# Patient Record
Sex: MALE | Race: BLACK OR AFRICAN AMERICAN | NOT HISPANIC OR LATINO | Employment: OTHER | URBAN - METROPOLITAN AREA
[De-identification: names, ages, dates, MRNs, and addresses within clinical notes are randomized per-mention and may not be internally consistent; named-entity substitution may affect disease eponyms.]

---

## 2017-01-03 ENCOUNTER — OFFICE VISIT (OUTPATIENT)
Dept: UROLOGY | Facility: CLINIC | Age: 72
End: 2017-01-03
Attending: UROLOGY
Payer: MEDICARE

## 2017-01-03 VITALS
HEIGHT: 70 IN | BODY MASS INDEX: 36.65 KG/M2 | SYSTOLIC BLOOD PRESSURE: 134 MMHG | HEART RATE: 53 BPM | WEIGHT: 256 LBS | DIASTOLIC BLOOD PRESSURE: 75 MMHG

## 2017-01-03 DIAGNOSIS — N52.9 ERECTILE DYSFUNCTION, UNSPECIFIED ERECTILE DYSFUNCTION TYPE: ICD-10-CM

## 2017-01-03 DIAGNOSIS — C61 CANCER OF PROSTATE WITH HIGH RECURRENCE RISK (STAGE T3A OR GLEASON 8-10 OR PSA > 20): Primary | ICD-10-CM

## 2017-01-03 LAB
BILIRUB SERPL-MCNC: NORMAL MG/DL
BLOOD URINE, POC: NORMAL
COLOR, POC UA: NORMAL
GLUCOSE UR QL STRIP: NORMAL
KETONES UR QL STRIP: NORMAL
LEUKOCYTE ESTERASE URINE, POC: NORMAL
NITRITE, POC UA: NORMAL
PH, POC UA: 6
PROTEIN, POC: NORMAL
SPECIFIC GRAVITY, POC UA: 1.02
UROBILINOGEN, POC UA: NORMAL

## 2017-01-03 PROCEDURE — 99213 OFFICE O/P EST LOW 20 MIN: CPT | Mod: S$PBB,,, | Performed by: UROLOGY

## 2017-01-03 PROCEDURE — 99213 OFFICE O/P EST LOW 20 MIN: CPT | Mod: PBBFAC | Performed by: UROLOGY

## 2017-01-03 PROCEDURE — 81002 URINALYSIS NONAUTO W/O SCOPE: CPT | Mod: PBBFAC | Performed by: UROLOGY

## 2017-01-03 PROCEDURE — 99999 PR PBB SHADOW E&M-EST. PATIENT-LVL III: CPT | Mod: PBBFAC,,, | Performed by: UROLOGY

## 2017-01-03 NOTE — PROGRESS NOTES
"Subjective:      Zheng Talley Jr. is a 71 y.o. male who returns today regarding his     Prostate cancer approximately one year status post surgery.    No pads  +nocturia when he drinks caffeine    No sxs of met disease    The following portions of the patient's history were reviewed and updated as appropriate: allergies, current medications, past family history, past medical history, past social history, past surgical history and problem list.    Review of Systems  Pertinent items are noted in HPI.  A comprehensive multipoint review of systems was negative except as otherwise stated in the HPI.     Objective:   Vitals:   Visit Vitals    /75 (BP Location: Right arm, Patient Position: Sitting, BP Method: Automatic)    Pulse (!) 53    Ht 5' 10" (1.778 m)    Wt 116.1 kg (256 lb)    BMI 36.73 kg/m2       Physical Exam   General: alert and oriented, no acute distress  Respiratory: Symmetric expansion, non-labored breathing  Cardiovascular: no peripheral edema  Abdomen: non distended  Skin: normal coloration and turgor, no rashes, no suspicious skin lesions noted  Neuro: no gross deficits  Psych: normal judgment and insight, normal mood/affect and non-anxious  BLANQUITA empty fossa    Physical Exam    Lab Review   Urinalysis demonstrates micro ua 0-1rbc; no wbc; no bacteria    Lab Results   Component Value Date    WBC 8.63 07/18/2015    HGB 14.3 07/18/2015    HCT 43.7 07/18/2015    MCV 85 07/18/2015     07/18/2015     Lab Results   Component Value Date    CREATININE 1.0 07/18/2015    BUN 13 07/18/2015     Lab Results   Component Value Date    PSA 3.4 02/24/2014    PSA 2.80 02/04/2013    PSA 3.13 07/11/2012    PSADIAG 0.01 11/28/2016    PSADIAG <0.01 06/03/2016    PSADIAG <0.01 03/03/2016         Imaging  -  Assessment and Plan:   Cancer of prostate with high recurrence risk (stage T3a or [place] 8-10 or PSA > 20)  didier 9 4+5 [id number] JEANETTE  rtc 6 months with psa      Erectile dysfunction, unspecified " erectile dysfunction type    Other orders  -     POCT URINE DIPSTICK WITHOUT MICROSCOPE

## 2017-01-25 ENCOUNTER — TELEPHONE (OUTPATIENT)
Dept: UROLOGY | Facility: CLINIC | Age: 72
End: 2017-01-25

## 2017-01-25 RX ORDER — AMLODIPINE BESYLATE 10 MG/1
TABLET ORAL
Qty: 90 TABLET | Refills: 0 | Status: SHIPPED | OUTPATIENT
Start: 2017-01-25 | End: 2017-05-26 | Stop reason: SDUPTHER

## 2017-01-25 RX ORDER — PRAVASTATIN SODIUM 40 MG/1
TABLET ORAL
Qty: 90 TABLET | Refills: 0 | Status: SHIPPED | OUTPATIENT
Start: 2017-01-25 | End: 2017-07-28 | Stop reason: SDUPTHER

## 2017-01-25 RX ORDER — LOSARTAN POTASSIUM AND HYDROCHLOROTHIAZIDE 25; 100 MG/1; MG/1
TABLET ORAL
Qty: 90 TABLET | Refills: 0 | Status: SHIPPED | OUTPATIENT
Start: 2017-01-25 | End: 2017-05-26 | Stop reason: SDUPTHER

## 2017-01-25 RX ORDER — METOPROLOL SUCCINATE 50 MG/1
TABLET, EXTENDED RELEASE ORAL
Qty: 90 TABLET | Refills: 0 | Status: SHIPPED | OUTPATIENT
Start: 2017-01-25 | End: 2017-05-26 | Stop reason: SDUPTHER

## 2017-01-25 NOTE — TELEPHONE ENCOUNTER
Pt called today and states he has been having groin pain.  He has been working out at the gym and thought it was soreness from this, but it has gone away and now come back.  He is not sure if he should see you or PCP.

## 2017-01-25 NOTE — TELEPHONE ENCOUNTER
----- Message from Lupe Umana sent at 1/25/2017  3:33 PM CST -----  Contact: pt   X_  1st Request  _  2nd Request  _  3rd Request    Who:RAYSHAWN ROBBINS JR. [[id number]]    Why: Patient states he would like to speak with the staff in regards to an unresolved problem he is experiencing      What Number to Call Back: [phone]    When to Expect a call back: (Before the end of the day)   -- if call after 3:00 call back will be tomorrow.

## 2017-01-30 ENCOUNTER — OFFICE VISIT (OUTPATIENT)
Dept: UROLOGY | Facility: CLINIC | Age: 72
End: 2017-01-30
Attending: UROLOGY
Payer: MEDICARE

## 2017-01-30 VITALS
BODY MASS INDEX: 36.65 KG/M2 | HEART RATE: 61 BPM | WEIGHT: 256 LBS | HEIGHT: 70 IN | SYSTOLIC BLOOD PRESSURE: 128 MMHG | DIASTOLIC BLOOD PRESSURE: 71 MMHG

## 2017-01-30 DIAGNOSIS — R10.32 BILATERAL GROIN PAIN: Primary | ICD-10-CM

## 2017-01-30 DIAGNOSIS — R10.31 BILATERAL GROIN PAIN: Primary | ICD-10-CM

## 2017-01-30 LAB
BILIRUB SERPL-MCNC: ABNORMAL MG/DL
BLOOD URINE, POC: ABNORMAL
COLOR, POC UA: ABNORMAL
GLUCOSE UR QL STRIP: ABNORMAL
KETONES UR QL STRIP: ABNORMAL
LEUKOCYTE ESTERASE URINE, POC: ABNORMAL
NITRITE, POC UA: ABNORMAL
PH, POC UA: 5
PROTEIN, POC: ABNORMAL
SPECIFIC GRAVITY, POC UA: 1.01
UROBILINOGEN, POC UA: ABNORMAL

## 2017-01-30 PROCEDURE — 99213 OFFICE O/P EST LOW 20 MIN: CPT | Mod: PBBFAC | Performed by: UROLOGY

## 2017-01-30 PROCEDURE — 99212 OFFICE O/P EST SF 10 MIN: CPT | Mod: S$PBB,,, | Performed by: UROLOGY

## 2017-01-30 PROCEDURE — 81002 URINALYSIS NONAUTO W/O SCOPE: CPT | Mod: PBBFAC | Performed by: UROLOGY

## 2017-01-30 PROCEDURE — 99999 PR PBB SHADOW E&M-EST. PATIENT-LVL III: CPT | Mod: PBBFAC,,, | Performed by: UROLOGY

## 2017-01-30 NOTE — PROGRESS NOTES
"Subjective:      Zheng Talley Jr. is a 71 y.o. male who returns today regarding his     Bilateral groin pain which is worse when he is working in his garage.  This resolved spontaneously.  No bulge to suggest a hernia.  Currently no complaints.  He is having no pain today.    The discomfort was mild.  No renal colic.  No hematuria.  Dysuria.  No urinary complaints    The following portions of the patient's history were reviewed and updated as appropriate: allergies, current medications, past family history, past medical history, past social history, past surgical history and problem list.    Review of Systems  Pertinent items are noted in HPI.  A comprehensive multipoint review of systems was negative except as otherwise stated in the HPI.     Objective:   Vitals:   Visit Vitals    /71 (BP Location: Right arm, Patient Position: Sitting, BP Method: Automatic)    Pulse 61    Ht 5' 10" (1.778 m)    Wt 116.1 kg (256 lb)    BMI 36.73 kg/m2       Physical Exam   General: alert and oriented, no acute distress  Respiratory: Symmetric expansion, non-labored breathing  Cardiovascular: no peripheral edema  Abdomen: soft, non distended  Skin: normal coloration and turgor, no rashes, no suspicious skin lesions noted  Neuro: no gross deficits  Psych: normal judgment and insight, normal mood/affect and non-anxious  No inguinal hernias palpable.  Testes epididymis and penis normal.  Scrotum normal.  No tenderness  Physical Exam    Lab Review   Urinalysis demonstrates trace blood on dipstick.  Microscopic urinalysis 0-2 red blood cells otherwise negative  Lab Results   Component Value Date    WBC 8.63 07/18/2015    HGB 14.3 07/18/2015    HCT 43.7 07/18/2015    MCV 85 07/18/2015     07/18/2015     Lab Results   Component Value Date    CREATININE 1.0 07/18/2015    BUN 13 07/18/2015       Imaging  -  Assessment and Plan:   Bilateral groin pain; resolved appears to have been musculoskeletal in origin  -     POCT URINE " DIPSTICK WITHOUT MICROSCOPE    Return to clinic as needed if the pain recurs.  Follow-up as previously scheduled for his prostate cancer

## 2017-03-08 ENCOUNTER — HOSPITAL ENCOUNTER (EMERGENCY)
Facility: OTHER | Age: 72
Discharge: HOME | End: 2017-03-08
Attending: EMERGENCY MEDICINE
Payer: MEDICARE

## 2017-03-08 VITALS
HEIGHT: 70 IN | WEIGHT: 248 LBS | RESPIRATION RATE: 18 BRPM | SYSTOLIC BLOOD PRESSURE: 131 MMHG | BODY MASS INDEX: 35.5 KG/M2 | OXYGEN SATURATION: 94 % | HEART RATE: 48 BPM | DIASTOLIC BLOOD PRESSURE: 71 MMHG | TEMPERATURE: 98 F

## 2017-03-08 DIAGNOSIS — S61.011A LACERATION OF THUMB, RIGHT, INITIAL ENCOUNTER: Primary | ICD-10-CM

## 2017-03-08 PROCEDURE — 63600175 PHARM REV CODE 636 W HCPCS: Performed by: EMERGENCY MEDICINE

## 2017-03-08 PROCEDURE — 90715 TDAP VACCINE 7 YRS/> IM: CPT | Performed by: EMERGENCY MEDICINE

## 2017-03-08 PROCEDURE — 25000003 PHARM REV CODE 250: Performed by: EMERGENCY MEDICINE

## 2017-03-08 PROCEDURE — 90471 IMMUNIZATION ADMIN: CPT | Performed by: EMERGENCY MEDICINE

## 2017-03-08 PROCEDURE — 99284 EMERGENCY DEPT VISIT MOD MDM: CPT | Mod: 25

## 2017-03-08 PROCEDURE — 12042 INTMD RPR N-HF/GENIT2.6-7.5: CPT

## 2017-03-08 RX ORDER — OXYCODONE AND ACETAMINOPHEN 5; 325 MG/1; MG/1
1 TABLET ORAL
Status: COMPLETED | OUTPATIENT
Start: 2017-03-08 | End: 2017-03-08

## 2017-03-08 RX ORDER — LIDOCAINE HYDROCHLORIDE 10 MG/ML
5 INJECTION INFILTRATION; PERINEURAL
Status: COMPLETED | OUTPATIENT
Start: 2017-03-08 | End: 2017-03-08

## 2017-03-08 RX ORDER — BACITRACIN ZINC 500 UNIT/G
OINTMENT (GRAM) TOPICAL 2 TIMES DAILY
Qty: 30 G | Refills: 0 | Status: SHIPPED | OUTPATIENT
Start: 2017-03-08 | End: 2017-03-18

## 2017-03-08 RX ORDER — OXYCODONE AND ACETAMINOPHEN 5; 325 MG/1; MG/1
1 TABLET ORAL EVERY 4 HOURS PRN
Qty: 10 TABLET | Refills: 0 | Status: SHIPPED | OUTPATIENT
Start: 2017-03-08 | End: 2017-05-26

## 2017-03-08 RX ORDER — CEPHALEXIN 500 MG/1
500 CAPSULE ORAL 4 TIMES DAILY
Qty: 20 CAPSULE | Refills: 0 | Status: SHIPPED | OUTPATIENT
Start: 2017-03-08 | End: 2017-03-13

## 2017-03-08 RX ADMIN — BACITRACIN, NEOMYCIN, POLYMYXIN B 1 EACH: 400; 3.5; 5 OINTMENT TOPICAL at 10:03

## 2017-03-08 RX ADMIN — OXYCODONE HYDROCHLORIDE AND ACETAMINOPHEN 1 TABLET: 5; 325 TABLET ORAL at 10:03

## 2017-03-08 RX ADMIN — CLOSTRIDIUM TETANI TOXOID ANTIGEN (FORMALDEHYDE INACTIVATED), CORYNEBACTERIUM DIPHTHERIAE TOXOID ANTIGEN (FORMALDEHYDE INACTIVATED), BORDETELLA PERTUSSIS TOXOID ANTIGEN (GLUTARALDEHYDE INACTIVATED), BORDETELLA PERTUSSIS FILAMENTOUS HEMAGGLUTININ ANTIGEN (FORMALDEHYDE INACTIVATED), BORDETELLA PERTUSSIS PERTACTIN ANTIGEN, AND BORDETELLA PERTUSSIS FIMBRIAE 2/3 ANTIGEN 0.5 ML: 5; 2; 2.5; 5; 3; 5 INJECTION, SUSPENSION INTRAMUSCULAR at 10:03

## 2017-03-08 RX ADMIN — LIDOCAINE HYDROCHLORIDE 5 ML: 10 INJECTION, SOLUTION INFILTRATION; PERINEURAL at 10:03

## 2017-03-08 NOTE — ED AVS SNAPSHOT
[facility]  [address] [facility] [address]               Zheng Talley Jr.   3/8/2017  9:23 PM   ED    Description:  Male : 1945   Department:  [facility]           Your Care was Coordinated By:     Provider Role From To    Rebecca Abernathy MD Attending Provider 17 [phone] --      Reason for Visit     Laceration           Diagnoses this Visit        Comments    Laceration of thumb, right, initial encounter    -  Primary       ED Disposition     ED Disposition Condition Comment    Discharge             To Do List           Follow-up Information     Follow up with Claude S. Williams Iv, MD. Schedule an appointment as soon as possible for a visit in 2 days.    Specialty:  Orthopedic Surgery    Contact information:    [address] [facility] [id number] [phone]          Go to EM[facility].    Why:  If symptoms worsen- fever, pus, worsened pain    Contact information:    [address] [facility] [address]         These Medications        Disp Refills Start End    cephALEXin (KEFLEX) 500 MG capsule 20 capsule 0 3/8/2017 3/13/2017    Take 1 capsule (500 mg total) by mouth 4 (four) times daily. - Oral    Pharmacy: [facility] [address] [place] [address] Tarena Koko Manzanares Ph #: [phone]       oxycodone-acetaminophen (PERCOCET) 5-325 mg per tablet 10 tablet 0 3/8/2017     Take 1 tablet by mouth every 4 (four) hours as needed for Pain. - Oral    Pharmacy: [facility] [address] [place] [address] [address] Ph #: [phone]       bacitracin 500 unit/gram Oint 30 g 0 3/8/2017 3/18/2017    Apply topically 2 (two) times daily. - Topical (Top)    Pharmacy: [facility] [address] [place] [address] [address] Ph #: [phone]         [facility]     [facility] -  Assistance  Registered nurses in the [facility] provide clinical advisement, health  education, appointment booking, and other advisory services.  Call for this free service at [phone].             Medications           Message regarding Medications     Verify the changes and/or additions to your medication regime listed below are the same as discussed with your clinician today.  If any of these changes or additions are incorrect, please notify your healthcare provider.        START taking these NEW medications        Refills    cephALEXin (KEFLEX) 500 MG capsule 0    Sig: Take 1 capsule (500 mg total) by mouth 4 (four) times daily.    Class: Print    Route: Oral    oxycodone-acetaminophen (PERCOCET) 5-325 mg per tablet 0    Sig: Take 1 tablet by mouth every 4 (four) hours as needed for Pain.    Class: Print    Route: Oral    bacitracin 500 unit/gram Oint 0    Sig: Apply topically 2 (two) times daily.    Class: Print    Route: Topical (Top)      These medications were administered today        Dose Freq    lidocaine HCL 10 mg/ml (1%) injection 5 mL 5 mL ED 1 Time    Si mLs by Infiltration route ED 1 Time.    Class: Normal    Route: Infiltration    neomycin-bacitracnZn-polymyxnB packet 1 each 1 packet ED 1 Time    Sig: Apply 1 each topically ED 1 Time.    Class: Normal    Route: Topical (Top)    Tdap vaccine injection 0.5 mL 0.5 mL Once    Sig: Inject 0.5 mLs into the muscle once.    Class: Normal    Route: Intramuscular    oxycodone-acetaminophen 5-325 mg per tablet 1 tablet 1 tablet ED 1 Time    Sig: Take 1 tablet by mouth ED 1 Time.    Class: Normal    Route: Oral           Verify that the below list of medications is an accurate representation of the medications you are currently taking.  If none reported, the list may be blank. If incorrect, please contact your healthcare provider. Carry this list with you in case of emergency.           Current Medications     amlodipine (NORVASC) 10 MG tablet TAKE 1 TABLET DAILY    losartan-hydrochlorothiazide 100-25 mg (HYZAAR) 100-25 mg per tablet  "TAKE 1 TABLET DAILY    metoprolol succinate (TOPROL-XL) 50 MG 24 hr tablet TAKE 1 TABLET DAILY    pravastatin (PRAVACHOL) 40 MG tablet TAKE 1 TABLET EVERY EVENING    valacyclovir (VALTREX) 1000 MG tablet Take 1 tablet (1,000 mg total) by mouth once daily.    bacitracin 500 unit/gram Oint Apply topically 2 (two) times daily.    cephALEXin (KEFLEX) 500 MG capsule Take 1 capsule (500 mg total) by mouth 4 (four) times daily.    oxycodone-acetaminophen (PERCOCET) 5-325 mg per tablet Take 1 tablet by mouth every 4 (four) hours as needed for Pain.    sildenafil (REVATIO) 20 mg Tab Take 1 tablet (20 mg total) by mouth daily as needed.           Clinical Reference Information           Your Vitals Were     BP Pulse Temp Resp Height Weight    141/77 (BP Location: Left arm) 56 98.3 °F (36.8 °C) (Oral) 18 5' 10" (1.778 m) 112.5 kg (248 lb)    SpO2 BMI             96% 35.58 kg/m2         Allergies as of 3/8/2017        Reactions    Iodine And Iodide Containing Products Other (See Comments)    Blood in urine in the 1970s      Immunizations Administered on Date of Encounter - 3/8/2017     Name Date Dose VIS Date Route    TDAP 3/8/2017 0.5 mL 2/24/2015 Intramuscular      ED Micro, Lab, POCT     None      ED Imaging Orders     Start Ordered       Status Ordering Provider    03/08/17 1951 03/08/17 1951  X-Ray Finger 2 or More Views  1 time imaging      Final result         Discharge Instructions         Extremity Laceration: Sutures, Staples, or Tape  A laceration is a cut through the skin. If it is deep, it may require stitches (sutures) or staples to close so it can heal. Minor cuts may be treated with surgical tape closures.   X-rays may be done if something may have entered the skin through the cut. You may also need a tetanus shot if you are not up to date on this vaccination.  Home care  · Follow the health care providers instructions on how to care for the cut.  · Wash your hands with soap and warm water before and after " caring for your wound. This is to help prevent infection.  · Keep the wound clean and dry. If a bandage was applied and it becomes wet or dirty, replace it. Otherwise, leave it in place for the first 24 hours, then change it once a day or as directed.  · If sutures or staples were used, clean the wound daily:  · After removing the bandage, wash the area with soap and water. Use a wet cotton swab to loosen and remove any blood or crust that forms.  · After cleaning, keep the wound clean and dry. Talk with your doctor before applying any antibiotic ointment to the wound. Reapply the bandage.  · You may remove the bandage to shower as usual after the first 24 hours, but do not soak the area in water (no swimming) until the stitches or staples are removed.  · If surgical tape closures were used, keep the area clean and dry. If it becomes wet, blot it dry with a towel.  · The doctor may prescribe an antibiotic cream or ointment to prevent infection. Do not stop taking this medication until you have finished the prescribed course or the doctor tells you to stop. The doctor may also prescribe medications for pain. Follow the doctors instructions for taking these medications.  · Avoid activities that may reopen your wound.  Follow-up care  Follow up with your health care provider. Most skin wounds heal within ten days. However, an infection may sometimes occur despite proper treatment. Therefore, check the wound daily for the signs of infection listed below. Stitches and staples should be removed within 7-14 days. If surgical tape closures were used, you may remove them after 10 days if they have not fallen off by then.   When to seek medical advice  Call your health care provider right away if any of these occur:  · Wound bleeding not controlled by direct pressure  · Signs of infection, including increasing pain in the wound, increasing wound redness or swelling, or pus or bad odor coming from the wound  · Fever  of 100.4°F (38ºC) or higher or as directed by your healthcare provider  · Stitches or staples come apart or fall out or surgical tape falls off before 7 days  · Wound edges re-open  · Wound changes colors  · Numbness around the wound   · Decreased movement around the injured area  Date Last Reviewed: 6/14/2015  © 2160-0799 [employer]. [address] [place] [address], [place] [address]. All rights reserved. This information is not intended as a substitute for professional medical care. Always follow your healthcare professional's instructions.          Your Scheduled Appointments     Jul 07, 2017  7:30 AM CDT   Established Patient Visit with Derrick Wu MD   [facility]    [address] [place] [address], [facility] [address]  [facility] [address] [phone]               [facility] complies with applicable Federal civil rights laws and does not discriminate on the basis of race, color, national origin, age, disability, or sex.        Language Assistance Services     ATTENTION: Language assistance services are available, free of charge. Please call [phone].      ATENCIÓN: Si habla lucasañol, tiene a newell disposición servicios gratuitos de asistencia lingüística. Llame al [phone].     CHÚ Ý: N?u b?n nói Ti?ng Vi?t, có các d?ch v? h? tr? ngôn ng? mi?n phí dành cho b?n. G?i s? [phone].

## 2017-03-09 NOTE — DISCHARGE INSTRUCTIONS
Extremity Laceration: Sutures, Staples, or Tape  A laceration is a cut through the skin. If it is deep, it may require stitches (sutures) or staples to close so it can heal. Minor cuts may be treated with surgical tape closures.   X-rays may be done if something may have entered the skin through the cut. You may also need a tetanus shot if you are not up to date on this vaccination.  Home care  · Follow the health care providers instructions on how to care for the cut.  · Wash your hands with soap and warm water before and after caring for your wound. This is to help prevent infection.  · Keep the wound clean and dry. If a bandage was applied and it becomes wet or dirty, replace it. Otherwise, leave it in place for the first 24 hours, then change it once a day or as directed.  · If sutures or staples were used, clean the wound daily:  · After removing the bandage, wash the area with soap and water. Use a wet cotton swab to loosen and remove any blood or crust that forms.  · After cleaning, keep the wound clean and dry. Talk with your doctor before applying any antibiotic ointment to the wound. Reapply the bandage.  · You may remove the bandage to shower as usual after the first 24 hours, but do not soak the area in water (no swimming) until the stitches or staples are removed.  · If surgical tape closures were used, keep the area clean and dry. If it becomes wet, blot it dry with a towel.  · The doctor may prescribe an antibiotic cream or ointment to prevent infection. Do not stop taking this medication until you have finished the prescribed course or the doctor tells you to stop. The doctor may also prescribe medications for pain. Follow the doctors instructions for taking these medications.  · Avoid activities that may reopen your wound.  Follow-up care  Follow up with your health care provider. Most skin wounds heal within ten days. However, an infection may sometimes occur despite proper treatment.  Therefore, check the wound daily for the signs of infection listed below. Stitches and staples should be removed within 7-14 days. If surgical tape closures were used, you may remove them after 10 days if they have not fallen off by then.   When to seek medical advice  Call your health care provider right away if any of these occur:  · Wound bleeding not controlled by direct pressure  · Signs of infection, including increasing pain in the wound, increasing wound redness or swelling, or pus or bad odor coming from the wound  · Fever of 100.4°F (38ºC) or higher or as directed by your healthcare provider  · Stitches or staples come apart or fall out or surgical tape falls off before 7 days  · Wound edges re-open  · Wound changes colors  · Numbness around the wound   · Decreased movement around the injured area  Date Last Reviewed: 6/14/2015  © 6404-2966 [employer]. [address] [place] [address], [place] [address]. All rights reserved. This information is not intended as a substitute for professional medical care. Always follow your healthcare professional's instructions.

## 2017-03-09 NOTE — ED TRIAGE NOTES
Pt states he cut his R thumb while working with a table saw.  Not currently bleeding.  [place]-shaped laceration noted to pad of R thumb, approx 3.5 cm

## 2017-03-09 NOTE — ED PROVIDER NOTES
Encounter Date: 3/8/2017    SCRIBE #1 NOTE: I, Landy Rojas, am scribing for, and in the presence of,  Dr. Abernathy. I have scribed the entire note.       History     Chief Complaint   Patient presents with    Laceration     pt has a laceration to the R thumb from a table saw, bleeding stable     Review of patient's allergies indicates:   Allergen Reactions    Iodine and iodide containing products Other (See Comments)     Blood in urine in the 1970s     HPI Comments: Time seen by provider: 9:52 PM    This is a 72 y.o. male who presents with complaint of right thumb laceration. He reports onset of incident was a few hours ago. The patient notes he was working with a circular saw when he accidentally sliced his finger. He states he had immediate bleeding from the wound that was controlled with compress. The patient notes associated numbness to the tip of the finger but denies any swelling or weakness to the left finger. He denies any other injury. The patient denies being on blood thinners or history of bleeding disorder. He states he is right hand dominant.     The history is provided by the patient.     Past Medical History:   Diagnosis Date    BPH (benign prostatic hyperplasia)     Cancer     Groin pain     History of gastroesophageal reflux (GERD)     Hyperlipidemia     Hypertension     Nuclear sclerosis - Both Eyes 2/18/2014     Past Surgical History:   Procedure Laterality Date    BUNIONECTOMY      bilateral     COLONOSCOPY  2011    Dr. Velez    HAND SURGERY      lip surgery       Family History   Problem Relation Age of Onset    Cancer Father      leukemia    Diabetes Father     Coronary artery disease Mother     Heart disease Neg Hx     Amblyopia Neg Hx     Blindness Neg Hx     Cataracts Neg Hx     Glaucoma Neg Hx     Macular degeneration Neg Hx     Retinal detachment Neg Hx     Strabismus Neg Hx      Social History   Substance Use Topics    Smoking status: Former Smoker      Types: Cigarettes    Smokeless tobacco: Never Used      Comment: QUIT 1976    Alcohol use 0.0 oz/week     0 Standard drinks or equivalent per week      Comment: occasionally     Review of Systems   Constitutional: Negative for chills and fever.   HENT: Negative for congestion and sore throat.    Eyes: Negative for redness and visual disturbance.   Respiratory: Negative for cough and shortness of breath.    Cardiovascular: Negative for chest pain and palpitations.   Gastrointestinal: Negative for abdominal pain, diarrhea, nausea and vomiting.   Genitourinary: Negative for dysuria.   Musculoskeletal: Negative for back pain.   Skin: Positive for wound (right thumb laceration). Negative for rash.   Neurological: Negative for weakness and headaches.   Psychiatric/Behavioral: Negative for confusion.       Physical Exam   Initial Vitals   BP Pulse Resp Temp SpO2   03/08/17 1947 03/08/17 1947 03/08/17 1947 03/08/17 1947 03/08/17 1947   141/77 56 18 98.3 °F (36.8 °C) 96 %     Physical Exam    Nursing note and vitals reviewed.  Constitutional: He appears well-developed and well-nourished. He is not diaphoretic. No distress.   HENT:   Head: Normocephalic and atraumatic.   Right Ear: External ear normal.   Left Ear: External ear normal.   Eyes: Conjunctivae and EOM are normal.   Neck: Normal range of motion. Neck supple.   Cardiovascular: Intact distal pulses.   Pulmonary/Chest: No respiratory distress.   Musculoskeletal: Normal range of motion. He exhibits tenderness. He exhibits no edema.        Right hand: He exhibits normal capillary refill. Decreased sensation is not present in the ulnar distribution, is not present in the medial distribution and is not present in the radial distribution. Right thumb: Exhibits bleeding, swelling and tenderness. Injuries: laceration. Motor /Testing: : 5/5. Thumb APB: 5/5. Thumb FPL: 5/5. Pinch Mechanism: 5/5.        Hands:  Semi-circular irregular deep laceration to right thumb distal  to DIP joint with extensive tissue injury and maceration of wound borders. Able to fully flex and extend digit as well as perform opposition. Full distal sensation intact.    Lymphadenopathy:     He has no cervical adenopathy.   Neurological: He is alert and oriented to person, place, and time. He has normal strength.   Skin: Skin is warm and dry. Laceration noted. No rash noted.         ED Course   Lac Repair  Date/Time: 3/8/2017 10:15 PM  Performed by: TIFFANY JULIEN  Authorized by: TIFFANY JULIEN   Body area: upper extremity  Location details: right thumb  Laceration length: 3 cm  Foreign bodies: no foreign bodies  Tendon involvement: none  Vascular damage: yes  Anesthesia: digital block    Anesthesia:  Anesthesia: digital block  Local Anesthetic: lidocaine 1% without epinephrine   Anesthetic total: 4 mL  Patient sedated: no  Preparation: Patient was prepped and draped in the usual sterile fashion.  Irrigation solution: saline  Irrigation method: syringe  Amount of cleaning: extensive  Debridement: moderate  Degree of undermining: minimal  Skin closure: 5-0 nylon  Number of sutures: 8  Technique: simple, horizontal mattress and vertical mattress (interrupted)  Approximation: loose  Approximation difficulty: complex  Dressing: antibiotic ointment, non-stick sterile dressing, biologic dressing, bulky dressing, dressing applied and splint for protection  Patient tolerance: Patient tolerated the procedure well with no immediate complications        Labs Reviewed - No data to display       X-Rays:   Independently Interpreted Readings:   Other Readings:  Finger 2 or More View X-ray Reading (9:51 PM): No foreign body. No fracture or dislocation.     Medical Decision Making:   Initial Assessment:   Urgent evaluation of 72-year-old right-hand-dominant gentleman with history of hypertension presenting with an injury to the right thumb from a saw while performing woodworking.  On exam patient has 3 cm irregular  laceration with macerated tissue present distal to the DIP joint on the palmar surface.  Patient has full sensation, opposition, flexion and extension strength to the digit.  Wound irrigated, repaired, tetanus updated, and discharged home with empiric antibiotics, splint, and strict return precautions regarding infection, fever, purulence, and instructions for wound care.     Additional MDM:   X-Rays: I have independently interpreted X-Ray(s) - see notes.          Scribe Attestation:   Scribe #1: I performed the above scribed service and the documentation accurately describes the services I performed. I attest to the accuracy of the note.    Attending Attestation:           Physician Attestation for Scribe:  Physician Attestation Statement for Scribe #1: I, Dr. Abernathy, reviewed documentation, as scribed by Landy Rojas in my presence, and it is both accurate and complete.                 ED Course     Clinical Impression:     1. Laceration of thumb, right, initial encounter          Disposition:   Disposition: Discharged  Condition: Stable       Rebecca Abernathy MD  03/10/17 1151

## 2017-03-15 ENCOUNTER — PATIENT MESSAGE (OUTPATIENT)
Dept: INTERNAL MEDICINE | Facility: CLINIC | Age: 72
End: 2017-03-15

## 2017-03-15 NOTE — TELEPHONE ENCOUNTER
I believe [facility] does take Medicare.  He would have to confirm that prior to the appointment.  Please remind the patient that [facility] is an [facility] partner physician group and that we work closely with them.

## 2017-05-16 RX ORDER — METOPROLOL SUCCINATE 50 MG/1
TABLET, EXTENDED RELEASE ORAL
Qty: 90 TABLET | OUTPATIENT
Start: 2017-05-16

## 2017-05-16 RX ORDER — LOSARTAN POTASSIUM AND HYDROCHLOROTHIAZIDE 25; 100 MG/1; MG/1
TABLET ORAL
Qty: 90 TABLET | OUTPATIENT
Start: 2017-05-16

## 2017-05-16 RX ORDER — AMLODIPINE BESYLATE 10 MG/1
TABLET ORAL
Qty: 90 TABLET | OUTPATIENT
Start: 2017-05-16

## 2017-05-17 ENCOUNTER — PATIENT MESSAGE (OUTPATIENT)
Dept: INTERNAL MEDICINE | Facility: CLINIC | Age: 72
End: 2017-05-17

## 2017-05-26 ENCOUNTER — OFFICE VISIT (OUTPATIENT)
Dept: INTERNAL MEDICINE | Facility: CLINIC | Age: 72
End: 2017-05-26
Attending: FAMILY MEDICINE
Payer: MEDICARE

## 2017-05-26 ENCOUNTER — LAB VISIT (OUTPATIENT)
Dept: LAB | Facility: OTHER | Age: 72
End: 2017-05-26
Attending: FAMILY MEDICINE
Payer: MEDICARE

## 2017-05-26 VITALS
BODY MASS INDEX: 34.84 KG/M2 | WEIGHT: 243.38 LBS | SYSTOLIC BLOOD PRESSURE: 120 MMHG | DIASTOLIC BLOOD PRESSURE: 70 MMHG | HEART RATE: 94 BPM | HEIGHT: 70 IN

## 2017-05-26 DIAGNOSIS — C61 PROSTATE CANCER: ICD-10-CM

## 2017-05-26 DIAGNOSIS — I10 ESSENTIAL HYPERTENSION: ICD-10-CM

## 2017-05-26 DIAGNOSIS — I10 ESSENTIAL HYPERTENSION: Primary | ICD-10-CM

## 2017-05-26 LAB
ALBUMIN SERPL BCP-MCNC: 4.1 G/DL
ALP SERPL-CCNC: 57 U/L
ALT SERPL W/O P-5'-P-CCNC: 23 U/L
ANION GAP SERPL CALC-SCNC: 10 MMOL/L
AST SERPL-CCNC: 21 U/L
BASOPHILS # BLD AUTO: 0.03 K/UL
BASOPHILS NFR BLD: 0.5 %
BILIRUB SERPL-MCNC: 0.5 MG/DL
BUN SERPL-MCNC: 17 MG/DL
CALCIUM SERPL-MCNC: 9.7 MG/DL
CHLORIDE SERPL-SCNC: 101 MMOL/L
CO2 SERPL-SCNC: 29 MMOL/L
CREAT SERPL-MCNC: 1.3 MG/DL
DIFFERENTIAL METHOD: ABNORMAL
EOSINOPHIL # BLD AUTO: 0.1 K/UL
EOSINOPHIL NFR BLD: 1.4 %
ERYTHROCYTE [DISTWIDTH] IN BLOOD BY AUTOMATED COUNT: 14.6 %
EST. GFR  (AFRICAN AMERICAN): >60 ML/MIN/1.73 M^2
EST. GFR  (NON AFRICAN AMERICAN): 55 ML/MIN/1.73 M^2
GLUCOSE SERPL-MCNC: 82 MG/DL
HCT VFR BLD AUTO: 47.5 %
HGB BLD-MCNC: 15.5 G/DL
LYMPHOCYTES # BLD AUTO: 1.6 K/UL
LYMPHOCYTES NFR BLD: 27.5 %
MCH RBC QN AUTO: 28.1 PG
MCHC RBC AUTO-ENTMCNC: 32.6 %
MCV RBC AUTO: 86 FL
MONOCYTES # BLD AUTO: 0.6 K/UL
MONOCYTES NFR BLD: 10.7 %
NEUTROPHILS # BLD AUTO: 3.4 K/UL
NEUTROPHILS NFR BLD: 59.7 %
PLATELET # BLD AUTO: 181 K/UL
PMV BLD AUTO: 10.7 FL
POTASSIUM SERPL-SCNC: 4 MMOL/L
PROT SERPL-MCNC: 7.5 G/DL
RBC # BLD AUTO: 5.52 M/UL
SODIUM SERPL-SCNC: 140 MMOL/L
WBC # BLD AUTO: 5.71 K/UL

## 2017-05-26 PROCEDURE — 85025 COMPLETE CBC W/AUTO DIFF WBC: CPT

## 2017-05-26 PROCEDURE — 99999 PR PBB SHADOW E&M-EST. PATIENT-LVL II: CPT | Mod: PBBFAC,,, | Performed by: FAMILY MEDICINE

## 2017-05-26 PROCEDURE — 36415 COLL VENOUS BLD VENIPUNCTURE: CPT

## 2017-05-26 PROCEDURE — 99214 OFFICE O/P EST MOD 30 MIN: CPT | Mod: S$PBB,,, | Performed by: FAMILY MEDICINE

## 2017-05-26 PROCEDURE — 80053 COMPREHEN METABOLIC PANEL: CPT

## 2017-05-26 RX ORDER — AMLODIPINE BESYLATE 10 MG/1
10 TABLET ORAL DAILY
Qty: 90 TABLET | Refills: 3 | Status: SHIPPED | OUTPATIENT
Start: 2017-05-26 | End: 2018-05-24 | Stop reason: SDUPTHER

## 2017-05-26 RX ORDER — METOPROLOL SUCCINATE 50 MG/1
50 TABLET, EXTENDED RELEASE ORAL DAILY
Qty: 90 TABLET | Refills: 3 | Status: SHIPPED | OUTPATIENT
Start: 2017-05-26 | End: 2018-05-24 | Stop reason: SDUPTHER

## 2017-05-26 RX ORDER — VALACYCLOVIR HYDROCHLORIDE 1 G/1
1000 TABLET, FILM COATED ORAL DAILY
Qty: 90 TABLET | Refills: 3 | Status: SHIPPED | OUTPATIENT
Start: 2017-05-26 | End: 2018-07-25 | Stop reason: SDUPTHER

## 2017-05-26 RX ORDER — LOSARTAN POTASSIUM AND HYDROCHLOROTHIAZIDE 25; 100 MG/1; MG/1
1 TABLET ORAL DAILY
Qty: 90 TABLET | Refills: 3 | Status: SHIPPED | OUTPATIENT
Start: 2017-05-26 | End: 2018-05-24 | Stop reason: SDUPTHER

## 2017-05-26 NOTE — PROGRESS NOTES
"CHIEF COMPLAINT: Annual exam and hypertension follow-up    HISTORY OF PRESENT ILLNESS: The patient is a 72 year-old male.  The patient has been treated with robotic prostatectomy since last time I saw him.  He is closely with urologic oncology.  All is going well there.      The patient has a history of stable hypertension on current medications.  Patient denies chest pain or shortness of breath today.    The patient has a history of stable hyperlipidemia on current medications.  The patient denies chest pain or shortness of breath today.  The patient denies muscle aches or myalgias suggestive of myositis.    REVIEW OF SYSTEMS:  GENERAL: No fever, chills, fatigability or weight loss.  SKIN: No rashes, itching or changes in color or texture of skin.  HEAD: No headaches or recent head trauma.  EYES: Visual acuity fine. No photophobia, ocular pain or diplopia.  EARS: Denies ear pain, discharge or vertigo.  NOSE: No loss of smell, no epistaxis or postnasal drip.  MOUTH & THROAT: No hoarseness or change in voice. No excessive gum bleeding.  NODES: Denies swollen glands.  CHEST: Denies CLIFFORD, cyanosis, wheezing, cough and sputum production.  CARDIOVASCULAR: Denies chest pain, PND, orthopnea or reduced exercise tolerance.  ABDOMEN: Appetite fine. No weight loss. Denies diarrhea, abdominal pain, hematemesis or blood in stool.  URINARY: No flank pain, dysuria or hematuria.  PERIPHERAL VASCULAR: No claudication or cyanosis.  MUSCULOSKELETAL: No joint stiffness or swelling. Denies back pain.  NEUROLOGIC: No history of seizures, paralysis, alteration of gait or coordination.    SOCIAL HISTORY: The patient does not smoke.  The patient consumes alcohol socially.  The patient is happily .    PHYSICAL EXAMINATION:     Blood pressure 120/70, pulse 94, height 5' 10" (1.778 m), weight 110.4 kg (243 lb 6.2 oz).    APPEARANCE: Well nourished, well developed, in no acute distress.    HEAD: Normocephalic, atraumatic.  EYES: PERRL. " EOMI.  Conjunctivae without injection and  anicteric  EARS: TM's intact. Light reflex normal. No retraction or perforation.    NOSE: Mucosa pink. Airway clear.  MOUTH & THROAT: No tonsillar enlargement. No pharyngeal erythema or exudate. No stridor.  NECK: Supple.   NODES: No cervical, axillary or inguinal lymph node enlargement.  CHEST: Lungs clear to auscultation.  No retractions are noted.  No rales or rhonchi are present.  CARDIOVASCULAR: Normal S1, S2. No rubs, murmurs or gallops.  ABDOMEN: Bowel sounds normal. Not distended. Soft. No tenderness or masses.  No ascites is noted.  MUSCULOSKELETAL:  There is no clubbing, cyanosis, or edema of the extremities x4.  There is full range of motion of the lumbar spine.  There is full range of motion of the extremities x4.  There is no deformity noted.    NEUROLOGIC:       Normal speech development.      Hearing normal.      Normal gait.      Motor and sensory exams grossly normal.      DTR's normal.  PSYCHIATRIC: Patient is alert and oriented x3.  Thought processes are all normal.  There is no homicidality.  There is no suicidality.  There is no evidence of psychosis.    LABORATORY/RADIOLOGY:   Chart reviewed.  We will update blood work today.    ASSESSMENT:   Normal physical exam in an apparently healthy individual  Prostate cancer, believed to be cured  Hypertension    PLAN:  We will follow-up blood work which we expect to be normal.  Blood pressure medications refilled   I discussed his case with his urologist today.    Return to clinic in one year.

## 2017-06-02 ENCOUNTER — PATIENT MESSAGE (OUTPATIENT)
Dept: INTERNAL MEDICINE | Facility: CLINIC | Age: 72
End: 2017-06-02

## 2017-06-07 ENCOUNTER — TELEPHONE (OUTPATIENT)
Dept: INTERNAL MEDICINE | Facility: CLINIC | Age: 72
End: 2017-06-07

## 2017-06-07 NOTE — TELEPHONE ENCOUNTER
"----- Message from Marleny Salazar sent at 6/7/2017  3:06 PM CDT -----  Contact: Patient himself  X  1st Request  _  2nd Request  _  3rd Request    Who: Zheng Talley (mrn# [id number])    Why: Patient called and said, "Wednesday 06/17/2017 at 10:40AM is fine; it works for him."   Please give a call back at your earliest convenience if there's a need too.THANKS!    What Number to Call Back:    When to Expect a call back: (Before the end of the day)   -- if the call is after 12:00, the call back will be tomorrow.                        "

## 2017-06-08 ENCOUNTER — TELEPHONE (OUTPATIENT)
Dept: INTERNAL MEDICINE | Facility: CLINIC | Age: 72
End: 2017-06-08

## 2017-06-08 NOTE — TELEPHONE ENCOUNTER
----- Message from Rivka Patel sent at 6/8/2017 12:00 PM CDT -----  x_  1st Request  _  2nd Request  _  3rd Request        Who: RAYSHAWN ROBBINS JR. [[id number]]    Why: Pt called in reference to getting an appointment. Please call him.     What Number to Call Back: [phone]    When to Expect a call back: (Before the end of the day)   -- if the call is after 12:00, the call back will be tomorrow.

## 2017-06-08 NOTE — TELEPHONE ENCOUNTER
----- Message from Rivka Patel sent at 6/8/2017 12:00 PM CDT -----  x_  1st Request  _  2nd Request  _  3rd Request        Who: RAYSHAWN ROBBINS JR. [[id number]]    Why: Pt called in reference to getting an appointment. Please call him.     What Number to Call Back: [phone]    When to Expect a call back: (Before the end of the day)   -- if the call is after 12:00, the call back will be tomorrow.

## 2017-06-08 NOTE — TELEPHONE ENCOUNTER
I spoke to pt concerning received letter from insurance company. Pt was informed that his clinic visit was done and he may have received that letter prior to scheduling his f/u appointment in May

## 2017-07-05 ENCOUNTER — LAB VISIT (OUTPATIENT)
Dept: LAB | Facility: OTHER | Age: 72
End: 2017-07-05
Attending: UROLOGY
Payer: MEDICARE

## 2017-07-05 DIAGNOSIS — C61 PROSTATE CANCER: Primary | ICD-10-CM

## 2017-07-05 DIAGNOSIS — C61 PROSTATE CANCER: ICD-10-CM

## 2017-07-05 LAB — COMPLEXED PSA SERPL-MCNC: <0.01 NG/ML

## 2017-07-05 PROCEDURE — 84153 ASSAY OF PSA TOTAL: CPT

## 2017-07-05 PROCEDURE — 36415 COLL VENOUS BLD VENIPUNCTURE: CPT

## 2017-07-07 ENCOUNTER — OFFICE VISIT (OUTPATIENT)
Dept: UROLOGY | Facility: CLINIC | Age: 72
End: 2017-07-07
Attending: UROLOGY
Payer: MEDICARE

## 2017-07-07 VITALS
DIASTOLIC BLOOD PRESSURE: 66 MMHG | WEIGHT: 243 LBS | SYSTOLIC BLOOD PRESSURE: 119 MMHG | BODY MASS INDEX: 34.79 KG/M2 | HEART RATE: 54 BPM | HEIGHT: 70 IN

## 2017-07-07 DIAGNOSIS — C61 CANCER OF PROSTATE WITH HIGH RECURRENCE RISK (STAGE T3A OR GLEASON 8-10 OR PSA > 20): Primary | ICD-10-CM

## 2017-07-07 PROCEDURE — 99214 OFFICE O/P EST MOD 30 MIN: CPT | Mod: S$PBB,,, | Performed by: UROLOGY

## 2017-07-07 PROCEDURE — 99213 OFFICE O/P EST LOW 20 MIN: CPT | Mod: PBBFAC | Performed by: UROLOGY

## 2017-07-07 PROCEDURE — 1159F MED LIST DOCD IN RCRD: CPT | Mod: ,,, | Performed by: UROLOGY

## 2017-07-07 PROCEDURE — 1126F AMNT PAIN NOTED NONE PRSNT: CPT | Mod: ,,, | Performed by: UROLOGY

## 2017-07-07 PROCEDURE — 99999 PR PBB SHADOW E&M-EST. PATIENT-LVL III: CPT | Mod: PBBFAC,,, | Performed by: UROLOGY

## 2017-07-07 NOTE — PROGRESS NOTES
"Subjective:      Zheng Talley Jr. is a 72 y.o. male who returns today regarding his     Prostate cancer    Dry    No erections but his wife has gyn issues and they are not sexually active  .    The following portions of the patient's history were reviewed and updated as appropriate: allergies, current medications, past family history, past medical history, past social history, past surgical history and problem list.    Review of Systems  Pertinent items are noted in HPI.  A comprehensive multipoint review of systems was negative except as otherwise stated in the HPI.     Objective:   Vitals: /66 (BP Location: Right arm, Patient Position: Sitting, BP Method: Automatic)   Pulse (!) 54   Ht 5' 10" (1.778 m)   Wt 110.2 kg (243 lb)   BMI 34.87 kg/m²     Physical Exam   General: alert and oriented, no acute distress  Respiratory: Symmetric expansion, non-labored breathing  Cardiovascular: no peripheral edema  Abdomen: soft, non distended  Skin: normal coloration and turgor, no rashes, no suspicious skin lesions noted  Neuro: no gross deficits  Psych: normal judgment and insight, normal mood/affect and non-anxious  No hernias palpable    Physical Exam    Lab Review   Urinalysis demonstrates   Lab Results   Component Value Date    PSA 3.4 02/24/2014    PSADIAG <0.01 07/05/2017       Lab Results   Component Value Date    WBC 5.71 05/26/2017    HGB 15.5 05/26/2017    HCT 47.5 05/26/2017    MCV 86 05/26/2017     05/26/2017     Lab Results   Component Value Date    CREATININE 1.3 05/26/2017    BUN 17 05/26/2017       Imaging  -  Assessment and Plan:   Cancer of prostate with high recurrence risk  didier 9 4+5 [id number] JEANETTE  -     Prostate Specific Antigen, Diagnostic; Future; Expected date: 01/03/2018    rtc 6 mons with psa    Groin pain  Appears musculoskeletal; no hernia on exam    "

## 2017-07-28 RX ORDER — PRAVASTATIN SODIUM 40 MG/1
TABLET ORAL
Qty: 90 TABLET | Refills: 0 | Status: SHIPPED | OUTPATIENT
Start: 2017-07-28 | End: 2017-11-06 | Stop reason: SDUPTHER

## 2017-11-06 RX ORDER — PRAVASTATIN SODIUM 40 MG/1
TABLET ORAL
Qty: 90 TABLET | Refills: 0 | Status: SHIPPED | OUTPATIENT
Start: 2017-11-06 | End: 2018-02-28 | Stop reason: SDUPTHER

## 2017-12-06 ENCOUNTER — OFFICE VISIT (OUTPATIENT)
Dept: OPTOMETRY | Facility: CLINIC | Age: 72
End: 2017-12-06
Payer: MEDICARE

## 2017-12-06 DIAGNOSIS — H52.4 HYPEROPIA WITH PRESBYOPIA OF BOTH EYES: ICD-10-CM

## 2017-12-06 DIAGNOSIS — H52.03 HYPEROPIA WITH PRESBYOPIA OF BOTH EYES: ICD-10-CM

## 2017-12-06 DIAGNOSIS — H04.123 DRY EYES, BILATERAL: ICD-10-CM

## 2017-12-06 DIAGNOSIS — H25.13 NUCLEAR SCLEROSIS, BILATERAL: Primary | ICD-10-CM

## 2017-12-06 DIAGNOSIS — Z13.5 SCREENING FOR GLAUCOMA: ICD-10-CM

## 2017-12-06 PROCEDURE — 99212 OFFICE O/P EST SF 10 MIN: CPT | Mod: PBBFAC,PO | Performed by: OPTOMETRIST

## 2017-12-06 PROCEDURE — 99999 PR PBB SHADOW E&M-EST. PATIENT-LVL II: CPT | Mod: PBBFAC,,, | Performed by: OPTOMETRIST

## 2017-12-06 PROCEDURE — 92015 DETERMINE REFRACTIVE STATE: CPT | Mod: ,,, | Performed by: OPTOMETRIST

## 2017-12-06 PROCEDURE — 92014 COMPRE OPH EXAM EST PT 1/>: CPT | Mod: S$PBB,,, | Performed by: OPTOMETRIST

## 2017-12-06 RX ORDER — TRIAZOLAM 0.25 MG/1
TABLET ORAL
COMMUNITY
Start: 2017-10-17 | End: 2018-01-09

## 2017-12-06 NOTE — PROGRESS NOTES
HPI     DLS: 12/5/2016  Pt states glare is a bother at night. Pt states after being on the   computer for a long period of time he notice va is blurry and takes longer   to focus when looking in other places, other than computer.  +Eye allergies: itching, tearing, redness, and burning OD>OS--not using   ATs regularly+Floaters  Denies flashes, diplopia, and headaches    AT ou PRN-not often    CAT OU   Vit Floaters   MONALISA     Last edited by Nixon Amos, OD on 12/6/2017  9:29 AM. (History)        ROS     Negative for: Constitutional, Gastrointestinal, Neurological, Skin,   Genitourinary, Musculoskeletal, HENT, Endocrine, Cardiovascular, Eyes,   Respiratory, Psychiatric, Allergic/Imm, Heme/Lymph    Last edited by Nixon Amos, OD on 12/6/2017  9:29 AM. (History)        Assessment /Plan     For exam results, see Encounter Report.    Nuclear sclerosis, bilateral    Dry eyes, bilateral    Screening for glaucoma    Hyperopia with presbyopia of both eyes      1. Cat OU--wrote new Rx  2. MONALISA--advised SYSTANE BAL ATs QID    PLAN:    rtc 1 yr

## 2018-01-02 ENCOUNTER — LAB VISIT (OUTPATIENT)
Dept: LAB | Facility: OTHER | Age: 73
End: 2018-01-02
Attending: UROLOGY
Payer: MEDICARE

## 2018-01-02 DIAGNOSIS — C61 CANCER OF PROSTATE WITH HIGH RECURRENCE RISK (STAGE T3A OR GLEASON 8-10 OR PSA > 20): ICD-10-CM

## 2018-01-02 LAB — COMPLEXED PSA SERPL-MCNC: <0.01 NG/ML

## 2018-01-02 PROCEDURE — 36415 COLL VENOUS BLD VENIPUNCTURE: CPT

## 2018-01-02 PROCEDURE — 84153 ASSAY OF PSA TOTAL: CPT

## 2018-01-09 ENCOUNTER — OFFICE VISIT (OUTPATIENT)
Dept: UROLOGY | Facility: CLINIC | Age: 73
End: 2018-01-09
Attending: UROLOGY
Payer: MEDICARE

## 2018-01-09 VITALS
HEIGHT: 70 IN | BODY MASS INDEX: 34.79 KG/M2 | HEART RATE: 54 BPM | DIASTOLIC BLOOD PRESSURE: 59 MMHG | SYSTOLIC BLOOD PRESSURE: 109 MMHG | WEIGHT: 243 LBS

## 2018-01-09 DIAGNOSIS — C61 PROSTATE CANCER: Primary | ICD-10-CM

## 2018-01-09 DIAGNOSIS — N52.9 ERECTILE DYSFUNCTION, UNSPECIFIED ERECTILE DYSFUNCTION TYPE: ICD-10-CM

## 2018-01-09 PROCEDURE — 99214 OFFICE O/P EST MOD 30 MIN: CPT | Mod: S$GLB,,, | Performed by: UROLOGY

## 2018-01-09 RX ORDER — SILDENAFIL CITRATE 20 MG/1
20 TABLET ORAL DAILY PRN
Qty: 30 TABLET | Refills: 11 | Status: SHIPPED | OUTPATIENT
Start: 2018-01-09 | End: 2018-07-10 | Stop reason: SDUPTHER

## 2018-01-09 NOTE — PROGRESS NOTES
"Subjective:      Zheng Talley Jr. is a 72 y.o. male who returns today regarding his     2.5 years post op  psa less than 0.01    Able to have erections firm enough for intercourse with sildenafil 40mg    Dry    +OAB sxs better with dietary modifications    .    The following portions of the patient's history were reviewed and updated as appropriate: allergies, current medications, past family history, past medical history, past social history, past surgical history and problem list.    Review of Systems  Pertinent items are noted in HPI.  A comprehensive multipoint review of systems was negative except as otherwise stated in the HPI.     Objective:   Vitals: BP (!) 109/59 (BP Location: Right arm, Patient Position: Sitting, BP Method: Large (Automatic))   Pulse (!) 54   Ht 5' 10" (1.778 m)   Wt 110.2 kg (243 lb)   BMI 34.87 kg/m²     Physical Exam   General: alert, oriented to person, place and time, no acute distress and alert and oriented, no acute distress  Respiratory: Symmetric expansion, non-labored breathing  Cardiovascular: no peripheral edema  Abdomen:  non distended  Skin: normal coloration and turgor, no rashes, no suspicious skin lesions noted  Neuro: no gross deficits  Psych: normal judgment and insight, normal mood/affect and non-anxious    Physical Exam    Lab Review   Urinalysis demonstrates no specimen    Lab Results                           PSADIAG <0.01 01/02/2018    PSADIAG <0.01 07/05/2017    PSADIAG 0.01 11/28/2016       Lab Results   Component Value Date    WBC 5.71 05/26/2017    HGB 15.5 05/26/2017    HCT 47.5 05/26/2017    MCV 86 05/26/2017     05/26/2017     Lab Results   Component Value Date    CREATININE 1.3 05/26/2017    BUN 17 05/26/2017       Imaging  -  Assessment and Plan:   Prostate cancer didier 9 4+5 [id number] JEANETTE*2.5 years    Erectile dysfunction, unspecified erectile dysfunction type      Sildenafil prn  rtc 6 months with psa  If OK then yearly  "

## 2018-01-09 NOTE — PATIENT INSTRUCTIONS
Sildenafil tablets (Viagra)  What is this medicine?  SILDENAFIL (campbell DEN a best) is used to treat erection problems in men.  How should I use this medicine?  Take this medicine by mouth with a glass of water. Follow the directions on the prescription label. The dose is usually taken 1 hour before sexual activity. You should not take the dose more than once per day. Do not take your medicine more often than directed.  Talk to your pediatrician regarding the use of this medicine in children. This medicine is not used in children for this condition.  What side effects may I notice from receiving this medicine?  Side effects that you should report to your doctor or health care professional as soon as possible:  · allergic reactions like skin rash, itching or hives, swelling of the face, lips, or tongue  · breathing problems  · changes in hearing  · changes in vision  · chest pain  · fast, irregular heartbeat  · prolonged or painful erection  · seizures  Side effects that usually do not require medical attention (report to your doctor or health care professional if they continue or are bothersome):  · back pain  · dizziness  · flushing  · headache  · indigestion  · muscle aches  · nausea  · stuffy or runny nose  What may interact with this medicine?  Do not take this medicine with any of the following medications:  · cisapride  · methscopolamine nitrate  · nitrates like amyl nitrite, isosorbide dinitrate, isosorbide mononitrate, nitroglycerin  · nitroprusside  · other medicines for erectile dysfunction like avanafil, tadalafil, vardenafil  · riociguat  · other sildenafil products (Revatio)  This medicine may also interact with the following medications:  · certain drugs for high blood pressure  · certain drugs for the treatment of HIV infection or AIDS  · certain drugs used for fungal or yeast infections, like fluconazole, itraconazole, ketoconazole, and voriconazole  · cimetidine  · erythromycin  · rifampin  What if I  miss a dose?  This does not apply. Do not take double or extra doses.  Where should I keep my medicine?  Keep out of reach of children.  Store at room temperature between 15 and 30 degrees C (59 and 86 degrees F). Throw away any unused medicine after the expiration date.  What should I tell my health care provider before I take this medicine?  They need to know if you have any of these conditions:  · bleeding disorders  · eye or vision problems, including a rare inherited eye disease called retinitis pigmentosa  · anatomical deformation of the penis, Peyronie's disease, or history of priapism (painful and prolonged erection)  · heart disease, angina, a history of heart attack, irregular heart beats, or other heart problems  · high or low blood pressure  · history of blood diseases, like sickle cell anemia or leukemia  · history of stomach bleeding  · kidney disease  · liver disease  · stroke  · an unusual or allergic reaction to sildenafil, other medicines, foods, dyes, or preservatives  · pregnant or trying to get pregnant  · breast-feeding  What should I watch for while using this medicine?  If you notice any changes in your vision while taking this drug, call your doctor or health care professional as soon as possible. Stop using this medicine and call your health care provider right away if you have a loss of sight in one or both eyes.  Contact your doctor or health care professional right away if you have an erection that lasts longer than 4 hours or if it becomes painful. This may be a sign of a serious problem and must be treated right away to prevent permanent damage.  If you experience symptoms of nausea, dizziness, chest pain or arm pain upon initiation of sexual activity after taking this medicine, you should refrain from further activity and call your doctor or health care professional as soon as possible.  Do not drink alcohol to excess (examples, 5 glasses of wine or 5 shots of whiskey) when taking  this medicine. When taken in excess, alcohol can increase your chances of getting a headache or getting dizzy, increasing your heart rate or lowering your blood pressure.  Using this medicine does not protect you or your partner against HIV infection (the virus that causes AIDS) or other sexually transmitted diseases.  NOTE:This sheet is a summary. It may not cover all possible information. If you have questions about this medicine, talk to your doctor, pharmacist, or health care provider. Copyright© 2017 [employer]

## 2018-02-28 RX ORDER — PRAVASTATIN SODIUM 40 MG/1
TABLET ORAL
Qty: 90 TABLET | Refills: 0 | Status: SHIPPED | OUTPATIENT
Start: 2018-02-28 | End: 2018-05-24 | Stop reason: SDUPTHER

## 2018-05-24 DIAGNOSIS — I10 ESSENTIAL HYPERTENSION: ICD-10-CM

## 2018-05-24 RX ORDER — LOSARTAN POTASSIUM AND HYDROCHLOROTHIAZIDE 25; 100 MG/1; MG/1
TABLET ORAL
Qty: 90 TABLET | Refills: 3 | Status: SHIPPED | OUTPATIENT
Start: 2018-05-24 | End: 2019-06-15 | Stop reason: SDUPTHER

## 2018-05-24 RX ORDER — AMLODIPINE BESYLATE 10 MG/1
TABLET ORAL
Qty: 90 TABLET | Refills: 3 | Status: SHIPPED | OUTPATIENT
Start: 2018-05-24 | End: 2019-06-15 | Stop reason: SDUPTHER

## 2018-05-24 RX ORDER — PRAVASTATIN SODIUM 40 MG/1
TABLET ORAL
Qty: 90 TABLET | Refills: 0 | Status: SHIPPED | OUTPATIENT
Start: 2018-05-24 | End: 2018-10-02 | Stop reason: SDUPTHER

## 2018-05-24 RX ORDER — METOPROLOL SUCCINATE 50 MG/1
TABLET, EXTENDED RELEASE ORAL
Qty: 90 TABLET | Refills: 3 | Status: SHIPPED | OUTPATIENT
Start: 2018-05-24 | End: 2019-06-15 | Stop reason: SDUPTHER

## 2018-07-03 ENCOUNTER — LAB VISIT (OUTPATIENT)
Dept: LAB | Facility: HOSPITAL | Age: 73
End: 2018-07-03
Attending: UROLOGY
Payer: MEDICARE

## 2018-07-03 DIAGNOSIS — N52.9 ERECTILE DYSFUNCTION, UNSPECIFIED ERECTILE DYSFUNCTION TYPE: ICD-10-CM

## 2018-07-03 DIAGNOSIS — C61 PROSTATE CANCER: ICD-10-CM

## 2018-07-03 LAB — COMPLEXED PSA SERPL-MCNC: <0.01 NG/ML

## 2018-07-03 PROCEDURE — 36415 COLL VENOUS BLD VENIPUNCTURE: CPT | Mod: PO

## 2018-07-03 PROCEDURE — 84153 ASSAY OF PSA TOTAL: CPT

## 2018-07-10 ENCOUNTER — TELEPHONE (OUTPATIENT)
Dept: ORTHOPEDICS | Facility: CLINIC | Age: 73
End: 2018-07-10

## 2018-07-10 ENCOUNTER — OFFICE VISIT (OUTPATIENT)
Dept: UROLOGY | Facility: CLINIC | Age: 73
End: 2018-07-10
Attending: UROLOGY
Payer: MEDICARE

## 2018-07-10 VITALS
HEART RATE: 58 BPM | DIASTOLIC BLOOD PRESSURE: 72 MMHG | WEIGHT: 255.31 LBS | BODY MASS INDEX: 36.55 KG/M2 | SYSTOLIC BLOOD PRESSURE: 131 MMHG | HEIGHT: 70 IN

## 2018-07-10 DIAGNOSIS — C61 PROSTATE CANCER: Primary | ICD-10-CM

## 2018-07-10 DIAGNOSIS — N52.9 ERECTILE DYSFUNCTION, UNSPECIFIED ERECTILE DYSFUNCTION TYPE: ICD-10-CM

## 2018-07-10 DIAGNOSIS — R20.0 HAND NUMBNESS: ICD-10-CM

## 2018-07-10 LAB
BILIRUB SERPL-MCNC: ABNORMAL MG/DL
BLOOD URINE, POC: 50
COLOR, POC UA: ABNORMAL
GLUCOSE UR QL STRIP: ABNORMAL
KETONES UR QL STRIP: ABNORMAL
LEUKOCYTE ESTERASE URINE, POC: ABNORMAL
NITRITE, POC UA: ABNORMAL
PH, POC UA: 5
PROTEIN, POC: ABNORMAL
SPECIFIC GRAVITY, POC UA: 1.01
UROBILINOGEN, POC UA: ABNORMAL

## 2018-07-10 PROCEDURE — 81002 URINALYSIS NONAUTO W/O SCOPE: CPT | Mod: S$GLB,,, | Performed by: UROLOGY

## 2018-07-10 PROCEDURE — 99214 OFFICE O/P EST MOD 30 MIN: CPT | Mod: 25,S$GLB,, | Performed by: UROLOGY

## 2018-07-10 RX ORDER — SILDENAFIL CITRATE 20 MG/1
20 TABLET ORAL DAILY PRN
Qty: 60 TABLET | Refills: 11 | Status: SHIPPED | OUTPATIENT
Start: 2018-07-10 | End: 2020-01-22 | Stop reason: SDUPTHER

## 2018-07-10 NOTE — TELEPHONE ENCOUNTER
----- Message from Maya Taylor sent at 7/10/2018  4:19 PM CDT -----  Contact: [phone]/self  Patient is returning your call. Please advise.

## 2018-07-10 NOTE — TELEPHONE ENCOUNTER
----- Message from Sumit Alicea Jr., MD sent at 7/10/2018 12:42 PM CDT -----  Can you call this patient for an appoinment Thx  ----- Message -----  From: Derrick Wu MD  Sent: 7/10/2018  10:08 AM  To: Sumit Alicea Jr., MD

## 2018-07-10 NOTE — PROGRESS NOTES
Subjective:      Zheng Talley Jr. is a 73 y.o. male who returns today regarding his     Prostate cancer 3 years status post surgery.  Erectile dysfunction responds partially to generic sildenafil.    Dry    Occasionally get numbness in his left hand (started 1 year ago and does not appear related to his prostatectomy which was 3 years ago)  Chronic right hand numbness related to a previous injury was present prior to his prostatectomy      The following portions of the patient's history were reviewed and updated as appropriate: allergies, current medications, past family history, past medical history, past social history, past surgical history and problem list.    Review of Systems  Pertinent items are noted in HPI.  A comprehensive multipoint review of systems was negative except as otherwise stated in the HPI.     Objective:   Vitals: There were no vitals taken for this visit.    Physical Exam   General: alert and oriented, no acute distress  Respiratory: Symmetric expansion, non-labored breathing  Cardiovascular: no peripheral edema  Abdomen: non distended -  Skin: normal coloration and turgor, no rashes, no suspicious skin lesions noted  Neuro: no gross deficits  Psych: normal judgment and insight, normal mood/affect and non-anxious  Incisions well healed  No ing hernia palpable    Physical Exam    Lab Review   Urinalysis demonstrates   Lab Results   Component Value Date    WBC 5.71 05/26/2017    HGB 15.5 05/26/2017    HCT 47.5 05/26/2017    MCV 86 05/26/2017     05/26/2017     Lab Results   Component Value Date    CREATININE 1.3 05/26/2017    BUN 17 05/26/2017     Lab Results   Component Value Date    PSA 3.4 02/24/2014    PSA 2.80 02/04/2013    PSA 3.13 07/11/2012    PSADIAG <0.01 07/03/2018    PSADIAG <0.01 01/02/2018    PSADIAG <0.01 07/05/2017       Imaging  -  Assessment and Plan:   Prostate cancer  didier 9 4+5 [id number] JEANETTE*3 years  -     Prostate Specific Antigen, Diagnostic; Future; Expected  date: 07/08/2019    Erectile dysfunction, unspecified erectile dysfunction type    Other orders  -     sildenafil (REVATIO) 20 mg Tab; Take 1 tablet (20 mg total) by mouth daily as needed.  Dispense: 60 tablet; Refill: 11    rtc 1 yr with psa    See Dr Alicea, hand surgery, re hand numbness

## 2018-07-25 ENCOUNTER — TELEPHONE (OUTPATIENT)
Dept: ORTHOPEDICS | Facility: CLINIC | Age: 73
End: 2018-07-25

## 2018-07-25 DIAGNOSIS — I10 ESSENTIAL HYPERTENSION: ICD-10-CM

## 2018-07-25 DIAGNOSIS — M79.642 LEFT HAND PAIN: Primary | ICD-10-CM

## 2018-07-25 RX ORDER — VALACYCLOVIR HYDROCHLORIDE 1 G/1
TABLET, FILM COATED ORAL
Qty: 90 TABLET | Refills: 3 | Status: SHIPPED | OUTPATIENT
Start: 2018-07-25 | End: 2021-04-05 | Stop reason: SDUPTHER

## 2018-07-30 ENCOUNTER — OFFICE VISIT (OUTPATIENT)
Dept: ORTHOPEDICS | Facility: CLINIC | Age: 73
End: 2018-07-30
Payer: MEDICARE

## 2018-07-30 ENCOUNTER — HOSPITAL ENCOUNTER (OUTPATIENT)
Dept: RADIOLOGY | Facility: OTHER | Age: 73
Discharge: HOME | End: 2018-07-30
Attending: PHYSICIAN ASSISTANT
Payer: MEDICARE

## 2018-07-30 VITALS
SYSTOLIC BLOOD PRESSURE: 144 MMHG | WEIGHT: 255 LBS | BODY MASS INDEX: 36.51 KG/M2 | HEIGHT: 70 IN | DIASTOLIC BLOOD PRESSURE: 79 MMHG | HEART RATE: 62 BPM | RESPIRATION RATE: 18 BRPM

## 2018-07-30 DIAGNOSIS — M25.512 LEFT SHOULDER PAIN, UNSPECIFIED CHRONICITY: Primary | ICD-10-CM

## 2018-07-30 DIAGNOSIS — M25.512 LEFT SHOULDER PAIN, UNSPECIFIED CHRONICITY: ICD-10-CM

## 2018-07-30 DIAGNOSIS — M79.642 LEFT HAND PAIN: ICD-10-CM

## 2018-07-30 DIAGNOSIS — M19.019 SHOULDER ARTHRITIS: Primary | ICD-10-CM

## 2018-07-30 DIAGNOSIS — R20.0 FINGER NUMBNESS: ICD-10-CM

## 2018-07-30 PROCEDURE — 73130 X-RAY EXAM OF HAND: CPT | Mod: TC,FY,LT

## 2018-07-30 PROCEDURE — 73030 X-RAY EXAM OF SHOULDER: CPT | Mod: 26,LT,, | Performed by: RADIOLOGY

## 2018-07-30 PROCEDURE — 73030 X-RAY EXAM OF SHOULDER: CPT | Mod: TC,FY,LT

## 2018-07-30 PROCEDURE — 99203 OFFICE O/P NEW LOW 30 MIN: CPT | Mod: S$PBB,,, | Performed by: PHYSICIAN ASSISTANT

## 2018-07-30 PROCEDURE — 99999 PR PBB SHADOW E&M-EST. PATIENT-LVL IV: CPT | Mod: PBBFAC,,, | Performed by: PHYSICIAN ASSISTANT

## 2018-07-30 PROCEDURE — 73130 X-RAY EXAM OF HAND: CPT | Mod: 26,LT,, | Performed by: RADIOLOGY

## 2018-07-30 PROCEDURE — 99214 OFFICE O/P EST MOD 30 MIN: CPT | Mod: PBBFAC,25 | Performed by: PHYSICIAN ASSISTANT

## 2018-07-30 NOTE — PROGRESS NOTES
"Subjective:      Patient ID: Zheng Talley Jr. is a 73 y.o. male.    Chief Complaint: Pain of the Left Hand      HPI  Zheng Talley Jr. is a right hand dominant 73 y.o. male presenting today for left hand tingling and intermittent left upper arm pain.  There was not a history of trauma.  Onset of symptoms began 2-3 months ago.  He notices that the fingers of the left hand will occasionally tingle when he is sitting at a restaurant.  He says that [place] the casino "fairly often" and that they eat at a restaurant when they go, this is predominantly when he notices the tingling.  He reports occasionally waking up with shoulder "aching pains" but denies waking up with finger numbness.  He did have a right thumb laceration 1 year ago, says that the sensation is slowly returning in that thumb.        Review of patient's allergies indicates:   Allergen Reactions    Iodine and iodide containing products Other (See Comments)     Blood in urine in the 1970s         Current Outpatient Prescriptions   Medication Sig Dispense Refill    amLODIPine (NORVASC) 10 MG tablet TAKE 1 TABLET DAILY 90 tablet 3    losartan-hydrochlorothiazide 100-25 mg (HYZAAR) 100-25 mg per tablet TAKE 1 TABLET DAILY 90 tablet 3    metoprolol succinate (TOPROL-XL) 50 MG 24 hr tablet TAKE 1 TABLET DAILY 90 tablet 3    pravastatin (PRAVACHOL) 40 MG tablet TAKE 1 TABLET EVERY EVENING 90 tablet 0    sildenafil (REVATIO) 20 mg Tab Take 1 tablet (20 mg total) by mouth daily as needed. 60 tablet 11    valACYclovir (VALTREX) 1000 MG tablet TAKE 1 TABLET DAILY 90 tablet 3     No current facility-administered medications for this visit.        Past Medical History:   Diagnosis Date    BPH (benign prostatic hyperplasia)     Cancer     Groin pain     History of gastroesophageal reflux (GERD)     Hyperlipidemia     Hypertension     Nuclear sclerosis - Both Eyes 2/18/2014       Past Surgical History:   Procedure Laterality Date    BUNIONECTOMY      " "bilateral     COLONOSCOPY  2011    Dr. Velez    dental implant      HAND SURGERY      lip surgery           Review of Systems:  Review of Systems   Constitution: Negative for chills and fever.   Skin: Negative for rash and suspicious lesions.   Musculoskeletal:        See HPI   Neurological: Positive for light-headedness (occasional). Negative for dizziness and headaches.   Psychiatric/Behavioral: Negative for depression. The patient is not nervous/anxious.          OBJECTIVE:     PHYSICAL EXAM:  Height: 5' 10" (177.8 cm) Weight: 115.7 kg (255 lb)  Vitals:    07/30/18 1020   BP: (!) 144/79   Pulse: 62   Resp: 18   Weight: 115.7 kg (255 lb)   Height: 5' 10" (1.778 m)   PainSc: 0-No pain   PainLoc: Hand     General    Vitals reviewed.  Constitutional: He is oriented to person, place, and time. He appears well-developed and well-nourished.   HENT:   Head: Normocephalic and atraumatic.   Neck: Normal range of motion.   Cardiovascular: Normal rate.    Pulmonary/Chest: Effort normal. No respiratory distress.   Neurological: He is alert and oriented to person, place, and time.   Psychiatric: He has a normal mood and affect. His behavior is normal. Judgment and thought content normal.             Musculoskeletal:  No scars or edema appreciated.  He is nontender palpation.  Good equal range of motion of the wrists, fingers, elbows, and shoulders.  No pain with motion today. Negative Durkan's bilaterally, negative Tinel's right wrist and elbow, equivocal Tinel's left wrist, negative Tinel's left elbow.  Neurovascularly intact-good sensation and motor function, good capillary refill, 2+ radial pulses. He does report some tingling at the right thumb finger tip.     RADIOGRAPHS:  Left Hand X-Ray, 7/30/18  FINDINGS:  These views demonstrate the loss of joint space which is mild involving the 1st carpometacarpal, 1st metacarpophalangeal and 1st interphalangeal joint.  The lateral radiograph shows possible osteophyte " formation at the 2nd distal interphalangeal joint.  Subchondral cysts are identified in the 1st through 3rd metacarpal heads, in the distal portions of the 2nd and 3rd proximal phalanges and in several of the carpal bones.  There is normal mineralization.  No erosions.  No fracture or dislocation.  No soft tissue abnormality.      Impression     Typical pattern osteoarthritis throughout the hands which overall is mild.     Left Shoulder X-Ray, 7/30/18  FINDINGS:  Moderate to severe loss of acromioclavicular joint space with osteophyte formation.  Preservation of the glenohumeral joint with osteophyte formation at the inferior aspect of this joint.  There is hydroxy appetite deposition in the region of the tendinous insertion of the supraspinatus on the humeral head.  No fracture, dislocation or osseous destructive process.      Impression     1. Advanced acromioclavicular osteoarthritis.  This seems to have progressed when compared to the previous exam.  2. 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  3. Mild glenohumeral osteoarthritis.       Comments: I have personally reviewed the imaging and I agree with the above radiologist's report.    ASSESSMENT/PLAN:   Zheng was seen today for pain.    Diagnoses and all orders for this visit:    Shoulder arthritis    Finger numbness           - We talked at length about the anatomy and pathophysiology of   Encounter Diagnoses   Name Primary?    Shoulder arthritis Yes    Finger numbness        - discussed treatment options for shoulder pain and finger numbness.  Discussed possible nerve compression, discussed use of bracing and stretches.  Discussed physical exam findings.  - patient information on stretches provided  - call with questions or concerns  - follow-up as needed     Disclaimer: This note has been generated using voice-recognition software. There may be typographical errors that have been missed during proof-reading.

## 2018-07-30 NOTE — LETTER
July 30, 2018      Derrick Wu MD  [address]  [address]  [facility] [address]           [facility]  [address], [address]  [facility] [address]  Phone: [phone]          Patient: Zheng Talley Jr.   MR Number: [id number]   YOB: 1945   Date of Visit: 7/30/2018       Dear Dr. Derrick Wu:    Thank you for referring Zheng Talley to me for evaluation. Attached you will find relevant portions of my assessment and plan of care.    If you have questions, please do not hesitate to call me. I look forward to following Zheng Talley along with you.    Sincerely,    MANDO Ferrer    Enclosure  CC:  No Recipients    If you would like to receive this communication electronically, please contact [email or web address] or [phone] to request more information on [employer] access.    For providers and/or their staff who would like to refer a patient to [facility], please contact us through our one-stop-shop provider referral line, [facility], at [phone].    If you feel you have received this communication in error or would no longer like to receive these types of communications, please e-mail [email or web address]

## 2018-07-30 NOTE — PATIENT INSTRUCTIONS
Understanding Carpal Tunnel Syndrome    The carpal tunnel is a narrow space inside the wrist. It is ringed by bone and a band of tough tissue called the transverse carpal ligament. A major nerve called the median nerve runs from the forearm into the hand through the carpal tunnel. Tendons also run through the carpal tunnel.  With carpal tunnel syndrome, the tendons or nearby tissues within the carpal tunnel may swell or thicken. Or the transverse carpal ligament may harden and shorten. This narrows the space in the carpal tunnel and puts pressure on the median nerve. This pressure leads to tingling and numbness of the hand and wrist. In time, the condition can make even simple tasks hard to do.  What causes carpal tunnel syndrome?  Doctors arent entirely clear why the condition occurs. Certain things may make a person more likely to have it. These include:  · Being female  · Being pregnant  · Being overweight  · Having diabetes or rheumatoid arthritis  Symptoms of carpal tunnel syndrome  Symptoms often come and go. At first, symptoms may occur mainly at night. Later, they may be noticed during the day as well. They may get worse with activities such as driving, reading, typing, or holding a phone. Symptoms can include:  · Tingling and numbness in the hand or wrist  · Sharp pain that shoots up the arm or down to the fingers  · Hand stiffness or cramping, especially in the morning  · Trouble making a fist  · Hand weakness and clumsiness  Treatment for carpal tunnel syndrome  Certain treatments help reduce the pressure on the median nerve and relieve symptoms. Choices for treatment may include one or more of the following:  · Wrist splint. This involves wearing a special brace on the wrist and hand. The splint holds the wrist straight, in a neutral position. This helps keep the carpal tunnel as open as possible.  · Cortisone shots. Cortisone is a medicine that helps reduce swelling. It is injected directly into the  wrist. It helps shrink tissues inside the carpal tunnel. This relieves symptoms for a time.  · Pain medicines. You may take over-the-counter or prescription medicines to help reduce swelling and relieve symptoms.  · Surgery. If the condition doesnt respond to other treatments and doesnt go away on its own, you may need surgery. During surgery, the surgeon cuts the transverse carpal ligament to relieve pressure on the median nerve.     When to call your healthcare provider  Call your healthcare provider right away if you have any of these:  · Fever of 100.4°F (38°C) or higher, or as directed  · Symptoms that dont get better, or get worse  · New symptoms   Date Last Reviewed: 3/10/2016  © 6424-7660 [employer]. [address] [place] [address], [place] [address]. All rights reserved. This information is not intended as a substitute for professional medical care. Always follow your healthcare professional's instructions.        Exercises for Shoulder Flexibility: External Rotation    This stretch can help restore shoulder flexibility and relieve pain over time. When stretching, be sure to breathe deeply. Follow any special instructions from your doctor or physical therapist:  1.  a doorway. Grasp the doorjamb with the hand on the frozen side. Your arm should be bent.  2. With the other hand, hold the elbow on the frozen side firmly against your body.  3. Standing in the same spot, rotate your body away from the doorjamb. Stop when you feel the stretch in the shoulder. At first, try to hold the stretch for 5 seconds.  4. Work up to doing 3 sets of this stretch, 3 times a day. Work up to holding the stretch for 30 to 60 seconds.  Note: Keep your arms as still as you can. Over time, rotate your body a little more to enhance the stretch. But be careful not to twist your back.  Frozen shoulder  Frozen shoulder is another name for adhesive capsulitis, which causes restricted movement in the shoulder. If you  have frozen shoulder, this stretch may cause discomfort, especially when you first get started. A few months may pass before you achieve the results you want. But once your shoulder heals, it rarely becomes frozen again. So stick to your stretching program. If you have any questions, be sure to ask your doctor.   Date Last Reviewed: 8/16/2015  © 6140-1315 [employer]. [address] [place] [address], [place] [address]. All rights reserved. This information is not intended as a substitute for professional medical care. Always follow your healthcare professional's instructions.        Exercises for Shoulder Flexibility: Internal Rotation    This stretch can help restore shoulder flexibility and relieve pain over time. When stretching, be sure to breathe deeply. Follow any special instructions from your healthcare provider or physical therapist.  5. While seated, move the arm on the side you want to stretch toward the middle of your back. The palm of your hand should face out.  6. Cup your other hand under the hand thats behind your back. Gently push your cupped hand upward until you feel the stretch in the shoulder. Try to hold the stretch for 5 seconds.  7. Work up to doing 3 sets of this stretch, 3 times a day. Work up to holding the stretch for 30 to 60 seconds.  Note: Keep your back straight. Its OK if your hand cant reach the middle of your back. Instead, start the stretch with your hand as close as you can get it to the middle of your back.     Frozen shoulder  Frozen shoulder is another name for adhesive capsulitis. This causes restricted movement in the shoulder. If you have frozen shoulder, this stretch may cause discomfort, especially when you first get started. A few months may pass before you achieve the results you want. But once your shoulder heals, it rarely becomes frozen again. So stick to your stretching program. If you have any questions, be sure to ask your healthcare provider.   Date Last  Reviewed: 10/14/2015  © 6102-6159 [employer]. [address] [place] [address]. All rights reserved. This information is not intended as a substitute for professional medical care. Always follow your healthcare professional's instructions.        Exercises for Shoulder Flexibility: Adduction (Reaching Across)    This stretch can help restore shoulder flexibility and relieve pain over time. When stretching, be sure to breathe deeply. And follow any special instructions from your doctor or physical therapist:  8. Put the hand from the side you want to stretch on your opposite shoulder. Your elbow should point away from your body. Try to raise your elbow as close to shoulder height as you can.  9. With your other hand, push the raised elbow toward the opposite shoulder. Avoid turning your head. Stop when you feel the stretch. Try to hold the stretch for 5 seconds.  10. Work up to doing 3 sets of this stretch, 3 times a day. Work up to holding the stretch for 30 to 60 seconds.  Note: Be sure to push your elbow across your chest, not up toward your chin. Over time, try to push your elbow farther across your chest to enhance the stretch.  Frozen shoulder  Frozen shoulder is another name for adhesive capsulitis, which causes restricted movement in the shoulder. If you have frozen shoulder, this stretch may cause discomfort, especially when you first get started. A few months may pass before you achieve the results you want. Once your shoulder heals, it rarely becomes frozen again. So stick to your stretching program. If you have any questions, be sure to ask your doctor.   Date Last Reviewed: 8/16/2015  © 7293-5662 [employer]. [address] [place] [address]. All rights reserved. This information is not intended as a substitute for professional medical care. Always follow your healthcare professional's instructions.        Exercises for Shoulder Flexibility: Back  Scratch    Improving your flexibility can reduce pain. Stretching exercises also can help increase your range of pain-free motion. Breathe normally when you exercise. Try to use smooth, fluid movements. Never force a stretch.  Note: Follow any special instructions you are given. If you feel pain, stop the exercise. If the pain continues after stopping, call your healthcare provider.  · Stand straight, placing the back of your hand on the side you want to stretch flat against your lower back.  · Throw one end of a towel over your shoulder. Grab it behind your back with your other hand.  · Pull down gently on the towel with your front arm. Let your back arm slide up as high as is comfortable. Youll feel a stretch in your shoulder. Hold the stretch for a few seconds.  · Repeat 3 to 5 times. Build up to holding each stretch for 30 to 60 seconds.  For your safety, check with your healthcare provider before starting an exercise program.   Date Last Reviewed: 8/26/2015  © 6102-5477 [employer]. [address] [place] [address]. All rights reserved. This information is not intended as a substitute for professional medical care. Always follow your healthcare professional's instructions.        Exercises for Shoulder Flexibility: Wall Walk    Improving your flexibility can reduce pain. Stretching exercises also can help increase your range of pain-free motion. Breathe normally when you exercise. Use smooth, fluid movements.  Note: Follow any special instructions you are given. If you feel pain, stop the exercise. If the pain continues after stopping, call your healthcare provider:  · Stand with your shoulder about 2 feet from the wall.  · Raise your arm to shoulder level and gently walk your fingers up the wall as high as you can.  · Hold for a few seconds. Then walk your fingers back down.  · Repeat 3 times. Move closer to the wall as you repeat.  · Build up to holding each stretch  for 30 seconds.  Caution: Do this stretch only if your healthcare provider recommends it. Dont do it when you are first injured.       Date Last Reviewed: 8/16/2015  © 3315-6761 [employer]. [address] [place] [address]. All rights reserved. This information is not intended as a substitute for professional medical care. Always follow your healthcare professional's instructions.        Shoulder Squeeze Exercise    To start, sit in a chair with your feet flat on the floor. Shift your weight slightly forward to avoid rounding your back. Relax. Keep your ears, shoulders, and hips aligned:  · Raise your arms to shoulder height, elbows bent and palms forward.  · Move your arms back, squeezing your shoulder blades together.  · Hold for 10 seconds. Return to starting position.   · Repeat 5 times.   For your safety, check with your healthcare provider before starting an exercise program.   Date Last Reviewed: 8/16/2015  © 9070-6868 [employer]. [address] [place] [address]. All rights reserved. This information is not intended as a substitute for professional medical care. Always follow your healthcare professional's instructions.

## 2018-10-02 RX ORDER — PRAVASTATIN SODIUM 40 MG/1
TABLET ORAL
Qty: 90 TABLET | Refills: 0 | Status: SHIPPED | OUTPATIENT
Start: 2018-10-02 | End: 2018-12-10 | Stop reason: SDUPTHER

## 2018-10-26 ENCOUNTER — OFFICE VISIT (OUTPATIENT)
Dept: OPTOMETRY | Facility: CLINIC | Age: 73
End: 2018-10-26
Payer: MEDICARE

## 2018-10-26 DIAGNOSIS — Z13.5 SCREENING FOR GLAUCOMA: ICD-10-CM

## 2018-10-26 DIAGNOSIS — H43.393 VITREOUS FLOATERS OF BOTH EYES: ICD-10-CM

## 2018-10-26 DIAGNOSIS — H52.4 HYPEROPIA WITH PRESBYOPIA OF BOTH EYES: ICD-10-CM

## 2018-10-26 DIAGNOSIS — H52.03 HYPEROPIA WITH PRESBYOPIA OF BOTH EYES: ICD-10-CM

## 2018-10-26 DIAGNOSIS — H25.13 NUCLEAR SCLEROSIS, BILATERAL: Primary | ICD-10-CM

## 2018-10-26 PROCEDURE — 92015 DETERMINE REFRACTIVE STATE: CPT | Mod: ,,, | Performed by: OPTOMETRIST

## 2018-10-26 PROCEDURE — 99999 PR PBB SHADOW E&M-EST. PATIENT-LVL II: CPT | Mod: PBBFAC,,, | Performed by: OPTOMETRIST

## 2018-10-26 PROCEDURE — 99212 OFFICE O/P EST SF 10 MIN: CPT | Mod: PBBFAC,PO | Performed by: OPTOMETRIST

## 2018-10-26 PROCEDURE — 92014 COMPRE OPH EXAM EST PT 1/>: CPT | Mod: S$PBB,,, | Performed by: OPTOMETRIST

## 2018-10-26 NOTE — PROGRESS NOTES
HPI     DLS:12/6/17  Pt states no VA changes  States he is still having floating and flashes   but has been going on for 4 years now --only rare F/F  systane gtts     Last edited by Nixon Amos, OD on 10/26/2018  9:48 AM. (History)        ROS     Negative for: Constitutional, Gastrointestinal, Neurological, Skin,   Genitourinary, Musculoskeletal, HENT, Endocrine, Cardiovascular, Eyes,   Respiratory, Psychiatric, Allergic/Imm, Heme/Lymph    Last edited by Nixon Amos, OD on 10/26/2018  9:48 AM. (History)        Assessment /Plan     For exam results, see Encounter Report.    Nuclear sclerosis, bilateral    Vitreous floaters of both eyes    Screening for glaucoma    Hyperopia with presbyopia of both eyes      1. Cat OU--pt wishes new Rx  2. Dry eyes--pt to cont w ATs  3. Vitreous floaters--discussed Signs/Symptoms of Retinal Detachment.  Pt to rtc immediately if increased Floaters/Flashes occur    PLAN:    rtc 1 yr

## 2018-10-31 ENCOUNTER — HOSPITAL ENCOUNTER (EMERGENCY)
Facility: OTHER | Age: 73
Discharge: HOME | End: 2018-10-31
Attending: EMERGENCY MEDICINE
Payer: MEDICARE

## 2018-10-31 VITALS
HEIGHT: 70 IN | TEMPERATURE: 99 F | SYSTOLIC BLOOD PRESSURE: 127 MMHG | BODY MASS INDEX: 34.65 KG/M2 | DIASTOLIC BLOOD PRESSURE: 62 MMHG | RESPIRATION RATE: 18 BRPM | WEIGHT: 242 LBS | HEART RATE: 71 BPM | OXYGEN SATURATION: 96 %

## 2018-10-31 DIAGNOSIS — S61.219A LACERATION OF FINGER: ICD-10-CM

## 2018-10-31 DIAGNOSIS — S62.523B OPEN FRACTURE OF TUFT OF DISTAL PHALANX OF THUMB: Primary | ICD-10-CM

## 2018-10-31 PROCEDURE — 63600175 PHARM REV CODE 636 W HCPCS: Performed by: PHYSICIAN ASSISTANT

## 2018-10-31 PROCEDURE — 29130 APPL FINGER SPLINT STATIC: CPT | Mod: F5

## 2018-10-31 PROCEDURE — 99284 EMERGENCY DEPT VISIT MOD MDM: CPT | Mod: 25

## 2018-10-31 PROCEDURE — 96365 THER/PROPH/DIAG IV INF INIT: CPT | Mod: 59

## 2018-10-31 PROCEDURE — 12002 RPR S/N/AX/GEN/TRNK2.6-7.5CM: CPT

## 2018-10-31 PROCEDURE — 13121 CMPLX RPR S/A/L 2.6-7.5 CM: CPT

## 2018-10-31 RX ORDER — CEFAZOLIN SODIUM 2 G/50ML
2 SOLUTION INTRAVENOUS ONCE
Status: COMPLETED | OUTPATIENT
Start: 2018-10-31 | End: 2018-10-31

## 2018-10-31 RX ORDER — OXYCODONE AND ACETAMINOPHEN 5; 325 MG/1; MG/1
1 TABLET ORAL EVERY 4 HOURS PRN
Qty: 10 TABLET | Refills: 0 | Status: SHIPPED | OUTPATIENT
Start: 2018-10-31 | End: 2018-10-31 | Stop reason: SDUPTHER

## 2018-10-31 RX ORDER — CEPHALEXIN 500 MG/1
500 CAPSULE ORAL 4 TIMES DAILY
Qty: 40 CAPSULE | Refills: 0 | Status: SHIPPED | OUTPATIENT
Start: 2018-10-31 | End: 2018-11-10

## 2018-10-31 RX ORDER — CEPHALEXIN 500 MG/1
500 CAPSULE ORAL 4 TIMES DAILY
Qty: 40 CAPSULE | Refills: 0 | Status: SHIPPED | OUTPATIENT
Start: 2018-10-31 | End: 2018-10-31 | Stop reason: SDUPTHER

## 2018-10-31 RX ORDER — OXYCODONE AND ACETAMINOPHEN 5; 325 MG/1; MG/1
1 TABLET ORAL EVERY 4 HOURS PRN
Qty: 10 TABLET | Refills: 0 | Status: SHIPPED | OUTPATIENT
Start: 2018-10-31 | End: 2019-02-25 | Stop reason: ALTCHOICE

## 2018-10-31 RX ADMIN — CEFAZOLIN SODIUM 2 G: 2 SOLUTION INTRAVENOUS at 07:10

## 2018-10-31 NOTE — ED NOTES
Pt presents with c/o laceration to the r thumb. Bleeding present. Pt unsure if up to date on tetanusPt is AAOx4. Rr are even and unlabored. Pt is ambulatory with a steady gait.

## 2018-11-01 ENCOUNTER — TELEPHONE (OUTPATIENT)
Dept: ORTHOPEDICS | Facility: CLINIC | Age: 73
End: 2018-11-01

## 2018-11-01 ENCOUNTER — DOCUMENTATION ONLY (OUTPATIENT)
Dept: ORTHOPEDICS | Facility: CLINIC | Age: 73
End: 2018-11-01

## 2018-11-01 ENCOUNTER — OFFICE VISIT (OUTPATIENT)
Dept: ORTHOPEDICS | Facility: CLINIC | Age: 73
End: 2018-11-01
Payer: MEDICARE

## 2018-11-01 VITALS
DIASTOLIC BLOOD PRESSURE: 75 MMHG | BODY MASS INDEX: 34.65 KG/M2 | HEIGHT: 70 IN | SYSTOLIC BLOOD PRESSURE: 137 MMHG | WEIGHT: 242 LBS | HEART RATE: 58 BPM

## 2018-11-01 DIAGNOSIS — S61.319A LACERATION OF FINGER NAIL BED, INITIAL ENCOUNTER: Primary | ICD-10-CM
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
## 2018-11-01 DIAGNOSIS — S62.521B OPEN DISPLACED FRACTURE OF DISTAL PHALANX OF RIGHT THUMB, INITIAL ENCOUNTER: ICD-10-CM

## 2018-11-01 PROCEDURE — 29125 APPL SHORT ARM SPLINT STATIC: CPT | Mod: S$PBB,RT,, | Performed by: PHYSICIAN ASSISTANT

## 2018-11-01 PROCEDURE — 29125 APPL SHORT ARM SPLINT STATIC: CPT | Mod: PBBFAC | Performed by: PHYSICIAN ASSISTANT

## 2018-11-01 PROCEDURE — 99999 PR PBB SHADOW E&M-EST. PATIENT-LVL IV: CPT | Mod: PBBFAC,,, | Performed by: PHYSICIAN ASSISTANT

## 2018-11-01 PROCEDURE — 99214 OFFICE O/P EST MOD 30 MIN: CPT | Mod: PBBFAC | Performed by: PHYSICIAN ASSISTANT

## 2018-11-01 PROCEDURE — 99214 OFFICE O/P EST MOD 30 MIN: CPT | Mod: S$PBB,25,, | Performed by: PHYSICIAN ASSISTANT

## 2018-11-01 RX ORDER — ONDANSETRON 4 MG/1
4 TABLET, ORALLY DISINTEGRATING ORAL EVERY 6 HOURS PRN
Qty: 30 TABLET | Refills: 0 | Status: SHIPPED | OUTPATIENT
Start: 2018-11-01 | End: 2021-04-06

## 2018-11-01 RX ORDER — ACETAMINOPHEN AND CODEINE PHOSPHATE 300; 30 MG/1; MG/1
1 TABLET ORAL EVERY 6 HOURS PRN
Qty: 20 TABLET | Refills: 0 | Status: SHIPPED | OUTPATIENT
Start: 2018-11-01 | End: 2019-02-25 | Stop reason: ALTCHOICE

## 2018-11-01 RX ORDER — MUPIROCIN 20 MG/G
OINTMENT TOPICAL
Status: CANCELLED | OUTPATIENT
Start: 2018-11-01

## 2018-11-01 NOTE — ED PROVIDER NOTES
Encounter Date: 10/31/2018       History     Chief Complaint   Patient presents with    Laceration     laceration to right thumb after table saw accident today     Patient is a 73-year-old male with hypertension and hyperlipidemia who presents to the ED with a finger injury. Patient states he was cutting wood at home with his table saw when he injured his right thumb.  He reports feeling the saw cut his bone.  He wrapped his wound and came straight here.  His tetanus vaccine was updated last year.  He reports full range of motion to the thumb.  Denies numbness.      The history is provided by the patient.     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 Velez    dental implant      HAND SURGERY      lip surgery      ROBOTIC ASSISTED LAPAROSCOPIC PROSTATECTOMY; lymphadenectomy N/A 7/17/2015    Performed by Derrick Wu MD at [facility] OR     Family History   Problem Relation Age of Onset    Cancer Father         leukemia    Diabetes Father     Coronary artery disease Mother     Heart disease Neg Hx     Amblyopia Neg Hx     Blindness Neg Hx     Cataracts Neg Hx     Glaucoma Neg Hx     Macular degeneration Neg Hx     Retinal detachment Neg Hx     Strabismus Neg Hx      Social History     Tobacco Use    Smoking status: Former Smoker     Types: Cigarettes    Smokeless tobacco: Never Used    Tobacco comment: QUIT 1976   Substance Use Topics    Alcohol use: Yes     Alcohol/week: 0.0 oz     Comment: occasionally    Drug use: No     Review of  Systems   Constitutional: Negative for chills and fever.   HENT: Negative for congestion and sore throat.    Eyes: Negative for pain.   Respiratory: Negative for shortness of breath.    Cardiovascular: Negative for chest pain.   Gastrointestinal: Negative for abdominal pain, diarrhea, nausea and vomiting.   Genitourinary: Negative for dysuria.   Musculoskeletal:        Right thumb pain   Skin:        Right thumb laceration   Neurological: Negative for headaches.       Physical Exam     Initial Vitals [10/31/18 1626]   BP Pulse Resp Temp SpO2   129/69 61 18 99.3 °F (37.4 °C) 95 %      MAP       --         Physical Exam    Constitutional: Vital signs are normal. He is cooperative.   HENT:   Head: Normocephalic and atraumatic.   Eyes: EOM are normal. Pupils are equal, round, and reactive to light.   Neck: Normal range of motion. Neck supple.   Cardiovascular: Normal rate, regular rhythm and intact distal pulses.   Pulmonary/Chest: Breath sounds normal. He has no wheezes. He has no rhonchi. He has no rales.   Musculoskeletal:   Right 1st digit:  Normal range of motion at the MCP   Neurological: He is alert and oriented to person, place, and time. No cranial nerve deficit. GCS eye subscore is 4. GCS verbal subscore is 5. GCS motor subscore is 6.   Normal sensation to light touch at the distal end of the right, 1st digit   Skin: Skin is warm and dry. Capillary refill takes less than 2 seconds. No rash noted.   Extensive laceration, approximately 5 cm on the ventral aspect of the right thumb.  Laceration extends through the nail bed (nail bed remains intact) to the superior to the PIP joint.  Superior portion (2 cm) of laceration has jagged edges.  Inferior portion of skin is macerated with exposure to underlying tissue.  No visualized bone or tendon.         ED Course   Lac Repair  Date/Time: 10/31/2018 8:49 PM  Performed by: Cameron Ross PA-C  Authorized by: Forrest Rothman MD   Body area: upper extremity  Location  details: right thumb  Laceration length: 5 cm  Foreign bodies: no foreign bodies  Tendon involvement: none  Nerve involvement: none  Vascular damage: no  Anesthesia: digital block    Anesthesia:  Local Anesthetic: lidocaine 1% without epinephrine  Anesthetic total: 10 mL  Preparation: Patient was prepped and draped in the usual sterile fashion.  Irrigation solution: saline  Irrigation method: syringe  Amount of cleaning: extensive  Skin closure: 5-0 nylon and 4-0 nylon  Number of sutures: 7  Technique: complex  Approximation: close  Approximation difficulty: complex  Dressing: petrolatum gauze and splint  Patient tolerance: Patient tolerated the procedure well with no immediate complications        Labs Reviewed - No data to display       Imaging Results          X-Ray Hand 3 View Right (Final result)  Result time 10/31/18 17:06:42   Procedure changed from X-Ray Hand 2 View Right     Final result by Dave Scherer MD (10/31/18 17:06:42)                 Impression:      Minimally displaced fracture involving the tuft of the distal phalanx of the right thumb with associated soft tissue laceration.  Punctate calcifications are identified within the soft tissues likely representing tiny bone fragments.  No metallic foreign bodies are identified.    Degenerative changes within the small joints of the hand and wrist.    Atherosclerosis.      Electronically signed by: aDve Scherer MD  Date:    10/31/2018  Time:    17:06             Narrative:    EXAMINATION:  XR HAND COMPLETE 3 VIEW RIGHT    CLINICAL HISTORY:  r thumb injury; Laceration without foreign body of unspecified finger without damage to nail, initial encounter    TECHNIQUE:  PA, lateral, and oblique views of the right hand were performed.    COMPARISON:  None    FINDINGS:  There is a fracture through the tuft of the distal phalanx of the right thumb with minimal displacement.  There is an associated laceration.  There are punctate calcific densities within the  soft tissues likely related to tiny bone fragments.  No metallic foreign bodies are identified.  There is no evidence for dislocation.  There are degenerative changes within the small joints of the hand.  Atherosclerotic calcification is identified within the arteries at the level of the wrist.                                 Medical Decision Making:   Initial Assessment:   Urgent evaluation of a 73 y.o. male with a medical history of HTN, presenting to the emergency department complaining of finger injury. Patient is afebrile, nontoxic appearing and hemodynamically stable.  Patient has extensive laceration involving the nail bed to the dorsal aspect of the right thumb.  Normal range of motion.  Limb is distally neurovascular intact.  ED Management:  Hand x-ray reveals a minimally displaced fracture involving the tuft of the distal phalanx of the right thumb with associated soft tissue laceration.  There are punctate calcifications within the soft tissues that likely represent bone fragments.  No metallic foreign body present.  Patient was given IV Ancef here in the ED for open fracture.  A portion of the laceration was primarily repaired as described in procedure note.  Nail bed was not repaired as it remained intact. Remaining of macerated skin was left open for secondary closure.  Wound was thoroughly irrigated.  The petroleum gauze was placed on the lower portion.  Finger was also placed in a splint.  I spoke with [facility] orthopedist on-call who states patient can be seen tomorrow in clinic with Dr. venecia Thompson.  Patient is actually seen him before for previous injury. Patient is given strict return precautions at home with Keflex.  He has no other complaints this time is stable for discharge.  Other:   I have discussed this case with another health care provider.  This note was created using M*Modal Dictation. There may be typographical errors secondary to dictation.                        Clinical Impression:     1. Open fracture of tuft of distal phalanx of thumb    2. Laceration of finger                               Cameron Ross PA-C  10/31/18 2058

## 2018-11-01 NOTE — TELEPHONE ENCOUNTER
Zheng Talley Jr. notified of arrival time 0930 for surgery on 11/2/18 with Dr. STEVIE Bradford at [facility]. Post Op appointment made, slip in mail.    
Alert and oriented to person, place and time

## 2018-11-01 NOTE — TELEPHONE ENCOUNTER
Zheng Talley Jr. notified of arrival time 0930 for surgery on 11/2/18 with Dr. STEVIE Bradford at [facility]. Post Op appointment made, slip in mail.

## 2018-11-01 NOTE — H&P (VIEW-ONLY)
Subjective:      Patient ID: Zheng Talley Jr. is a 73 y.o. male.    Chief Complaint: Pain of the Right Hand      HPI  Zheng Talley Jr. is a right hand dominant 73 y.o. male presenting today for follow-up from the ED.  There was a history of trauma, he cut his right thumb using a table saw.  Onset of symptoms began on 10/31/18.  He was seen in the emergency department at [facility], part of the defect was sutured and patient underwent x-ray evaluation showing a tuft fracture. He was given IV Keflex and sent home on oral antibiotics, he was also splinted and advised to follow-up in clinic today. He has started taking the oral Keflex.  His tetanus vaccine was updated last year.    He was seen previously in the hand clinic for left hand tingling.  He reports that he has been using the nerve glide handout, he has not braced the wrist.      Review of patient's allergies indicates:   Allergen Reactions    Iodine and iodide containing products Other (See Comments)     Blood in urine in the 1970s         Current Outpatient Medications   Medication Sig Dispense Refill    amLODIPine (NORVASC) 10 MG tablet TAKE 1 TABLET DAILY 90 tablet 3    cephALEXin (KEFLEX) 500 MG capsule Take 1 capsule (500 mg total) by mouth 4 (four) times daily. for 10 days 40 capsule 0    losartan-hydrochlorothiazide 100-25 mg (HYZAAR) 100-25 mg per tablet TAKE 1 TABLET DAILY 90 tablet 3    metoprolol succinate (TOPROL-XL) 50 MG 24 hr tablet TAKE 1 TABLET DAILY 90 tablet 3    oxyCODONE-acetaminophen (PERCOCET) 5-325 mg per tablet Take 1 tablet by mouth every 4 (four) hours as needed for Pain. 10 tablet 0    pravastatin (PRAVACHOL) 40 MG tablet TAKE 1 TABLET EVERY EVENING 90 tablet 0    sildenafil (REVATIO) 20 mg Tab Take 1 tablet (20 mg total) by mouth daily as needed. 60 tablet 11    valACYclovir (VALTREX) 1000 MG tablet TAKE 1 TABLET DAILY 90 tablet 3    acetaminophen-codeine 300-30mg (TYLENOL #3) 300-30 mg Tab Take 1 tablet by mouth  "every 6 (six) hours as needed. 20 tablet 0    ondansetron (ZOFRAN-ODT) 4 MG TbDL Take 1 tablet (4 mg total) by mouth every 6 (six) hours as needed. 30 tablet 0     No current facility-administered medications for this visit.        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 Velez    dental implant      HAND SURGERY      lip surgery      ROBOTIC ASSISTED LAPAROSCOPIC PROSTATECTOMY; lymphadenectomy N/A 7/17/2015    Performed by [facility] MD at [facility] OR         Review of Systems:  Review of Systems   Constitution: Negative for chills and fever.   Skin: Negative for rash and suspicious lesions.   Musculoskeletal:        See HPI   Neurological: Negative for dizziness, headaches, light-headedness, numbness and paresthesias.   Psychiatric/Behavioral: Negative for depression. The patient is not nervous/anxious.          OBJECTIVE:     PHYSICAL EXAM:  Height: 5' 10" (177.8 cm) Weight: 109.8 kg (242 lb)  Vitals:    11/01/18 1042   BP: 137/75   Pulse: (!) 58   Weight: 109.8 kg (242 lb)   Height: 5' 10" (1.778 m)   PainSc: 0-No pain     General    Vitals reviewed.  Constitutional: He is oriented to person, place, and time. He appears well-developed and well-nourished.   HENT:   Head: Normocephalic and atraumatic.   Neck: Normal range of motion.   Cardiovascular: Normal rate.    Pulmonary/Chest: Effort normal. No respiratory distress.   Neurological: He is alert and oriented to person, place, and time.   Psychiatric: He has a normal mood and affect. His behavior is normal. Judgment and thought content normal.             Musculoskeletal:  Laceration noted going through the right thumb nail bed and extending to " the palmar aspect of the thumb.  Sutures are in place distally, proximally there is a raw superficially open area.  Distally he has decreased sensation of the thumb tip, distal to the laceration.  Sensation proximal to the laceration is all intact. NVI.    RADIOGRAPHS:  Right Hand X-Ray, 10/31/18  Impression     Minimally displaced fracture involving the tuft of the distal phalanx of the right thumb with associated soft tissue laceration.  Punctate calcifications are identified within the soft tissues likely representing tiny bone fragments.  No metallic foreign bodies are identified.    Degenerative changes within the small joints of the hand and wrist.    Atherosclerosis.     Comments: I have personally reviewed the imaging and I agree with the above radiologist's report.    ASSESSMENT/PLAN:   Zheng was seen today for pain.    Diagnoses and all orders for this visit:    Laceration of finger nail bed, initial encounter  -     Place in Outpatient; Standing  -     Vital signs; Standing  -     Insert peripheral IV; Standing  -     Patient may leave on underwear for knee, ankle, and upper extremity surgery; Standing  -     Cleanse with Chlorhexidine (CHG); Standing  -     Diet NPO; Standing  -     Place AUGUSTINE hose; Standing  -     Chlorohexidine Gluconate Bath; Standing    Open displaced fracture of distal phalanx of right thumb, initial encounter  -     Place in Outpatient; Standing  -     Vital signs; Standing  -     Insert peripheral IV; Standing  -     Patient may leave on underwear for knee, ankle, and upper extremity surgery; Standing  -     Cleanse with Chlorhexidine (CHG); Standing  -     Diet NPO; Standing  -     Place AUGUSTINE hose; Standing  -     Chlorohexidine Gluconate Bath; Standing    Contact with powered saw as cause of accidental injury    Other orders  -     IP VTE HIGH RISK PATIENT; Standing  -     ceFAZolin (ANCEF) 2 g in dextrose 5 % 50 mL IVPB  -     mupirocin 2 % ointment           - We talked at  length about the anatomy and pathophysiology of   Encounter Diagnoses   Name Primary?    Laceration of finger nail bed, initial encounter Yes    Open displaced fracture of distal phalanx of right thumb, initial encounter     Contact with powered saw as cause of accidental injury        - discussed indications and risks of nail plate removal, nail bed repair, irrigation and debridement of the right thumb open fracture with nail bed involvement.  His questions were answered.  Consent form signed today in clinic.  Dr. Bradford also saw the patient today.  - wound redressed, thumb spica plaster splint applied today clinic  - continue Keflex as prescribed  - left carpal tunnel brace  - call with questions or concerns    Disclaimer: This note has been generated using voice-recognition software. There may be typographical errors that have been missed during proof-reading.

## 2018-11-01 NOTE — PROGRESS NOTES
Subjective:      Patient ID: Zheng Talley Jr. is a 73 y.o. male.    Chief Complaint: Pain of the Right Hand      HPI  Zheng Talley Jr. is a right hand dominant 73 y.o. male presenting today for follow-up from the ED.  There was a history of trauma, he cut his right thumb using a table saw.  Onset of symptoms began on 10/31/18.  He was seen in the emergency department at [facility], part of the defect was sutured and patient underwent x-ray evaluation showing a tuft fracture. He was given IV Keflex and sent home on oral antibiotics, he was also splinted and advised to follow-up in clinic today. He has started taking the oral Keflex.  His tetanus vaccine was updated last year.    He was seen previously in the hand clinic for left hand tingling.  He reports that he has been using the nerve glide handout, he has not braced the wrist.      Review of patient's allergies indicates:   Allergen Reactions    Iodine and iodide containing products Other (See Comments)     Blood in urine in the 1970s         Current Outpatient Medications   Medication Sig Dispense Refill    amLODIPine (NORVASC) 10 MG tablet TAKE 1 TABLET DAILY 90 tablet 3    cephALEXin (KEFLEX) 500 MG capsule Take 1 capsule (500 mg total) by mouth 4 (four) times daily. for 10 days 40 capsule 0    losartan-hydrochlorothiazide 100-25 mg (HYZAAR) 100-25 mg per tablet TAKE 1 TABLET DAILY 90 tablet 3    metoprolol succinate (TOPROL-XL) 50 MG 24 hr tablet TAKE 1 TABLET DAILY 90 tablet 3    oxyCODONE-acetaminophen (PERCOCET) 5-325 mg per tablet Take 1 tablet by mouth every 4 (four) hours as needed for Pain. 10 tablet 0    pravastatin (PRAVACHOL) 40 MG tablet TAKE 1 TABLET EVERY EVENING 90 tablet 0    sildenafil (REVATIO) 20 mg Tab Take 1 tablet (20 mg total) by mouth daily as needed. 60 tablet 11    valACYclovir (VALTREX) 1000 MG tablet TAKE 1 TABLET DAILY 90 tablet 3    acetaminophen-codeine 300-30mg (TYLENOL #3) 300-30 mg Tab Take 1 tablet by mouth  "every 6 (six) hours as needed. 20 tablet 0    ondansetron (ZOFRAN-ODT) 4 MG TbDL Take 1 tablet (4 mg total) by mouth every 6 (six) hours as needed. 30 tablet 0     No current facility-administered medications for this visit.        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 Velez    dental implant      HAND SURGERY      lip surgery      ROBOTIC ASSISTED LAPAROSCOPIC PROSTATECTOMY; lymphadenectomy N/A 7/17/2015    Performed by Derrick Wu MD at [facility] OR         Review of Systems:  Review of Systems   Constitution: Negative for chills and fever.   Skin: Negative for rash and suspicious lesions.   Musculoskeletal:        See HPI   Neurological: Negative for dizziness, headaches, light-headedness, numbness and paresthesias.   Psychiatric/Behavioral: Negative for depression. The patient is not nervous/anxious.          OBJECTIVE:     PHYSICAL EXAM:  Height: 5' 10" (177.8 cm) Weight: 109.8 kg (242 lb)  Vitals:    11/01/18 1042   BP: 137/75   Pulse: (!) 58   Weight: 109.8 kg (242 lb)   Height: 5' 10" (1.778 m)   PainSc: 0-No pain     General    Vitals reviewed.  Constitutional: He is oriented to person, place, and time. He appears well-developed and well-nourished.   HENT:   Head: Normocephalic and atraumatic.   Neck: Normal range of motion.   Cardiovascular: Normal rate.    Pulmonary/Chest: Effort normal. No respiratory distress.   Neurological: He is alert and oriented to person, place, and time.   Psychiatric: He has a normal mood and affect. His behavior is normal. Judgment and thought content normal.             Musculoskeletal:  Laceration noted going through the right thumb nail bed and extending to " the palmar aspect of the thumb.  Sutures are in place distally, proximally there is a raw superficially open area.  Distally he has decreased sensation of the thumb tip, distal to the laceration.  Sensation proximal to the laceration is all intact. NVI.    RADIOGRAPHS:  Right Hand X-Ray, 10/31/18  Impression     Minimally displaced fracture involving the tuft of the distal phalanx of the right thumb with associated soft tissue laceration.  Punctate calcifications are identified within the soft tissues likely representing tiny bone fragments.  No metallic foreign bodies are identified.    Degenerative changes within the small joints of the hand and wrist.    Atherosclerosis.     Comments: I have personally reviewed the imaging and I agree with the above radiologist's report.    ASSESSMENT/PLAN:   Zheng was seen today for pain.    Diagnoses and all orders for this visit:    Laceration of finger nail bed, initial encounter  -     Place in Outpatient; Standing  -     Vital signs; Standing  -     Insert peripheral IV; Standing  -     Patient may leave on underwear for knee, ankle, and upper extremity surgery; Standing  -     Cleanse with Chlorhexidine (CHG); Standing  -     Diet NPO; Standing  -     Place AUGUSTINE hose; Standing  -     Chlorohexidine Gluconate Bath; Standing    Open displaced fracture of distal phalanx of right thumb, initial encounter  -     Place in Outpatient; Standing  -     Vital signs; Standing  -     Insert peripheral IV; Standing  -     Patient may leave on underwear for knee, ankle, and upper extremity surgery; Standing  -     Cleanse with Chlorhexidine (CHG); Standing  -     Diet NPO; Standing  -     Place AUGUSTINE hose; Standing  -     Chlorohexidine Gluconate Bath; Standing    Contact with powered saw as cause of accidental injury    Other orders  -     IP VTE HIGH RISK PATIENT; Standing  -     ceFAZolin (ANCEF) 2 g in dextrose 5 % 50 mL IVPB  -     mupirocin 2 % ointment           - We talked at  length about the anatomy and pathophysiology of   Encounter Diagnoses   Name Primary?    Laceration of finger nail bed, initial encounter Yes    Open displaced fracture of distal phalanx of right thumb, initial encounter     Contact with powered saw as cause of accidental injury        - discussed indications and risks of nail plate removal, nail bed repair, irrigation and debridement of the right thumb open fracture with nail bed involvement.  His questions were answered.  Consent form signed today in clinic.  Dr. Bradford also saw the patient today.  - wound redressed, thumb spica plaster splint applied today clinic  - continue Keflex as prescribed  - left carpal tunnel brace  - call with questions or concerns    Disclaimer: This note has been generated using voice-recognition software. There may be typographical errors that have been missed during proof-reading.

## 2018-11-02 ENCOUNTER — ANESTHESIA EVENT (OUTPATIENT)
Dept: SURGERY | Facility: OTHER | Age: 73
End: 2018-11-02
Payer: MEDICARE

## 2018-11-02 ENCOUNTER — ANESTHESIA (OUTPATIENT)
Dept: SURGERY | Facility: OTHER | Age: 73
End: 2018-11-02
Payer: MEDICARE

## 2018-11-02 ENCOUNTER — HOSPITAL ENCOUNTER (OUTPATIENT)
Facility: OTHER | Age: 73
Discharge: HOME | End: 2018-11-02
Attending: ORTHOPAEDIC SURGERY | Admitting: ORTHOPAEDIC SURGERY
Payer: MEDICARE

## 2018-11-02 VITALS
BODY MASS INDEX: 34.65 KG/M2 | TEMPERATURE: 98 F | OXYGEN SATURATION: 99 % | HEART RATE: 60 BPM | WEIGHT: 242 LBS | HEIGHT: 70 IN | RESPIRATION RATE: 18 BRPM | SYSTOLIC BLOOD PRESSURE: 133 MMHG | DIASTOLIC BLOOD PRESSURE: 74 MMHG

## 2018-11-02 DIAGNOSIS — S61.319A LACERATION OF FINGER NAIL BED, INITIAL ENCOUNTER: ICD-10-CM

## 2018-11-02 DIAGNOSIS — S62.521B OPEN DISPLACED FRACTURE OF DISTAL PHALANX OF RIGHT THUMB, INITIAL ENCOUNTER: ICD-10-CM

## 2018-11-02 PROCEDURE — 71000016 HC POSTOP RECOV ADDL HR: Performed by: ORTHOPAEDIC SURGERY

## 2018-11-02 PROCEDURE — G0008 ADMIN INFLUENZA VIRUS VAC: HCPCS | Performed by: ORTHOPAEDIC SURGERY

## 2018-11-02 PROCEDURE — 90471 IMMUNIZATION ADMIN: CPT | Performed by: ORTHOPAEDIC SURGERY

## 2018-11-02 PROCEDURE — 36000707: Performed by: ORTHOPAEDIC SURGERY

## 2018-11-02 PROCEDURE — 71000015 HC POSTOP RECOV 1ST HR: Performed by: ORTHOPAEDIC SURGERY

## 2018-11-02 PROCEDURE — 63600175 PHARM REV CODE 636 W HCPCS: Performed by: ORTHOPAEDIC SURGERY

## 2018-11-02 PROCEDURE — 37000008 HC ANESTHESIA 1ST 15 MINUTES: Performed by: ORTHOPAEDIC SURGERY

## 2018-11-02 PROCEDURE — 37000009 HC ANESTHESIA EA ADD 15 MINS: Performed by: ORTHOPAEDIC SURGERY

## 2018-11-02 PROCEDURE — 25000003 PHARM REV CODE 250: Performed by: ORTHOPAEDIC SURGERY

## 2018-11-02 PROCEDURE — 63600175 PHARM REV CODE 636 W HCPCS: Performed by: NURSE ANESTHETIST, CERTIFIED REGISTERED

## 2018-11-02 PROCEDURE — 90662 IIV NO PRSV INCREASED AG IM: CPT | Performed by: ORTHOPAEDIC SURGERY

## 2018-11-02 PROCEDURE — 36000706: Performed by: ORTHOPAEDIC SURGERY

## 2018-11-02 PROCEDURE — 63600175 PHARM REV CODE 636 W HCPCS: Performed by: PHYSICIAN ASSISTANT

## 2018-11-02 PROCEDURE — 11012 DEB SKIN BONE AT FX SITE: CPT | Mod: 51,,, | Performed by: ORTHOPAEDIC SURGERY

## 2018-11-02 PROCEDURE — 26765 TREAT FINGER FRACTURE EACH: CPT | Mod: F5,,, | Performed by: ORTHOPAEDIC SURGERY

## 2018-11-02 PROCEDURE — 25000003 PHARM REV CODE 250: Performed by: PHYSICIAN ASSISTANT

## 2018-11-02 PROCEDURE — 25000003 PHARM REV CODE 250: Performed by: ANESTHESIOLOGY

## 2018-11-02 RX ORDER — LIDOCAINE HYDROCHLORIDE 10 MG/ML
INJECTION, SOLUTION EPIDURAL; INFILTRATION; INTRACAUDAL; PERINEURAL
Status: DISCONTINUED | OUTPATIENT
Start: 2018-11-02 | End: 2018-11-02 | Stop reason: HOSPADM

## 2018-11-02 RX ORDER — SODIUM CHLORIDE, SODIUM LACTATE, POTASSIUM CHLORIDE, CALCIUM CHLORIDE 600; 310; 30; 20 MG/100ML; MG/100ML; MG/100ML; MG/100ML
INJECTION, SOLUTION INTRAVENOUS CONTINUOUS PRN
Status: DISCONTINUED | OUTPATIENT
Start: 2018-11-02 | End: 2018-11-02

## 2018-11-02 RX ORDER — ONDANSETRON 2 MG/ML
4 INJECTION INTRAMUSCULAR; INTRAVENOUS DAILY PRN
Status: DISCONTINUED | OUTPATIENT
Start: 2018-11-02 | End: 2018-11-02 | Stop reason: HOSPADM

## 2018-11-02 RX ORDER — PROPOFOL 10 MG/ML
VIAL (ML) INTRAVENOUS
Status: DISCONTINUED | OUTPATIENT
Start: 2018-11-02 | End: 2018-11-02

## 2018-11-02 RX ORDER — CEFAZOLIN SODIUM 1 G/3ML
2 INJECTION, POWDER, FOR SOLUTION INTRAMUSCULAR; INTRAVENOUS
Status: COMPLETED | OUTPATIENT
Start: 2018-11-02 | End: 2018-11-02

## 2018-11-02 RX ORDER — MUPIROCIN 20 MG/G
OINTMENT TOPICAL
Status: DISCONTINUED | OUTPATIENT
Start: 2018-11-02 | End: 2018-11-02 | Stop reason: HOSPADM

## 2018-11-02 RX ORDER — OXYCODONE HYDROCHLORIDE 5 MG/1
5 TABLET ORAL
Status: DISCONTINUED | OUTPATIENT
Start: 2018-11-02 | End: 2018-11-02 | Stop reason: HOSPADM

## 2018-11-02 RX ORDER — SODIUM CHLORIDE 0.9 % (FLUSH) 0.9 %
3 SYRINGE (ML) INJECTION
Status: DISCONTINUED | OUTPATIENT
Start: 2018-11-02 | End: 2018-11-02 | Stop reason: HOSPADM

## 2018-11-02 RX ORDER — MEPERIDINE HYDROCHLORIDE 50 MG/ML
12.5 INJECTION INTRAMUSCULAR; INTRAVENOUS; SUBCUTANEOUS ONCE AS NEEDED
Status: DISCONTINUED | OUTPATIENT
Start: 2018-11-02 | End: 2018-11-02 | Stop reason: HOSPADM

## 2018-11-02 RX ORDER — HYDROMORPHONE HYDROCHLORIDE 2 MG/ML
0.4 INJECTION, SOLUTION INTRAMUSCULAR; INTRAVENOUS; SUBCUTANEOUS EVERY 5 MIN PRN
Status: DISCONTINUED | OUTPATIENT
Start: 2018-11-02 | End: 2018-11-02 | Stop reason: HOSPADM

## 2018-11-02 RX ORDER — FENTANYL CITRATE 50 UG/ML
25 INJECTION, SOLUTION INTRAMUSCULAR; INTRAVENOUS EVERY 5 MIN PRN
Status: DISCONTINUED | OUTPATIENT
Start: 2018-11-02 | End: 2018-11-02 | Stop reason: HOSPADM

## 2018-11-02 RX ORDER — LIDOCAINE HCL/PF 100 MG/5ML
SYRINGE (ML) INTRAVENOUS
Status: DISCONTINUED | OUTPATIENT
Start: 2018-11-02 | End: 2018-11-02

## 2018-11-02 RX ORDER — PROPOFOL 10 MG/ML
VIAL (ML) INTRAVENOUS CONTINUOUS PRN
Status: DISCONTINUED | OUTPATIENT
Start: 2018-11-02 | End: 2018-11-02

## 2018-11-02 RX ORDER — BUPIVACAINE HYDROCHLORIDE 2.5 MG/ML
INJECTION, SOLUTION EPIDURAL; INFILTRATION; INTRACAUDAL
Status: DISCONTINUED | OUTPATIENT
Start: 2018-11-02 | End: 2018-11-02 | Stop reason: HOSPADM

## 2018-11-02 RX ADMIN — LIDOCAINE HYDROCHLORIDE 25 MG: 20 INJECTION, SOLUTION INTRAVENOUS at 11:11

## 2018-11-02 RX ADMIN — SODIUM CHLORIDE, SODIUM LACTATE, POTASSIUM CHLORIDE, AND CALCIUM CHLORIDE: .6; .31; .03; .02 INJECTION, SOLUTION INTRAVENOUS at 10:11

## 2018-11-02 RX ADMIN — MUPIROCIN: 20 OINTMENT TOPICAL at 09:11

## 2018-11-02 RX ADMIN — PROPOFOL 100 MCG/KG/MIN: 10 INJECTION, EMULSION INTRAVENOUS at 11:11

## 2018-11-02 RX ADMIN — CEFAZOLIN 2 G: 330 INJECTION, POWDER, FOR SOLUTION INTRAMUSCULAR; INTRAVENOUS at 11:11

## 2018-11-02 RX ADMIN — PROPOFOL 100 MG: 10 INJECTION, EMULSION INTRAVENOUS at 11:11

## 2018-11-02 RX ADMIN — INFLUENZA A VIRUS A/MICHIGAN/45/2015 X-275 (H1N1) ANTIGEN (FORMALDEHYDE INACTIVATED), INFLUENZA A VIRUS A/SINGAPORE/INFIMH-16-0019/2016 IVR-186 (H3N2) ANTIGEN (FORMALDEHYDE INACTIVATED), AND INFLUENZA B VIRUS B/MARYLAND/15/2016 BX-69A (A B/COLORADO/6/2017-LIKE VIRUS) ANTIGEN (FORMALDEHYDE INACTIVATED) 0.5 ML: 60; 60; 60 INJECTION, SUSPENSION INTRAMUSCULAR at 12:11

## 2018-11-02 NOTE — ANESTHESIA PREPROCEDURE EVALUATION
11/02/2018  Zheng Talley Jr. is a 73 y.o., male.    Anesthesia Evaluation    I have reviewed the Patient Summary Reports.    I have reviewed the Nursing Notes.   I have reviewed the Medications.     Review of Systems  Anesthesia Hx:  No problems with previous Anesthesia    Social:  Non-Smoker    Cardiovascular:   Hypertension, well controlled    Pulmonary:  Pulmonary Normal    Hepatic/GI:  Hepatic/GI Normal    Endocrine:  Endocrine Normal        Physical Exam  General:  Obesity    Airway/Jaw/Neck:  Airway Findings: Mouth Opening: Normal Tongue: Normal  General Airway Assessment: Adult  Mallampati: II  TM Distance: Normal, at least 6 cm  Jaw/Neck Findings:     Neck ROM: Normal ROM      Dental:  Dental Findings: In tact             Anesthesia Plan  Type of Anesthesia, risks & benefits discussed:  Anesthesia Type:  MAC  Patient's Preference:   Intra-op Monitoring Plan:   Intra-op Monitoring Plan Comments:   Post Op Pain Control Plan:   Post Op Pain Control Plan Comments:   Induction:   IV  Beta Blocker:         Informed Consent: Patient understands risks and agrees with Anesthesia plan.  Questions answered. Anesthesia consent signed with patient.  ASA Score: 3     Day of Surgery Review of History & Physical:    H&P update referred to the surgeon.         Ready For Surgery From Anesthesia Perspective.

## 2018-11-02 NOTE — PLAN OF CARE
Zheng Talley Jr. has met all discharge criteria from Phase II. Vital Signs are stable, ambulating  without difficulty. Discharge instructions given, patient verbalized understanding. Discharged from facility via wheelchair in stable condition.

## 2018-11-02 NOTE — BRIEF OP NOTE
Brief Operative Note     SUMMARY     Surgery Date: 11/2/2018     Surgeon(s) and Role:     * Elyssa Bradford MD - Primary    Assisting Surgeon: None    Pre-op Diagnosis:  Laceration of finger nail bed, initial encounter [L51.666A]    Post-op Diagnosis:  Laceration of finger nail bed, initial encounter [S61.464A]    Procedure(s) (LRB):  REPAIR, NAIL BED right thumb with I&D (Right)    Anesthesia: Local MAC    Description of Procedure:   Right thumb nail plate removal and nail bed repair with irrigation and debridement    Findings/Key Components:  Right thumb nail plate removal and nail bed repair with irrigation and debridement    Estimated Blood Loss: Minimal         Specimens Removed:   Specimen (12h ago, onward)    None          Discharge Note      SUMMARY     Admit Date: 11/2/2018    Attending Physician: Elyssa Bradford MD     Discharge Physician: Elyssa Bradford MD    Discharge Date: 11/2/2018     Final Diagnosis: Laceration of finger nail bed, initial encounter [Y48.165H]    Hospital Course: Patient was admitted for an outpatient procedure and tolerated the procedure well with no complications.    Disposition: Home or Self Care    Follow Up/Patient Instructions:   Current Discharge Medication List      CONTINUE these medications which have NOT CHANGED    Details   amLODIPine (NORVASC) 10 MG tablet TAKE 1 TABLET DAILY  Qty: 90 tablet, Refills: 3      cephALEXin (KEFLEX) 500 MG capsule Take 1 capsule (500 mg total) by mouth 4 (four) times daily. for 10 days  Qty: 40 capsule, Refills: 0      losartan-hydrochlorothiazide 100-25 mg (HYZAAR) 100-25 mg per tablet TAKE 1 TABLET DAILY  Qty: 90 tablet, Refills: 3    Associated Diagnoses: Essential hypertension      metoprolol succinate (TOPROL-XL) 50 MG 24 hr tablet TAKE 1 TABLET DAILY  Qty: 90 tablet, Refills: 3    Associated Diagnoses: Essential hypertension      oxyCODONE-acetaminophen (PERCOCET) 5-325 mg per tablet Take 1 tablet by mouth every 4  (four) hours as needed for Pain.  Qty: 10 tablet, Refills: 0      pravastatin (PRAVACHOL) 40 MG tablet TAKE 1 TABLET EVERY EVENING  Qty: 90 tablet, Refills: 0      valACYclovir (VALTREX) 1000 MG tablet TAKE 1 TABLET DAILY  Qty: 90 tablet, Refills: 3    Associated Diagnoses: Essential hypertension      acetaminophen-codeine 300-30mg (TYLENOL #3) 300-30 mg Tab Take 1 tablet by mouth every 6 (six) hours as needed.  Qty: 20 tablet, Refills: 0    Associated Diagnoses: Laceration of finger nail bed, initial encounter      ondansetron (ZOFRAN-ODT) 4 MG TbDL Take 1 tablet (4 mg total) by mouth every 6 (six) hours as needed.  Qty: 30 tablet, Refills: 0    Associated Diagnoses: Laceration of finger nail bed, initial encounter      sildenafil (REVATIO) 20 mg Tab Take 1 tablet (20 mg total) by mouth daily as needed.  Qty: 60 tablet, Refills: 11           Follow-up Information     Follow up In 2 weeks.    Why:  For suture removal, For wound re-check               Discharge Procedure Orders (must include Diet, Follow-up, Activity)   Discharge Procedure Orders (must include Diet, Follow-up, Activity)   Keep surgical extremity elevated     Lifting restrictions     Notify your health care provider if you experience any of the following:  temperature >100.4     Notify your health care provider if you experience any of the following:  severe uncontrolled pain     Notify your health care provider if you experience any of the following:  redness, tenderness, or signs of infection (pain, swelling, redness, odor or green/yellow discharge around incision site)     Notify your health care provider if you experience any of the following:  worsening rash     Leave dressing on - Keep it clean, dry, and intact until clinic visit     Activity as tolerated    Certification of Assistant at Surgery       Surgery Date: 11/2/2018     Participating Surgeons:  Surgeon(s) and Role:     * Elyssa Bradford MD - Primary    Procedures:  Procedure(s)  (LRB):  REPAIR, NAIL BED right thumb with I&D (Right)    Assistant Surgeon's Certification of Necessity:  I understand that section 1842 (b) (6) (d) of the Social Security Act generally prohibits Medicare Part B reasonable charge payment for the services of assistants at surgery in teaching hospitals when qualified residents are available to furnish such services. I certify that the services for which payment is claimed were medically necessary, and that no qualified resident was available to perform the services. I further understand that these services are subject to post-payment review by the Medicare carrier.      MANDO Ferrer    11/02/2018  12:08 PM

## 2018-11-02 NOTE — ANESTHESIA POSTPROCEDURE EVALUATION
"Anesthesia Post Evaluation    Patient: Zheng Talley Jr.    Procedure(s) Performed: Procedure(s) (LRB):  REPAIR, NAIL BED right thumb with I&D (Right)    Final Anesthesia Type: general  Patient location during evaluation: OPS  Patient participation: Yes- Able to Participate  Level of consciousness: awake and alert  Post-procedure vital signs: reviewed and stable  Pain management: adequate  Airway patency: patent  PONV status at discharge: No PONV  Anesthetic complications: no      Cardiovascular status: blood pressure returned to baseline  Respiratory status: unassisted, spontaneous ventilation and room air  Hydration status: euvolemic          Visit Vitals  /87 (BP Location: Left arm, Patient Position: Sitting)   Pulse 73   Temp 36.9 °C (98.5 °F) (Oral)   Resp 18   Ht 5' 10" (1.778 m)   Wt 109.8 kg (241 lb 16 oz)   SpO2 95%   BMI 34.72 kg/m²       Pain/Veronica Score: Pain Assessment Performed: Yes (11/2/2018 10:00 AM)  Presence of Pain: complains of pain/discomfort (11/2/2018 10:00 AM)        "

## 2018-11-02 NOTE — INTERVAL H&P NOTE
The patient has been examined and the H&P has been reviewed:    I concur with the findings and no changes have occurred since H&P was written.    Anesthesia/Surgery risks, benefits and alternative options discussed and understood by patient/family.          Active Hospital Problems    Diagnosis  POA    Laceration of finger nail bed, initial encounter [Z48.708C]  Yes      Resolved Hospital Problems   No resolved problems to display.

## 2018-11-02 NOTE — DISCHARGE INSTRUCTIONS
Anesthesia: Monitored Anesthesia Care (MAC)    Anesthesia Safety  · Have an adult family member or friend drive you home after the procedure.  · For the first 24 hours after your surgery:  ¨ Do not drive or use heavy equipment.  ¨ Do not make important decisions or sign documents.  ¨ Avoid alcohol.  ¨ Have someone stay with you, if possible. They can watch for problems and help keep you safe.    PLEASE FOLLOW ANY OTHER INSTRUCTIONS PROVIDED TO YOU BY DR. MORAN!

## 2018-11-06 NOTE — OP NOTE
DATE OF PROCEDURE:  11/02/2018.    SERVICE:  Orthopedics.    ATTENDING SURGEON:  Elyssa Bradford M.D.    ASSISTANT SURGEON:  MANDO Ferrer.    PREOPERATIVE DIAGNOSIS:  Right open distal phalanx fracture of thumb with nail   bed injury.    POSTOPERATIVE DIAGNOSIS:  Right open distal phalanx fracture of thumb with nail   bed injury.    PROCEDURES PERFORMED:  1.  Nail bed repair, right thumb.  2.  Irrigation and debridement down to bone, right thumb.  3.  Splint application, right thumb.    ANESTHESIA:  Local placed by surgeon and MAC.    FLUIDS:  Lactated Ringers.    BLOOD LOSS:  None.  No blood was given.    TOURNIQUET TIME:  30 minutes.    PACKS AND DRAINS:  None.    IMPLANTS:  None.    SPECIMENS:  None.    COMPLICATIONS:  None.    INDICATIONS FOR PROCEDURE:  Ms. Talley is a 73-year-old male who was using a   skill saw, sliced the dorsal aspect of his right thumb and a portion of his   volar aspect.  He was seen in the Emergency Room, they repaired the volar aspect   of the thumb; however, they did not address the open tuft fracture.  After much   discussion with the patient in clinic, he consented for surgery.  Risks and   benefits were explained to the patient in clinic.  Consents were signed in the   clinic.    PROCEDURE IN DETAIL:  After correct site was marked with the patient's   participation in the holding area, the patient was brought to the Operating   Room, placed in supine position, underwent MAC anesthesia.  A well-padded   nonsterile tourniquet was placed on the right forearm.  Timeout was conducted   for correct site, procedure and patient to be indicated.  IV antibiotics were   given to the patient preoperatively.  Under sterile conditions, lidocaine 1% was   placed as a right thumb block.  The hand was then prepped and draped in normal   sterile fashion.  The nail plate was removed after the tourniquet was   insufflated to 250 mmHg.  Immediately could be seen was the 6 mm laceration    across the dorsal of the nail bed.  After significant amount of irrigation down   to the bone and the bone was exposed through this opening in the nail bed,   curette was used to debride the bone.  The nail bed was then repaired with   chromic suture.  A small piece of aluminum foil was placed as a splint   underneath the cuticle, sutured into place and the finger after being cleansed   was placed in a sterile dressing and a finger splint.  Tourniquet was deflated.    Brisk capillary refill ensued.  The patient was placed in a well-padded splint.    The patient was taken to recovery area in stable condition.    POSTOPERATIVE PLAN FOR THIS PATIENT:  Keep the dressing clean, dry and intact,   we will see him back in one week's time to assess the wound.      LES/IN  dd: 11/05/2018 18:07:42 (CST)  td: 11/05/2018 18:35:55 (CST)  Doc ID   #[id number]  Job ID #[id number]    CC:

## 2018-11-08 ENCOUNTER — PES CALL (OUTPATIENT)
Dept: ADMINISTRATIVE | Facility: CLINIC | Age: 73
End: 2018-11-08

## 2018-11-13 ENCOUNTER — PES CALL (OUTPATIENT)
Dept: ADMINISTRATIVE | Facility: CLINIC | Age: 73
End: 2018-11-13

## 2018-11-15 ENCOUNTER — OFFICE VISIT (OUTPATIENT)
Dept: ORTHOPEDICS | Facility: CLINIC | Age: 73
End: 2018-11-15
Payer: MEDICARE

## 2018-11-15 VITALS
HEIGHT: 70 IN | DIASTOLIC BLOOD PRESSURE: 75 MMHG | HEART RATE: 62 BPM | BODY MASS INDEX: 34.5 KG/M2 | WEIGHT: 241 LBS | SYSTOLIC BLOOD PRESSURE: 128 MMHG

## 2018-11-15 DIAGNOSIS — S62.521B OPEN DISPLACED FRACTURE OF DISTAL PHALANX OF RIGHT THUMB, INITIAL ENCOUNTER: Primary | ICD-10-CM

## 2018-11-15 DIAGNOSIS — S61.319A LACERATION OF FINGER NAIL BED, INITIAL ENCOUNTER: ICD-10-CM

## 2018-11-15 PROCEDURE — 99213 OFFICE O/P EST LOW 20 MIN: CPT | Mod: PBBFAC | Performed by: PHYSICIAN ASSISTANT

## 2018-11-15 PROCEDURE — 99024 POSTOP FOLLOW-UP VISIT: CPT | Mod: POP,,, | Performed by: PHYSICIAN ASSISTANT

## 2018-11-15 PROCEDURE — 99999 PR PBB SHADOW E&M-EST. PATIENT-LVL III: CPT | Mod: PBBFAC,,, | Performed by: PHYSICIAN ASSISTANT

## 2018-11-15 NOTE — PROGRESS NOTES
"Mr. Talley is here today for a post-operative visit.  He is 13 days status post Nail bed repair right thumb with Irrigation and debridement down to bone right thumb by Dr. Bradford on 11/2/18. He reports that he is doing well.  He has no current pain.  He is not taking pain medication.  He denies fever, chills, and sweats since the time of the surgery.     Physical exam:    Vitals:    11/15/18 1518   BP: 128/75   Pulse: 62   Weight: 109.3 kg (241 lb)   Height: 5' 10" (1.778 m)   PainSc: 0-No pain     Vital signs are stable, patient is afebrile.  Patient is well dressed and well groomed, no acute distress.  Alert and oriented to person, place, and time.  Post op dressing taken down. Foil packet not in place over nailbed - came off with dressing.  Incision is clean, dry and intact.  There is no erythema or exudate.  There is no sign of any infection. He is NVI. Nylon sutures removed without difficulty, chromic sutures left in place.      Assessment: 13 days status post Nail bed repair right thumb with Irrigation and debridement down to bone right thumb    Plan:  Zhegn was seen today for post-op evaluation.    Diagnoses and all orders for this visit:    Open displaced fracture of distal phalanx of right thumb, initial encounter  -     X-Ray Finger 2 View or More Views; Future    Laceration of finger nail bed, initial encounter  -     X-Ray Finger 2 View or More Views; Future        - PO instruction reviewed and provided to patient  - Chantale splint right thumb - full time use  - Clean dry dressing applied  - Follow up in 2 weeks with X-Ray  - Call with questions or concerns    "

## 2018-11-29 ENCOUNTER — HOSPITAL ENCOUNTER (OUTPATIENT)
Dept: RADIOLOGY | Facility: OTHER | Age: 73
Discharge: HOME | End: 2018-11-29
Attending: PHYSICIAN ASSISTANT
Payer: MEDICARE

## 2018-11-29 ENCOUNTER — OFFICE VISIT (OUTPATIENT)
Dept: ORTHOPEDICS | Facility: CLINIC | Age: 73
End: 2018-11-29
Payer: MEDICARE

## 2018-11-29 VITALS
HEART RATE: 58 BPM | BODY MASS INDEX: 34.5 KG/M2 | SYSTOLIC BLOOD PRESSURE: 150 MMHG | HEIGHT: 70 IN | DIASTOLIC BLOOD PRESSURE: 81 MMHG | WEIGHT: 241 LBS

## 2018-11-29 DIAGNOSIS — S61.319A LACERATION OF FINGER NAIL BED, INITIAL ENCOUNTER: ICD-10-CM

## 2018-11-29 DIAGNOSIS — S62.521B OPEN DISPLACED FRACTURE OF DISTAL PHALANX OF RIGHT THUMB, INITIAL ENCOUNTER: ICD-10-CM

## 2018-11-29 DIAGNOSIS — S62.521B OPEN DISPLACED FRACTURE OF DISTAL PHALANX OF RIGHT THUMB, INITIAL ENCOUNTER: Primary | ICD-10-CM

## 2018-11-29 PROCEDURE — 99024 POSTOP FOLLOW-UP VISIT: CPT | Mod: POP,,, | Performed by: PHYSICIAN ASSISTANT

## 2018-11-29 PROCEDURE — 73140 X-RAY EXAM OF FINGER(S): CPT | Mod: TC,FY

## 2018-11-29 PROCEDURE — 99214 OFFICE O/P EST MOD 30 MIN: CPT | Mod: PBBFAC,25 | Performed by: PHYSICIAN ASSISTANT

## 2018-11-29 PROCEDURE — 99999 PR PBB SHADOW E&M-EST. PATIENT-LVL IV: CPT | Mod: PBBFAC,,, | Performed by: PHYSICIAN ASSISTANT

## 2018-11-29 PROCEDURE — 73140 X-RAY EXAM OF FINGER(S): CPT | Mod: 26,RT,, | Performed by: RADIOLOGY

## 2018-11-29 NOTE — PROGRESS NOTES
"Mr. Talley is here today for a post-operative visit.  He is 27 days status post Nail bed repair right thumb with Irrigation and debridement down to bone right thumb by Dr. Bradford on 11/2/18. He reports that he is doing well.  He has no current pain.  He is not taking pain medication.  He reports that he is performing scar massage with vitamin E.  He took the splint off 48 hours after his last visit, he has not been wearing it since.  He denies fever, chills, and sweats since the time of the surgery.     Physical exam:    Vitals:    11/29/18 0811   BP: (!) 150/81   Pulse: (!) 58   Weight: 109.3 kg (241 lb)   Height: 5' 10" (1.778 m)   PainSc:   4     Vital signs are stable, patient is afebrile.  Patient is well dressed and well groomed, no acute distress.  Alert and oriented to person, place, and time.  Lacerations over the nailbed and palmar thumb healing well - clean, dry and intact. Palmar thumb abrasion is also healing well. There is no erythema or exudate.  There is no sign of any infection. He is NVI- mild hypersensitivity ulnarly at the thumb.  Good thumb ROM at MCP and IP.     Assessment: 27 days status post Nail bed repair right thumb with Irrigation and debridement down to bone right thumb    Plan:  Zheng was seen today for pain.    Diagnoses and all orders for this visit:    Open displaced fracture of distal phalanx of right thumb, initial encounter  -     Ambulatory Referral to Physical/Occupational Therapy  -     X-Ray Finger 2 View or More Views; Future    Laceration of finger nail bed, initial encounter  -     Ambulatory Referral to Physical/Occupational Therapy          - Chantale splint right thumb - full time use x 2-4 weeks  - Orders for OT  - Follow up in 4 weeks with X-Ray  - Call with questions or concerns    "

## 2018-12-05 ENCOUNTER — PES CALL (OUTPATIENT)
Dept: ADMINISTRATIVE | Facility: CLINIC | Age: 73
End: 2018-12-05

## 2018-12-11 RX ORDER — PRAVASTATIN SODIUM 40 MG/1
TABLET ORAL
Qty: 90 TABLET | Refills: 0 | Status: SHIPPED | OUTPATIENT
Start: 2018-12-11 | End: 2019-03-14 | Stop reason: SDUPTHER

## 2018-12-27 ENCOUNTER — OFFICE VISIT (OUTPATIENT)
Dept: ORTHOPEDICS | Facility: CLINIC | Age: 73
End: 2018-12-27
Payer: MEDICARE

## 2018-12-27 ENCOUNTER — OFFICE VISIT (OUTPATIENT)
Dept: INTERNAL MEDICINE | Facility: CLINIC | Age: 73
End: 2018-12-27
Payer: MEDICARE

## 2018-12-27 ENCOUNTER — HOSPITAL ENCOUNTER (OUTPATIENT)
Dept: RADIOLOGY | Facility: OTHER | Age: 73
Discharge: HOME | End: 2018-12-27
Attending: PHYSICIAN ASSISTANT
Payer: MEDICARE

## 2018-12-27 VITALS
OXYGEN SATURATION: 97 % | SYSTOLIC BLOOD PRESSURE: 126 MMHG | HEIGHT: 70 IN | HEART RATE: 57 BPM | DIASTOLIC BLOOD PRESSURE: 70 MMHG | BODY MASS INDEX: 36.7 KG/M2 | WEIGHT: 256.38 LBS

## 2018-12-27 VITALS
SYSTOLIC BLOOD PRESSURE: 124 MMHG | DIASTOLIC BLOOD PRESSURE: 80 MMHG | HEIGHT: 70 IN | WEIGHT: 241 LBS | HEART RATE: 71 BPM | BODY MASS INDEX: 34.5 KG/M2

## 2018-12-27 DIAGNOSIS — N52.9 ERECTILE DYSFUNCTION, UNSPECIFIED ERECTILE DYSFUNCTION TYPE: ICD-10-CM

## 2018-12-27 DIAGNOSIS — E66.01 SEVERE OBESITY WITH BODY MASS INDEX (BMI) OF 36.0 TO 36.9 WITH SERIOUS COMORBIDITY: ICD-10-CM

## 2018-12-27 DIAGNOSIS — C61 PROSTATE CANCER: ICD-10-CM

## 2018-12-27 DIAGNOSIS — E27.8 ADRENAL NODULE: ICD-10-CM

## 2018-12-27 DIAGNOSIS — B00.9 HSV INFECTION: ICD-10-CM

## 2018-12-27 DIAGNOSIS — S62.521B OPEN DISPLACED FRACTURE OF DISTAL PHALANX OF RIGHT THUMB, INITIAL ENCOUNTER: Primary | ICD-10-CM

## 2018-12-27 DIAGNOSIS — Z00.00 ENCOUNTER FOR PREVENTIVE HEALTH EXAMINATION: Primary | ICD-10-CM

## 2018-12-27 DIAGNOSIS — K63.5 POLYP OF COLON, UNSPECIFIED PART OF COLON, UNSPECIFIED TYPE: ICD-10-CM

## 2018-12-27 DIAGNOSIS — M25.569 KNEE PAIN, UNSPECIFIED CHRONICITY, UNSPECIFIED LATERALITY: ICD-10-CM

## 2018-12-27 DIAGNOSIS — R35.1 NOCTURIA: ICD-10-CM

## 2018-12-27 DIAGNOSIS — H25.13 NUCLEAR SCLEROSIS OF BOTH EYES: ICD-10-CM

## 2018-12-27 DIAGNOSIS — S61.319A LACERATION OF FINGER NAIL BED, INITIAL ENCOUNTER: ICD-10-CM

## 2018-12-27 DIAGNOSIS — S62.521B OPEN DISPLACED FRACTURE OF DISTAL PHALANX OF RIGHT THUMB, INITIAL ENCOUNTER: ICD-10-CM

## 2018-12-27 DIAGNOSIS — I10 ESSENTIAL HYPERTENSION: ICD-10-CM

## 2018-12-27 DIAGNOSIS — E78.5 HYPERLIPIDEMIA LDL GOAL <130: ICD-10-CM

## 2018-12-27 PROBLEM — E27.9 ADRENAL NODULE: Status: ACTIVE | Noted: 2018-12-27

## 2018-12-27 PROCEDURE — 73140 X-RAY EXAM OF FINGER(S): CPT | Mod: 26,RT,, | Performed by: RADIOLOGY

## 2018-12-27 PROCEDURE — 99213 OFFICE O/P EST LOW 20 MIN: CPT | Mod: PBBFAC,25 | Performed by: NURSE PRACTITIONER

## 2018-12-27 PROCEDURE — G0439 PPPS, SUBSEQ VISIT: HCPCS | Mod: S$GLB,,, | Performed by: NURSE PRACTITIONER

## 2018-12-27 PROCEDURE — 99999 PR PBB SHADOW E&M-EST. PATIENT-LVL III: CPT | Mod: PBBFAC,,, | Performed by: PHYSICIAN ASSISTANT

## 2018-12-27 PROCEDURE — 99213 OFFICE O/P EST LOW 20 MIN: CPT | Mod: PBBFAC,25,27 | Performed by: PHYSICIAN ASSISTANT

## 2018-12-27 PROCEDURE — 73140 X-RAY EXAM OF FINGER(S): CPT | Mod: TC,FY

## 2018-12-27 PROCEDURE — 99999 PR PBB SHADOW E&M-EST. PATIENT-LVL III: CPT | Mod: PBBFAC,,, | Performed by: NURSE PRACTITIONER

## 2018-12-27 PROCEDURE — 99024 POSTOP FOLLOW-UP VISIT: CPT | Mod: POP,,, | Performed by: PHYSICIAN ASSISTANT

## 2018-12-27 NOTE — PROGRESS NOTES
"Mr. Talley is here today for a post-operative visit.  He is 55 days status post Nail bed repair right thumb with Irrigation and debridement down to bone right thumb by Dr. Bradford on 11/2/18. He reports that he is doing well.  He has no current pain.  He is not taking pain medication.  He reports that he is performing scar massage with vitamin E.  He wore the splint for couple weeks, but has since lost it. He reports good motion of the thumb.  He is not interested in OT.  He is ready for discharge.  He denies fever, chills, and sweats since the time of the surgery.     Physical exam:    Vitals:    12/27/18 1004   BP: 124/80   Pulse: 71   Weight: 109.3 kg (241 lb)   Height: 5' 10" (1.778 m)   PainSc: 0-No pain     Vital signs are stable, patient is afebrile.  Patient is well dressed and well groomed, no acute distress.  Alert and oriented to person, place, and time.  Lacerations over the nailbed and palmar thumb healing well - clean, dry and intact. Palmar thumb abrasion is also healing well. There is no erythema or exudate.  There is no sign of any infection. He is NVI- mild hypersensitivity volarly at the thumb.  Good thumb ROM at MCP and IP.     Assessment: 55 days status post Nail bed repair right thumb with Irrigation and debridement down to bone right thumb    Plan:  Zheng was seen today for pain.    Diagnoses and all orders for this visit:    Open displaced fracture of distal phalanx of right thumb, initial encounter    Laceration of finger nail bed, initial encounter        - reviewed x-ray with the patient  - gradual increase in lifting/weight-bearing and activity  - Follow up as needed  - Call with questions or concerns    "

## 2018-12-27 NOTE — PROGRESS NOTES
"Zheng Talley presented for a  Medicare AWV and comprehensive Health Risk Assessment today. The following components were reviewed and updated:    · Medical history  · Family History  · Social history  · Allergies and Current Medications  · Health Risk Assessment  · Health Maintenance  · Care Team     ** See Completed Assessments for Annual Wellness Visit within the encounter summary.**       The following assessments were completed:  · Living Situation  · CAGE  · Depression Screening  · Timed Get Up and Go  · Whisper Test  · Cognitive Function Screening  · Nutrition Screening  · ADL Screening  · PAQ Screening          Vitals:    12/27/18 1206   BP: 126/70   BP Location: Left arm   Patient Position: Sitting   Pulse: (!) 57   SpO2: 97%   Weight: 116.3 kg (256 lb 6.3 oz)   Height: 5' 10" (1.778 m)     Body mass index is 36.79 kg/m².     Physical Exam   Constitutional: He is oriented to person, place, and time. He appears well-developed and well-nourished.   HENT:   Head: Normocephalic and atraumatic. Not macrocephalic and not microcephalic. Head is without raccoon's eyes, without Vargas's sign, without abrasion, without contusion, without laceration, without right periorbital erythema and without left periorbital erythema. Hair is normal.   Right Ear: No lacerations. No drainage, swelling or tenderness. No foreign bodies. No mastoid tenderness. Tympanic membrane is not injected, not scarred, not perforated, not erythematous, not retracted and not bulging. Tympanic membrane mobility is normal. No middle ear effusion. No hemotympanum. No decreased hearing is noted.   Left Ear: No lacerations. No drainage, swelling or tenderness. No foreign bodies. No mastoid tenderness. Tympanic membrane is not injected, not scarred, not perforated, not erythematous, not retracted and not bulging. Tympanic membrane mobility is normal.  No middle ear effusion. No hemotympanum. No decreased hearing is noted.   Nose: Nose normal. No " mucosal edema, rhinorrhea, nose lacerations, sinus tenderness or nasal deformity.   Mouth/Throat: Uvula is midline.   Eyes: Conjunctivae and lids are normal. No scleral icterus.   Neck: Trachea normal. Neck supple. No spinous process tenderness and no muscular tenderness present. No neck rigidity. No edema, no erythema and normal range of motion present. No thyroid mass and no thyromegaly present.   Cardiovascular: Normal rate, regular rhythm, normal heart sounds and intact distal pulses. Exam reveals no gallop and no friction rub.   No murmur heard.  Pulmonary/Chest: Effort normal and breath sounds normal. No stridor. No respiratory distress. He has no wheezes. He has no rales. He exhibits no tenderness.   Abdominal: Soft. Bowel sounds are normal. He exhibits no distension and no mass. There is no tenderness. There is no rebound and no guarding. No hernia.   Musculoskeletal: Normal range of motion.   Lymphadenopathy:        Head (right side): No submental, no submandibular, no tonsillar, no preauricular and no posterior auricular adenopathy present.        Head (left side): No submental, no submandibular, no tonsillar, no preauricular, no posterior auricular and no occipital adenopathy present.   Neurological: He is alert and oriented to person, place, and time. No cranial nerve deficit.   Skin: Skin is warm and dry.   Psychiatric: He has a normal mood and affect. His behavior is normal. Judgment and thought content normal.   Vitals reviewed.      Diagnoses and health risks identified today and associated recommendations/orders:    1. Encounter for preventive health examination  Annual Health Risk Assessment (HRA) visit today.  Counseling and referral of health maintenance and preventative health measures performed.  Patient given annual wellness paperwork to take home.  Encouraged to return in 1 year for subsequent HRA visit.     2. Adrenal nodule  Chronic. Stable. Noted on CT Abdomen from 04/29/15. Continue  current treatment plan. Monitored by PCP.    3. Severe obesity with body mass index (BMI) of 36.0 to 36.9 with serious comorbidity  Chronic. Stable. Encouraged to increase exercise as tolerated and improve diet to heart healthy, low sodium diet. Continue current treatment plan as previously prescribed by PCP.    4. Prostate cancer  Chronic. Stable. Monitored by Urology.    5. Erectile dysfunction, unspecified erectile dysfunction type  Chronic. Stable. Continue current treatment plan. Monitored by Urology.    6. Nocturia  Chronic. Stable. Continue current treatment plan. Monitored by Urology.    7. HSV infection  Chronic. Stable. Continue current treatment plan as previously prescribed by PCP.    8. Essential hypertension  Chronic. Stable. Encouraged to increase exercise as tolerated (moderate-intensity aerobic activity and muscle-strengthening activities) improve diet to heart healthy, low sodium diet. Continue current treatment plan. Monitored by PCP.    9. Hyperlipidemia LDL goal <130  Chronic. Stable. Continue current treatment plan as previously prescribed by PCP.    10. Nuclear sclerosis of both eyes  Chronic. Stable. Continue current treatment plan. Monitored by Optometry.    11. Polyp of colon, unspecified part of colon, unspecified type  Chronic. Stable. Monitored by GI.    12. Knee pain, unspecified chronicity, unspecified laterality  Chronic. Stable. Continue current treatment plan. Monitored by Ortho.      Provided Zheng with a 5-10 year written screening schedule and personal prevention plan. Recommendations were developed using the USPSTF age appropriate recommendations. Education, counseling, and referrals were provided as needed. After Visit Summary printed and given to patient which includes a list of additional screenings\tests needed.    Follow-up in about 1 year (around 12/27/2019).    Dash Thompson NP

## 2018-12-27 NOTE — PATIENT INSTRUCTIONS
Counseling and Referral of Other Preventative  (Italic type indicates deductible and co-insurance are waived)    Patient Name: Zhneg Talley  Today's Date: 12/27/2018    Health Maintenance       Date Due Completion Date    Pneumococcal Vaccine (65+ High/Highest Risk) (1 of 2 - PCV13) 01/31/2011 ---    Colonoscopy 03/18/2019 3/18/2009    Lipid Panel 03/16/2020 3/16/2015    TETANUS VACCINE 03/08/2027 3/8/2017        No orders of the defined types were placed in this encounter.    The following information is provided to all patients.  This information is to help you find resources for any of the problems found today that may be affecting your health:                Living healthy guide: www.[facility].[place].gov      Understanding Diabetes: www.diabetes.org      Eating healthy: www.cdc.gov/healthyweight      CDC home safety checklist: www.cdc.gov/steadi/patient.html      Agency on Aging: www.goea.[place].[facility]      Alcoholics anonymous (AA): www.aa.org      Physical Activity: www.silas.nih.gov/bo4dpxu      Tobacco use: www.quitwithusla.org

## 2019-01-23 ENCOUNTER — CLINICAL SUPPORT (OUTPATIENT)
Dept: OPHTHALMOLOGY | Facility: CLINIC | Age: 74
End: 2019-01-23
Payer: MEDICARE

## 2019-01-23 DIAGNOSIS — Z86.010 ENCOUNTER FOR COLONOSCOPY DUE TO HISTORY OF COLONIC POLYP: Primary | ICD-10-CM

## 2019-01-23 DIAGNOSIS — Z12.11 ENCOUNTER FOR COLONOSCOPY DUE TO HISTORY OF COLONIC POLYP: Primary | ICD-10-CM

## 2019-01-23 NOTE — PROGRESS NOTES
COLONOSCOPY ORDER PLACED AND PT GIVEN # TO ENDOSCOPY SCHEDULING TO CALL IF HE HAS NOT HEARD FROM THEM IN APPROX 2 WEEKS.

## 2019-01-23 NOTE — PROGRESS NOTES
Pneumovax 23 given IM LEFT Deltoid; Two patient identifiers verified; VIS given; No adverse reaction noted; patient asked to remain in clinic for 15 minutes post injection

## 2019-02-06 DIAGNOSIS — Z12.11 SPECIAL SCREENING FOR MALIGNANT NEOPLASMS, COLON: Primary | ICD-10-CM

## 2019-02-06 RX ORDER — SODIUM, POTASSIUM,MAG SULFATES 17.5-3.13G
1 SOLUTION, RECONSTITUTED, ORAL ORAL DAILY
Qty: 354 ML | Refills: 0 | Status: SHIPPED | OUTPATIENT
Start: 2019-02-06 | End: 2019-02-24

## 2019-02-12 ENCOUNTER — TELEPHONE (OUTPATIENT)
Dept: OPTOMETRY | Facility: CLINIC | Age: 74
End: 2019-02-12

## 2019-02-12 NOTE — TELEPHONE ENCOUNTER
Spoke with patient regarding upcoming appointments. Pt is scheduled to see Dr Amos at the end of the month.

## 2019-02-25 ENCOUNTER — ANESTHESIA (OUTPATIENT)
Dept: ENDOSCOPY | Facility: HOSPITAL | Age: 74
End: 2019-02-25
Payer: MEDICARE

## 2019-02-25 ENCOUNTER — OFFICE VISIT (OUTPATIENT)
Dept: OPTOMETRY | Facility: CLINIC | Age: 74
End: 2019-02-25
Payer: MEDICARE

## 2019-02-25 ENCOUNTER — ANESTHESIA EVENT (OUTPATIENT)
Dept: ENDOSCOPY | Facility: HOSPITAL | Age: 74
End: 2019-02-25
Payer: MEDICARE

## 2019-02-25 ENCOUNTER — HOSPITAL ENCOUNTER (OUTPATIENT)
Facility: HOSPITAL | Age: 74
Discharge: HOME | End: 2019-02-25
Attending: COLON & RECTAL SURGERY | Admitting: COLON & RECTAL SURGERY
Payer: MEDICARE

## 2019-02-25 VITALS
SYSTOLIC BLOOD PRESSURE: 103 MMHG | HEART RATE: 52 BPM | RESPIRATION RATE: 16 BRPM | WEIGHT: 252 LBS | OXYGEN SATURATION: 97 % | HEIGHT: 70 IN | DIASTOLIC BLOOD PRESSURE: 57 MMHG | BODY MASS INDEX: 36.08 KG/M2 | TEMPERATURE: 98 F

## 2019-02-25 DIAGNOSIS — H52.4 HYPEROPIA WITH PRESBYOPIA OF BOTH EYES: Primary | ICD-10-CM

## 2019-02-25 DIAGNOSIS — Z12.11 SCREENING FOR COLON CANCER: Primary | ICD-10-CM

## 2019-02-25 DIAGNOSIS — H52.03 HYPEROPIA WITH PRESBYOPIA OF BOTH EYES: Primary | ICD-10-CM

## 2019-02-25 PROCEDURE — 99999 PR PBB SHADOW E&M-EST. PATIENT-LVL II: CPT | Mod: PBBFAC,,, | Performed by: OPTOMETRIST

## 2019-02-25 PROCEDURE — 88305 TISSUE EXAM BY PATHOLOGIST: CPT | Mod: 26,,, | Performed by: PATHOLOGY

## 2019-02-25 PROCEDURE — 99499 NO LOS: ICD-10-PCS | Mod: S$PBB,,, | Performed by: OPTOMETRIST

## 2019-02-25 PROCEDURE — 99499 UNLISTED E&M SERVICE: CPT | Mod: S$PBB,,, | Performed by: OPTOMETRIST

## 2019-02-25 PROCEDURE — 37000008 HC ANESTHESIA 1ST 15 MINUTES: Performed by: COLON & RECTAL SURGERY

## 2019-02-25 PROCEDURE — 99999 PR PBB SHADOW E&M-EST. PATIENT-LVL II: ICD-10-PCS | Mod: PBBFAC,,, | Performed by: OPTOMETRIST

## 2019-02-25 PROCEDURE — 27201089 HC SNARE, DISP (ANY): Performed by: COLON & RECTAL SURGERY

## 2019-02-25 PROCEDURE — 63600175 PHARM REV CODE 636 W HCPCS: Performed by: NURSE ANESTHETIST, CERTIFIED REGISTERED

## 2019-02-25 PROCEDURE — 45380 COLONOSCOPY AND BIOPSY: CPT | Performed by: COLON & RECTAL SURGERY

## 2019-02-25 PROCEDURE — 45385 PR COLONOSCOPY,REMV LESN,SNARE: ICD-10-PCS | Mod: PT,,, | Performed by: COLON & RECTAL SURGERY

## 2019-02-25 PROCEDURE — 25000003 PHARM REV CODE 250: Performed by: NURSE PRACTITIONER

## 2019-02-25 PROCEDURE — 45380 COLONOSCOPY AND BIOPSY: CPT | Mod: 59,,, | Performed by: COLON & RECTAL SURGERY

## 2019-02-25 PROCEDURE — 45385 COLONOSCOPY W/LESION REMOVAL: CPT | Mod: PT,,, | Performed by: COLON & RECTAL SURGERY

## 2019-02-25 PROCEDURE — 27201012 HC FORCEPS, HOT/COLD, DISP: Performed by: COLON & RECTAL SURGERY

## 2019-02-25 PROCEDURE — E9220 PRA ENDO ANESTHESIA: HCPCS | Mod: PT,,, | Performed by: NURSE ANESTHETIST, CERTIFIED REGISTERED

## 2019-02-25 PROCEDURE — 45385 COLONOSCOPY W/LESION REMOVAL: CPT | Performed by: COLON & RECTAL SURGERY

## 2019-02-25 PROCEDURE — E9220 PRA ENDO ANESTHESIA: ICD-10-PCS | Mod: PT,,, | Performed by: NURSE ANESTHETIST, CERTIFIED REGISTERED

## 2019-02-25 PROCEDURE — 37000009 HC ANESTHESIA EA ADD 15 MINS: Performed by: COLON & RECTAL SURGERY

## 2019-02-25 PROCEDURE — 25000003 PHARM REV CODE 250: Performed by: NURSE ANESTHETIST, CERTIFIED REGISTERED

## 2019-02-25 PROCEDURE — 88305 TISSUE EXAM BY PATHOLOGIST: CPT | Performed by: PATHOLOGY

## 2019-02-25 PROCEDURE — 45380 PR COLONOSCOPY,BIOPSY: ICD-10-PCS | Mod: 59,,, | Performed by: COLON & RECTAL SURGERY

## 2019-02-25 PROCEDURE — 88305 TISSUE SPECIMEN TO PATHOLOGY - SURGERY: ICD-10-PCS | Mod: 26,,, | Performed by: PATHOLOGY

## 2019-02-25 PROCEDURE — 99212 OFFICE O/P EST SF 10 MIN: CPT | Mod: PBBFAC,PO,25 | Performed by: OPTOMETRIST

## 2019-02-25 RX ORDER — GLYCOPYRROLATE 0.2 MG/ML
INJECTION INTRAMUSCULAR; INTRAVENOUS
Status: DISCONTINUED | OUTPATIENT
Start: 2019-02-25 | End: 2019-02-25

## 2019-02-25 RX ORDER — SODIUM CHLORIDE 0.9 % (FLUSH) 0.9 %
3 SYRINGE (ML) INJECTION
Status: DISCONTINUED | OUTPATIENT
Start: 2019-02-25 | End: 2019-02-25 | Stop reason: HOSPADM

## 2019-02-25 RX ORDER — SODIUM CHLORIDE 9 MG/ML
INJECTION, SOLUTION INTRAVENOUS CONTINUOUS
Status: DISCONTINUED | OUTPATIENT
Start: 2019-02-25 | End: 2019-02-25 | Stop reason: HOSPADM

## 2019-02-25 RX ORDER — LIDOCAINE HCL/PF 100 MG/5ML
SYRINGE (ML) INTRAVENOUS
Status: DISCONTINUED | OUTPATIENT
Start: 2019-02-25 | End: 2019-02-25

## 2019-02-25 RX ORDER — PROPOFOL 10 MG/ML
VIAL (ML) INTRAVENOUS
Status: DISCONTINUED | OUTPATIENT
Start: 2019-02-25 | End: 2019-02-25

## 2019-02-25 RX ORDER — PROPOFOL 10 MG/ML
VIAL (ML) INTRAVENOUS CONTINUOUS PRN
Status: DISCONTINUED | OUTPATIENT
Start: 2019-02-25 | End: 2019-02-25

## 2019-02-25 RX ADMIN — PROPOFOL 150 MCG/KG/MIN: 10 INJECTION, EMULSION INTRAVENOUS at 03:02

## 2019-02-25 RX ADMIN — SODIUM CHLORIDE: 0.9 INJECTION, SOLUTION INTRAVENOUS at 01:02

## 2019-02-25 RX ADMIN — LIDOCAINE HYDROCHLORIDE 100 MG: 20 INJECTION, SOLUTION INTRAVENOUS at 03:02

## 2019-02-25 RX ADMIN — GLYCOPYRROLATE 0.2 MG: 0.2 INJECTION, SOLUTION INTRAMUSCULAR; INTRAVENOUS at 03:02

## 2019-02-25 RX ADMIN — PROPOFOL 60 MG: 10 INJECTION, EMULSION INTRAVENOUS at 03:02

## 2019-02-25 NOTE — ADDENDUM NOTE
Addendum  created 02/25/19 1627 by Sharon Myers CRNA    Intraprocedure Meds edited, Orders acknowledged in Narrator

## 2019-02-25 NOTE — TRANSFER OF CARE
"Anesthesia Transfer of Care Note    Patient: Zheng Talley Jr.    Procedure(s) Performed: Procedure(s) (LRB):  COLONOSCOPY (N/A)    Patient location: PACU    Anesthesia Type: general    Transport from OR: Transported from OR on room air with adequate spontaneous ventilation. Transported from OR on 6-10 L/min O2 by face mask with adequate spontaneous ventilation    Post pain: adequate analgesia    Post assessment: no apparent anesthetic complications and tolerated procedure well    Post vital signs: stable    Level of consciousness: awake and alert    Nausea/Vomiting: no nausea/vomiting    Complications: none    Transfer of care protocol was followed      Last vitals:   Visit Vitals  BP (!) 90/52 (BP Location: Left arm, Patient Position: Lying)   Pulse 60   Temp 36.5 °C (97.7 °F)   Resp 16   Ht 5' 10" (1.778 m)   Wt 114.3 kg (252 lb)   SpO2 (!) 94%   BMI 36.16 kg/m²     "

## 2019-02-25 NOTE — PROVATION PATIENT INSTRUCTIONS
Discharge Summary/Instructions after an Endoscopic Procedure  Patient Name: Zheng Talley  Patient MRN: [id number]  Patient YOB: 1945 Monday, February 25, 2019  Surya Lozano MD  RESTRICTIONS:  During your procedure today, you received medications for sedation.  These   medications may affect your judgment, balance and coordination.  Therefore,   for 24 hours, you have the following restrictions:   - DO NOT drive a car, operate machinery, make legal/financial decisions,   sign important papers or drink alcohol.    ACTIVITY:  Today: no heavy lifting, straining or running due to procedural   sedation/anesthesia.  The following day: return to full activity including work.  DIET:  Eat and drink normally unless instructed otherwise.     TREATMENT FOR COMMON SIDE EFFECTS:  - Mild abdominal pain, nausea, belching, bloating or excessive gas:  rest,   eat lightly and use a heating pad.  - Sore Throat: treat with throat lozenges and/or gargle with warm salt   water.  - Because air was used during the procedure, expelling large amounts of air   from your rectum or belching is normal.  - If a bowel prep was taken, you may not have a bowel movement for 1-3 days.    This is normal.  SYMPTOMS TO WATCH FOR AND REPORT TO YOUR PHYSICIAN:  1. Abdominal pain or bloating, other than gas cramps.  2. Chest pain.  3. Back pain.  4. Signs of infection such as: chills or fever occurring within 24 hours   after the procedure.  5. Rectal bleeding, which would show as bright red, maroon, or black stools.   (A tablespoon of blood from the rectum is not serious, especially if   hemorrhoids are present.)  6. Vomiting.  7. Weakness or dizziness.  GO DIRECTLY TO THE NEAREST EMERGENCY ROOM IF YOU HAVE ANY OF THE FOLLOWING:      Difficulty breathing              Chills and/or fever over 101 F   Persistent vomiting and/or vomiting blood   Severe abdominal pain   Severe chest pain   Black, tarry stools   Bleeding- more than one  tablespoon   Any other symptom or condition that you feel may need urgent attention  Your doctor recommends these additional instructions:  If any biopsies were taken, your doctors clinic will contact you in 1 to 2   weeks with any results.  - Discharge patient to home.   - Resume previous diet.   - Patient has a contact number available for emergencies.  The signs and   symptoms of potential delayed complications were discussed with the   patient.  Return to normal activities tomorrow.  Written discharge   instructions were provided to the patient.   - Continue present medications.   - Await pathology results.   - Repeat colonoscopy in 3 years for surveillance based on pathology   results.  For questions, problems or results please call your physician - Surya Lozano MD at Work:  [phone].  [facility] [place], EMERGENCY ROOM PHONE NUMBER: [phone]  IF A COMPLICATION OR EMERGENCY SITUATION ARISES AND YOU ARE UNABLE TO REACH   YOUR PHYSICIAN - GO DIRECTLY TO THE EMERGENCY ROOM.  Surya Lozano MD  2/25/2019 3:31:09 PM  This report has been verified and signed electronically.  [employer]ATION

## 2019-02-25 NOTE — ANESTHESIA PREPROCEDURE EVALUATION
02/25/2019  Zheng Talley Jr. is a 74 y.o., male presenting for a colonoscopy today.    Past Medical History:   Diagnosis Date    BPH (benign prostatic hyperplasia)     Cancer     Groin pain     History of gastroesophageal reflux (GERD)     Hyperlipidemia     Hypertension     Nuclear sclerosis - Both Eyes 2/18/2014     Past Surgical History:   Procedure Laterality Date    BUNIONECTOMY      bilateral     COLONOSCOPY  2011    Dr. Velez    dental implant      HAND SURGERY      lip surgery      PROSTATE SURGERY      REPAIR, NAIL BED right thumb with I&D Right 11/2/2018    Performed by Elyssa Bradford MD at [facility], [place]    ROBOTIC ASSISTED LAPAROSCOPIC PROSTATECTOMY; lymphadenectomy N/A 7/17/2015    Performed by Derrick Wu MD at [facility], [place]       Anesthesia Evaluation    I have reviewed the Patient Summary Reports.     I have reviewed the Medications.     Review of Systems  Anesthesia Hx:  No problems with previous Anesthesia Denies Hx of Anesthetic complications  Denies Family Hx of Anesthesia complications.   Denies Personal Hx of Anesthesia complications.   Hematology/Oncology:  Hematology Normal   Oncology Normal     EENT/Dental:EENT/Dental Normal   Cardiovascular:   Hypertension    Pulmonary:  Pulmonary Normal    Renal/:  Renal/ Normal     Hepatic/GI:   GERD    Musculoskeletal:  Musculoskeletal Normal    Neurological:  Neurology Normal    Endocrine:  Endocrine Normal    Dermatological:  Skin Normal    Psych:  Psychiatric Normal           Physical Exam  General:  Well nourished    Airway/Jaw/Neck:  Airway Findings: Mouth Opening: Normal Tongue: Normal  General Airway Assessment: Adult  Mallampati: II  TM Distance: Normal, at least 6 cm  Jaw/Neck Findings:  Neck ROM: Normal ROM     Eyes/Ears/Nose:  EYES/EARS/NOSE FINDINGS: Normal   Dental:  Dental Findings: In tact    Chest/Lungs:  Chest/Lungs Findings: Clear to auscultation, Normal Respiratory Rate     Heart/Vascular:  Heart Findings: Rate: Normal  Rhythm: Regular Rhythm  Sounds: Normal        Mental Status:  Mental Status Findings:  Cooperative, Alert and Oriented         Anesthesia Plan  Type of Anesthesia, risks & benefits discussed:  Anesthesia Type:  general  Patient's Preference: general  Intra-op Monitoring Plan: standard ASA monitors  Intra-op Monitoring Plan Comments:   Post Op Pain Control Plan:   Post Op Pain Control Plan Comments:   Induction:   IV  Beta Blocker:  Patient is on a Beta-Blocker and has received one dose within the past 24 hours (No further documentation required).       Informed Consent: Patient understands risks and agrees with Anesthesia plan.  Questions answered. Anesthesia consent signed with patient.  ASA Score: 2     Day of Surgery Review of History & Physical: I have interviewed and examined the patient. I have reviewed the patient's H&P dated: 2/25/2019.   H&P update referred to the provider.         Ready For Surgery From Anesthesia Perspective.

## 2019-02-25 NOTE — DISCHARGE INSTRUCTIONS

## 2019-02-25 NOTE — PROGRESS NOTES
HPI     Pt returns for rx recheck. Pt complains distance vision is not sharp. He   had prescription filled at [facility].  Denies any HA's, no pain.    Gtts: Systane prn    Last edited by Siva Rodríguez MA on 2/25/2019 10:05 AM. (History)        ROS     Negative for: Constitutional, Gastrointestinal, Neurological, Skin,   Genitourinary, Musculoskeletal, HENT, Endocrine, Cardiovascular, Eyes,   Respiratory, Psychiatric, Allergic/Imm, Heme/Lymph    Last edited by Nixon Amos, OD on 2/25/2019 10:50 AM. (History)        Assessment /Plan     For exam results, see Encounter Report.    Hyperopia with presbyopia of both eyes      See notes from 4 mos ago:  1. Cat OU--pt wishes new Rx  2. Dry eyes--pt to cont w ATs  3. Vitreous floaters--discussed Signs/Symptoms of Retinal Detachment.  Pt to rtc immediately if increased Floaters/Flashes occur    Pt presents today because got new PALs from [facility], and feels they are blurry.  I did not change Rx OS, only changed OD from +1.50 to +2.00.  Pt prefers new Rx behind phoropter.  OCs aligned.  BC=+6.00 old, +5.50 new.  Discussed w pt I will rewrite Rx to match OLD power, BUT his sx could be from diff brand of PAL, so if still has problems even w Rx rewrite then [facility] will need to research old brand of PAL    PLAN:    1. Wrote new Rx to reflect old poer  2. NOLOS EPRx charge today

## 2019-02-25 NOTE — ANESTHESIA POSTPROCEDURE EVALUATION
"Anesthesia Post Evaluation    Patient: Zheng Talley Jr.    Procedure(s) Performed: Procedure(s) (LRB):  COLONOSCOPY (N/A)    Final Anesthesia Type: general  Patient location during evaluation: GI PACU  Patient participation: Yes- Able to Participate  Level of consciousness: awake and alert and oriented  Post-procedure vital signs: reviewed and stable  Pain management: adequate  Airway patency: patent  PONV status at discharge: No PONV  Anesthetic complications: yes  Perioperative Events: arrhythmias requiring treatment    Cardiovascular status: blood pressure returned to baseline and hemodynamically stable  Respiratory status: unassisted, spontaneous ventilation and room air  Hydration status: euvolemic  Follow-up not needed.        Visit Vitals  /62 (BP Location: Left arm, Patient Position: Lying)   Pulse (!) 58   Temp 36.5 °C (97.7 °F)   Resp 18   Ht 5' 10" (1.778 m)   Wt 114.3 kg (252 lb)   SpO2 95%   BMI 36.16 kg/m²       Pain/Veronica Score: Veronica Score: 8 (2/25/2019  3:44 PM)    At the end of the procedure, the patient's heart rate decreased rapidly. For a brief few seconds it appeared as if the patient was asystolic. The patients blood pressure decreased to SBP of 60s. 0.2 mg of glycopyrrolate was given with a rapid return in the patient's heart rate and blood pressure. The patient was apneic during this time requiring mask ventilation with the ambu bag and an oral airway. He was an easy mask ventilation with the ambu bag, oral airway and jaw thrust. The patient began to respond after about a minute of mask ventilation and a few minutes later the patient was verbally responsive with normal vital signs and was taken to recovery. I explained to the patient and his wife the series of intraoperative events and to mention this to his PCP then next time he has an appointment. The patient feels well now with no complaints and his vital are back to his baseline.      "

## 2019-02-25 NOTE — H&P
Colonoscopy History and Physical      Procedure : Colonoscopy    Indications:  asymptomatic screening exam    Family Hx of CRC: denies    Last Colonoscopy:  10yrs ago, no polyps    Hx of sedation problems: none  FHX of sedation problems: none    Past Medical History:   Diagnosis Date    BPH (benign prostatic hyperplasia)     Cancer     prostate    Groin pain     History of gastroesophageal reflux (GERD)     Hyperlipidemia     Hypertension     Nuclear sclerosis - Both Eyes 2/18/2014       Past Surgical History:   Procedure Laterality Date    BUNIONECTOMY      bilateral     COLONOSCOPY  2011    Dr. Velez    dental implant      HAND SURGERY      lip surgery      PROSTATE SURGERY      REPAIR, NAIL BED right thumb with I&D Right 11/2/2018    Performed by [facility] Bradford [facility] at [facility] [place]    ROBOTIC ASSISTED LAPAROSCOPIC PROSTATECTOMY; lymphadenectomy N/A 7/17/2015    Performed by Derrick Wu MD at [facility] [place]       Review of patient's allergies indicates:   Allergen Reactions    Iodine and iodide containing products Other (See Comments)     Blood in urine in the 1970s       No current facility-administered medications on file prior to encounter.      Current Outpatient Medications on File Prior to Encounter   Medication Sig Dispense Refill    amLODIPine (NORVASC) 10 MG tablet TAKE 1 TABLET DAILY 90 tablet 3    losartan-hydrochlorothiazide 100-25 mg (HYZAAR) 100-25 mg per tablet TAKE 1 TABLET DAILY 90 tablet 3    metoprolol succinate (TOPROL-XL) 50 MG 24 hr tablet TAKE 1 TABLET DAILY 90 tablet 3    pravastatin (PRAVACHOL) 40 MG tablet TAKE 1 TABLET EVERY EVENING 90 tablet 0    ondansetron (ZOFRAN-ODT) 4 MG TbDL Take 1 tablet (4 mg total) by mouth every 6 (six) hours as needed. 30 tablet 0    sildenafil (REVATIO) 20 mg Tab Take 1 tablet (20 mg total) by mouth daily as needed. 60 tablet 11    valACYclovir (VALTREX) 1000 MG tablet TAKE 1 TABLET DAILY 90 tablet 3    [DISCONTINUED]  acetaminophen-codeine 300-30mg (TYLENOL #3) 300-30 mg Tab Take 1 tablet by mouth every 6 (six) hours as needed. 20 tablet 0    [DISCONTINUED] oxyCODONE-acetaminophen (PERCOCET) 5-325 mg per tablet Take 1 tablet by mouth every 4 (four) hours as needed for Pain. 10 tablet 0       Family History   Problem Relation Age of Onset    Cancer Father         leukemia    Diabetes Father     Coronary artery disease Mother     Hypertension Mother     No Known Problems Brother     No Known Problems Daughter     No Known Problems Son     No Known Problems Brother     No Known Problems Brother     Heart disease Neg Hx     Amblyopia Neg Hx     Blindness Neg Hx     Cataracts Neg Hx     Glaucoma Neg Hx     Macular degeneration Neg Hx     Retinal detachment Neg Hx     Strabismus Neg Hx        Social History     Socioeconomic History    Marital status:      Spouse name: Not on file    Number of children: Not on file    Years of education: Not on file    Highest education level: Not on file   Social Needs    Financial resource strain: Not on file    Food insecurity - worry: Not on file    Food insecurity - inability: Not on file    Transportation needs - medical: Not on file    Transportation needs - non-medical: Not on file   Occupational History    Not on file   Tobacco Use    Smoking status: Former Smoker     Types: Cigarettes     Last attempt to quit:      Years since quittin.1    Smokeless tobacco: Never Used   Substance and Sexual Activity    Alcohol use: Yes     Alcohol/week: 0.0 oz     Comment: occasionally    Drug use: No    Sexual activity: No     Partners: Female   Other Topics Concern    Not on file   Social History Narrative    Not on file       Review of Systems -   Respiratory ROS: negative  Cardiovascular ROS: negative  Gastrointestinal ROS: negative  Musculoskeletal ROS: negative  Neurological ROS: negative    Physical Exam:  General: no distress  Head:  normocephalic  Oropharynx clear, Mallampati   Lungs:  normal respiratory effort  Heart: regular rate  Abdomen: soft,  Non-tender  Extremities: warm and well perfused  Neuro awake and alert    ASA: II    Patient cleared for Anesthesia:  MAC    Anesthesia/Surgery risks, benefits, and alternative options discussed and understood by patient/family.

## 2019-03-04 ENCOUNTER — TELEPHONE (OUTPATIENT)
Dept: ENDOSCOPY | Facility: HOSPITAL | Age: 74
End: 2019-03-04

## 2019-03-05 ENCOUNTER — PATIENT MESSAGE (OUTPATIENT)
Dept: INTERNAL MEDICINE | Facility: CLINIC | Age: 74
End: 2019-03-05

## 2019-03-14 RX ORDER — PRAVASTATIN SODIUM 40 MG/1
TABLET ORAL
Qty: 90 TABLET | Refills: 0 | Status: SHIPPED | OUTPATIENT
Start: 2019-03-14 | End: 2019-07-25 | Stop reason: SDUPTHER

## 2019-05-09 ENCOUNTER — PES CALL (OUTPATIENT)
Dept: ADMINISTRATIVE | Facility: CLINIC | Age: 74
End: 2019-05-09

## 2019-06-15 DIAGNOSIS — I10 ESSENTIAL HYPERTENSION: ICD-10-CM

## 2019-06-17 RX ORDER — AMLODIPINE BESYLATE 10 MG/1
TABLET ORAL
Qty: 90 TABLET | Refills: 3 | Status: SHIPPED | OUTPATIENT
Start: 2019-06-17 | End: 2020-01-22 | Stop reason: SDUPTHER

## 2019-06-17 RX ORDER — METOPROLOL SUCCINATE 50 MG/1
TABLET, EXTENDED RELEASE ORAL
Qty: 90 TABLET | Refills: 3 | Status: SHIPPED | OUTPATIENT
Start: 2019-06-17 | End: 2020-01-22 | Stop reason: SDUPTHER

## 2019-06-17 RX ORDER — LOSARTAN POTASSIUM AND HYDROCHLOROTHIAZIDE 25; 100 MG/1; MG/1
TABLET ORAL
Qty: 90 TABLET | Refills: 3 | Status: SHIPPED | OUTPATIENT
Start: 2019-06-17 | End: 2020-01-22 | Stop reason: SDUPTHER

## 2019-07-03 ENCOUNTER — LAB VISIT (OUTPATIENT)
Dept: LAB | Facility: OTHER | Age: 74
End: 2019-07-03
Attending: UROLOGY
Payer: MEDICARE

## 2019-07-03 DIAGNOSIS — C61 PROSTATE CANCER: ICD-10-CM

## 2019-07-03 LAB — COMPLEXED PSA SERPL-MCNC: <0.01 NG/ML (ref 0–4)

## 2019-07-03 PROCEDURE — 36415 COLL VENOUS BLD VENIPUNCTURE: CPT

## 2019-07-03 PROCEDURE — 84153 ASSAY OF PSA TOTAL: CPT

## 2019-07-03 NOTE — PROGRESS NOTES
Dr Wu forwarded these results to the patient via [facility].  He will discuss the results with the patient in person at the next appointment.  The patient was instructed to make an appointment if he does not already have one scheduled.

## 2019-07-26 RX ORDER — PRAVASTATIN SODIUM 40 MG/1
TABLET ORAL
Qty: 90 TABLET | Refills: 0 | Status: SHIPPED | OUTPATIENT
Start: 2019-07-26 | End: 2019-11-06 | Stop reason: SDUPTHER

## 2019-08-13 ENCOUNTER — PES CALL (OUTPATIENT)
Dept: ADMINISTRATIVE | Facility: CLINIC | Age: 74
End: 2019-08-13

## 2019-11-07 RX ORDER — PRAVASTATIN SODIUM 40 MG/1
TABLET ORAL
Qty: 90 TABLET | Refills: 4 | Status: SHIPPED | OUTPATIENT
Start: 2019-11-07 | End: 2020-01-22 | Stop reason: SDUPTHER

## 2020-01-07 ENCOUNTER — PATIENT MESSAGE (OUTPATIENT)
Dept: INTERNAL MEDICINE | Facility: CLINIC | Age: 75
End: 2020-01-07

## 2020-01-21 ENCOUNTER — PATIENT MESSAGE (OUTPATIENT)
Dept: UROLOGY | Facility: CLINIC | Age: 75
End: 2020-01-21

## 2020-01-22 ENCOUNTER — OFFICE VISIT (OUTPATIENT)
Dept: INTERNAL MEDICINE | Facility: CLINIC | Age: 75
End: 2020-01-22
Attending: FAMILY MEDICINE
Payer: MEDICARE

## 2020-01-22 ENCOUNTER — CLINICAL SUPPORT (OUTPATIENT)
Dept: INTERNAL MEDICINE | Facility: CLINIC | Age: 75
End: 2020-01-22
Payer: MEDICARE

## 2020-01-22 ENCOUNTER — LAB VISIT (OUTPATIENT)
Dept: LAB | Facility: OTHER | Age: 75
End: 2020-01-22
Attending: FAMILY MEDICINE
Payer: MEDICARE

## 2020-01-22 VITALS
HEART RATE: 57 BPM | DIASTOLIC BLOOD PRESSURE: 70 MMHG | HEIGHT: 70 IN | WEIGHT: 254.44 LBS | BODY MASS INDEX: 36.43 KG/M2 | SYSTOLIC BLOOD PRESSURE: 112 MMHG | OXYGEN SATURATION: 91 %

## 2020-01-22 DIAGNOSIS — C61 PROSTATE CANCER: ICD-10-CM

## 2020-01-22 DIAGNOSIS — Z23 IMMUNIZATION DUE: Primary | ICD-10-CM

## 2020-01-22 DIAGNOSIS — R79.9 ABNORMAL FINDING OF BLOOD CHEMISTRY, UNSPECIFIED: ICD-10-CM

## 2020-01-22 DIAGNOSIS — I10 ESSENTIAL HYPERTENSION: ICD-10-CM

## 2020-01-22 DIAGNOSIS — E78.5 HYPERLIPIDEMIA LDL GOAL <130: ICD-10-CM

## 2020-01-22 DIAGNOSIS — Z00.00 ENCOUNTER FOR PREVENTIVE HEALTH EXAMINATION: ICD-10-CM

## 2020-01-22 DIAGNOSIS — I10 ESSENTIAL HYPERTENSION: Primary | ICD-10-CM

## 2020-01-22 LAB
ALBUMIN SERPL BCP-MCNC: 4.2 G/DL (ref 3.5–5.2)
ALP SERPL-CCNC: 55 U/L (ref 55–135)
ALT SERPL W/O P-5'-P-CCNC: 22 U/L (ref 10–44)
ANION GAP SERPL CALC-SCNC: 7 MMOL/L (ref 8–16)
AST SERPL-CCNC: 17 U/L (ref 10–40)
BILIRUB SERPL-MCNC: 0.6 MG/DL (ref 0.1–1)
BUN SERPL-MCNC: 16 MG/DL (ref 8–23)
CALCIUM SERPL-MCNC: 10.1 MG/DL (ref 8.7–10.5)
CHLORIDE SERPL-SCNC: 103 MMOL/L (ref 95–110)
CHOLEST SERPL-MCNC: 128 MG/DL (ref 120–199)
CHOLEST/HDLC SERPL: 3.6 {RATIO} (ref 2–5)
CO2 SERPL-SCNC: 32 MMOL/L (ref 23–29)
CREAT SERPL-MCNC: 1.3 MG/DL (ref 0.5–1.4)
EST. GFR  (AFRICAN AMERICAN): >60 ML/MIN/1.73 M^2
EST. GFR  (NON AFRICAN AMERICAN): 54 ML/MIN/1.73 M^2
ESTIMATED AVG GLUCOSE: 120 MG/DL (ref 68–131)
GLUCOSE SERPL-MCNC: 92 MG/DL (ref 70–110)
HBA1C MFR BLD HPLC: 5.8 % (ref 4–5.6)
HDLC SERPL-MCNC: 36 MG/DL (ref 40–75)
HDLC SERPL: 28.1 % (ref 20–50)
LDLC SERPL CALC-MCNC: 66.6 MG/DL (ref 63–159)
NONHDLC SERPL-MCNC: 92 MG/DL
POTASSIUM SERPL-SCNC: 4.1 MMOL/L (ref 3.5–5.1)
PROT SERPL-MCNC: 7.6 G/DL (ref 6–8.4)
SODIUM SERPL-SCNC: 142 MMOL/L (ref 136–145)
TRIGL SERPL-MCNC: 127 MG/DL (ref 30–150)
TSH SERPL DL<=0.005 MIU/L-ACNC: 1.45 UIU/ML (ref 0.4–4)

## 2020-01-22 PROCEDURE — 1159F PR MEDICATION LIST DOCUMENTED IN MEDICAL RECORD: ICD-10-PCS | Mod: ,,, | Performed by: FAMILY MEDICINE

## 2020-01-22 PROCEDURE — 80053 COMPREHEN METABOLIC PANEL: CPT

## 2020-01-22 PROCEDURE — 1159F MED LIST DOCD IN RCRD: CPT | Mod: ,,, | Performed by: FAMILY MEDICINE

## 2020-01-22 PROCEDURE — 99999 PR PBB SHADOW E&M-EST. PATIENT-LVL III: CPT | Mod: PBBFAC,,, | Performed by: FAMILY MEDICINE

## 2020-01-22 PROCEDURE — 90662 IIV NO PRSV INCREASED AG IM: CPT | Mod: PBBFAC

## 2020-01-22 PROCEDURE — 36415 COLL VENOUS BLD VENIPUNCTURE: CPT

## 2020-01-22 PROCEDURE — 84443 ASSAY THYROID STIM HORMONE: CPT

## 2020-01-22 PROCEDURE — 99213 OFFICE O/P EST LOW 20 MIN: CPT | Mod: PBBFAC,25 | Performed by: FAMILY MEDICINE

## 2020-01-22 PROCEDURE — 1126F AMNT PAIN NOTED NONE PRSNT: CPT | Mod: ,,, | Performed by: FAMILY MEDICINE

## 2020-01-22 PROCEDURE — 80061 LIPID PANEL: CPT

## 2020-01-22 PROCEDURE — 83036 HEMOGLOBIN GLYCOSYLATED A1C: CPT

## 2020-01-22 PROCEDURE — 1126F PR PAIN SEVERITY QUANTIFIED, NO PAIN PRESENT: ICD-10-PCS | Mod: ,,, | Performed by: FAMILY MEDICINE

## 2020-01-22 PROCEDURE — 99214 PR OFFICE/OUTPT VISIT, EST, LEVL IV, 30-39 MIN: ICD-10-PCS | Mod: S$PBB,,, | Performed by: FAMILY MEDICINE

## 2020-01-22 PROCEDURE — 99214 OFFICE O/P EST MOD 30 MIN: CPT | Mod: S$PBB,,, | Performed by: FAMILY MEDICINE

## 2020-01-22 PROCEDURE — 99999 PR PBB SHADOW E&M-EST. PATIENT-LVL III: ICD-10-PCS | Mod: PBBFAC,,, | Performed by: FAMILY MEDICINE

## 2020-01-22 RX ORDER — METOPROLOL SUCCINATE 50 MG/1
50 TABLET, EXTENDED RELEASE ORAL DAILY
Qty: 90 TABLET | Refills: 3 | Status: SHIPPED | OUTPATIENT
Start: 2020-01-22 | End: 2021-04-05 | Stop reason: SDUPTHER

## 2020-01-22 RX ORDER — LOSARTAN POTASSIUM AND HYDROCHLOROTHIAZIDE 25; 100 MG/1; MG/1
1 TABLET ORAL DAILY
Qty: 90 TABLET | Refills: 3 | Status: SHIPPED | OUTPATIENT
Start: 2020-01-22 | End: 2021-04-05 | Stop reason: SDUPTHER

## 2020-01-22 RX ORDER — AMLODIPINE BESYLATE 10 MG/1
10 TABLET ORAL DAILY
Qty: 90 TABLET | Refills: 3 | Status: SHIPPED | OUTPATIENT
Start: 2020-01-22 | End: 2021-04-05 | Stop reason: SDUPTHER

## 2020-01-22 RX ORDER — SILDENAFIL CITRATE 20 MG/1
20 TABLET ORAL DAILY PRN
Qty: 30 TABLET | Refills: 11 | Status: SHIPPED | OUTPATIENT
Start: 2020-01-22 | End: 2020-02-18

## 2020-01-22 RX ORDER — PRAVASTATIN SODIUM 40 MG/1
40 TABLET ORAL NIGHTLY
Qty: 90 TABLET | Refills: 4 | Status: SHIPPED | OUTPATIENT
Start: 2020-01-22 | End: 2021-03-27 | Stop reason: SDUPTHER

## 2020-01-22 NOTE — PROGRESS NOTES
"Patient was given vaccine information sheet for the Flu Vaccine. The area of injection was palpated using the acromion process as a landmark. This area was cleaned with alcohol. Using a 25g 1" safety needle, 0.5mL of the vaccine was placed into the left deltoid muscle. The injection site was dressed with a bandage. Patient experienced no complications and was discharged in stable condition. Fluzone High Dose vaccine Lot: [id number] Exp: 06/02/2020.    "

## 2020-01-22 NOTE — PROGRESS NOTES
"CHIEF COMPLAINT: Annual exam and hypertension follow-up    HISTORY OF PRESENT ILLNESS: The patient is a 74 year-old male.  The patient has been treated with robotic prostatectomy.  He is closely following with urologic oncology.  All is going well there.      The patient has a history of stable hypertension on current medications.  Patient denies chest pain or shortness of breath today.    The patient has a history of stable hyperlipidemia on current medications.  The patient denies chest pain or shortness of breath today.  The patient denies muscle aches or myalgias suggestive of myositis.    REVIEW OF SYSTEMS:  GENERAL: No fever, chills, fatigability or weight loss.  SKIN: No rashes, itching or changes in color or texture of skin.  HEAD: No headaches or recent head trauma.  EYES: Visual acuity fine. No photophobia, ocular pain or diplopia.  EARS: Denies ear pain, discharge or vertigo.  NOSE: No loss of smell, no epistaxis or postnasal drip.  MOUTH & THROAT: No hoarseness or change in voice. No excessive gum bleeding.  NODES: Denies swollen glands.  CHEST: Denies CLIFFORD, cyanosis, wheezing, cough and sputum production.  CARDIOVASCULAR: Denies chest pain, PND, orthopnea or reduced exercise tolerance.  ABDOMEN: Appetite fine. No weight loss. Denies diarrhea, abdominal pain, hematemesis or blood in stool.  URINARY: No flank pain, dysuria or hematuria.  PERIPHERAL VASCULAR: No claudication or cyanosis.  MUSCULOSKELETAL: No joint stiffness or swelling. Denies back pain.  NEUROLOGIC: No history of seizures, paralysis, alteration of gait or coordination.    SOCIAL HISTORY: The patient does not smoke.  The patient consumes alcohol socially.  The patient is happily .    PHYSICAL EXAMINATION:     Blood pressure 112/70, pulse (!) 57, height 5' 10" (1.778 m), weight 115.4 kg (254 lb 6.6 oz), SpO2 (!) 91 %.    APPEARANCE: Well nourished, well developed, in no acute distress.    HEAD: Normocephalic, atraumatic.  EYES: PERRL. " EOMI.  Conjunctivae without injection and  anicteric  EARS: TM's intact. Light reflex normal. No retraction or perforation.    NOSE: Mucosa pink. Airway clear.  MOUTH & THROAT: No tonsillar enlargement. No pharyngeal erythema or exudate. No stridor.  NECK: Supple.   NODES: No cervical, axillary or inguinal lymph node enlargement.  CHEST: Lungs clear to auscultation.  No retractions are noted.  No rales or rhonchi are present.  CARDIOVASCULAR: Normal S1, S2. No rubs, murmurs or gallops.  ABDOMEN: Bowel sounds normal. Not distended. Soft. No tenderness or masses.  No ascites is noted.  MUSCULOSKELETAL:  There is no clubbing, cyanosis, or edema of the extremities x4.  There is full range of motion of the lumbar spine.  There is full range of motion of the extremities x4.  There is no deformity noted.    NEUROLOGIC:       Normal speech development.      Hearing normal.      Normal gait.      Motor and sensory exams grossly normal.  PSYCHIATRIC: Patient is alert and oriented x3.  Thought processes are all normal.  There is no homicidality.  There is no suicidality.  There is no evidence of psychosis.    LABORATORY/RADIOLOGY:   Chart reviewed.  We will update blood work today.    ASSESSMENT:   Left hand paresthesia  Prostate cancer, believed to be cured  Hypertension    PLAN:  We will follow-up blood work which we expect to be normal.  Blood pressure medications refilled   I discussed his case with his urologist     Return to clinic in one year.

## 2020-01-23 ENCOUNTER — TELEPHONE (OUTPATIENT)
Dept: INTERNAL MEDICINE | Facility: CLINIC | Age: 75
End: 2020-01-23

## 2020-01-23 NOTE — TELEPHONE ENCOUNTER
----- Message from Gio Rizo MD sent at 1/23/2020  3:23 PM CST -----  Blood work looks good as we expected.  No changes in medications.  Followup as scheduled.

## 2020-01-31 ENCOUNTER — OFFICE VISIT (OUTPATIENT)
Dept: OPTOMETRY | Facility: CLINIC | Age: 75
End: 2020-01-31
Payer: MEDICARE

## 2020-01-31 DIAGNOSIS — H52.03 HYPEROPIA WITH PRESBYOPIA OF BOTH EYES: ICD-10-CM

## 2020-01-31 DIAGNOSIS — H25.13 NUCLEAR SCLEROSIS, BILATERAL: Primary | ICD-10-CM

## 2020-01-31 DIAGNOSIS — H52.4 HYPEROPIA WITH PRESBYOPIA OF BOTH EYES: ICD-10-CM

## 2020-01-31 DIAGNOSIS — Z13.5 SCREENING FOR GLAUCOMA: ICD-10-CM

## 2020-01-31 PROCEDURE — 92015 PR REFRACTION: ICD-10-PCS | Mod: ,,, | Performed by: OPTOMETRIST

## 2020-01-31 PROCEDURE — 99212 OFFICE O/P EST SF 10 MIN: CPT | Mod: PBBFAC,PO | Performed by: OPTOMETRIST

## 2020-01-31 PROCEDURE — 99999 PR PBB SHADOW E&M-EST. PATIENT-LVL II: CPT | Mod: PBBFAC,,, | Performed by: OPTOMETRIST

## 2020-01-31 PROCEDURE — 92014 PR EYE EXAM, EST PATIENT,COMPREHESV: ICD-10-PCS | Mod: S$PBB,,, | Performed by: OPTOMETRIST

## 2020-01-31 PROCEDURE — 92015 DETERMINE REFRACTIVE STATE: CPT | Mod: ,,, | Performed by: OPTOMETRIST

## 2020-01-31 PROCEDURE — 92014 COMPRE OPH EXAM EST PT 1/>: CPT | Mod: S$PBB,,, | Performed by: OPTOMETRIST

## 2020-01-31 PROCEDURE — 99999 PR PBB SHADOW E&M-EST. PATIENT-LVL II: ICD-10-PCS | Mod: PBBFAC,,, | Performed by: OPTOMETRIST

## 2020-01-31 NOTE — PROGRESS NOTES
HPI     DLS; 2/25/19  Pt states no VA problems   Occ. Floater, Occ. Flashes   systane gtts PRN   Hx Cats OU      Last edited by Nixon Amos, OD on 1/31/2020  8:49 AM. (History)        ROS     Negative for: Constitutional, Gastrointestinal, Neurological, Skin,   Genitourinary, Musculoskeletal, HENT, Endocrine, Cardiovascular, Eyes,   Respiratory, Psychiatric, Allergic/Imm, Heme/Lymph    Last edited by Nixon Amos, OD on 1/31/2020  8:49 AM. (History)        Assessment /Plan     For exam results, see Encounter Report.    Nuclear sclerosis, bilateral    Screening for glaucoma    Hyperopia with presbyopia of both eyes      1. Cat OU--pt wishes new Rx (see last notes: I had tried to make OD stronger last time, but he didn't like and went to old power.  Couldn't be sure if problem was from power or new brand of PAL.  Pt wishes to try stronger power again this year)  2. Dry eyes--pt to cont w ATs  3. Vitreous floaters w rare PVD type flash--again discussed Signs/Symptoms of Retinal Detachment.  Pt to rtc immediately if increased Floaters/Flashes occur    PLAN:    rtc 1 yr

## 2020-02-10 ENCOUNTER — TELEPHONE (OUTPATIENT)
Dept: INTERNAL MEDICINE | Facility: CLINIC | Age: 75
End: 2020-02-10

## 2020-02-10 ENCOUNTER — PATIENT MESSAGE (OUTPATIENT)
Dept: INTERNAL MEDICINE | Facility: CLINIC | Age: 75
End: 2020-02-10

## 2020-02-10 DIAGNOSIS — M79.604 PAIN OF RIGHT LOWER EXTREMITY: Primary | ICD-10-CM

## 2020-02-10 NOTE — TELEPHONE ENCOUNTER
----- Message from Pranav Childs, Patient Care Assistant sent at 2/10/2020 11:56 AM CST -----  Contact: RAYSHAWN ROBBINS JR. [[id number]]  Name of Who is Calling: RAYSHAWN ROBBINS JR. [[id number]]    What is the request in detail:Requesting a call back in regards of still having pain in leg. Please contact to further discuss and advise      Can the clinic reply by [facility]: No    [facility]:   [phone]

## 2020-02-18 ENCOUNTER — OFFICE VISIT (OUTPATIENT)
Dept: UROLOGY | Facility: CLINIC | Age: 75
End: 2020-02-18
Payer: MEDICARE

## 2020-02-18 VITALS
HEIGHT: 70 IN | WEIGHT: 255 LBS | HEART RATE: 58 BPM | BODY MASS INDEX: 36.51 KG/M2 | SYSTOLIC BLOOD PRESSURE: 134 MMHG | DIASTOLIC BLOOD PRESSURE: 68 MMHG

## 2020-02-18 DIAGNOSIS — C61 PROSTATE CANCER: Primary | ICD-10-CM

## 2020-02-18 DIAGNOSIS — N52.9 ERECTILE DYSFUNCTION, UNSPECIFIED ERECTILE DYSFUNCTION TYPE: ICD-10-CM

## 2020-02-18 LAB
BILIRUB SERPL-MCNC: ABNORMAL MG/DL
BLOOD URINE, POC: 50
COLOR, POC UA: YELLOW
GLUCOSE UR QL STRIP: ABNORMAL
KETONES UR QL STRIP: ABNORMAL
LEUKOCYTE ESTERASE URINE, POC: ABNORMAL
NITRITE, POC UA: ABNORMAL
PH, POC UA: 6
PROTEIN, POC: ABNORMAL
SPECIFIC GRAVITY, POC UA: 1.01
UROBILINOGEN, POC UA: 1

## 2020-02-18 PROCEDURE — 81002 URINALYSIS NONAUTO W/O SCOPE: CPT | Mod: S$GLB,,, | Performed by: UROLOGY

## 2020-02-18 PROCEDURE — 99214 OFFICE O/P EST MOD 30 MIN: CPT | Mod: 25,S$GLB,, | Performed by: UROLOGY

## 2020-02-18 PROCEDURE — 99214 PR OFFICE/OUTPT VISIT, EST, LEVL IV, 30-39 MIN: ICD-10-PCS | Mod: 25,S$GLB,, | Performed by: UROLOGY

## 2020-02-18 PROCEDURE — 81002 POCT URINE DIPSTICK WITHOUT MICROSCOPE: ICD-10-PCS | Mod: S$GLB,,, | Performed by: UROLOGY

## 2020-02-18 RX ORDER — TADALAFIL 20 MG/1
20 TABLET ORAL DAILY PRN
Qty: 12 TABLET | Refills: 11 | Status: SHIPPED | OUTPATIENT
Start: 2020-02-18 | End: 2022-09-15

## 2020-02-18 NOTE — PATIENT INSTRUCTIONS
Tadalafil tablets (Cialis)  What is this medicine?  TADALAFIL (desirae DA la best) is used to treat erection problems in men. It is also used for enlargement of the prostate gland in men, a condition called benign prostatic hyperplasia or BPH. This medicine improves urine flow and reduces BPH symptoms. This medicine can also treat both erection problems and BPH when they occur together.  How should I use this medicine?  Take this medicine by mouth with a glass of water. Follow the directions on the prescription label. You may take this medicine with or without meals. When this medicine is used for erection problems, your doctor may prescribe it to be taken once daily or as needed. If you are taking the medicine as needed, you may be able to have sexual activity 30 minutes after taking it and for up to 36 hours after taking it. Whether you are taking the medicine as needed or once daily, you should not take more than one dose per day. If you are taking this medicine for symptoms of benign prostatic hyperplasia (BPH) or to treat both BPH and an erection problem, take the dose once daily at about the same time each day. Do not take your medicine more often than directed.  Talk to your pediatrician regarding the use of this medicine in children. Special care may be needed.  What side effects may I notice from receiving this medicine?  Side effects that you should report to your doctor or health care professional as soon as possible:  · allergic reactions like skin rash, itching or hives, swelling of the face, lips, or tongue  · breathing problems  · changes in hearing  · changes in vision  · chest pain  · fast, irregular heartbeat  · prolonged or painful erection  · seizures  Side effects that usually do not require medical attention (report to your doctor or health care professional if they continue or are bothersome):  · back pain  · dizziness  · flushing  · headache  · indigestion  · muscle aches  · nausea  · stuffy or  runny nose  What may interact with this medicine?  Do not take this medicine with any of the following medications:  · nitrates like amyl nitrite, isosorbide dinitrate, isosorbide mononitrate, nitroglycerin  · other medicines for erectile dysfunction like avanafil, sildenafil, vardenafil  · other tadalafil products (Adcirca)  · riociguat  This medicine may also interact with the following medications:  · certain drugs for high blood pressure  · certain drugs for the treatment of HIV infection or AIDS  · certain drugs used for fungal or yeast infections, like fluconazole, itraconazole, ketoconazole, and voriconazole  · certain drugs used for seizures like carbamazepine, phenytoin, and phenobarbital  · grapefruit juice  · macrolide antibiotics like clarithromycin, erythromycin, troleandomycin  · medicines for prostate problems  · rifabutin, rifampin or rifapentine  What if I miss a dose?  If you are taking this medicine as needed for erection problems, this does not apply. If you miss a dose while taking this medicine once daily for an erection problem, benign prostatic hyperplasia, or both, take it as soon as you remember, but do not take more than one dose per day.  Where should I keep my medicine?  Keep out of the reach of children.  Store at room temperature between 15 and 30 degrees C (59 and 86 degrees F). Throw away any unused medicine after the expiration date.  What should I tell my health care provider before I take this medicine?  They need to know if you have any of these conditions:  · bleeding disorders  · eye or vision problems, including a rare inherited eye disease called retinitis pigmentosa  · anatomical deformation of the penis, Peyronie's disease, or history of priapism (painful and prolonged erection)  · heart disease, angina, a history of heart attack, irregular heart beats, or other heart problems  · high or low blood pressure  · history of blood diseases, like sickle cell anemia or  leukemia  · history of stomach bleeding  · kidney disease  · liver disease  · stroke  · an unusual or allergic reaction to tadalafil, other medicines, foods, dyes, or preservatives  · pregnant or trying to get pregnant  · breast-feeding  What should I watch for while using this medicine?  If you notice any changes in your vision while taking this drug, call your doctor or health care professional as soon as possible. Stop using this medicine and call your health care provider right away if you have a loss of sight in one or both eyes.  Contact your doctor or health care professional right away if the erection lasts longer than 4 hours or if it becomes painful. This may be a sign of serious problem and must be treated right away to prevent permanent damage.  If you experience symptoms of nausea, dizziness, chest pain or arm pain upon initiation of sexual activity after taking this medicine, you should refrain from further activity and call your doctor or health care professional as soon as possible.  Do not drink alcohol to excess (examples, 5 glasses of wine or 5 shots of whiskey) when taking this medicine. When taken in excess, alcohol can increase your chances of getting a headache or getting dizzy, increasing your heart rate or lowering your blood pressure.  Using this medicine does not protect you or your partner against HIV infection (the virus that causes AIDS) or other sexually transmitted diseases.  NOTE:This sheet is a summary. It may not cover all possible information. If you have questions about this medicine, talk to your doctor, pharmacist, or health care provider. Copyright© 2017 [employer]

## 2020-02-18 NOTE — PROGRESS NOTES
"Subjective:      Zheng Talley Jr. is a 75 y.o. male who returns today regarding his     Robotic prostatectomy and lymphadenectomy 2015.    Dry  No leakage at all    Some spontaneous erections; semi hard          The following portions of the patient's history were reviewed and updated as appropriate: allergies, current medications, past family history, past medical history, past social history, past surgical history and problem list.    Review of Systems  Pertinent items are noted in HPI.  A comprehensive multipoint review of systems was negative except as otherwise stated in the HPI.     Objective:   Vitals: /68 (BP Location: Left arm, Patient Position: Sitting, BP Method: Large (Automatic))   Pulse (!) 58   Ht 5' 10" (1.778 m)   Wt 115.7 kg (255 lb)   BMI 36.59 kg/m²     Physical Exam   General: alert and oriented, no acute distress  Respiratory: Symmetric expansion, non-labored breathing  Cardiovascular: normal to inspection  Abdomen: non distended   Skin: normal coloration and turgor, no rashes, no suspicious skin lesions noted  Neuro: no gross deficits  Psych: normal judgment and insight, normal mood/affect and non-anxious    Physical Exam    Lab Review   Urinalysis demonstrates micro ua neg    Lab Results   Component Value Date    WBC 5.71 05/26/2017    HGB 15.5 05/26/2017    HCT 47.5 05/26/2017    MCV 86 05/26/2017     05/26/2017     Lab Results   Component Value Date    CREATININE 1.3 01/22/2020    BUN 16 01/22/2020     Lab Results   Component Value Date    PSA 3.4 02/24/2014    PSA 2.80 02/04/2013    PSA 3.13 07/11/2012    PSADIAG <0.01 07/03/2019    PSADIAG <0.01 07/03/2018    PSADIAG <0.01 01/02/2018           Imaging  -  Assessment and Plan:   Prostate cancer  didier 9 4+5 [id number] JEANETTE*4 years    Erectile dysfunction, unspecified erectile dysfunction type  cialis prn    psa today    If OK, RTC 1 yr with psa  "

## 2020-02-19 DIAGNOSIS — C61 PROSTATE CANCER: Primary | ICD-10-CM

## 2020-02-21 ENCOUNTER — PATIENT MESSAGE (OUTPATIENT)
Dept: INTERNAL MEDICINE | Facility: CLINIC | Age: 75
End: 2020-02-21

## 2020-03-02 ENCOUNTER — TELEPHONE (OUTPATIENT)
Dept: PAIN MEDICINE | Facility: CLINIC | Age: 75
End: 2020-03-02

## 2020-03-02 NOTE — TELEPHONE ENCOUNTER
Good morning,afternoon Mr./Mrs.    My name is Melanie I am contacting you from [facility] regarding your appointment scheduled for,03/02/2020 with Frankie Carrillo  just confirming you will be able to make it.

## 2020-03-03 ENCOUNTER — OFFICE VISIT (OUTPATIENT)
Dept: PAIN MEDICINE | Facility: CLINIC | Age: 75
End: 2020-03-03
Payer: MEDICARE

## 2020-03-03 VITALS
HEIGHT: 70 IN | RESPIRATION RATE: 18 BRPM | BODY MASS INDEX: 36.92 KG/M2 | TEMPERATURE: 98 F | HEART RATE: 64 BPM | WEIGHT: 257.88 LBS | DIASTOLIC BLOOD PRESSURE: 91 MMHG | SYSTOLIC BLOOD PRESSURE: 152 MMHG

## 2020-03-03 DIAGNOSIS — G89.4 CHRONIC PAIN DISORDER: ICD-10-CM

## 2020-03-03 DIAGNOSIS — M54.16 LUMBAR RADICULOPATHY: Primary | ICD-10-CM

## 2020-03-03 DIAGNOSIS — M17.11 PRIMARY OSTEOARTHRITIS OF RIGHT KNEE: ICD-10-CM

## 2020-03-03 PROCEDURE — 99214 OFFICE O/P EST MOD 30 MIN: CPT | Mod: PBBFAC | Performed by: ANESTHESIOLOGY

## 2020-03-03 PROCEDURE — 99999 PR PBB SHADOW E&M-EST. PATIENT-LVL IV: CPT | Mod: PBBFAC,,, | Performed by: ANESTHESIOLOGY

## 2020-03-03 PROCEDURE — 99204 PR OFFICE/OUTPT VISIT, NEW, LEVL IV, 45-59 MIN: ICD-10-PCS | Mod: S$PBB,,, | Performed by: ANESTHESIOLOGY

## 2020-03-03 PROCEDURE — 99999 PR PBB SHADOW E&M-EST. PATIENT-LVL IV: ICD-10-PCS | Mod: PBBFAC,,, | Performed by: ANESTHESIOLOGY

## 2020-03-03 PROCEDURE — 99204 OFFICE O/P NEW MOD 45 MIN: CPT | Mod: S$PBB,,, | Performed by: ANESTHESIOLOGY

## 2020-03-03 RX ORDER — GABAPENTIN 100 MG/1
100 CAPSULE ORAL 3 TIMES DAILY
Qty: 90 CAPSULE | Refills: 0 | Status: SHIPPED | OUTPATIENT
Start: 2020-03-03 | End: 2021-03-03

## 2020-03-03 RX ORDER — GABAPENTIN 100 MG/1
100 CAPSULE ORAL 3 TIMES DAILY
Qty: 90 CAPSULE | Refills: 0 | Status: SHIPPED | OUTPATIENT
Start: 2020-03-03 | End: 2020-03-03 | Stop reason: SDUPTHER

## 2020-03-03 RX ORDER — NAPROXEN 500 MG/1
500 TABLET ORAL 2 TIMES DAILY WITH MEALS
Qty: 60 TABLET | Refills: 0 | Status: SHIPPED | OUTPATIENT
Start: 2020-03-03 | End: 2020-03-03 | Stop reason: SDUPTHER

## 2020-03-03 RX ORDER — NAPROXEN 500 MG/1
500 TABLET ORAL 2 TIMES DAILY WITH MEALS
Qty: 60 TABLET | Refills: 0 | Status: SHIPPED | OUTPATIENT
Start: 2020-03-03 | End: 2021-04-06

## 2020-03-03 NOTE — PROGRESS NOTES
Chronic Pain - New Consult    Referring Physician: Gio Rizo*    Chief Complaint:   Chief Complaint   Patient presents with    Leg Pain     right leg pain     Knee Pain        SUBJECTIVE:    Zheng Talley Jr. presents to the clinic for the evaluation of right leg pain. The pain started approximately 1 year ago insiduously and symptoms have been unchanged.The pain is starts in his right posterior thigh radiates to the lateral right leg and to the top of his right foot.  The pain is described as aching and intermittent and is rated as 2/10. He says his pain is exacerbated by prolonged sitting, particularly while driving. He says his pain is relieved by standing or repositioning his body to relieve pressure. Symptoms interfere with daily activity and sleeping. He reports spending 0 hours per day reclining. The patient reports 5 hours of uninterrupted sleep per night.  He has never seen Pain Management in the past nor has he had spine or joint surgery.   Of note, he also endorses chronic, intermittent, dull, aching right knee pain exacerbated by prolonged walking and improved with rest.     Patient denies night fever/night sweats, urinary incontinence, bowel incontinence, significant weight loss, significant motor weakness and loss of sensations.    Physical Therapy/Home Exercise: no      Pain Disability Index Review:  Last 3 PDI Scores 3/3/2020   Pain Disability Index (PDI) 20       Pain Medications:    None   See medication list     report:  Reviewed and consistent with medication use as prescribed.    Pain Procedures: none to review    Imaging: none to review.    Past Medical History:   Diagnosis Date    BPH (benign prostatic hyperplasia)     Cancer     prostate    Groin pain     History of gastroesophageal reflux (GERD)     Hyperlipidemia     Hypertension     Nuclear sclerosis - Both Eyes 2/18/2014     Past Surgical History:   Procedure Laterality Date    BUNIONECTOMY      bilateral      COLONOSCOPY      Dr. Velez    COLONOSCOPY N/A 2019    Procedure: COLONOSCOPY;  Surgeon: JACQUELINE Lozano MD;  Location: [facility] ([address] [place] [address]);  Service: Endoscopy;  Laterality: N/A;    dental implant      HAND SURGERY      lip surgery      PROSTATE SURGERY      REPAIR OF NAIL BED Right 2018    Procedure: REPAIR, NAIL BED right thumb with I&D;  Surgeon: Elyssa Bradford MD;  Location: [facility];  Service: Orthopedics;  Laterality: Right;  stretcher, supine, hand pan 1 and pan 2     Social History     Socioeconomic History    Marital status:      Spouse name: Not on file    Number of children: Not on file    Years of education: Not on file    Highest education level: Not on file   Occupational History    Not on file   Social Needs    Financial resource strain: Not on file    Food insecurity:     Worry: Not on file     Inability: Not on file    Transportation needs:     Medical: Not on file     Non-medical: Not on file   Tobacco Use    Smoking status: Former Smoker     Types: Cigarettes     Last attempt to quit:      Years since quittin.2    Smokeless tobacco: Never Used   Substance and Sexual Activity    Alcohol use: Yes     Alcohol/week: 0.0 standard drinks     Comment: occasionally    Drug use: No    Sexual activity: Never     Partners: Female   Lifestyle    Physical activity:     Days per week: Not on file     Minutes per session: Not on file    Stress: Not on file   Relationships    Social connections:     Talks on phone: Not on file     Gets together: Not on file     Attends [employer] service: Not on file     Active member of club or organization: Not on file     Attends meetings of clubs or organizations: Not on file     Relationship status: Not on file   Other Topics Concern    Not on file   Social History Narrative    Not on file     Family History   Problem Relation Age of Onset    Cancer Father         leukemia    Diabetes Father     Coronary artery  disease Mother     Hypertension Mother     No Known Problems Brother     No Known Problems Daughter     No Known Problems Son     No Known Problems Brother     No Known Problems Brother     Heart disease Neg Hx     Amblyopia Neg Hx     Blindness Neg Hx     Cataracts Neg Hx     Glaucoma Neg Hx     Macular degeneration Neg Hx     Retinal detachment Neg Hx     Strabismus Neg Hx        Review of patient's allergies indicates:   Allergen Reactions    Iodine and iodide containing products Other (See Comments)     Blood in urine in the 1970s       Current Outpatient Medications   Medication Sig    amLODIPine (NORVASC) 10 MG tablet Take 1 tablet (10 mg total) by mouth once daily.    losartan-hydrochlorothiazide 100-25 mg (HYZAAR) 100-25 mg per tablet Take 1 tablet by mouth once daily.    metoprolol succinate (TOPROL-XL) 50 MG 24 hr tablet Take 1 tablet (50 mg total) by mouth once daily.    pravastatin (PRAVACHOL) 40 MG tablet Take 1 tablet (40 mg total) by mouth every evening.    tadalafil (CIALIS) 20 MG Tab Take 1 tablet (20 mg total) by mouth daily as needed.    valACYclovir (VALTREX) 1000 MG tablet TAKE 1 TABLET DAILY    gabapentin (NEURONTIN) 100 MG capsule Take 1 capsule (100 mg total) by mouth 3 (three) times daily.    naproxen (NAPROSYN) 500 MG tablet Take 1 tablet (500 mg total) by mouth 2 (two) times daily with meals.    ondansetron (ZOFRAN-ODT) 4 MG TbDL Take 1 tablet (4 mg total) by mouth every 6 (six) hours as needed. (Patient not taking: Reported on 3/3/2020)     No current facility-administered medications for this visit.        REVIEW OF SYSTEMS:    GENERAL:  No weight loss, malaise or fevers.  HEENT:  Negative for frequent or significant headaches.  NECK:  Negative for lumps, goiter, pain and significant neck swelling.  RESPIRATORY:  Negative for cough, wheezing or shortness of breath.  CARDIOVASCULAR:  Negative for chest pain, leg swelling or palpitations. +HTN  GI:  Negative for  "abdominal discomfort, blood in stools or black stools or change in bowel habits.  MUSCULOSKELETAL:  See HPI.  SKIN:  Negative for lesions, rash, and itching.  PSYCH:  Negative for  mood disorder and recent psychosocial stressors. Positive for sleep disturbance.  HEMATOLOGY/LYMPHOLOGY:  Negative for prolonged bleeding, bruising easily or swollen nodes.  NEURO:   No history of headaches, syncope, paralysis, seizures or tremors.  All other reviewed and negative other than HPI.    OBJECTIVE:    BP (!) 152/91   Pulse 64   Temp 98.3 °F (36.8 °C) (Oral)   Resp 18   Ht 5' 10" (1.778 m)   Wt 117 kg (257 lb 14.4 oz)   BMI 37.00 kg/m²     PHYSICAL EXAMINATION:    General appearance: Well appearing, in no acute distress, alert and oriented x3.  Psych:  Mood and affect appropriate.  Skin: Skin color, texture, turgor normal, no rashes or lesions, in both upper and lower body.  Head/face:  Normocephalic, atraumatic. No palpable lymph nodes.  Cor: RRR  Pulm: CTA  GI:  Soft and non-tender.  Back: Straight leg raising in the sitting and supine positions is negative to radicular pain. No pain to palpation over the spine or costovertebral angles. Negative facet loading. Decreased range of motion with pain reproduction on lumbar extension.  Extremities: Peripheral joint ROM is full and pain free without obvious instability or laxity in all four extremities except right knee joint with mild pain with knee flexion. No deformities, edema, or skin discoloration. Good capillary refill.  Musculoskeletal: Hip and sacroiliac and provocative maneuvers are negative. Bilateral upper and lower extremity strength is normal and symmetric.  No atrophy or tone abnormalities are noted.  Neuro: Bilateral upper and lower extremity coordination and muscle stretch reflexes are physiologic and symmetric.  Plantar response are downgoing. No loss of sensation is noted.  Gait: Antalgic.     ASSESSMENT: 75 y.o. year old male with right leg and knee pain. " He states his initial complaint was the intermittent lumbar radicular pain, however, his right knee pain is often more debilitating. Will order imaging of lumbar spine and right knee to further elucidate his pain location. In addition, will consider further interventions in future pending results of imaging. Will prescribe gabapentin and Naproxen to assist with his pain symptoms, and will refer to PT for core strengthening and stretching.     1. Lumbar radiculopathy  MRI Lumbar Spine Without Contrast    Ambulatory referral/consult to Physical Therapy    X-Ray Knee 3 View Right   2. Primary osteoarthritis of right knee  MRI Lumbar Spine Without Contrast    Ambulatory referral/consult to Physical Therapy    X-Ray Knee 3 View Right   3. Chronic pain disorder  MRI Lumbar Spine Without Contrast    Ambulatory referral/consult to Physical Therapy    X-Ray Knee 3 View Right         PLAN:   - Discussed potential for interventions in future including NICOLA and right genicular nerve block/RFA pending results imaging as stated below.     - I have stressed the importance of physical activity and a home exercise plan to help with pain and improve health.    - Counseled patient regarding the importance of activity modification, constant sleeping habits and physical therapy.    - Referral to Physical therapy for Lumbar stabilization, core strengthening, and a home exercise program.    - Order MRI of the Lumbar Spine to help evaluate possible source of pain.    - Also order right knee Xray to further elucidate source of his chronic right knee pain.     - Will prescribe gabapentin 100 mg TID to assist with neuropathic pain symptoms.    - Will also prescribe Naproxen 500 mg BID to assist with inflammatory pain symptoms.     - I do not feel this patient is a candidate for long term opioids for this non-cancer pain at this time.    - RTC in 2 weeks.     The above plan and management options were discussed at length with patient. Patient  is in agreement with the above and verbalized understanding. It will be communicated with the referring physician via electronic record, fax, or mail.    Ever Fonseca MD  [facility], PGY V  03/03/2020    I have reviewed and concur with the resident's history, physical, assessment, and plan.  I have personally interviewed and examined the patient at bedside.  See below addendum for my evaluation and additional findings.  75-year-old male with chronic back pain radiation to right lower extremity and right knee pain consistent with lumbar radiculopathy and right knee osteoarthritis.  We will refer him to physical therapy, start gabapentin 100 mg at bedtime to increase as tolerated to t.i.d. naproxen 500 mg b.i.d. as needed.  We also order lumbar spine MRI and right knee x-ray to further evaluate the etiology of his pain.  Will follow up with him in 2 weeks.    Frankie Carrillo MD  03/03/2020

## 2020-03-03 NOTE — LETTER
March 3, 2020      Gio Rizo MD  [address]  [address]  [facility] [address]           [facility] [place] [address]  [address]  [facility] [address]  Phone: [phone]  Fax: [phone]          Patient: Zheng Talley Jr.   MR Number: [id number]   YOB: 1945   Date of Visit: 3/3/2020       Dear Dr. Gio Rizo:    Thank you for referring Zheng Talley to me for evaluation. Attached you will find relevant portions of my assessment and plan of care.    If you have questions, please do not hesitate to call me. I look forward to following Zheng Talley along with you.    Sincerely,    Frankie Carrillo MD    Enclosure  CC:  No Recipients    If you would like to receive this communication electronically, please contact [email or web address] or [phone] to request more information on [employer] access.    For providers and/or their staff who would like to refer a patient to [facility], please contact us through our one-stop-shop provider referral line, [facility], at [phone].    If you feel you have received this communication in error or would no longer like to receive these types of communications, please e-mail [email or web address]

## 2020-03-04 ENCOUNTER — HOSPITAL ENCOUNTER (OUTPATIENT)
Dept: RADIOLOGY | Facility: OTHER | Age: 75
Discharge: HOME | End: 2020-03-04
Attending: STUDENT IN AN ORGANIZED HEALTH CARE EDUCATION/TRAINING PROGRAM
Payer: MEDICARE

## 2020-03-04 DIAGNOSIS — M54.16 LUMBAR RADICULOPATHY: ICD-10-CM

## 2020-03-04 DIAGNOSIS — M17.11 PRIMARY OSTEOARTHRITIS OF RIGHT KNEE: ICD-10-CM

## 2020-03-04 DIAGNOSIS — G89.4 CHRONIC PAIN DISORDER: ICD-10-CM

## 2020-03-04 PROCEDURE — 73562 X-RAY EXAM OF KNEE 3: CPT | Mod: 26,RT,, | Performed by: RADIOLOGY

## 2020-03-04 PROCEDURE — 73562 XR KNEE 3 VIEW RIGHT: ICD-10-PCS | Mod: 26,RT,, | Performed by: RADIOLOGY

## 2020-03-04 PROCEDURE — 73562 X-RAY EXAM OF KNEE 3: CPT | Mod: TC,FY,RT

## 2020-03-07 ENCOUNTER — PATIENT MESSAGE (OUTPATIENT)
Dept: PAIN MEDICINE | Facility: CLINIC | Age: 75
End: 2020-03-07

## 2020-03-11 ENCOUNTER — HOSPITAL ENCOUNTER (OUTPATIENT)
Dept: RADIOLOGY | Facility: OTHER | Age: 75
Discharge: HOME | End: 2020-03-11
Attending: STUDENT IN AN ORGANIZED HEALTH CARE EDUCATION/TRAINING PROGRAM
Payer: MEDICARE

## 2020-03-11 DIAGNOSIS — M17.11 PRIMARY OSTEOARTHRITIS OF RIGHT KNEE: ICD-10-CM

## 2020-03-11 DIAGNOSIS — G89.4 CHRONIC PAIN DISORDER: ICD-10-CM

## 2020-03-11 DIAGNOSIS — M54.16 LUMBAR RADICULOPATHY: ICD-10-CM

## 2020-03-11 PROCEDURE — 72148 MRI LUMBAR SPINE W/O DYE: CPT | Mod: TC

## 2020-03-11 PROCEDURE — 72148 MRI LUMBAR SPINE WITHOUT CONTRAST: ICD-10-PCS | Mod: 26,,, | Performed by: RADIOLOGY

## 2020-03-11 PROCEDURE — 72148 MRI LUMBAR SPINE W/O DYE: CPT | Mod: 26,,, | Performed by: RADIOLOGY

## 2020-03-16 ENCOUNTER — TELEPHONE (OUTPATIENT)
Dept: PAIN MEDICINE | Facility: CLINIC | Age: 75
End: 2020-03-16

## 2020-03-16 NOTE — TELEPHONE ENCOUNTER
Attempted to reach patient regarding appointment tomorrow with Dr Carrillo, no answer, left voicemail message

## 2020-03-17 ENCOUNTER — OFFICE VISIT (OUTPATIENT)
Dept: PAIN MEDICINE | Facility: CLINIC | Age: 75
End: 2020-03-17
Payer: MEDICARE

## 2020-03-17 VITALS
DIASTOLIC BLOOD PRESSURE: 77 MMHG | HEART RATE: 60 BPM | RESPIRATION RATE: 18 BRPM | SYSTOLIC BLOOD PRESSURE: 136 MMHG | WEIGHT: 255.69 LBS | HEIGHT: 70 IN | BODY MASS INDEX: 36.61 KG/M2 | TEMPERATURE: 98 F

## 2020-03-17 DIAGNOSIS — M47.816 SPONDYLOSIS OF LUMBAR SPINE: Primary | ICD-10-CM

## 2020-03-17 DIAGNOSIS — G89.4 CHRONIC PAIN DISORDER: ICD-10-CM

## 2020-03-17 DIAGNOSIS — M17.11 PRIMARY OSTEOARTHRITIS OF RIGHT KNEE: ICD-10-CM

## 2020-03-17 PROCEDURE — 99214 OFFICE O/P EST MOD 30 MIN: CPT | Mod: S$PBB,,, | Performed by: ANESTHESIOLOGY

## 2020-03-17 PROCEDURE — 99999 PR PBB SHADOW E&M-EST. PATIENT-LVL III: ICD-10-PCS | Mod: PBBFAC,,, | Performed by: ANESTHESIOLOGY

## 2020-03-17 PROCEDURE — 99214 PR OFFICE/OUTPT VISIT, EST, LEVL IV, 30-39 MIN: ICD-10-PCS | Mod: S$PBB,,, | Performed by: ANESTHESIOLOGY

## 2020-03-17 PROCEDURE — 99213 OFFICE O/P EST LOW 20 MIN: CPT | Mod: PBBFAC | Performed by: ANESTHESIOLOGY

## 2020-03-17 PROCEDURE — 99999 PR PBB SHADOW E&M-EST. PATIENT-LVL III: CPT | Mod: PBBFAC,,, | Performed by: ANESTHESIOLOGY

## 2020-03-17 RX ORDER — NAPROXEN 500 MG/1
500 TABLET ORAL 2 TIMES DAILY WITH MEALS
Qty: 60 TABLET | Refills: 1 | Status: SHIPPED | OUTPATIENT
Start: 2020-03-17 | End: 2021-04-06

## 2020-03-17 RX ORDER — GABAPENTIN 100 MG/1
100 CAPSULE ORAL 3 TIMES DAILY
Qty: 90 CAPSULE | Refills: 11 | Status: SHIPPED | OUTPATIENT
Start: 2020-03-17 | End: 2021-04-06

## 2020-03-17 NOTE — PROGRESS NOTES
Chronic Pain - Established Patient    Referring Physician: No ref. provider found    Chief Complaint:   No chief complaint on file.       SUBJECTIVE:    Interval hx 3/17/20  Patient presents today for follow up of his right knee pain. He reports significant pain relief since previous visit. He feels a pain in his knee every once in a while but overall is happy with his pain relief and functionality at this time. He has been taking gabapentin and naproxen as prescribed. His knee xray shows degenerative changes, and his MRI shows non-stenotic spondylosis and facet arthritis. He has not started physical therapy.      Initial HPI 3/3/20  Zheng Talley Jr. presents to the clinic for the evaluation of right leg pain. The pain started approximately 1 year ago insiduously and symptoms have been unchanged.The pain is starts in his right posterior thigh radiates to the lateral right leg and to the top of his right foot.  The pain is described as aching and intermittent and is rated as 2/10. He says his pain is exacerbated by prolonged sitting, particularly while driving. He says his pain is relieved by standing or repositioning his body to relieve pressure. Symptoms interfere with daily activity and sleeping. He reports spending 0 hours per day reclining. The patient reports 5 hours of uninterrupted sleep per night.  He has never seen Pain Management in the past nor has he had spine or joint surgery.   Of note, he also endorses chronic, intermittent, dull, aching right knee pain exacerbated by prolonged walking and improved with rest.     Patient denies night fever/night sweats, urinary incontinence, bowel incontinence, significant weight loss, significant motor weakness and loss of sensations.    Physical Therapy/Home Exercise: no      Pain Disability Index Review:  Last 3 PDI Scores 3/17/2020 3/3/2020   Pain Disability Index (PDI) 0 20       Pain Medications:  Gabapentin 300 mg TID  Naproxen 500 mg BID    None   See  medication list     report:  Reviewed and consistent with medication use as prescribed.    Pain Procedures: none to review    Imaging:   Reading Physician Reading Date Result Priority   Maco Stallworth DO 3/4/2020 Routine      Narrative     EXAMINATION:  XR KNEE 3 VIEW RIGHT    CLINICAL HISTORY:  Radiculopathy, lumbar region    TECHNIQUE:  AP, lateral, and Merchant views of the right knee were performed.    COMPARISON:  01/10/2013    FINDINGS:  Tricompartmental degenerative change of the right knee with joint space loss articular sclerosis and marginal osteophyte formation most pronounced in the lateral femoral tibial compartment.  There is no evidence for acute fracture or dislocation right knee.  No focal bony erosion.  Questionable small suprapatellar bursa effusion.  Scattered vascular calcifications.    Continued punctate ossified focus adjacent to the lateral aspect of the patella which may represent ununited ossification center.  Clinical correlation and further evaluation as warranted.    There is questionable sclerotic focus projected over the proximal tibial diaphysis partially included in the study which may be artifactual is only seen on the frontal view.  Clinical correlation and further evaluation with dedicated views of the tibia advised.    This report was flagged in Epic as abnormal.  .      Impression       Please see above      Electronically signed by: Maco Stallworth DO  Date: 03/04/2020  Time: 16:57       Details     Reading Physician Reading Date Result Priority   Surya Schaffer Jr., MD 3/12/2020 Routine      Narrative     EXAMINATION:  MRI LUMBAR SPINE WITHOUT CONTRAST    CLINICAL HISTORY:  Radiculopathy, lumbar regionlumbar radiculopathy;    TECHNIQUE:  Sagittal T1, sagittal T2, sagittal STIR, axial T1 and axial T2 weighted images of the lumbar spine obtained without contrast.    COMPARISON:  X-ray 02/04/2013    FINDINGS:  Lumbar spine alignment is within normal limits. The vertebral  body heights are well maintained, with no fracture.  No marrow signal abnormality suspicious for an infiltrative process.    The conus is normal in appearance.  The adjacent soft tissue structures show no significant abnormalities.    L1-L2: There is no focal disc herniation. No significant central canal or neural foraminal narrowing.    L2-L3: Broad-based disc bulging and minimal spondylitic spurring are present.  There is also some facet arthritis.  No significant central canal or neural foraminal narrowing.    L3-L4: Broad-based disc bulging, spondylitic spurring, and facet arthritis are present.  No significant central canal or neural foraminal narrowing.    L4-L5: Broad-based disc bulging and spondylitic spurring which is asymmetric to the left noted, as well as severe facet arthritis and ligamentum flavum hypertrophy.  No focal disc herniation.  No significant central canal or neural foraminal narrowing.    L5-S1: Broad-based disc bulging and facet arthritis is present with superimposed central and right paracentral disc protrusion.  No focal disc herniation.    There is a prominence of the epidural fat in the lumbosacral region, most notably below L5-S1 which is of doubtful clinical significance.  No significant central canal or neural foraminal narrowing.      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 Velez    COLONOSCOPY N/A 2/25/2019    Procedure: COLONOSCOPY;  Surgeon: JACQUELINE Lozano MD;  Location: [facility] ([address] [place] [address]);   Service: Endoscopy;  Laterality: N/A;    dental implant      HAND SURGERY      lip surgery      PROSTATE SURGERY      REPAIR OF NAIL BED Right 2018    Procedure: REPAIR, NAIL BED right thumb with I&D;  Surgeon: Elyssa Bradford MD;  Location: [facility];  Service: Orthopedics;  Laterality: Right;  stretcher, supine, hand pan 1 and pan 2     Social History     Socioeconomic History    Marital status:      Spouse name: Not on file    Number of children: Not on file    Years of education: Not on file    Highest education level: Not on file   Occupational History    Not on file   Social Needs    Financial resource strain: Not on file    Food insecurity:     Worry: Not on file     Inability: Not on file    Transportation needs:     Medical: Not on file     Non-medical: Not on file   Tobacco Use    Smoking status: Former Smoker     Types: Cigarettes     Last attempt to quit:      Years since quittin.2    Smokeless tobacco: Never Used   Substance and Sexual Activity    Alcohol use: Yes     Alcohol/week: 0.0 standard drinks     Comment: occasionally    Drug use: No    Sexual activity: Never     Partners: Female   Lifestyle    Physical activity:     Days per week: Not on file     Minutes per session: Not on file    Stress: Not on file   Relationships    Social connections:     Talks on phone: Not on file     Gets together: Not on file     Attends [employer] service: Not on file     Active member of club or organization: Not on file     Attends meetings of clubs or organizations: Not on file     Relationship status: Not on file   Other Topics Concern    Not on file   Social History Narrative    Not on file     Family History   Problem Relation Age of Onset    Cancer Father         leukemia    Diabetes Father     Coronary artery disease Mother     Hypertension Mother     No Known Problems Brother     No Known Problems Daughter     No Known Problems Son     No Known Problems  Brother     No Known Problems Brother     Heart disease Neg Hx     Amblyopia Neg Hx     Blindness Neg Hx     Cataracts Neg Hx     Glaucoma Neg Hx     Macular degeneration Neg Hx     Retinal detachment Neg Hx     Strabismus Neg Hx        Review of patient's allergies indicates:   Allergen Reactions    Iodine and iodide containing products Other (See Comments)     Blood in urine in the 1970s       Current Outpatient Medications   Medication Sig    amLODIPine (NORVASC) 10 MG tablet Take 1 tablet (10 mg total) by mouth once daily.    gabapentin (NEURONTIN) 100 MG capsule Take 1 capsule (100 mg total) by mouth 3 (three) times daily.    losartan-hydrochlorothiazide 100-25 mg (HYZAAR) 100-25 mg per tablet Take 1 tablet by mouth once daily.    metoprolol succinate (TOPROL-XL) 50 MG 24 hr tablet Take 1 tablet (50 mg total) by mouth once daily.    naproxen (NAPROSYN) 500 MG tablet Take 1 tablet (500 mg total) by mouth 2 (two) times daily with meals.    pravastatin (PRAVACHOL) 40 MG tablet Take 1 tablet (40 mg total) by mouth every evening.    tadalafil (CIALIS) 20 MG Tab Take 1 tablet (20 mg total) by mouth daily as needed.    valACYclovir (VALTREX) 1000 MG tablet TAKE 1 TABLET DAILY    gabapentin (NEURONTIN) 100 MG capsule Take 1 capsule (100 mg total) by mouth 3 (three) times daily.    naproxen (NAPROSYN) 500 MG tablet Take 1 tablet (500 mg total) by mouth 2 (two) times daily with meals.    ondansetron (ZOFRAN-ODT) 4 MG TbDL Take 1 tablet (4 mg total) by mouth every 6 (six) hours as needed. (Patient not taking: Reported on 3/3/2020)     No current facility-administered medications for this visit.        REVIEW OF SYSTEMS:    GENERAL:  No weight loss, malaise or fevers.  HEENT:  Negative for frequent or significant headaches.  NECK:  Negative for lumps, goiter, pain and significant neck swelling.  RESPIRATORY:  Negative for cough, wheezing or shortness of breath.  CARDIOVASCULAR:  Negative for chest  "pain, leg swelling or palpitations. +HTN  GI:  Negative for abdominal discomfort, blood in stools or black stools or change in bowel habits.  MUSCULOSKELETAL:  See HPI.  SKIN:  Negative for lesions, rash, and itching.  PSYCH:  Negative for  mood disorder and recent psychosocial stressors. Positive for sleep disturbance.  HEMATOLOGY/LYMPHOLOGY:  Negative for prolonged bleeding, bruising easily or swollen nodes.  NEURO:   No history of headaches, syncope, paralysis, seizures or tremors.  All other reviewed and negative other than HPI.    OBJECTIVE:    /77   Pulse 60   Temp 98 °F (36.7 °C) (Oral)   Resp 18   Ht 5' 10" (1.778 m)   Wt 116 kg (255 lb 11.2 oz)   BMI 36.69 kg/m²     PHYSICAL EXAMINATION:    General appearance: Well appearing, in no acute distress, alert and oriented x3.  Psych:  Mood and affect appropriate.  Skin: Skin color, texture, turgor normal, no rashes or lesions, in both upper and lower body.  Head/face:  Normocephalic, atraumatic. No palpable lymph nodes.  Cor: RRR  Pulm: CTA  GI:  Soft and non-tender.  Back: Straight leg raising in the sitting and supine positions is negative to radicular pain. No pain to palpation over the spine or costovertebral angles. Negative facet loading. Decreased range of motion with pain reproduction on lumbar extension.  Extremities: Peripheral joint ROM is full and pain free without obvious instability or laxity in all four extremities except right knee joint with mild pain with knee flexion. No deformities, edema, or skin discoloration. Good capillary refill.  Musculoskeletal: Hip and sacroiliac and provocative maneuvers are negative. Bilateral upper and lower extremity strength is normal and symmetric.  No atrophy or tone abnormalities are noted.  Neuro: Bilateral upper and lower extremity coordination and muscle stretch reflexes are physiologic and symmetric.  Plantar response are downgoing. No loss of sensation is noted.  Gait: Antalgic.     ASSESSMENT: " 75 y.o. year old male with right leg and knee pain consistent with osteoarthritis. He has responded well to conservative measures. We will continue gabapentin, make naproxen PRN, and use tylenol to assist with his pain symptoms, and will refer to PT for core strengthening and stretching.     1. Spondylosis of lumbar spine     2. Primary osteoarthritis of right knee     3. Chronic pain disorder           PLAN:   - Discussed potential for interventions in future including NICOLA and right genicular nerve block/RFA pending results imaging as stated below.     - I have stressed the importance of physical activity and a home exercise plan to help with pain and improve health.    - Counseled patient regarding the importance of activity modification, constant sleeping habits and physical therapy.    - Referral to Physical therapy for Lumbar stabilization, core strengthening, and a home exercise program. Patient will begin when appropriate due to virus concerns. In the meantime, we discussed home exercises he can do to strengthen his lower extremity muscles.    - Continue gabapentin 100 mg TID to assist with neuropathic pain symptoms.    - Instructed to use tylenol 500 mg up to TID for pain control    - Change Naproxen 500 mg BID to PRN for breakthrough pain to assist with inflammatory pain symptoms    - I do not feel this patient is a candidate for long term opioids for this non-cancer pain at this time.    - RTC in 8 weeks.     The above plan and management options were discussed at length with patient. Patient is in agreement with the above and verbalized understanding. It will be communicated with the referring physician via electronic record, fax, or mail.      Aleksey Bernal MD  PGY-3, Anesthesiology  03/17/2020     I have reviewed and concur with the resident's history, physical, assessment, and plan.  I have personally interviewed and examined the patient at bedside.  See below addendum for my evaluation and additional  findings.  75-year-old male with chronic right knee pain consistent with right knee osteoarthritis and back pain consistent with lumbar spondylosis.  He is here today for follow-up and review imaging results.  He reports significant relief from conservative management including naproxen and gabapentin.  We will make naproxen p.r.n. and asked him to continue gabapentin and Tylenol as needed.  Follow up with him in 8 weeks and if his pain worsens will consider interventions including intra-articular steroid injection bursa genicular nerve block for right knee pain and epidural steroid injection for lower back pain.    Frankie Carrillo MD

## 2020-04-03 ENCOUNTER — TELEPHONE (OUTPATIENT)
Dept: PAIN MEDICINE | Facility: CLINIC | Age: 75
End: 2020-04-03

## 2020-04-03 NOTE — TELEPHONE ENCOUNTER
lvm for patient about converting appt to a virtual visit due to covid-19 as we are not doing any in clinic visits at this time staff left call back number for patient to reach out to discuss further options

## 2020-05-13 ENCOUNTER — PATIENT MESSAGE (OUTPATIENT)
Dept: PAIN MEDICINE | Facility: CLINIC | Age: 75
End: 2020-05-13

## 2020-05-13 ENCOUNTER — TELEPHONE (OUTPATIENT)
Dept: PAIN MEDICINE | Facility: CLINIC | Age: 75
End: 2020-05-13

## 2020-05-13 NOTE — TELEPHONE ENCOUNTER
Patient returned call to patient in regards to rescheduling his 5/18/20 to a later date due provider not being available.    Staff was able to schedule patient for virtual visit for 5/19 at 1:30p     Pt verbalized understanding.

## 2020-05-13 NOTE — TELEPHONE ENCOUNTER
Contacted the patient regarding his visit on Monday. Dr. Carrillo will not be available at the current time he is scheduled and his appointment need to be rescheduled. Requested a return call.

## 2020-05-13 NOTE — TELEPHONE ENCOUNTER
----- Message from Evie Childs sent at 5/13/2020  1:46 PM CDT -----  Contact: RAYSHAWN ROBBINS JR. [[id number]]  Name of Who is Calling: RAYSHAWN ROBBINS JR. [[id number]]      What is the request in detail: Pt is calling to speak to staff in regards to rescheduling his virtual audio visit . I was unable to reschedule th audio visit ... Please call to further assist .       Can the clinic reply by [facility]: N      What Number to Call Back if not [facility]: [phone]

## 2020-05-19 ENCOUNTER — OFFICE VISIT (OUTPATIENT)
Dept: PAIN MEDICINE | Facility: CLINIC | Age: 75
End: 2020-05-19
Payer: MEDICARE

## 2020-05-19 DIAGNOSIS — G89.4 CHRONIC PAIN DISORDER: ICD-10-CM

## 2020-05-19 DIAGNOSIS — M47.816 SPONDYLOSIS OF LUMBAR SPINE: Primary | ICD-10-CM

## 2020-05-19 DIAGNOSIS — M17.11 PRIMARY OSTEOARTHRITIS OF RIGHT KNEE: ICD-10-CM

## 2020-05-19 PROBLEM — M17.0 PRIMARY OSTEOARTHRITIS OF BOTH KNEES: Status: ACTIVE | Noted: 2020-05-19

## 2020-05-19 PROCEDURE — G0463 HOSPITAL OUTPT CLINIC VISIT: HCPCS

## 2020-05-19 PROCEDURE — 99213 PR OFFICE/OUTPT VISIT, EST, LEVL III, 20-29 MIN: ICD-10-PCS | Mod: 95,,, | Performed by: ANESTHESIOLOGY

## 2020-05-19 PROCEDURE — 99213 OFFICE O/P EST LOW 20 MIN: CPT | Mod: 95,,, | Performed by: ANESTHESIOLOGY

## 2020-05-19 PROCEDURE — 99211 OFF/OP EST MAY X REQ PHY/QHP: CPT

## 2020-05-19 NOTE — PROGRESS NOTES
Chronic Pain-Tele-Medicine-Established Note (Follow up visit)      The patient location is: home  The chief complaint leading to consultation is: right knee pain  Visit type: Virtual visit with synchronous audio and video  Total time spent with patient: 20 mins  Each patient to whom he or she provides medical services by telemedicine is:  (1) informed of the relationship between the physician and patient and the respective role of any other health care provider with respect to management of the patient; and (2) notified that he or she may decline to receive medical services by telemedicine and may withdraw from such care at any time.    Referring Physician: No ref. provider found    Chief Complaint:   Chief Complaint   Patient presents with    Low-back Pain    Knee Pain        SUBJECTIVE:  Interval History 5/19/2020:  Mr. Talley was contacted at home for follow-up. He continues to endorse right dull, aching lower knee pain. He states, however, that it has significantly improved from his prior encounter. He attributes this improvement to rest, stretching, and medications. He continues to take gabapentin 100 mg TID as well as intermittent Naproxen and Tylenol. He has no new complaints or concerns today.     Interval hx 3/17/20  Patient presents today for follow up of his right knee pain. He reports significant pain relief since previous visit. He feels a pain in his knee every once in a while but overall is happy with his pain relief and functionality at this time. He has been taking gabapentin and naproxen as prescribed. His knee xray shows degenerative changes, and his MRI shows non-stenotic spondylosis and facet arthritis. He has not started physical therapy.      Initial HPI 3/3/20  Zheng Talley JrManny presents to the clinic for the evaluation of right leg pain. The pain started approximately 1 year ago insiduously and symptoms have been unchanged.The pain is starts in his right posterior thigh radiates to the  lateral right leg and to the top of his right foot.  The pain is described as aching and intermittent and is rated as 2/10. He says his pain is exacerbated by prolonged sitting, particularly while driving. He says his pain is relieved by standing or repositioning his body to relieve pressure. Symptoms interfere with daily activity and sleeping. He reports spending 0 hours per day reclining. The patient reports 5 hours of uninterrupted sleep per night.  He has never seen Pain Management in the past nor has he had spine or joint surgery.   Of note, he also endorses chronic, intermittent, dull, aching right knee pain exacerbated by prolonged walking and improved with rest.     Patient denies night fever/night sweats, urinary incontinence, bowel incontinence, significant weight loss, significant motor weakness and loss of sensations.    Physical Therapy/Home Exercise: no      Pain Disability Index Review:  Last 3 PDI Scores 3/17/2020 3/3/2020   Pain Disability Index (PDI) 0 20       Pain Medications:  Gabapentin 300 mg TID  Naproxen 500 mg BID  Tylenol 1000 mg TID PRN    None   See medication list     report:  Reviewed and consistent with medication use as prescribed.    Pain Procedures: none to review    Imaging:   Reading Physician Reading Date Result Priority   Maco Stallworth DO 3/4/2020 Routine      Narrative     EXAMINATION:  XR KNEE 3 VIEW RIGHT    CLINICAL HISTORY:  Radiculopathy, lumbar region    TECHNIQUE:  AP, lateral, and Merchant views of the right knee were performed.    COMPARISON:  01/10/2013    FINDINGS:  Tricompartmental degenerative change of the right knee with joint space loss articular sclerosis and marginal osteophyte formation most pronounced in the lateral femoral tibial compartment.  There is no evidence for acute fracture or dislocation right knee.  No focal bony erosion.  Questionable small suprapatellar bursa effusion.  Scattered vascular calcifications.    Continued punctate ossified  focus adjacent to the lateral aspect of the patella which may represent ununited ossification center.  Clinical correlation and further evaluation as warranted.    There is questionable sclerotic focus projected over the proximal tibial diaphysis partially included in the study which may be artifactual is only seen on the frontal view.  Clinical correlation and further evaluation with dedicated views of the tibia advised.    This report was flagged in Epic as abnormal.  .      Impression       Please see above      Electronically signed by: Maco Stallworth DO  Date: 03/04/2020  Time: 16:57       Details     Reading Physician Reading Date Result Priority   Surya Schaffer Jr., MD 3/12/2020 Routine      Narrative     EXAMINATION:  MRI LUMBAR SPINE WITHOUT CONTRAST    CLINICAL HISTORY:  Radiculopathy, lumbar regionlumbar radiculopathy;    TECHNIQUE:  Sagittal T1, sagittal T2, sagittal STIR, axial T1 and axial T2 weighted images of the lumbar spine obtained without contrast.    COMPARISON:  X-ray 02/04/2013    FINDINGS:  Lumbar spine alignment is within normal limits. The vertebral body heights are well maintained, with no fracture.  No marrow signal abnormality suspicious for an infiltrative process.    The conus is normal in appearance.  The adjacent soft tissue structures show no significant abnormalities.    L1-L2: There is no focal disc herniation. No significant central canal or neural foraminal narrowing.    L2-L3: Broad-based disc bulging and minimal spondylitic spurring are present.  There is also some facet arthritis.  No significant central canal or neural foraminal narrowing.    L3-L4: Broad-based disc bulging, spondylitic spurring, and facet arthritis are present.  No significant central canal or neural foraminal narrowing.    L4-L5: Broad-based disc bulging and spondylitic spurring which is asymmetric to the left noted, as well as severe facet arthritis and ligamentum flavum hypertrophy.  No focal disc  herniation.  No significant central canal or neural foraminal narrowing.    L5-S1: Broad-based disc bulging and facet arthritis is present with superimposed central and right paracentral disc protrusion.  No focal disc herniation.    There is a prominence of the epidural fat in the lumbosacral region, most notably below L5-S1 which is of doubtful clinical significance.  No significant central canal or neural foraminal narrowing.      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 Velez    COLONOSCOPY N/A 2/25/2019    Procedure: COLONOSCOPY;  Surgeon: JACQUELINE Lozano MD;  Location: [address]);  Service: Endoscopy;  Laterality: N/A;    dental implant      HAND SURGERY      lip surgery      PROSTATE SURGERY      REPAIR OF NAIL BED Right 11/2/2018    Procedure: REPAIR, NAIL BED right thumb with I&D;  Surgeon: Elyssa Bradford MD;  Location: [facility];  Service: Orthopedics;  Laterality: Right;  stretcher, supine, hand pan 1 and pan 2     Social History     Socioeconomic History    Marital status:      Spouse name: Not on file    Number of children: Not on file    Years of education: Not on file    Highest education level: Not on file   Occupational History    Not on file   Social Needs    Financial resource strain: Not on file    Food insecurity:     Worry: Not on file     Inability: Not on file    Transportation needs:     Medical: Not on file     Non-medical: Not on file   Tobacco Use    Smoking status: Former  Smoker     Types: Cigarettes     Last attempt to quit:      Years since quittin.4    Smokeless tobacco: Never Used   Substance and Sexual Activity    Alcohol use: Yes     Alcohol/week: 0.0 standard drinks     Comment: occasionally    Drug use: No    Sexual activity: Never     Partners: Female   Lifestyle    Physical activity:     Days per week: Not on file     Minutes per session: Not on file    Stress: Not on file   Relationships    Social connections:     Talks on phone: Not on file     Gets together: Not on file     Attends Denominational service: Not on file     Active member of club or organization: Not on file     Attends meetings of clubs or organizations: Not on file     Relationship status: Not on file   Other Topics Concern    Not on file   Social History Narrative    Not on file     Family History   Problem Relation Age of Onset    Cancer Father         leukemia    Diabetes Father     Coronary artery disease Mother     Hypertension Mother     No Known Problems Brother     No Known Problems Daughter     No Known Problems Son     No Known Problems Brother     No Known Problems Brother     Heart disease Neg Hx     Amblyopia Neg Hx     Blindness Neg Hx     Cataracts Neg Hx     Glaucoma Neg Hx     Macular degeneration Neg Hx     Retinal detachment Neg Hx     Strabismus Neg Hx        Review of patient's allergies indicates:   Allergen Reactions    Iodine and iodide containing products Other (See Comments)     Blood in urine in the 1970s       Current Outpatient Medications   Medication Sig    amLODIPine (NORVASC) 10 MG tablet Take 1 tablet (10 mg total) by mouth once daily.    gabapentin (NEURONTIN) 100 MG capsule Take 1 capsule (100 mg total) by mouth 3 (three) times daily.    gabapentin (NEURONTIN) 100 MG capsule Take 1 capsule (100 mg total) by mouth 3 (three) times daily.    losartan-hydrochlorothiazide 100-25 mg (HYZAAR) 100-25 mg per tablet Take 1 tablet by mouth once  daily.    metoprolol succinate (TOPROL-XL) 50 MG 24 hr tablet Take 1 tablet (50 mg total) by mouth once daily.    naproxen (NAPROSYN) 500 MG tablet Take 1 tablet (500 mg total) by mouth 2 (two) times daily with meals.    naproxen (NAPROSYN) 500 MG tablet Take 1 tablet (500 mg total) by mouth 2 (two) times daily with meals.    ondansetron (ZOFRAN-ODT) 4 MG TbDL Take 1 tablet (4 mg total) by mouth every 6 (six) hours as needed. (Patient not taking: Reported on 3/3/2020)    pravastatin (PRAVACHOL) 40 MG tablet Take 1 tablet (40 mg total) by mouth every evening.    tadalafil (CIALIS) 20 MG Tab Take 1 tablet (20 mg total) by mouth daily as needed.    valACYclovir (VALTREX) 1000 MG tablet TAKE 1 TABLET DAILY     No current facility-administered medications for this visit.        REVIEW OF SYSTEMS:    GENERAL:  No weight loss, malaise or fevers.  HEENT:  Negative for frequent or significant headaches.  NECK:  Negative for lumps, goiter, pain and significant neck swelling.  RESPIRATORY:  Negative for cough, wheezing or shortness of breath.  CARDIOVASCULAR:  Negative for chest pain, leg swelling or palpitations. +HTN  GI:  Negative for abdominal discomfort, blood in stools or black stools or change in bowel habits.  MUSCULOSKELETAL:  See HPI.  SKIN:  Negative for lesions, rash, and itching.  PSYCH:  Negative for  mood disorder and recent psychosocial stressors. Positive for sleep disturbance.  HEMATOLOGY/LYMPHOLOGY:  Negative for prolonged bleeding, bruising easily or swollen nodes.  NEURO:   No history of headaches, syncope, paralysis, seizures or tremors.  All other reviewed and negative other than HPI.    OBJECTIVE:    There were no vitals taken for this visit.    PREVIOUS PHYSICAL EXAMINATION BELOW. PE unable to perform today 2/2 virtual visit.     General appearance: Well appearing, in no acute distress, alert and oriented x3.  Psych:  Mood and affect appropriate.  Skin: Skin color, texture, turgor normal, no  rashes or lesions, in both upper and lower body.  Head/face:  Normocephalic, atraumatic. No palpable lymph nodes.  Cor: RRR  Pulm: CTA  GI:  Soft and non-tender.  Back: Straight leg raising in the sitting and supine positions is negative to radicular pain. No pain to palpation over the spine or costovertebral angles. Negative facet loading. Decreased range of motion with pain reproduction on lumbar extension.  Extremities: Peripheral joint ROM is full and pain free without obvious instability or laxity in all four extremities except right knee joint with mild pain with knee flexion. No deformities, edema, or skin discoloration. Good capillary refill.  Musculoskeletal: Hip and sacroiliac and provocative maneuvers are negative. Bilateral upper and lower extremity strength is normal and symmetric.  No atrophy or tone abnormalities are noted.  Neuro: Bilateral upper and lower extremity coordination and muscle stretch reflexes are physiologic and symmetric.  Plantar response are downgoing. No loss of sensation is noted.  Gait: Antalgic.     ASSESSMENT: 75 y.o. year old male with right leg and knee pain consistent with osteoarthritis. He has responded well to conservative measures. We will continue gabapentin, make naproxen PRN, and use tylenol to assist with his pain symptoms, and will refer to PT for core strengthening and stretching.     1. Spondylosis of lumbar spine     2. Chronic pain disorder     3. Primary osteoarthritis of right knee           PLAN:   - Discussed potential for interventions in future and right knee joint injections vs genicular nerve block/RFA in future if needed pending course of overall pain symptoms.     - I have stressed the importance of physical activity and a home exercise plan to help with pain and improve health.    - Counseled patient regarding the importance of activity modification, constant sleeping habits and physical therapy.    - Continue gabapentin 100 mg TID to assist with  neuropathic pain symptoms.Wean to discontinue if no pain.    - Continue Tylenol 1000 mg TID PRN for pain control    - Change Naproxen 500 mg BID to PRN for breakthrough pain to assist with inflammatory pain symptoms. Discontinue if no pain.     - I do not feel this patient is a candidate for long term opioids for this non-cancer pain at this time.    - RTC PRN.     The above plan and management options were discussed at length with patient. Patient is in agreement with the above and verbalized understanding. It will be communicated with the referring physician via electronic record, fax, or mail.      Ever Fonseca MD  [facility] Fellow, PGY V  05/19/2020     I have personally reviewed the encounter with resident/fellow/NP and personally spoke with patient and addressed all questions and concerns. The encounter was initiated by patient who consented and verbalized understanding to the type of encounter not related to any office visit or other encounter in the past 7 days.    Frankie Carrillo MD   5/19/2020

## 2020-10-01 ENCOUNTER — PATIENT MESSAGE (OUTPATIENT)
Dept: OTHER | Facility: OTHER | Age: 75
End: 2020-10-01

## 2020-10-05 ENCOUNTER — PATIENT MESSAGE (OUTPATIENT)
Dept: RESEARCH | Facility: OTHER | Age: 75
End: 2020-10-05

## 2020-12-03 ENCOUNTER — CLINICAL SUPPORT (OUTPATIENT)
Dept: PRIMARY CARE CLINIC | Facility: CLINIC | Age: 75
End: 2020-12-03
Payer: MEDICARE

## 2020-12-03 DIAGNOSIS — Z23 NEED FOR VACCINATION: Primary | ICD-10-CM

## 2020-12-03 PROCEDURE — G0008 ADMIN INFLUENZA VIRUS VAC: HCPCS | Mod: PBBFAC,PN

## 2020-12-03 PROCEDURE — 90694 VACC AIIV4 NO PRSRV 0.5ML IM: CPT | Mod: PBBFAC,PN

## 2020-12-03 NOTE — PROGRESS NOTES
Verified pt ID using name and . Allergies verified. Administered Influenza (FLUAD) - Quadrivalent - Adjuvanted - PF *Preferred* (65+) 0.5 ml in right deltoid per physician order using aseptic technique. Aspirated and no blood return noted. Pt tolerated well with no adverse reactions noted.

## 2020-12-11 ENCOUNTER — PATIENT MESSAGE (OUTPATIENT)
Dept: OTHER | Facility: OTHER | Age: 75
End: 2020-12-11

## 2021-01-06 ENCOUNTER — OFFICE VISIT (OUTPATIENT)
Dept: OPTOMETRY | Facility: CLINIC | Age: 76
End: 2021-01-06
Payer: MEDICARE

## 2021-01-06 DIAGNOSIS — H25.13 NUCLEAR SCLEROSIS, BILATERAL: Primary | ICD-10-CM

## 2021-01-06 DIAGNOSIS — H52.03 HYPEROPIA WITH PRESBYOPIA OF BOTH EYES: ICD-10-CM

## 2021-01-06 DIAGNOSIS — H52.4 HYPEROPIA WITH PRESBYOPIA OF BOTH EYES: ICD-10-CM

## 2021-01-06 DIAGNOSIS — Z13.5 SCREENING FOR GLAUCOMA: ICD-10-CM

## 2021-01-06 PROCEDURE — 92014 PR EYE EXAM, EST PATIENT,COMPREHESV: ICD-10-PCS | Mod: S$PBB,,, | Performed by: OPTOMETRIST

## 2021-01-06 PROCEDURE — 99999 PR PBB SHADOW E&M-EST. PATIENT-LVL III: CPT | Mod: PBBFAC,,, | Performed by: OPTOMETRIST

## 2021-01-06 PROCEDURE — 99213 OFFICE O/P EST LOW 20 MIN: CPT | Mod: PBBFAC,PO | Performed by: OPTOMETRIST

## 2021-01-06 PROCEDURE — 99999 PR PBB SHADOW E&M-EST. PATIENT-LVL III: ICD-10-PCS | Mod: PBBFAC,,, | Performed by: OPTOMETRIST

## 2021-01-06 PROCEDURE — 92015 DETERMINE REFRACTIVE STATE: CPT | Mod: ,,, | Performed by: OPTOMETRIST

## 2021-01-06 PROCEDURE — 92014 COMPRE OPH EXAM EST PT 1/>: CPT | Mod: S$PBB,,, | Performed by: OPTOMETRIST

## 2021-01-06 PROCEDURE — 92015 PR REFRACTION: ICD-10-PCS | Mod: ,,, | Performed by: OPTOMETRIST

## 2021-01-27 ENCOUNTER — TELEPHONE (OUTPATIENT)
Dept: UROLOGY | Facility: CLINIC | Age: 76
End: 2021-01-27

## 2021-01-28 ENCOUNTER — TELEPHONE (OUTPATIENT)
Dept: UROLOGY | Facility: CLINIC | Age: 76
End: 2021-01-28

## 2021-02-12 ENCOUNTER — LAB VISIT (OUTPATIENT)
Dept: LAB | Facility: OTHER | Age: 76
End: 2021-02-12
Attending: UROLOGY
Payer: MEDICARE

## 2021-02-12 DIAGNOSIS — C61 PROSTATE CANCER: ICD-10-CM

## 2021-02-12 LAB — COMPLEXED PSA SERPL-MCNC: 0.02 NG/ML (ref 0–4)

## 2021-02-12 PROCEDURE — 84153 ASSAY OF PSA TOTAL: CPT

## 2021-02-12 PROCEDURE — 36415 COLL VENOUS BLD VENIPUNCTURE: CPT

## 2021-02-22 ENCOUNTER — OFFICE VISIT (OUTPATIENT)
Dept: UROLOGY | Facility: CLINIC | Age: 76
End: 2021-02-22
Payer: MEDICARE

## 2021-02-22 VITALS
HEIGHT: 70 IN | BODY MASS INDEX: 36.51 KG/M2 | DIASTOLIC BLOOD PRESSURE: 77 MMHG | HEART RATE: 62 BPM | SYSTOLIC BLOOD PRESSURE: 130 MMHG | WEIGHT: 255 LBS

## 2021-02-22 DIAGNOSIS — N52.9 ERECTILE DYSFUNCTION, UNSPECIFIED ERECTILE DYSFUNCTION TYPE: ICD-10-CM

## 2021-02-22 DIAGNOSIS — C61 PROSTATE CANCER: Primary | ICD-10-CM

## 2021-02-22 PROCEDURE — 99214 OFFICE O/P EST MOD 30 MIN: CPT | Mod: S$GLB,,, | Performed by: UROLOGY

## 2021-02-22 PROCEDURE — 99214 PR OFFICE/OUTPT VISIT, EST, LEVL IV, 30-39 MIN: ICD-10-PCS | Mod: S$GLB,,, | Performed by: UROLOGY

## 2021-03-24 ENCOUNTER — PATIENT MESSAGE (OUTPATIENT)
Dept: ADMINISTRATIVE | Facility: OTHER | Age: 76
End: 2021-03-24

## 2021-03-27 DIAGNOSIS — E78.5 HYPERLIPIDEMIA LDL GOAL <130: ICD-10-CM

## 2021-03-29 RX ORDER — PRAVASTATIN SODIUM 40 MG/1
40 TABLET ORAL NIGHTLY
Qty: 90 TABLET | Refills: 0 | Status: SHIPPED | OUTPATIENT
Start: 2021-03-29 | End: 2021-04-06

## 2021-04-05 ENCOUNTER — PATIENT MESSAGE (OUTPATIENT)
Dept: ADMINISTRATIVE | Facility: HOSPITAL | Age: 76
End: 2021-04-05

## 2021-04-05 DIAGNOSIS — I10 ESSENTIAL HYPERTENSION: ICD-10-CM

## 2021-04-06 ENCOUNTER — TELEPHONE (OUTPATIENT)
Dept: INTERNAL MEDICINE | Facility: CLINIC | Age: 76
End: 2021-04-06

## 2021-04-06 ENCOUNTER — OFFICE VISIT (OUTPATIENT)
Dept: INTERNAL MEDICINE | Facility: CLINIC | Age: 76
End: 2021-04-06
Attending: FAMILY MEDICINE
Payer: MEDICARE

## 2021-04-06 ENCOUNTER — TELEPHONE (OUTPATIENT)
Dept: ORTHOPEDICS | Facility: CLINIC | Age: 76
End: 2021-04-06

## 2021-04-06 VITALS
BODY MASS INDEX: 36.96 KG/M2 | WEIGHT: 258.19 LBS | OXYGEN SATURATION: 93 % | HEIGHT: 70 IN | HEART RATE: 69 BPM | DIASTOLIC BLOOD PRESSURE: 68 MMHG | SYSTOLIC BLOOD PRESSURE: 124 MMHG

## 2021-04-06 DIAGNOSIS — E78.5 HYPERLIPIDEMIA LDL GOAL <130: ICD-10-CM

## 2021-04-06 DIAGNOSIS — R20.2 LEFT HAND PARESTHESIA: Primary | ICD-10-CM

## 2021-04-06 DIAGNOSIS — I10 ESSENTIAL HYPERTENSION: ICD-10-CM

## 2021-04-06 DIAGNOSIS — R52 PAIN: Primary | ICD-10-CM

## 2021-04-06 PROCEDURE — 99999 PR PBB SHADOW E&M-EST. PATIENT-LVL III: CPT | Mod: PBBFAC,,, | Performed by: FAMILY MEDICINE

## 2021-04-06 PROCEDURE — 99214 PR OFFICE/OUTPT VISIT, EST, LEVL IV, 30-39 MIN: ICD-10-PCS | Mod: S$PBB,,, | Performed by: FAMILY MEDICINE

## 2021-04-06 PROCEDURE — 99213 OFFICE O/P EST LOW 20 MIN: CPT | Mod: PBBFAC | Performed by: FAMILY MEDICINE

## 2021-04-06 PROCEDURE — 99999 PR PBB SHADOW E&M-EST. PATIENT-LVL III: ICD-10-PCS | Mod: PBBFAC,,, | Performed by: FAMILY MEDICINE

## 2021-04-06 PROCEDURE — 99214 OFFICE O/P EST MOD 30 MIN: CPT | Mod: S$PBB,,, | Performed by: FAMILY MEDICINE

## 2021-04-06 RX ORDER — PRAVASTATIN SODIUM 40 MG/1
40 TABLET ORAL NIGHTLY
Qty: 90 TABLET | Refills: 3 | Status: SHIPPED | OUTPATIENT
Start: 2021-04-06 | End: 2021-04-06 | Stop reason: SDUPTHER

## 2021-04-06 RX ORDER — METOPROLOL SUCCINATE 50 MG/1
50 TABLET, EXTENDED RELEASE ORAL DAILY
Qty: 90 TABLET | Refills: 3 | Status: SHIPPED | OUTPATIENT
Start: 2021-04-06 | End: 2021-04-07

## 2021-04-06 RX ORDER — AMLODIPINE BESYLATE 10 MG/1
10 TABLET ORAL DAILY
Qty: 90 TABLET | Refills: 3 | Status: SHIPPED | OUTPATIENT
Start: 2021-04-06 | End: 2021-04-07

## 2021-04-06 RX ORDER — VALACYCLOVIR HYDROCHLORIDE 1 G/1
1000 TABLET, FILM COATED ORAL DAILY
Qty: 90 TABLET | Refills: 3 | Status: SHIPPED | OUTPATIENT
Start: 2021-04-06 | End: 2023-07-01 | Stop reason: SDUPTHER

## 2021-04-06 RX ORDER — PRAVASTATIN SODIUM 40 MG/1
40 TABLET ORAL NIGHTLY
Qty: 90 TABLET | Refills: 3 | Status: SHIPPED | OUTPATIENT
Start: 2021-04-06 | End: 2022-04-07 | Stop reason: SDUPTHER

## 2021-04-06 RX ORDER — LOSARTAN POTASSIUM AND HYDROCHLOROTHIAZIDE 25; 100 MG/1; MG/1
1 TABLET ORAL DAILY
Qty: 90 TABLET | Refills: 3 | Status: SHIPPED | OUTPATIENT
Start: 2021-04-06 | End: 2021-04-07

## 2021-04-07 ENCOUNTER — OFFICE VISIT (OUTPATIENT)
Dept: ORTHOPEDICS | Facility: CLINIC | Age: 76
End: 2021-04-07
Payer: MEDICARE

## 2021-04-07 ENCOUNTER — HOSPITAL ENCOUNTER (OUTPATIENT)
Dept: RADIOLOGY | Facility: OTHER | Age: 76
Discharge: HOME | End: 2021-04-07
Attending: PHYSICIAN ASSISTANT
Payer: MEDICARE

## 2021-04-07 VITALS
DIASTOLIC BLOOD PRESSURE: 72 MMHG | HEART RATE: 60 BPM | SYSTOLIC BLOOD PRESSURE: 126 MMHG | WEIGHT: 258 LBS | HEIGHT: 70 IN | BODY MASS INDEX: 36.94 KG/M2

## 2021-04-07 DIAGNOSIS — R52 PAIN: ICD-10-CM

## 2021-04-07 DIAGNOSIS — R20.0 FINGER NUMBNESS: Primary | ICD-10-CM

## 2021-04-07 PROCEDURE — 73130 X-RAY EXAM OF HAND: CPT | Mod: TC,FY,LT

## 2021-04-07 PROCEDURE — 99213 PR OFFICE/OUTPT VISIT, EST, LEVL III, 20-29 MIN: ICD-10-PCS | Mod: S$PBB,,, | Performed by: PHYSICIAN ASSISTANT

## 2021-04-07 PROCEDURE — 73130 XR HAND COMPLETE 3 VIEW LEFT: ICD-10-PCS | Mod: 26,LT,, | Performed by: RADIOLOGY

## 2021-04-07 PROCEDURE — 73130 X-RAY EXAM OF HAND: CPT | Mod: 26,LT,, | Performed by: RADIOLOGY

## 2021-04-07 PROCEDURE — 99999 PR PBB SHADOW E&M-EST. PATIENT-LVL III: ICD-10-PCS | Mod: PBBFAC,,, | Performed by: PHYSICIAN ASSISTANT

## 2021-04-07 PROCEDURE — 99213 OFFICE O/P EST LOW 20 MIN: CPT | Mod: S$PBB,,, | Performed by: PHYSICIAN ASSISTANT

## 2021-04-07 PROCEDURE — 99213 OFFICE O/P EST LOW 20 MIN: CPT | Mod: PBBFAC,25 | Performed by: PHYSICIAN ASSISTANT

## 2021-04-07 PROCEDURE — 99999 PR PBB SHADOW E&M-EST. PATIENT-LVL III: CPT | Mod: PBBFAC,,, | Performed by: PHYSICIAN ASSISTANT

## 2021-04-07 RX ORDER — LOSARTAN POTASSIUM AND HYDROCHLOROTHIAZIDE 25; 100 MG/1; MG/1
TABLET ORAL
Qty: 90 TABLET | Refills: 3 | Status: SHIPPED | OUTPATIENT
Start: 2021-04-07 | End: 2022-04-07 | Stop reason: SDUPTHER

## 2021-04-07 RX ORDER — METOPROLOL SUCCINATE 50 MG/1
TABLET, EXTENDED RELEASE ORAL
Qty: 90 TABLET | Refills: 3 | Status: SHIPPED | OUTPATIENT
Start: 2021-04-07 | End: 2022-04-07 | Stop reason: SDUPTHER

## 2021-04-07 RX ORDER — AMLODIPINE BESYLATE 10 MG/1
TABLET ORAL
Qty: 90 TABLET | Refills: 3 | Status: SHIPPED | OUTPATIENT
Start: 2021-04-07 | End: 2022-04-07 | Stop reason: SDUPTHER

## 2021-06-04 ENCOUNTER — LAB VISIT (OUTPATIENT)
Dept: LAB | Facility: OTHER | Age: 76
End: 2021-06-04
Attending: UROLOGY
Payer: MEDICARE

## 2021-06-04 DIAGNOSIS — C61 PROSTATE CANCER: ICD-10-CM

## 2021-06-04 DIAGNOSIS — N52.9 ERECTILE DYSFUNCTION, UNSPECIFIED ERECTILE DYSFUNCTION TYPE: ICD-10-CM

## 2021-06-04 LAB — COMPLEXED PSA SERPL-MCNC: 0.02 NG/ML (ref 0–4)

## 2021-06-04 PROCEDURE — 84153 ASSAY OF PSA TOTAL: CPT | Performed by: UROLOGY

## 2021-06-04 PROCEDURE — 36415 COLL VENOUS BLD VENIPUNCTURE: CPT | Performed by: UROLOGY

## 2021-06-07 ENCOUNTER — TELEPHONE (OUTPATIENT)
Dept: UROLOGY | Facility: CLINIC | Age: 76
End: 2021-06-07

## 2021-06-11 ENCOUNTER — OFFICE VISIT (OUTPATIENT)
Dept: UROLOGY | Facility: CLINIC | Age: 76
End: 2021-06-11
Payer: MEDICARE

## 2021-06-11 ENCOUNTER — TELEPHONE (OUTPATIENT)
Dept: UROLOGY | Facility: CLINIC | Age: 76
End: 2021-06-11

## 2021-06-11 VITALS
DIASTOLIC BLOOD PRESSURE: 73 MMHG | HEART RATE: 62 BPM | SYSTOLIC BLOOD PRESSURE: 122 MMHG | BODY MASS INDEX: 36.93 KG/M2 | HEIGHT: 70 IN | WEIGHT: 257.94 LBS

## 2021-06-11 DIAGNOSIS — C61 PROSTATE CANCER: Primary | ICD-10-CM

## 2021-06-11 DIAGNOSIS — N52.9 ERECTILE DYSFUNCTION, UNSPECIFIED ERECTILE DYSFUNCTION TYPE: ICD-10-CM

## 2021-06-11 PROCEDURE — 99214 PR OFFICE/OUTPT VISIT, EST, LEVL IV, 30-39 MIN: ICD-10-PCS | Mod: S$GLB,,, | Performed by: UROLOGY

## 2021-06-11 PROCEDURE — 99214 OFFICE O/P EST MOD 30 MIN: CPT | Mod: S$GLB,,, | Performed by: UROLOGY

## 2021-09-14 ENCOUNTER — LAB VISIT (OUTPATIENT)
Dept: LAB | Facility: HOSPITAL | Age: 76
End: 2021-09-14
Attending: UROLOGY
Payer: MEDICARE

## 2021-09-14 ENCOUNTER — OFFICE VISIT (OUTPATIENT)
Dept: UROLOGY | Facility: CLINIC | Age: 76
End: 2021-09-14
Payer: MEDICARE

## 2021-09-14 VITALS
HEIGHT: 70 IN | WEIGHT: 257.94 LBS | DIASTOLIC BLOOD PRESSURE: 77 MMHG | SYSTOLIC BLOOD PRESSURE: 141 MMHG | BODY MASS INDEX: 36.93 KG/M2 | HEART RATE: 59 BPM

## 2021-09-14 DIAGNOSIS — C61 PROSTATE CANCER: ICD-10-CM

## 2021-09-14 DIAGNOSIS — C61 PROSTATE CANCER: Primary | ICD-10-CM

## 2021-09-14 DIAGNOSIS — N52.9 ERECTILE DYSFUNCTION, UNSPECIFIED ERECTILE DYSFUNCTION TYPE: ICD-10-CM

## 2021-09-14 LAB — COMPLEXED PSA SERPL-MCNC: 0.03 NG/ML (ref 0–4)

## 2021-09-14 PROCEDURE — 99214 PR OFFICE/OUTPT VISIT, EST, LEVL IV, 30-39 MIN: ICD-10-PCS | Mod: S$PBB,,, | Performed by: UROLOGY

## 2021-09-14 PROCEDURE — 99214 OFFICE O/P EST MOD 30 MIN: CPT | Mod: S$PBB,,, | Performed by: UROLOGY

## 2021-09-14 PROCEDURE — 99999 PR PBB SHADOW E&M-EST. PATIENT-LVL III: CPT | Mod: PBBFAC,,, | Performed by: UROLOGY

## 2021-09-14 PROCEDURE — 84153 ASSAY OF PSA TOTAL: CPT | Performed by: UROLOGY

## 2021-09-14 PROCEDURE — 99999 PR PBB SHADOW E&M-EST. PATIENT-LVL III: ICD-10-PCS | Mod: PBBFAC,,, | Performed by: UROLOGY

## 2021-09-14 PROCEDURE — 36415 COLL VENOUS BLD VENIPUNCTURE: CPT | Performed by: UROLOGY

## 2021-09-14 PROCEDURE — 99213 OFFICE O/P EST LOW 20 MIN: CPT | Mod: PBBFAC | Performed by: UROLOGY

## 2021-10-21 ENCOUNTER — IMMUNIZATION (OUTPATIENT)
Dept: INTERNAL MEDICINE | Facility: CLINIC | Age: 76
End: 2021-10-21
Payer: MEDICARE

## 2021-10-21 PROCEDURE — 90694 VACC AIIV4 NO PRSRV 0.5ML IM: CPT | Mod: PBBFAC

## 2021-10-21 PROCEDURE — G0008 ADMIN INFLUENZA VIRUS VAC: HCPCS | Mod: PBBFAC

## 2021-10-25 ENCOUNTER — IMMUNIZATION (OUTPATIENT)
Dept: PHARMACY | Facility: CLINIC | Age: 76
End: 2021-10-25
Payer: MEDICARE

## 2021-10-25 DIAGNOSIS — Z23 NEED FOR VACCINATION: Primary | ICD-10-CM

## 2021-11-29 ENCOUNTER — PES CALL (OUTPATIENT)
Dept: ADMINISTRATIVE | Facility: CLINIC | Age: 76
End: 2021-11-29
Payer: MEDICARE

## 2021-12-13 ENCOUNTER — LAB VISIT (OUTPATIENT)
Dept: LAB | Facility: OTHER | Age: 76
End: 2021-12-13
Attending: UROLOGY
Payer: MEDICARE

## 2021-12-13 DIAGNOSIS — C61 PROSTATE CANCER: ICD-10-CM

## 2021-12-13 DIAGNOSIS — N52.9 ERECTILE DYSFUNCTION, UNSPECIFIED ERECTILE DYSFUNCTION TYPE: ICD-10-CM

## 2021-12-13 LAB — COMPLEXED PSA SERPL-MCNC: <0.01 NG/ML (ref 0–4)

## 2021-12-13 PROCEDURE — 36415 COLL VENOUS BLD VENIPUNCTURE: CPT | Performed by: UROLOGY

## 2021-12-13 PROCEDURE — 84153 ASSAY OF PSA TOTAL: CPT | Performed by: UROLOGY

## 2021-12-14 ENCOUNTER — OFFICE VISIT (OUTPATIENT)
Dept: UROLOGY | Facility: CLINIC | Age: 76
End: 2021-12-14
Payer: MEDICARE

## 2021-12-14 ENCOUNTER — OFFICE VISIT (OUTPATIENT)
Dept: RADIATION ONCOLOGY | Facility: CLINIC | Age: 76
End: 2021-12-14
Payer: MEDICARE

## 2021-12-14 VITALS
BODY MASS INDEX: 36.93 KG/M2 | WEIGHT: 257.94 LBS | HEART RATE: 69 BPM | BODY MASS INDEX: 36.93 KG/M2 | HEART RATE: 69 BPM | HEIGHT: 70 IN | WEIGHT: 257.94 LBS | DIASTOLIC BLOOD PRESSURE: 74 MMHG | SYSTOLIC BLOOD PRESSURE: 131 MMHG | DIASTOLIC BLOOD PRESSURE: 74 MMHG | HEIGHT: 70 IN | SYSTOLIC BLOOD PRESSURE: 131 MMHG

## 2021-12-14 DIAGNOSIS — C61 PROSTATE CANCER: ICD-10-CM

## 2021-12-14 DIAGNOSIS — N52.9 ERECTILE DYSFUNCTION, UNSPECIFIED ERECTILE DYSFUNCTION TYPE: ICD-10-CM

## 2021-12-14 DIAGNOSIS — C61 PROSTATE CANCER: Primary | ICD-10-CM

## 2021-12-14 PROCEDURE — 99214 OFFICE O/P EST MOD 30 MIN: CPT | Mod: S$PBB,,, | Performed by: UROLOGY

## 2021-12-14 PROCEDURE — 99999 PR PBB SHADOW E&M-EST. PATIENT-LVL III: ICD-10-PCS | Mod: PBBFAC,,, | Performed by: UROLOGY

## 2021-12-14 PROCEDURE — 99999 PR PBB SHADOW E&M-EST. PATIENT-LVL III: CPT | Mod: PBBFAC,,, | Performed by: UROLOGY

## 2021-12-14 PROCEDURE — 99212 PR OFFICE/OUTPT VISIT, EST, LEVL II, 10-19 MIN: ICD-10-PCS | Mod: S$PBB,,, | Performed by: RADIOLOGY

## 2021-12-14 PROCEDURE — 99999 PR PBB SHADOW E&M-EST. PATIENT-LVL III: ICD-10-PCS | Mod: PBBFAC,,, | Performed by: RADIOLOGY

## 2021-12-14 PROCEDURE — 99213 OFFICE O/P EST LOW 20 MIN: CPT | Mod: PBBFAC,27 | Performed by: UROLOGY

## 2021-12-14 PROCEDURE — 99214 PR OFFICE/OUTPT VISIT, EST, LEVL IV, 30-39 MIN: ICD-10-PCS | Mod: S$PBB,,, | Performed by: UROLOGY

## 2021-12-14 PROCEDURE — 99212 OFFICE O/P EST SF 10 MIN: CPT | Mod: S$PBB,,, | Performed by: RADIOLOGY

## 2021-12-14 PROCEDURE — 99213 OFFICE O/P EST LOW 20 MIN: CPT | Mod: PBBFAC | Performed by: RADIOLOGY

## 2021-12-14 PROCEDURE — 99999 PR PBB SHADOW E&M-EST. PATIENT-LVL III: CPT | Mod: PBBFAC,,, | Performed by: RADIOLOGY

## 2022-01-11 ENCOUNTER — PATIENT MESSAGE (OUTPATIENT)
Dept: UROLOGY | Facility: CLINIC | Age: 77
End: 2022-01-11
Payer: MEDICARE

## 2022-01-11 ENCOUNTER — PATIENT MESSAGE (OUTPATIENT)
Dept: INTERNAL MEDICINE | Facility: CLINIC | Age: 77
End: 2022-01-11
Payer: MEDICARE

## 2022-01-11 DIAGNOSIS — R10.31 RIGHT LOWER QUADRANT PAIN: Primary | ICD-10-CM

## 2022-01-11 NOTE — TELEPHONE ENCOUNTER
He is established with the hand clinic and can see them for the hand pain.  General surgery referral placed as well

## 2022-01-13 ENCOUNTER — PATIENT MESSAGE (OUTPATIENT)
Dept: ORTHOPEDICS | Facility: CLINIC | Age: 77
End: 2022-01-13
Payer: MEDICARE

## 2022-01-13 ENCOUNTER — TELEPHONE (OUTPATIENT)
Dept: ORTHOPEDICS | Facility: CLINIC | Age: 77
End: 2022-01-13
Payer: MEDICARE

## 2022-01-13 NOTE — TELEPHONE ENCOUNTER
1/13/22 [facility] for pt to call the referrals department to schedule appt with General surgery-

## 2022-01-20 ENCOUNTER — TELEPHONE (OUTPATIENT)
Dept: ORTHOPEDICS | Facility: CLINIC | Age: 77
End: 2022-01-20
Payer: MEDICARE

## 2022-01-20 ENCOUNTER — OFFICE VISIT (OUTPATIENT)
Dept: UROLOGY | Facility: CLINIC | Age: 77
End: 2022-01-20
Payer: MEDICARE

## 2022-01-20 VITALS
HEART RATE: 61 BPM | HEIGHT: 70 IN | WEIGHT: 257.94 LBS | SYSTOLIC BLOOD PRESSURE: 128 MMHG | BODY MASS INDEX: 36.93 KG/M2 | DIASTOLIC BLOOD PRESSURE: 60 MMHG

## 2022-01-20 DIAGNOSIS — R10.31 INGUINAL PAIN, RIGHT: Primary | ICD-10-CM

## 2022-01-20 DIAGNOSIS — C61 PROSTATE CANCER: ICD-10-CM

## 2022-01-20 PROCEDURE — 99213 OFFICE O/P EST LOW 20 MIN: CPT | Mod: S$GLB,,, | Performed by: NURSE PRACTITIONER

## 2022-01-20 PROCEDURE — 99213 PR OFFICE/OUTPT VISIT, EST, LEVL III, 20-29 MIN: ICD-10-PCS | Mod: S$GLB,,, | Performed by: NURSE PRACTITIONER

## 2022-01-20 NOTE — TELEPHONE ENCOUNTER
----- Message from James Gates sent at 1/20/2022  3:37 PM CST -----  Type:  Patient Returning Call    Who Called:     Who Left Message for Patient:     Does the patient know what this is regarding?: missed call     Best Call Back Number:    [phone]    Additional Information:

## 2022-01-20 NOTE — TELEPHONE ENCOUNTER
----- Message from Josr Cheek sent at 1/20/2022 10:09 AM CST -----  Betsy, Per Alexandra Guevara NP she would like to get patient back in to see you. Please call the pt directly to schedule. Thank You

## 2022-01-20 NOTE — PROGRESS NOTES
Subjective:      Zheng aTlley Jr. is a 76 y.o. male who returns today regarding his groin pain.  He is an established patient of Dr. Wu' and is new to me today.    The patient is s/p robotic prostatectomy and lymphadenectomy with Dr. uW in 2015.     Component PSA Diagnostic   Latest Ref Rng & Units 0.00 - 4.00 ng/mL   12/13/2021 <0.01   9/14/2021 0.03   6/4/2021 0.02     He developed right inguinal pain over the last several months. Exam with Dr. Wu in December did not demonstrate any evidence of hernia.    He returns today with this continued pain. States that the pain often radiates down his right leg. Now with upper left leg pain as well. Denies feeling a bulge at the site. The pain is aggravated by changing position- going from sitting to standing.     Also reports numbness in his left hand.     The following portions of the patient's history were reviewed and updated as appropriate: allergies, current medications, past family history, past medical history, past social history, past surgical history and problem list.    Review of Systems  Constitutional: no fever or chills  ENT: no nasal congestion or sore throat  Respiratory: no cough or shortness of breath  Cardiovascular: no chest pain or palpitations  Gastrointestinal: no nausea or vomiting, tolerating diet  Genitourinary: as per HPI  Hematologic/Lymphatic: no easy bruising or lymphadenopathy  Musculoskeletal: no arthralgias or myalgias  Neurological: no seizures or tremors  Behavioral/Psych: no auditory or visual hallucinations     Objective:   Vitals:   Vitals:    01/20/22 0941   BP: 128/60   Pulse: 61     Physical Exam   General: alert and oriented, no acute distress  Head: normocephalic, atraumatic  Neck: supple, normal ROM  Respiratory: Symmetric expansion, non-labored breathing  Cardiovascular: regular rate and rhythm  Abdomen: soft, non tender, non distended  Genitourinary: deferred   Skin: normal coloration and turgor, no rashes, no  suspicious skin lesions noted  Neuro: alert and oriented x3, no gross deficits  Psych: normal judgment and insight, normal mood/affect and non-anxious    Lab Review   Urinalysis demonstrates : no specimen    Lab Results   Component Value Date    WBC 5.71 05/26/2017    HGB 15.5 05/26/2017    HCT 47.5 05/26/2017    MCV 86 05/26/2017     05/26/2017     Lab Results   Component Value Date    CREATININE 1.3 01/22/2020    BUN 16 01/22/2020     Lab Results   Component Value Date    PSA 3.4 02/24/2014     Imaging   None    Assessment:   Inguinal pain, right  Prostate cancer    Plan:   Zheng was seen today for groin pain.    Diagnoses and all orders for this visit:    Inguinal pain, right  -     US Abdomen Limited_Hernia; Future    Prostate cancer didier 9 4+5 [id number]     Plan:  --Low suspicion of hernia given exam and symptoms. US to rule out hernia   --If US is negative will need referral to ortho for further evaluation  --Follow up as scheduled with Dr. Wu

## 2022-01-21 ENCOUNTER — IMMUNIZATION (OUTPATIENT)
Dept: PHARMACY | Facility: CLINIC | Age: 77
End: 2022-01-21
Payer: MEDICARE

## 2022-01-21 DIAGNOSIS — R20.0 NUMBNESS: Primary | ICD-10-CM

## 2022-01-24 ENCOUNTER — HOSPITAL ENCOUNTER (OUTPATIENT)
Dept: RADIOLOGY | Facility: OTHER | Age: 77
Discharge: HOME | End: 2022-01-24
Attending: NURSE PRACTITIONER
Payer: MEDICARE

## 2022-01-24 DIAGNOSIS — R10.31 INGUINAL PAIN, RIGHT: ICD-10-CM

## 2022-01-24 PROCEDURE — 76705 ECHO EXAM OF ABDOMEN: CPT | Mod: 26,,, | Performed by: RADIOLOGY

## 2022-01-24 PROCEDURE — 76705 US ABDOMEN LIMITED_HERNIA: ICD-10-PCS | Mod: 26,,, | Performed by: RADIOLOGY

## 2022-01-24 PROCEDURE — 76705 ECHO EXAM OF ABDOMEN: CPT | Mod: TC

## 2022-01-25 ENCOUNTER — TELEPHONE (OUTPATIENT)
Dept: ORTHOPEDICS | Facility: CLINIC | Age: 77
End: 2022-01-25
Payer: MEDICARE

## 2022-01-25 NOTE — PROGRESS NOTES
"Subjective:      Patient ID: Zheng Talley Jr. is a 76 y.o. male.    Chief Complaint: Pain and Numbness of the Left Hand and Pain and Numbness of the Right Hand      HPI  Zheng Talley Jr. is a right hand dominant 76 y.o. male presenting today for evaluation of left hand numbness.  He has intermittent right finger tingling/numbness.  He reports that his symptoms are unchanged since his last visit.  He is not bracing. He reports decreased left finger sensation all the time. There was not a history of trauma.  Onset of symptoms began 2 years ago, reports that it has increased in frequency especially at night in the entire left hand.  He reports waking up with the finger numbness in the whole left hand, improved with extending the left arm. He also notices it when going out to eat and having the left arm resting on the table or holding anything in the left hand.  Denies neck pain, reports occasional "cracking" in the neck.    He is status post Nail bed repair right thumb with Irrigation and debridement down to bone right thumb by Dr. Bradford on 11/2/2018. Reports some persistent right thumb tip hypersensitivity.      Review of patient's allergies indicates:   Allergen Reactions    Iodine and iodide containing products Other (See Comments)     Blood in urine in the 1970s         Current Outpatient Medications   Medication Sig Dispense Refill    amLODIPine (NORVASC) 10 MG tablet TAKE 1 TABLET ONCE DAILY 90 tablet 3    losartan-hydrochlorothiazide 100-25 mg (HYZAAR) 100-25 mg per tablet TAKE 1 TABLET ONCE DAILY 90 tablet 3    metoprolol succinate (TOPROL-XL) 50 MG 24 hr tablet TAKE 1 TABLET ONCE DAILY 90 tablet 3    pravastatin (PRAVACHOL) 40 MG tablet Take 1 tablet (40 mg total) by mouth every evening. 90 tablet 3    valACYclovir (VALTREX) 1000 MG tablet Take 1 tablet (1,000 mg total) by mouth once daily. 90 tablet 3    tadalafil (CIALIS) 20 MG Tab Take 1 tablet (20 mg total) by mouth daily as needed. 12 tablet " "11     No current facility-administered medications for this visit.       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 Velez    COLONOSCOPY N/A 2/25/2019    Procedure: COLONOSCOPY;  Surgeon: JACQUELINE Lozano MD;  Location: [address]);  Service: Endoscopy;  Laterality: N/A;    dental implant      HAND SURGERY      lip surgery      PROSTATE SURGERY      REPAIR OF NAIL BED Right 11/2/2018    Procedure: REPAIR, NAIL BED right thumb with I&D;  Surgeon: Elyssa Bradford MD;  Location: [facility];  Service: Orthopedics;  Laterality: Right;  stretcher, supine, hand pan 1 and pan 2         Review of Systems:  ROS:  Constitutional: no fever or chills  Skin: no rash or suspicious lesions  Musculoskeletal: See HPI.   Neurological: no headaches, lightheadedness, or dizziness. No numbness or tingling  Psychological/behavioral: no anxiety or depression      OBJECTIVE:     PHYSICAL EXAM:  Height: 5' 10" (177.8 cm) Weight: 116.6 kg (257 lb)  Vitals:    01/26/22 0839   Weight: 116.6 kg (257 lb)   Height: 5' 10" (1.778 m)   PainSc: 0-No pain     Vitals reviewed.  Constitutional: NAD. She appears well-developed and well-nourished.   HENT:   Head: Normocephalic and atraumatic.   Neck: Normal range of motion.   Cardiovascular: Normal rate.    Pulmonary/Chest: Effort normal. No respiratory distress.   Neurological: She is awake, alert, oriented.   Psychiatric: She has a normal mood and affect. Her behavior is normal. Judgment and thought content normal.  Musculoskeletal:  Well-healed surgical scars right thumb and right wrist.  No scars noted left upper extremity.  Nontender " to palpation bilaterally.  Good finger, wrist, and elbow range of motion bilaterally.  Neurovascularly intact- decreased left median nerve sensation compared to right, equal ulnar and radial sensation; decreased right thumb distal sensation; good motor function, good capillary refill, 2+ radial pulses.  Negative Tinel's bilaterally today.  Negative Durkan's bilaterally, reports no increase in left finger numbness with Durkan's. Negative ulnar nerve compression test bilaterally.    RADIOGRAPHS:  Left Hand XRay, 4/7/2021  FINDINGS:  No evidence of acute fracture or dislocation.  Mild degenerative changes, most pronounced involving the 1st carpometacarpal, 1st metacarpophalangeal, and 1st interphalangeal joints.  Multiple small subchondral cysts noted in the distal aspect of the 1st through 3rd metacarpals.  Additional small subchondral cysts noted in the distal aspect of the 2nd and 3rd proximal phalanges.  Few carpal bones subchondral cysts also noted.  No large focal bony erosion.  Soft tissues are symmetric.  No radiopaque foreign body.     Impression:  No acute bony abnormality identified.  Multifocal osteoarthritis.    Comments: I have personally reviewed the imaging and I agree with the above radiologist's report.    ASSESSMENT/PLAN:   Zheng was seen today for pain, numbness, pain and numbness.    Diagnoses and all orders for this visit:    Finger numbness  -     EMG W/ ULTRASOUND AND NERVE CONDUCTION TEST 2 Extremities; Future           - We talked at length about the anatomy and pathophysiology of   Encounter Diagnosis   Name Primary?    Finger numbness Yes       - discussed patient's symptoms physical exam findings, discussed conservative and surgical treatment options.  Discussed further evaluation with EMG.  - EMG ordered and scheduled  - nerve glide handout provided  - discussed use of braces, patient is not interested in bracing at this time  - Follow up after EMG  - call with questions or  concerns      Disclaimer: This note has been generated using voice-recognition software. There may be typographical errors that have been missed during proof-reading.

## 2022-01-26 ENCOUNTER — OFFICE VISIT (OUTPATIENT)
Dept: ORTHOPEDICS | Facility: CLINIC | Age: 77
End: 2022-01-26
Payer: MEDICARE

## 2022-01-26 ENCOUNTER — OFFICE VISIT (OUTPATIENT)
Dept: OPTOMETRY | Facility: CLINIC | Age: 77
End: 2022-01-26
Payer: MEDICARE

## 2022-01-26 ENCOUNTER — HOSPITAL ENCOUNTER (OUTPATIENT)
Dept: RADIOLOGY | Facility: OTHER | Age: 77
Discharge: HOME | End: 2022-01-26
Attending: PHYSICIAN ASSISTANT
Payer: MEDICARE

## 2022-01-26 VITALS — HEIGHT: 70 IN | BODY MASS INDEX: 36.79 KG/M2 | WEIGHT: 257 LBS

## 2022-01-26 DIAGNOSIS — H52.4 HYPEROPIA WITH PRESBYOPIA OF BOTH EYES: ICD-10-CM

## 2022-01-26 DIAGNOSIS — H52.03 HYPEROPIA WITH PRESBYOPIA OF BOTH EYES: ICD-10-CM

## 2022-01-26 DIAGNOSIS — R20.0 FINGER NUMBNESS: Primary | ICD-10-CM

## 2022-01-26 DIAGNOSIS — H25.13 NUCLEAR SCLEROSIS, BILATERAL: Primary | ICD-10-CM

## 2022-01-26 DIAGNOSIS — Z13.5 SCREENING FOR GLAUCOMA: ICD-10-CM

## 2022-01-26 DIAGNOSIS — R20.0 NUMBNESS: ICD-10-CM

## 2022-01-26 PROCEDURE — 99214 PR OFFICE/OUTPT VISIT, EST, LEVL IV, 30-39 MIN: ICD-10-PCS | Mod: S$PBB,,, | Performed by: PHYSICIAN ASSISTANT

## 2022-01-26 PROCEDURE — 99999 PR PBB SHADOW E&M-EST. PATIENT-LVL II: CPT | Mod: PBBFAC,,, | Performed by: OPTOMETRIST

## 2022-01-26 PROCEDURE — 92015 DETERMINE REFRACTIVE STATE: CPT | Mod: ,,, | Performed by: OPTOMETRIST

## 2022-01-26 PROCEDURE — 92014 COMPRE OPH EXAM EST PT 1/>: CPT | Mod: S$PBB,,, | Performed by: OPTOMETRIST

## 2022-01-26 PROCEDURE — 92015 PR REFRACTION: ICD-10-PCS | Mod: ,,, | Performed by: OPTOMETRIST

## 2022-01-26 PROCEDURE — 99999 PR PBB SHADOW E&M-EST. PATIENT-LVL III: ICD-10-PCS | Mod: PBBFAC,,, | Performed by: PHYSICIAN ASSISTANT

## 2022-01-26 PROCEDURE — 99999 PR PBB SHADOW E&M-EST. PATIENT-LVL II: ICD-10-PCS | Mod: PBBFAC,,, | Performed by: OPTOMETRIST

## 2022-01-26 PROCEDURE — 99999 PR PBB SHADOW E&M-EST. PATIENT-LVL III: CPT | Mod: PBBFAC,,, | Performed by: PHYSICIAN ASSISTANT

## 2022-01-26 PROCEDURE — 99212 OFFICE O/P EST SF 10 MIN: CPT | Mod: PBBFAC,27 | Performed by: OPTOMETRIST

## 2022-01-26 PROCEDURE — 73130 X-RAY EXAM OF HAND: CPT | Mod: TC,50,FY

## 2022-01-26 PROCEDURE — 92014 PR EYE EXAM, EST PATIENT,COMPREHESV: ICD-10-PCS | Mod: S$PBB,,, | Performed by: OPTOMETRIST

## 2022-01-26 PROCEDURE — 99213 OFFICE O/P EST LOW 20 MIN: CPT | Mod: PBBFAC | Performed by: PHYSICIAN ASSISTANT

## 2022-01-26 PROCEDURE — 99214 OFFICE O/P EST MOD 30 MIN: CPT | Mod: S$PBB,,, | Performed by: PHYSICIAN ASSISTANT

## 2022-01-26 PROCEDURE — 73130 X-RAY EXAM OF HAND: CPT | Mod: 26,50,, | Performed by: RADIOLOGY

## 2022-01-26 PROCEDURE — 73130 XR HAND COMPLETE 3 VIEWS BILATERAL: ICD-10-PCS | Mod: 26,50,, | Performed by: RADIOLOGY

## 2022-01-26 NOTE — PROGRESS NOTES
EMIR CAT - 1/6/2021    Presents today for annual eye exam.  Pt reports blurry vision.  Occ floaters and flashes  Soothe XP--PRN    Last edited by Carla Boggs MA on 1/26/2022 10:14 AM. (History)        ROS     Negative for: Constitutional, Gastrointestinal, Neurological, Skin,   Genitourinary, Musculoskeletal, HENT, Endocrine, Cardiovascular, Eyes,   Respiratory, Psychiatric, Allergic/Imm, Heme/Lymph    Last edited by Nixon Amos, OD on 1/26/2022 10:22 AM. (History)        Assessment /Plan     For exam results, see Encounter Report.    Nuclear sclerosis, bilateral    Screening for glaucoma    Hyperopia with presbyopia of both eyes      Cat OS>OD--discussed surgery, but pt wishes to wait.  Wishes new Rx    PLAN:    rtc 1 yr

## 2022-02-01 ENCOUNTER — PATIENT MESSAGE (OUTPATIENT)
Dept: INTERNAL MEDICINE | Facility: CLINIC | Age: 77
End: 2022-02-01
Payer: MEDICARE

## 2022-02-01 DIAGNOSIS — M79.604 RIGHT LEG PAIN: Primary | ICD-10-CM

## 2022-02-03 ENCOUNTER — PATIENT MESSAGE (OUTPATIENT)
Dept: ORTHOPEDICS | Facility: CLINIC | Age: 77
End: 2022-02-03
Payer: MEDICARE

## 2022-02-03 ENCOUNTER — TELEPHONE (OUTPATIENT)
Dept: ORTHOPEDICS | Facility: CLINIC | Age: 77
End: 2022-02-03
Payer: MEDICARE

## 2022-02-14 ENCOUNTER — TELEPHONE (OUTPATIENT)
Dept: PAIN MEDICINE | Facility: CLINIC | Age: 77
End: 2022-02-14
Payer: MEDICARE

## 2022-02-14 NOTE — TELEPHONE ENCOUNTER
"This message is for patient in regards to his/her appointment 02/15/22 at 10:15a       [facility] Policy: For the safety of all patients and staff members.     Patient Visitor policy: Due to social distancing and limited seating staff are requesting patient to arrive to their schedule appointments on time.     Upon arriving to facility; patients are required to wear a face mask, if patient do not have a face mask one will be provided. Upon arriving patient we ask patients to contact clinic at this number [phone] to notify staff that they have arrived or they may do so by utilizing the Mobile checked in [place](if patient have patient portal; clinical staff will send a message through there letting them know it's okay to proceed to their visit). Staff will give the patient the "okay" to come up or wait inside their vehicle until clinic is ready for patient to be seen by Dr. Frankie Carrillo MD. If you have any questions or concerns please contact [phone]    Patient verbalized and confirmed appt  "

## 2022-02-15 ENCOUNTER — HOSPITAL ENCOUNTER (OUTPATIENT)
Dept: RADIOLOGY | Facility: OTHER | Age: 77
Discharge: HOME | End: 2022-02-15
Attending: ANESTHESIOLOGY
Payer: MEDICARE

## 2022-02-15 ENCOUNTER — OFFICE VISIT (OUTPATIENT)
Dept: PAIN MEDICINE | Facility: CLINIC | Age: 77
End: 2022-02-15
Attending: FAMILY MEDICINE
Payer: MEDICARE

## 2022-02-15 VITALS
OXYGEN SATURATION: 99 % | SYSTOLIC BLOOD PRESSURE: 135 MMHG | RESPIRATION RATE: 18 BRPM | HEART RATE: 67 BPM | DIASTOLIC BLOOD PRESSURE: 77 MMHG | BODY MASS INDEX: 34.93 KG/M2 | WEIGHT: 244 LBS | HEIGHT: 70 IN

## 2022-02-15 DIAGNOSIS — G89.4 CHRONIC PAIN DISORDER: ICD-10-CM

## 2022-02-15 DIAGNOSIS — M79.604 RIGHT LEG PAIN: ICD-10-CM

## 2022-02-15 DIAGNOSIS — M25.551 RIGHT HIP PAIN: ICD-10-CM

## 2022-02-15 DIAGNOSIS — M25.551 RIGHT HIP PAIN: Primary | ICD-10-CM

## 2022-02-15 PROCEDURE — 99999 PR PBB SHADOW E&M-EST. PATIENT-LVL V: CPT | Mod: PBBFAC,,, | Performed by: ANESTHESIOLOGY

## 2022-02-15 PROCEDURE — 99214 OFFICE O/P EST MOD 30 MIN: CPT | Mod: S$PBB,,, | Performed by: ANESTHESIOLOGY

## 2022-02-15 PROCEDURE — 99999 PR PBB SHADOW E&M-EST. PATIENT-LVL V: ICD-10-PCS | Mod: PBBFAC,,, | Performed by: ANESTHESIOLOGY

## 2022-02-15 PROCEDURE — 73521 XR HIPS BILATERAL 2 VIEW INCL AP PELVIS: ICD-10-PCS | Mod: 26,,, | Performed by: RADIOLOGY

## 2022-02-15 PROCEDURE — 99215 OFFICE O/P EST HI 40 MIN: CPT | Mod: PBBFAC | Performed by: ANESTHESIOLOGY

## 2022-02-15 PROCEDURE — 73521 X-RAY EXAM HIPS BI 2 VIEWS: CPT | Mod: TC,FY

## 2022-02-15 PROCEDURE — 99214 PR OFFICE/OUTPT VISIT, EST, LEVL IV, 30-39 MIN: ICD-10-PCS | Mod: S$PBB,,, | Performed by: ANESTHESIOLOGY

## 2022-02-15 PROCEDURE — 73521 X-RAY EXAM HIPS BI 2 VIEWS: CPT | Mod: 26,,, | Performed by: RADIOLOGY

## 2022-02-15 RX ORDER — CYCLOBENZAPRINE HCL 5 MG
5 TABLET ORAL NIGHTLY
Qty: 30 TABLET | Refills: 1 | Status: SHIPPED | OUTPATIENT
Start: 2022-02-15 | End: 2022-03-17

## 2022-02-15 NOTE — PROGRESS NOTES
Chronic Pain-Established Note (Follow up visit)       SUBJECTIVE:    Interval History 2/15/2022:  Mr. Talley is here in clinic today for the evaluation of new right groin pain. This pain has been present for ~3 months. He has seen a urologist for the pain who had an US performed and evaluated for hernia which was negative. He denies any trauma to the hip or inciting event but says he started to notice it gradually. The pain bothers him most when he stands from a seated position, especially if he was sitting for a while. He sometimes massages the area and feels a tight/tender point. He does some stretching of the hip but says he does not think its consistent enough to tell if it helps.    Interval History 5/19/2020:  Mr. Talley was contacted at home for follow-up. He continues to endorse right dull, aching lower knee pain. He states, however, that it has significantly improved from his prior encounter. He attributes this improvement to rest, stretching, and medications. He continues to take gabapentin 100 mg TID as well as intermittent Naproxen and Tylenol. He has no new complaints or concerns today.     Interval hx 3/17/20  Patient presents today for follow up of his right knee pain. He reports significant pain relief since previous visit. He feels a pain in his knee every once in a while but overall is happy with his pain relief and functionality at this time. He has been taking gabapentin and naproxen as prescribed. His knee xray shows degenerative changes, and his MRI shows non-stenotic spondylosis and facet arthritis. He has not started physical therapy.      Initial HPI 3/3/20  Zheng Talley  presents to the clinic for the evaluation of right leg pain. The pain started approximately 1 year ago insiduously and symptoms have been unchanged.The pain is starts in his right posterior thigh radiates to the lateral right leg and to the top of his right foot.  The pain is described as aching and intermittent and is  rated as 2/10. He says his pain is exacerbated by prolonged sitting, particularly while driving. He says his pain is relieved by standing or repositioning his body to relieve pressure. Symptoms interfere with daily activity and sleeping. He reports spending 0 hours per day reclining. The patient reports 5 hours of uninterrupted sleep per night.  He has never seen Pain Management in the past nor has he had spine or joint surgery.   Of note, he also endorses chronic, intermittent, dull, aching right knee pain exacerbated by prolonged walking and improved with rest.     Patient denies night fever/night sweats, urinary incontinence, bowel incontinence, significant weight loss, significant motor weakness and loss of sensations.    Physical Therapy/Home Exercise: no      Pain Disability Index Review:  Last 3 PDI Scores 2/15/2022 3/17/2020 3/3/2020   Pain Disability Index (PDI) 9 0 20       Pain Medications:  Gabapentin 300 mg TID  Naproxen 500 mg BID  Tylenol 1000 mg TID PRN    None   See medication list     report:  Reviewed and consistent with medication use as prescribed.    Pain Procedures: none to review    Imaging:   Reading Physician Reading Date Result Priority   Maco Stallworth DO 3/4/2020 Routine      Narrative     EXAMINATION:  XR KNEE 3 VIEW RIGHT    CLINICAL HISTORY:  Radiculopathy, lumbar region    TECHNIQUE:  AP, lateral, and Merchant views of the right knee were performed.    COMPARISON:  01/10/2013    FINDINGS:  Tricompartmental degenerative change of the right knee with joint space loss articular sclerosis and marginal osteophyte formation most pronounced in the lateral femoral tibial compartment.  There is no evidence for acute fracture or dislocation right knee.  No focal bony erosion.  Questionable small suprapatellar bursa effusion.  Scattered vascular calcifications.    Continued punctate ossified focus adjacent to the lateral aspect of the patella which may represent ununited ossification  center.  Clinical correlation and further evaluation as warranted.    There is questionable sclerotic focus projected over the proximal tibial diaphysis partially included in the study which may be artifactual is only seen on the frontal view.  Clinical correlation and further evaluation with dedicated views of the tibia advised.    This report was flagged in Epic as abnormal.  .      Impression       Please see above      Electronically signed by: Maco Stallworth DO  Date: 03/04/2020  Time: 16:57       Details     Reading Physician Reading Date Result Priority   Surya Schaffer Jr., MD 3/12/2020 Routine      Narrative     EXAMINATION:  MRI LUMBAR SPINE WITHOUT CONTRAST    CLINICAL HISTORY:  Radiculopathy, lumbar regionlumbar radiculopathy;    TECHNIQUE:  Sagittal T1, sagittal T2, sagittal STIR, axial T1 and axial T2 weighted images of the lumbar spine obtained without contrast.    COMPARISON:  X-ray 02/04/2013    FINDINGS:  Lumbar spine alignment is within normal limits. The vertebral body heights are well maintained, with no fracture.  No marrow signal abnormality suspicious for an infiltrative process.    The conus is normal in appearance.  The adjacent soft tissue structures show no significant abnormalities.    L1-L2: There is no focal disc herniation. No significant central canal or neural foraminal narrowing.    L2-L3: Broad-based disc bulging and minimal spondylitic spurring are present.  There is also some facet arthritis.  No significant central canal or neural foraminal narrowing.    L3-L4: Broad-based disc bulging, spondylitic spurring, and facet arthritis are present.  No significant central canal or neural foraminal narrowing.    L4-L5: Broad-based disc bulging and spondylitic spurring which is asymmetric to the left noted, as well as severe facet arthritis and ligamentum flavum hypertrophy.  No focal disc herniation.  No significant central canal or neural foraminal narrowing.    L5-S1: Broad-based  disc bulging and facet arthritis is present with superimposed central and right paracentral disc protrusion.  No focal disc herniation.    There is a prominence of the epidural fat in the lumbosacral region, most notably below L5-S1 which is of doubtful clinical significance.  No significant central canal or neural foraminal narrowing.      Impression       Multilevel nonstenotic spondylosis and facet arthritis      Electronically signed by: Suray Stovall Jr  Date: 2020  Time: 08:29     Abdominal Ulratrasound :   Impression:     No evidence for inguinal hernia.    Past Medical History:   Diagnosis Date    Arthritis     BPH (benign prostatic hyperplasia)     Cancer     prostate    Cataract     Groin pain     History of gastroesophageal reflux (GERD)     Hyperlipidemia     Hypertension     Nuclear sclerosis - Both Eyes 2014     Past Surgical History:   Procedure Laterality Date    BUNIONECTOMY      bilateral     COLONOSCOPY      Dr. Velez    COLONOSCOPY N/A 2019    Procedure: COLONOSCOPY;  Surgeon: JACQUELINE Lozano MD;  Location: [address]);  Service: Endoscopy;  Laterality: N/A;    dental implant      HAND SURGERY      lip surgery      PROSTATE SURGERY      REPAIR OF NAIL BED Right 2018    Procedure: REPAIR, NAIL BED right thumb with I&D;  Surgeon: Elyssa Bradford MD;  Location: [facility];  Service: Orthopedics;  Laterality: Right;  stretcher, supine, hand pan 1 and pan 2     Social History     Socioeconomic History    Marital status:    Tobacco Use    Smoking status: Former Smoker     Types: Cigarettes     Quit date:      Years since quittin.1    Smokeless tobacco: Never Used   Substance and Sexual Activity    Alcohol use: Yes     Alcohol/week: 0.0 standard drinks     Comment: occasionally    Drug use: No    Sexual activity: Never     Partners: Female     Family History   Problem Relation Age of Onset    Cancer Father          leukemia    Diabetes Father     Coronary artery disease Mother     Hypertension Mother     No Known Problems Brother     No Known Problems Daughter     No Known Problems Son     No Known Problems Brother     No Known Problems Brother     Heart disease Neg Hx     Amblyopia Neg Hx     Blindness Neg Hx     Cataracts Neg Hx     Glaucoma Neg Hx     Macular degeneration Neg Hx     Retinal detachment Neg Hx     Strabismus Neg Hx        Review of patient's allergies indicates:   Allergen Reactions    Iodine and iodide containing products Other (See Comments)     Blood in urine in the 1970s       Current Outpatient Medications   Medication Sig    amLODIPine (NORVASC) 10 MG tablet TAKE 1 TABLET ONCE DAILY    losartan-hydrochlorothiazide 100-25 mg (HYZAAR) 100-25 mg per tablet TAKE 1 TABLET ONCE DAILY    metoprolol succinate (TOPROL-XL) 50 MG 24 hr tablet TAKE 1 TABLET ONCE DAILY    pravastatin (PRAVACHOL) 40 MG tablet Take 1 tablet (40 mg total) by mouth every evening.    valACYclovir (VALTREX) 1000 MG tablet Take 1 tablet (1,000 mg total) by mouth once daily.    tadalafil (CIALIS) 20 MG Tab Take 1 tablet (20 mg total) by mouth daily as needed.     No current facility-administered medications for this visit.       REVIEW OF SYSTEMS:    GENERAL:  No weight loss, malaise or fevers.  HEENT:  Negative for frequent or significant headaches.  NECK:  Negative for lumps, goiter, pain and significant neck swelling.  RESPIRATORY:  Negative for cough, wheezing or shortness of breath.  CARDIOVASCULAR:  Negative for chest pain, leg swelling or palpitations. +HTN  GI:  Negative for abdominal discomfort, blood in stools or black stools or change in bowel habits.  MUSCULOSKELETAL:  See HPI.  SKIN:  Negative for lesions, rash, and itching.  PSYCH:  Negative for  mood disorder and recent psychosocial stressors.   HEMATOLOGY/LYMPHOLOGY:  Negative for prolonged bleeding, bruising easily or swollen nodes.  NEURO:   No  "history of headaches, syncope, paralysis, seizures or tremors.  All other reviewed and negative other than HPI.    OBJECTIVE:    /77 (BP Location: Right arm, Patient Position: Sitting, BP Method: Large (Automatic))   Pulse 67   Resp 18   Ht 5' 10" (1.778 m)   Wt 110.7 kg (244 lb)   SpO2 99%   BMI 35.01 kg/m²     General appearance: Well appearing, in no acute distress, alert and oriented x3.  Psych:  Mood and affect appropriate.  Skin: Skin color, texture, turgor normal, no rashes or lesions, in both upper and lower body.  Head/face:  Normocephalic, atraumatic. No palpable lymph nodes.  Cor: RRR  Pulm: CTA  GI:  Soft and non-tender.  Back: Straight leg raising in the sitting and supine positions is negative to radicular pain. No pain to palpation over the spine or costovertebral angles. Negative facet loading. Decreased range of motion with pain reproduction on lumbar extension.  Extremities: Pain with standing from sitting in the right hip, pain with FADIR, no pain on ROXI. No pain with extension or flexion at the hip when standing. No deformities, edema, or skin discoloration. Good capillary refill.  Musculoskeletal: Hip and sacroiliac and provocative maneuvers are negative. Bilateral upper and lower extremity strength is normal and symmetric.  No atrophy or tone abnormalities are noted.  Neuro: Bilateral upper and lower extremity coordination and muscle stretch reflexes are physiologic and symmetric.  Plantar response are downgoing. No loss of sensation is noted.  Gait: Antalgic.     ASSESSMENT: 77 y.o. year old male with right groin pain that sounds to involve muscle/tendon . Pain can also be caused of osteoarthritis of the hip and we will evaluate for both.    1. Right hip pain  X-Ray Hips Bilateral 2 View Incl AP Pelvis    Ambulatory referral/consult to Physical/Occupational Therapy   2. Right leg pain  Ambulatory referral/consult to Pain Clinic    X-Ray Hips Bilateral 2 View Incl AP Pelvis    " Ambulatory referral/consult to Physical/Occupational Therapy   3. Chronic pain disorder           PLAN:   - Referred for physical therapy  and I have stressed the importance of physical activity and a home exercise plan to help with pain and improve health.    - Counseled patient regarding the importance of activity modification, constant sleeping habits and physical therapy.    - Will order xray of the right hip looking for evidence of osteoarthritis    - Prescribed flexeril 5 mg to take at night as needed for pain    - Discussed potential for future steroid injections of the hip joint if pain not improved with physical therapy and medication.    - RTC PRN.     The above plan and management options were discussed at length with patient. Patient is in agreement with the above and verbalized understanding. It will be communicated with the referring physician via electronic record, fax, or mail.        Amadeo Dexter MD   2/15/2022    I have reviewed and concur with the resident's history, physical, assessment, and plan.  I have personally interviewed and examined the patient at bedside.  See below addendum for my evaluation and additional findings.    Frankie Carrillo MD

## 2022-02-26 ENCOUNTER — PATIENT MESSAGE (OUTPATIENT)
Dept: PAIN MEDICINE | Facility: CLINIC | Age: 77
End: 2022-02-26
Payer: MEDICARE

## 2022-03-02 ENCOUNTER — PATIENT MESSAGE (OUTPATIENT)
Dept: OPTOMETRY | Facility: CLINIC | Age: 77
End: 2022-03-02
Payer: MEDICARE

## 2022-03-08 ENCOUNTER — PROCEDURE VISIT (OUTPATIENT)
Dept: NEUROLOGY | Facility: CLINIC | Age: 77
End: 2022-03-08
Payer: MEDICARE

## 2022-03-08 ENCOUNTER — OFFICE VISIT (OUTPATIENT)
Dept: OPTOMETRY | Facility: CLINIC | Age: 77
End: 2022-03-08
Payer: MEDICARE

## 2022-03-08 DIAGNOSIS — R20.0 FINGER NUMBNESS: ICD-10-CM

## 2022-03-08 DIAGNOSIS — H25.13 NUCLEAR SCLEROSIS, BILATERAL: Primary | ICD-10-CM

## 2022-03-08 PROCEDURE — 99499 NO LOS: ICD-10-PCS | Mod: S$PBB,,, | Performed by: OPTOMETRIST

## 2022-03-08 PROCEDURE — 95913 NRV CNDJ TEST 13/> STUDIES: CPT | Mod: PBBFAC | Performed by: PSYCHIATRY & NEUROLOGY

## 2022-03-08 PROCEDURE — 99499 UNLISTED E&M SERVICE: CPT | Mod: S$PBB,,, | Performed by: OPTOMETRIST

## 2022-03-08 PROCEDURE — 95913 NRV CNDJ TEST 13/> STUDIES: CPT | Mod: 26,S$PBB,, | Performed by: PSYCHIATRY & NEUROLOGY

## 2022-03-08 PROCEDURE — 95886 MUSC TEST DONE W/N TEST COMP: CPT | Mod: PBBFAC | Performed by: PSYCHIATRY & NEUROLOGY

## 2022-03-08 PROCEDURE — 99213 OFFICE O/P EST LOW 20 MIN: CPT | Mod: PBBFAC | Performed by: OPTOMETRIST

## 2022-03-08 PROCEDURE — 99999 PR PBB SHADOW E&M-EST. PATIENT-LVL III: CPT | Mod: PBBFAC,,, | Performed by: OPTOMETRIST

## 2022-03-08 PROCEDURE — 95886 PR EMG COMPLETE, W/ NERVE CONDUCTION STUDIES, 5+ MUSCLES: ICD-10-PCS | Mod: 26,S$PBB,, | Performed by: PSYCHIATRY & NEUROLOGY

## 2022-03-08 PROCEDURE — 95886 MUSC TEST DONE W/N TEST COMP: CPT | Mod: 26,S$PBB,, | Performed by: PSYCHIATRY & NEUROLOGY

## 2022-03-08 PROCEDURE — 99999 PR PBB SHADOW E&M-EST. PATIENT-LVL III: ICD-10-PCS | Mod: PBBFAC,,, | Performed by: OPTOMETRIST

## 2022-03-08 PROCEDURE — 95913 PR NERVE CONDUCTION STUDY; 13 OR MORE STUDIES: ICD-10-PCS | Mod: 26,S$PBB,, | Performed by: PSYCHIATRY & NEUROLOGY

## 2022-03-08 NOTE — PROCEDURES
Procedures     EMG/NCS  was performed in the lab. Report scanned into chart.          Page Brady MD  Medicine-Neurology, Clinical Neurophysiology

## 2022-03-08 NOTE — PROGRESS NOTES
EMIR CAT: 1/26/22    Presents today for rx recheck.   Purchase spex from [facility]    Pt reports dist vision issues--no problem with near.    Last edited by Carla Boggs MA on 3/8/2022 10:39 AM. (History)        ROS     Negative for: Constitutional, Gastrointestinal, Neurological, Skin,   Genitourinary, Musculoskeletal, HENT, Endocrine, Cardiovascular, Eyes,   Respiratory, Psychiatric, Allergic/Imm, Heme/Lymph    Last edited by Nixon Amos, OD on 3/8/2022  1:16 PM. (History)        Assessment /Plan     For exam results, see Encounter Report.    Nuclear sclerosis, bilateral      See notes from 2 mos ago:  Cat OS>OD--discussed surgery, but pt wishes to wait.  Wishes new Rx    Pt presents TODAY w problems, lucas at the computer, with new PALs from [facility].  Reassured pt sloan correct.  Again discussed even w best correction [facility] will NOT be perfect due top cats.  Again discussed surgery, but pt wishes to wait.  May go back to [facility] to see if they can re-adjust PALs for better computer vision, or wrote separate computer Rx    PLAN:    rtc as sched, or pt will call sooner if wishes cat eval.  NOLOS EPRx today

## 2022-03-09 ENCOUNTER — PES CALL (OUTPATIENT)
Dept: ADMINISTRATIVE | Facility: CLINIC | Age: 77
End: 2022-03-09
Payer: MEDICARE

## 2022-03-10 ENCOUNTER — CLINICAL SUPPORT (OUTPATIENT)
Dept: REHABILITATION | Facility: HOSPITAL | Age: 77
End: 2022-03-10
Attending: ANESTHESIOLOGY
Payer: MEDICARE

## 2022-03-10 DIAGNOSIS — R29.898 WEAKNESS OF RIGHT LOWER EXTREMITY: ICD-10-CM

## 2022-03-10 DIAGNOSIS — R26.9 GAIT ABNORMALITY: ICD-10-CM

## 2022-03-10 DIAGNOSIS — M25.551 RIGHT HIP PAIN: ICD-10-CM

## 2022-03-10 DIAGNOSIS — M79.604 RIGHT LEG PAIN: ICD-10-CM

## 2022-03-10 DIAGNOSIS — M25.651 DECREASED RANGE OF RIGHT HIP MOVEMENT: ICD-10-CM

## 2022-03-10 PROCEDURE — 97110 THERAPEUTIC EXERCISES: CPT | Mod: PO

## 2022-03-10 PROCEDURE — 97161 PT EVAL LOW COMPLEX 20 MIN: CPT | Mod: PO

## 2022-03-11 ENCOUNTER — TELEPHONE (OUTPATIENT)
Dept: SURGERY | Facility: CLINIC | Age: 77
End: 2022-03-11
Payer: MEDICARE

## 2022-03-14 PROBLEM — M25.651 DECREASED RANGE OF RIGHT HIP MOVEMENT: Status: ACTIVE | Noted: 2022-03-14

## 2022-03-14 PROBLEM — R29.898 WEAKNESS OF RIGHT LOWER EXTREMITY: Status: ACTIVE | Noted: 2022-03-14

## 2022-03-14 PROBLEM — R26.9 GAIT ABNORMALITY: Status: ACTIVE | Noted: 2022-03-14

## 2022-03-14 NOTE — PLAN OF CARE
[facility]  Physical Therapy Initial Evaluation    Name: Zheng Talley Jr.  Clinic Number: [id number]    Therapy Diagnosis:   Encounter Diagnoses   Name Primary?    Right leg pain     Right hip pain     Decreased range of right hip movement     Gait abnormality     Weakness of right lower extremity      Physician: Frankie Carrillo MD    Physician Orders: PT Eval and Treat   Medical Diagnosis from Referral:   M79.604 (ICD-10-CM) - Right leg pain   M25.551 (ICD-10-CM) - Right hip pain     Evaluation Date: 3/10/2022  Authorization Period Expiration: 2/15/2023  Plan of Care Expiration: 5/5/2022  Visit # / Visits authorized: 1/ 1    Time In: 722am  Time Out: 807am  Total Billable Time: 45 minutes    Precautions: Standard, cancer and HTN    Subjective   Date of onset: 6 or so months  History of current condition - Zheng reports: that he has had increased R sided anterior hip and groin pain that has progressed over the last few months. His pain is deep in his groin area but he does notice occasional muscle tenderness in that area as well. His pain is not constant but he gets sharp and intense moments of pain throughout his day that causes his hip to buckle under him. He has not fallen in a long time. His pain increases with rising from a chair after sitting, standing and walking for prolonged periods of time (> 1 hour), lying on his R side, and crossing his legs to put on his shoes. His pain improves with rest and muscle relaxers at night. He also notices occasional pain in his L groin and anterior thigh at night. His urologist ruled out a hernia. He has a history of prostate cancer but is consistently following up with his oncologist. He would like the get back into the gym to help strengthen his legs and lose some weight.        Past Medical History:   Diagnosis Date    Arthritis     BPH (benign prostatic hyperplasia)     Cancer     prostate    Cataract     Groin pain     History of  gastroesophageal reflux (GERD)     Hyperlipidemia     Hypertension     Nuclear sclerosis - Both Eyes 2/18/2014     Zheng Talley Jr.  has a past surgical history that includes Bunionectomy; Colonoscopy (2011); Hand surgery; lip surgery; dental implant; Repair of nail bed (Right, 11/2/2018); Prostate surgery; and Colonoscopy (N/A, 2/25/2019).    Zheng has a current medication list which includes the following prescription(s): amlodipine, cyclobenzaprine, losartan-hydrochlorothiazide 100-25 mg, metoprolol succinate, pravastatin, tadalafil, and valacyclovir.    Review of patient's allergies indicates:   Allergen Reactions    Iodine and iodide containing products Other (See Comments)     Blood in urine in the 1970s        Imaging, X-ray hips:   Impression:     No acute osseous abnormality seen.  Severe osteoarthritis both hips.     Nonspecific calcifications project over the pelvis.    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)  Prior Level of Function: no pain or difficulty with ADLs or recreation activities like working in his shop  Current Level of Function: moderate pain and difficulty with ADLs and recreational activities     Pain:  Current 2/10, worst 7/10, best 0/10   Location: right anterior hip and groin    Description: Aching, Dull and Sharp (occasionally)  Aggravating Factors: rising from a chair after sitting, standing and walking for prolonged periods of time (> 1 hour), lying on his R side, and crossing his legs to put on his shoes  Easing Factors:  rest and muscle relaxers at night    Pts goals: to get his leg stronger so that it no longer hurts him    Objective     Observation: pt ambulates into the clinic independently     Posture: pt stands with his R LE externally rotated and has significant knee valgus on the R side    Gait: he ambulates with excessive ER and knee  "valgus on the R side. His gait is antalgic with decreased stance time on the R LE.     Hip Range of Motion:   Right AROM/PROM Left AROM/PROM   Flexion 90 95   Abduction 40 40   Extension 0 0   Ext. Rotation 30 30   Int. Rotation 15 20   - Pt had pain with PROM into IR and flexion at his end range  - ER and IR measurements were taken with his hip flexed to 90. With IR assessed in supine with his knee extended (log roll test position) his pain was worse and motion was more limited.    Lower Extremity Strength  Right LE  Left LE    Hip flexion: 3/5 (pain) Hip flexion: 4-/5   Hip Internal Rotation:  3+/5    Hip Internal Rotation: 3+/5      Hip External Rotation: 3+/5    Hip External Rotation: 3+/5      Hip extension:  3/5 Hip extension: 3/5   Hip adduction 4/5 (pain free at 0 and 45) Hip adduction 4/5 (pain free at 0 and 45)   Hip abduction: 3+/5 Hip abduction: 3+/5     Special Tests:  Log roll Test: + for pain on the R  ROXI: + on the R  FADIR: + on the R  Scour: -  Hip extension movement pattern: fires lumbar paraspinals immediately     Step ups on 6" step: unable to perform on the R without UE assistance and pushing up with L LE     Flexibility:    Ely's test: R = 110 degrees ; L = 120 degrees     Joint Mobility: decreased R hip mobility in all directions    Palpation: no TTP noted on the exam today    Sensation: intact to light touch    Edema: none        CMS Impairment/Limitation/Restriction for FOTO Hip Survey    Therapist reviewed FOTO scores for Zheng Talley  on 3/10/2022.   FOTO documents entered into [employer] - see Media section.    Limitation Score: 24%           TREATMENT   Treatment Time In: 757am  Treatment Time Out: 807am  Total Treatment time separate from Evaluation: 10 minutes    Zheng received therapeutic exercises to develop strength, endurance and ROM for 10 minutes including:    Bridges, 2 x 10  SL clamshells with OTB, 2 x 10  Standing hip extension bent over a wedge, 2 x 10      Home Exercises " and Patient Education Provided    Education provided re:   - HEP  - Plan of care  - importance of hip mobility and strength     Written Home Exercises Provided: Yes.  Exercises were reviewed and Zheng was able to demonstrate them prior to the end of the session.   Pt received a written copy of exercises to perform at home. Zheng demonstrated good  understanding of the education provided.     Assessment   Zheng is a 77 y.o. male referred to outpatient Physical Therapy with a medical diagnosis of Right hip pain and Right leg pain. Pt presents to PT today with a history of R sided hip and going pain. He displays decreased R hip ROM and mobility, pain with prolonged weight bearing activities, R LE weakness, gait abnormalities, impaired posture and flexibility deficits.     Pt prognosis is Good.   Pt will benefit from skilled outpatient Physical Therapy to address the deficits stated above and in the chart below, provide pt/family education, and to maximize pt's level of independence.     Plan of care discussed with patient: Yes  Pt's spiritual, cultural and educational needs considered and patient is agreeable to the plan of care and goals as stated below:     Anticipated Barriers for therapy: none    Medical Necessity is demonstrated by the following  History  Co-morbidities and personal factors that may impact the plan of care Co-morbidities:   history of cancer and HTN    Personal Factors:   no deficits     moderate   Examination  Body Structures and Functions, activity limitations and participation restrictions that may impact the plan of care Body Regions:   back  lower extremities    Body Systems:    ROM  strength  balance  gait  motor control    Participation Restrictions:   none    Activity limitations:   Learning and applying knowledge  no deficits    General Tasks and Commands  no deficits    Communication  no deficits    Mobility  lifting and carrying objects  walking    Self care  dressing    Domestic  Life  shopping  cooking  doing house work (cleaning house, washing dishes, laundry)    Interactions/Relationships  no deficits    Life Areas  no deficits    Community and Social Life  no deficits         high   Clinical Presentation stable and uncomplicated low   Decision Making/ Complexity Score: low     Goals:  Short Term Goals (4 Weeks):   1. Pt will be compliant with HEP to supplement PT in restoring pain free function.  2. Pt will improve impaired LE strength by 1/2 MMT grade to improve strength for functional tasks  3. Pt improve impaired R hip extension ROM by 5 deg to improve mobility for normal movement patterns.   Long Term Goals (8 Weeks):  1. Pt will improve FOTO score to </= 22% limited to decrease perceived limitation with mobility  2. Pt will improve impaired LE strength by 1 MMT grade to improve strength for functional tasks.  3. Pt will be able to tolerate prolonged weight bearing activities without pain in order to demonstrate improved functional ability.        Plan   Plan of care Certification: 3/10/2022 to 5/5/2022.    Outpatient Physical Therapy 1-2 times weekly for 8 weeks to include the following interventions: Gait Training, Manual Therapy, Moist Heat/ Ice, Neuromuscular Re-ed, Patient Education, Self Care, Therapeutic Activities and Therapeutic Exercise.     PAPI NOVOA, PT

## 2022-03-16 ENCOUNTER — CLINICAL SUPPORT (OUTPATIENT)
Dept: REHABILITATION | Facility: HOSPITAL | Age: 77
End: 2022-03-16
Attending: ANESTHESIOLOGY
Payer: MEDICARE

## 2022-03-16 DIAGNOSIS — R29.898 WEAKNESS OF RIGHT LOWER EXTREMITY: ICD-10-CM

## 2022-03-16 DIAGNOSIS — R26.9 GAIT ABNORMALITY: ICD-10-CM

## 2022-03-16 DIAGNOSIS — M25.651 DECREASED RANGE OF RIGHT HIP MOVEMENT: Primary | ICD-10-CM

## 2022-03-16 PROCEDURE — 97110 THERAPEUTIC EXERCISES: CPT | Mod: PO

## 2022-03-16 NOTE — PROGRESS NOTES
"  Physical Therapy Daily Treatment Note     Name: Zheng Talley Jr.  Clinic Number: [id number]    Therapy Diagnosis:   Encounter Diagnoses   Name Primary?    Decreased range of right hip movement Yes    Gait abnormality     Weakness of right lower extremity      Physician: Frankie Carrillo MD    Visit Date: 3/16/2022    Physician Orders: PT Eval and Treat   Medical Diagnosis from Referral:   M79.604 (ICD-10-CM) - Right leg pain   M25.551 (ICD-10-CM) - Right hip pain      Evaluation Date: 3/10/2022  Authorization Period Expiration: 3/16/2023  Plan of Care Expiration: 5/5/2022  Visit # / Visits authorized: 1/ 1    1st FOTO Follow up:   2nd FOTO Follow up:     Time In: 1133am  Time Out: 1213pm  Total Billable Time: 15 minutes    Precautions: Standard, cancer and HTN    Subjective     Pt reports: that he feels about the same as he did at his evaluation. He continues with pain when lying down too long and with rising up from a chair.   He was compliant with home exercise program.  Response to previous treatment: independent with HEP  Functional change: ongoing    Pain: 3/10  Location: right hip and groin      Objective     Zheng received therapeutic exercises to develop strength, endurance and ROM for 32 minutes including:     Bridges, 2 x 10  SL clamshells with OTB, 2 x 10  Standing hip extension bent over a wedge, 2 x 10  Standing on 8" step with self distraction of R hip, 5# around ankle, 10x 10" holds  DL shuttle leg press with 87#, 4 minutes  Steps up on 6" step, 8x (increased pain so exercise was halted)  Sit to stands form 23" box, 3 x 10  Lateral walking in // bars with OTB around shins, 3 laps     Zheng received the following manual therapy techniques: Joint mobilizations were applied to the: R hip for 8 minutes, including:    Supine long axis distraction of R hip, grades IV and V  Side lying hip extension with anterior mobs, grade III  Lateral hip mobs with mobilization strap, grade III and IV    Zheng " participated in neuromuscular re-education activities to improve: Balance, Coordination and Kinesthetic for 0 minutes. The following activities were included:      Zheng participated in dynamic functional therapeutic activities to improve functional performance for 0  minutes, including:        Home Exercises Provided and Patient Education Provided     Education provided:   - HEP  - Plan of care  - importance of hip mobility and strength     Written Home Exercises Provided: Patient instructed to cont prior HEP.  Exercises were reviewed and Zheng was able to demonstrate them prior to the end of the session.  Zheng demonstrated good  understanding of the education provided.     See EMR under Patient Instructions for exercises provided 3/10/2022.    Assessment     Pt presents today with continued anterior R hip and groin pain. He remains with hip mobility deficits in all directions and is especially limited with hip IR while in supine. Manual interventions were applied today to begin improving his capsular mobility. Hip strengthening was progressed as well. RE required cueing for equal WB through B LEs during closed chain exercises.     Zheng Is progressing well towards his goals.   Pt prognosis is Good.     Pt will continue to benefit from skilled outpatient physical therapy to address the deficits listed in the problem list box on initial evaluation, provide pt/family education and to maximize pt's level of independence in the home and community environment.     Pt's spiritual, cultural and educational needs considered and pt agreeable to plan of care and goals.     Anticipated barriers to physical therapy: none    Goals:   Short Term Goals (4 Weeks):   1. Pt will be compliant with HEP to supplement PT in restoring pain free function.  2. Pt will improve impaired LE strength by 1/2 MMT grade to improve strength for functional tasks  3. Pt improve impaired R hip extension ROM by 5 deg to improve mobility for  normal movement patterns.   Long Term Goals (8 Weeks):  1. Pt will improve FOTO score to </= 22% limited to decrease perceived limitation with mobility  2. Pt will improve impaired LE strength by 1 MMT grade to improve strength for functional tasks.  3. Pt will be able to tolerate prolonged weight bearing activities without pain in order to demonstrate improved functional ability.     Plan     Continues to progress hip mobility and strength     PAPI NOVOA, PT

## 2022-03-17 ENCOUNTER — OFFICE VISIT (OUTPATIENT)
Dept: ORTHOPEDICS | Facility: CLINIC | Age: 77
End: 2022-03-17
Payer: MEDICARE

## 2022-03-17 VITALS — WEIGHT: 244 LBS | HEIGHT: 70 IN | BODY MASS INDEX: 34.93 KG/M2

## 2022-03-17 DIAGNOSIS — R20.0 FINGER NUMBNESS: Primary | ICD-10-CM

## 2022-03-17 PROCEDURE — 99213 PR OFFICE/OUTPT VISIT, EST, LEVL III, 20-29 MIN: ICD-10-PCS | Mod: S$PBB,,, | Performed by: ORTHOPAEDIC SURGERY

## 2022-03-17 PROCEDURE — 99213 OFFICE O/P EST LOW 20 MIN: CPT | Mod: S$PBB,,, | Performed by: ORTHOPAEDIC SURGERY

## 2022-03-17 PROCEDURE — 99999 PR PBB SHADOW E&M-EST. PATIENT-LVL III: CPT | Mod: PBBFAC,,, | Performed by: ORTHOPAEDIC SURGERY

## 2022-03-17 PROCEDURE — 99213 OFFICE O/P EST LOW 20 MIN: CPT | Mod: PBBFAC | Performed by: ORTHOPAEDIC SURGERY

## 2022-03-17 PROCEDURE — 99999 PR PBB SHADOW E&M-EST. PATIENT-LVL III: ICD-10-PCS | Mod: PBBFAC,,, | Performed by: ORTHOPAEDIC SURGERY

## 2022-03-17 NOTE — PROGRESS NOTES
[facility]  History & Physical  Orthopedics    SUBJECTIVE:      COVID-19 attestation:  This patient was treated during the COVID-19 pandemic.  This was discussed with the patient, they are aware of our current policies and procedures, were given the option of delaying their visit and or switching to a virtual visit, delaying their surgery when applicable, and they elect to proceed.    Chief Complaint: b/l hand numbness and tingling     Referring Provider: N/A     History of Present Illness:  Patient is a 77 y.o. right hand dominant male who presents today with complaints of intermittent b/l hand numbness and tingling last seen 6 weeks ago in hand clinic and sent for EMG/NCS for which results are shown below. He does not wear wrist splints. He denies any weakness of the hands. Wakes at night secondary to symptoms.    Onset of symptoms/DOI was years ago.    Symptoms are aggravated by activity, movement and at night.    Symptoms are alleviated by rest, activity and immobilization.    Symptoms consist of pain and numbness/tingling.    The patient rates their pain as a 3/10.    Attempted treatment(s) and/or interventions include activity modifications, rest, rest, activity modification and anti-inflammatory medications.     The patient denies any fevers, chills, N/V, D/C and presents for evaluation.       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 Velez    COLONOSCOPY N/A 2/25/2019    Procedure: COLONOSCOPY;  Surgeon: JACQUELINE Lozano MD;  Location: [address]);  Service: Endoscopy;  Laterality: N/A;    dental implant      HAND SURGERY       lip surgery      PROSTATE SURGERY      REPAIR OF NAIL BED Right 11/2/2018    Procedure: REPAIR, NAIL BED right thumb with I&D;  Surgeon: Elyssa Bradford MD;  Location: [facility];  Service: Orthopedics;  Laterality: Right;  stretcher, supine, hand pan 1 and pan 2     Review of patient's allergies indicates:   Allergen Reactions    Iodine and iodide containing products Other (See Comments)     Blood in urine in the 1970s     Social History     Social History Narrative    Not on file     Family History   Problem Relation Age of Onset    Cancer Father         leukemia    Diabetes Father     Coronary artery disease Mother     Hypertension Mother     No Known Problems Brother     No Known Problems Daughter     No Known Problems Son     No Known Problems Brother     No Known Problems Brother     Heart disease Neg Hx     Amblyopia Neg Hx     Blindness Neg Hx     Cataracts Neg Hx     Glaucoma Neg Hx     Macular degeneration Neg Hx     Retinal detachment Neg Hx     Strabismus Neg Hx          Current Outpatient Medications:     amLODIPine (NORVASC) 10 MG tablet, TAKE 1 TABLET ONCE DAILY, Disp: 90 tablet, Rfl: 3    cyclobenzaprine (FLEXERIL) 5 MG tablet, Take 1 tablet (5 mg total) by mouth nightly., Disp: 30 tablet, Rfl: 1    losartan-hydrochlorothiazide 100-25 mg (HYZAAR) 100-25 mg per tablet, TAKE 1 TABLET ONCE DAILY, Disp: 90 tablet, Rfl: 3    metoprolol succinate (TOPROL-XL) 50 MG 24 hr tablet, TAKE 1 TABLET ONCE DAILY, Disp: 90 tablet, Rfl: 3    pravastatin (PRAVACHOL) 40 MG tablet, Take 1 tablet (40 mg total) by mouth every evening., Disp: 90 tablet, Rfl: 3    valACYclovir (VALTREX) 1000 MG tablet, Take 1 tablet (1,000 mg total) by mouth once daily., Disp: 90 tablet, Rfl: 3    tadalafil (CIALIS) 20 MG Tab, Take 1 tablet (20 mg total) by mouth daily as needed., Disp: 12 tablet, Rfl: 11      Review of Systems:  As per HPI otherwise noncontributory    OBJECTIVE:      Vital Signs (Most  "Recent):  Vitals:    03/17/22 1038   Weight: 110.7 kg (244 lb)   Height: 5' 10" (1.778 m)     Body mass index is 35.01 kg/m².      Physical Exam:  Constitutional: The patient appears well-developed and well-nourished. No distress.   Skin: No lesions appreciated  Head: Normocephalic and atraumatic.   Nose: Nose normal.   Ears: No deformities seen  Eyes: Conjunctivae and EOM are normal.   Neck: No tracheal deviation present.   Cardiovascular: Normal rate and intact distal pulses.    Pulmonary/Chest: Effort normal. No respiratory distress.   Abdominal: There is no guarding.   Neurological: The patient is alert.   Psychiatric: The patient has a normal mood and affect.     Bilateral Hand/Wrist Examination:    Observation/Inspection:  Swelling  none    Deformity  none  Discoloration  none     Scars   none    Atrophy  none    HAND/WRIST EXAMINATION:  Finkelstein's Test   Neg  WHAT Test    Neg  Snuff box tenderness   Neg  Gerber's Test    Neg  Hook of Hamate Tenderness  Neg  CMC grind    Neg  Circumduction test   Neg    Neurovascular Exam:  Digits WWP, brisk CR < 3s throughout  NVI motor/LTS to M/R/U nerves, radial pulse 2+  Tinel's Test - Carpal Tunnel  Positive b/l  Tinel's Test - Cubital Tunnel  Neg  Phalen's Test    Positive b/l  Median Nerve Compression Test Positive b/l    ROM hand full, painless    ROM wrist full, painless    ROM elbow full, painless    Abdomen not guarded  Respirations nonlabored  Perfusion intact    Diagnostic Results:    EMG - b/l carpal tunnel L>R    ASSESSMENT/PLAN:      77 y.o. yo male with b/l carpal tunnel syndrome L>R    Plan: The patient and I had a thorough discussion today.  We discussed the working diagnosis as well as several other potential alternative diagnoses.  Treatment options were discussed, both conservative and surgical.  Conservative treatment options would include things such as activity modifications, workplace modifications, a period of rest, oral vs topical OTC and " prescription anti-inflammatory medications, occupational therapy, splinting/bracing, immobilization, corticosteroid injections, and others.  Surgical options were discussed as well.     At this time, the patient would like to think about the surgery and will contact via the portal.    Should the patient's symptoms worsen, persist, or fail to improve they should return for reevaluation and I would be happy to see them back anytime.

## 2022-03-22 ENCOUNTER — CLINICAL SUPPORT (OUTPATIENT)
Dept: REHABILITATION | Facility: HOSPITAL | Age: 77
End: 2022-03-22
Attending: ANESTHESIOLOGY
Payer: MEDICARE

## 2022-03-22 DIAGNOSIS — R26.9 GAIT ABNORMALITY: ICD-10-CM

## 2022-03-22 DIAGNOSIS — R29.898 WEAKNESS OF RIGHT LOWER EXTREMITY: ICD-10-CM

## 2022-03-22 DIAGNOSIS — M25.651 DECREASED RANGE OF RIGHT HIP MOVEMENT: Primary | ICD-10-CM

## 2022-03-22 PROCEDURE — 97110 THERAPEUTIC EXERCISES: CPT | Mod: PO

## 2022-03-22 PROCEDURE — 97112 NEUROMUSCULAR REEDUCATION: CPT | Mod: PO

## 2022-03-22 PROCEDURE — 97140 MANUAL THERAPY 1/> REGIONS: CPT | Mod: PO

## 2022-03-22 NOTE — PROGRESS NOTES
"  Physical Therapy Daily Treatment Note     Name: Zheng Talley Jr.  Clinic Number: [id number]    Therapy Diagnosis:   Encounter Diagnoses   Name Primary?    Decreased range of right hip movement Yes    Gait abnormality     Weakness of right lower extremity      Physician: Frankie Carrillo MD    Visit Date: 3/22/2022    Physician Orders: PT Eval and Treat   Medical Diagnosis from Referral:   M79.604 (ICD-10-CM) - Right leg pain   M25.551 (ICD-10-CM) - Right hip pain      Evaluation Date: 3/10/2022  Authorization Period Expiration: 3/16/2023  Plan of Care Expiration: 5/5/2022  Visit # / Visits authorized: 3/1  Total Visits: 3    1st FOTO Follow up:   2nd FOTO Follow up:     Time In: 10:32 am  Time Out: 11:15 am  Total Billable Time: 43 minutes    Precautions: Standard, cancer and HTN    Subjective     Pt reports: He has noticed a little improvement in his pain in his hip. He still has some difficulty when sitting in a chair and going to stand up. He gets sharp pain in his groin area.   He was compliant with home exercise program.  Response to previous treatment: Felt fine  Functional change: A little less hip pain     Pain: 3/10  Location: right hip and groin      Objective     Zheng received therapeutic exercises to develop strength, endurance and ROM for 21 minutes including:     Attempted prone hip flexor stretch with strap however patient unable to perform   Bridges, 2 x 10  - emphasis on glute squeeze with exercise   Sidelying clamshells with GreenTB 3x10 reps each   Hip flexor stretch in the chair 3x20 sec holds on R   - attempted sit to stand from chair following and no pain in hip following stretch  Shuttle single leg squats 2x10 reps with 50# each     Not Today:  Standing on 8" step with self distraction of R hip, 5# around ankle, 10x 10" holds  DL shuttle leg press with 87#, 4 minutes  Steps up on 6" step, 8x (increased pain so exercise was halted)  Sit to stands form 23" box, 3 x 10  Lateral walking in " // bars with OTB around shins, 3 laps     Zheng received the following manual therapy techniques: Joint mobilizations were applied to the: R hip for 11 minutes, including:    Supine long axis hip distraction grade III-IV  Lateral hip distraction grade III-IV  Prone hip extension mobilizations grade III     Not Today:  Side lying hip extension with anterior mobs, grade III  Lateral hip mobs with mobilization strap, grade III and IV    Zheng participated in neuromuscular re-education activities to improve: Balance, Coordination and Kinesthetic for 11 minutes. The following activities were included:    Half kneeling on 2 foam pads with posterior pelvic tilt and hip flexor stretch with paloff press on cables 2x10 reps on each side   Bent over wedge on mat hip extensions 2x10 reps each side   - emphasis on using glutes no lumbar extension     Zheng participated in dynamic functional therapeutic activities to improve functional performance for 0  minutes, including:    Home Exercises Provided and Patient Education Provided     Education provided:   - Continue with HEP  - Add in hip flexor stretch in chair   - Importance of hip mobility and strength     Written Home Exercises Provided: Patient instructed to cont prior HEP.  Exercises were reviewed and Zheng was able to demonstrate them prior to the end of the session.  Zheng demonstrated good  understanding of the education provided.     See EMR under Patient Instructions for exercises provided 3/10/2022.    Assessment     Zheng continues with right anterior hip and groin pain with limitations in hip mobility. Continued emphasis in manual therapy on improving hip mobility. Exercises added to improve hip flexor mobility which patient responded well to hip flexor stretch in chair improving his sit to stand. He required verbal and tactile cueing for proper form with therex but able to perform all exercises with no adverse effects. He would continue to benefit from  skilled PT intervention to address hip mobility, flexibility, and lower extremity strength. Continue to monitor and progress as able.     hZeng Is progressing well towards his goals.   Pt prognosis is Good.     Pt will continue to benefit from skilled outpatient physical therapy to address the deficits listed in the problem list box on initial evaluation, provide pt/family education and to maximize pt's level of independence in the home and community environment.     Pt's spiritual, cultural and educational needs considered and pt agreeable to plan of care and goals.     Anticipated barriers to physical therapy: none    Goals:   Short Term Goals (4 Weeks): Progressing, not met   1. Pt will be compliant with HEP to supplement PT in restoring pain free function.  2. Pt will improve impaired LE strength by 1/2 MMT grade to improve strength for functional tasks  3. Pt improve impaired R hip extension ROM by 5 deg to improve mobility for normal movement patterns.     Long Term Goals (8 Weeks): Progressing, not met   1. Pt will improve FOTO score to </= 22% limited to decrease perceived limitation with mobility  2. Pt will improve impaired LE strength by 1 MMT grade to improve strength for functional tasks.  3. Pt will be able to tolerate prolonged weight bearing activities without pain in order to demonstrate improved functional ability.     Plan   Plan of care Certification: 3/10/2022 to 5/5/2022.    Continue with current plan of care with emphasis on hip mobility and lower extremity strengthening.     Mali Reed, PT

## 2022-03-31 NOTE — PROGRESS NOTES
"  Physical Therapy Daily Treatment Note     Name: Zheng Talley Jr.  Clinic Number: [id number]    Therapy Diagnosis:   Encounter Diagnoses   Name Primary?    Decreased range of right hip movement Yes    Gait abnormality     Weakness of right lower extremity      Physician: Frankie Carrillo MD    Visit Date: 4/1/2022    Physician Orders: PT Eval and Treat   Medical Diagnosis from Referral:   M79.604 (ICD-10-CM) - Right leg pain   M25.551 (ICD-10-CM) - Right hip pain      Evaluation Date: 3/10/2022  Authorization Period Expiration: 12/31/2022  Plan of Care Expiration: 5/5/2022  Visit # / Visits authorized: 3/20  Total Visits: 3    1st FOTO Follow up:   2nd FOTO Follow up:     Time In: 901am  Time Out: 945am  Total Billable Time: 44 minutes    Precautions: Standard, cancer and HTN    Subjective     Pt reports: that he is noticing improvement. He feels like getting out of a chair is getting easier and he has not has much pain with prolonged weight bearing recently.   He was compliant with home exercise program.  Response to previous treatment: Felt fine  Functional change: A little less hip pain     Pain: 3/10  Location: right hip and groin      Objective     Zheng received therapeutic exercises to develop strength, endurance and ROM for 22 minutes including:     Bridges, 2 x 10  Sidelying clamshells with GreenTB 3x10 reps each   Shuttle single leg squats 2x10 reps with 50# each   DL shuttle leg press with 87#, 4 minutes  Lateral walking in // bars with OTB around shins, 3 laps     Not Today:  Hip flexor stretch in the chair 3x20 sec holds on R   - attempted sit to stand from chair following and no pain in hip following stretch  Standing on 8" step with self distraction of R hip, 5# around ankle, 10x 10" holds  Steps up on 6" step, 8x (increased pain so exercise was halted)  Sit to stands form 23" box, 3 x 10    Zheng received the following manual therapy techniques: Joint mobilizations were applied to the: R hip for " "11 minutes, including:    Supine long axis hip distraction grade III-IV  Lateral hip mobs with mobilization strap, grade III and IV  Inferior hip mobilizations at 90 degrees of flexion, grade III and IV    Not Today:  Lateral hip distraction grade III-IV  Prone hip extension mobilizations grade III   Side lying hip extension with anterior mobs, grade III    Zheng participated in neuromuscular re-education activities to improve: Balance, Coordination and Kinesthetic for 11 minutes. The following activities were included:    Standing PPT on the wall, 2 x 10 3" holds  Half kneeling on 2 foam pads with posterior pelvic tilt and hip flexor stretch with paloff press on cables 2x10 reps on each side   Bent over wedge on mat hip extensions 2x10 reps each side   - emphasis on using glutes no lumbar extension     Zheng participated in dynamic functional therapeutic activities to improve functional performance for 0  minutes, including:    Home Exercises Provided and Patient Education Provided     Education provided:   - Continue with HEP  - Add in hip flexor stretch in chair   - Importance of hip mobility and strength     Written Home Exercises Provided: Patient instructed to cont prior HEP.  Exercises were reviewed and Zheng was able to demonstrate them prior to the end of the session.  Zheng demonstrated good  understanding of the education provided.     See EMR under Patient Instructions for exercises provided 3/10/2022.    Assessment     Pt remains with stiffness throughout his R hip but is responded well to manual interventions for pain control. Exercises continue to focus on improving his hip strength and mobility. Increased emphasis was placed on core control today via posterior pelvic tilting. He struggle with TA activation but can improve performance with cueing.     Zheng Is progressing well towards his goals.   Pt prognosis is Good.     Pt will continue to benefit from skilled outpatient physical therapy to " address the deficits listed in the problem list box on initial evaluation, provide pt/family education and to maximize pt's level of independence in the home and community environment.     Pt's spiritual, cultural and educational needs considered and pt agreeable to plan of care and goals.     Anticipated barriers to physical therapy: none    Goals:   Short Term Goals (4 Weeks): Progressing, not met   1. Pt will be compliant with HEP to supplement PT in restoring pain free function.  2. Pt will improve impaired LE strength by 1/2 MMT grade to improve strength for functional tasks  3. Pt improve impaired R hip extension ROM by 5 deg to improve mobility for normal movement patterns.     Long Term Goals (8 Weeks): Progressing, not met   1. Pt will improve FOTO score to </= 22% limited to decrease perceived limitation with mobility  2. Pt will improve impaired LE strength by 1 MMT grade to improve strength for functional tasks.  3. Pt will be able to tolerate prolonged weight bearing activities without pain in order to demonstrate improved functional ability.     Plan   Plan of care Certification: 3/10/2022 to 5/5/2022.    Continue with current plan of care with emphasis on hip mobility and lower extremity strengthening.     PAPI NOVOA, PT

## 2022-04-01 ENCOUNTER — CLINICAL SUPPORT (OUTPATIENT)
Dept: REHABILITATION | Facility: HOSPITAL | Age: 77
End: 2022-04-01
Attending: ANESTHESIOLOGY
Payer: MEDICARE

## 2022-04-01 DIAGNOSIS — R26.9 GAIT ABNORMALITY: ICD-10-CM

## 2022-04-01 DIAGNOSIS — R29.898 WEAKNESS OF RIGHT LOWER EXTREMITY: ICD-10-CM

## 2022-04-01 DIAGNOSIS — M25.651 DECREASED RANGE OF RIGHT HIP MOVEMENT: Primary | ICD-10-CM

## 2022-04-01 PROCEDURE — 97112 NEUROMUSCULAR REEDUCATION: CPT | Mod: PO

## 2022-04-01 PROCEDURE — 97110 THERAPEUTIC EXERCISES: CPT | Mod: PO

## 2022-04-01 PROCEDURE — 97140 MANUAL THERAPY 1/> REGIONS: CPT | Mod: PO

## 2022-04-04 NOTE — PROGRESS NOTES
I have personally taken the history and examined this patient. I agree with the resident's note as stated above.       77 y.o. yo male with b/l carpal tunnel syndrome L>R     Plan: The patient and I had a thorough discussion today.  We discussed the working diagnosis as well as several other potential alternative diagnoses.  Treatment options were discussed, both conservative and surgical.  Conservative treatment options would include things such as activity modifications, workplace modifications, a period of rest, oral vs topical OTC and prescription anti-inflammatory medications, occupational therapy, splinting/bracing, immobilization, corticosteroid injections, and others.  Surgical options were discussed as well.      At this time, the patient would like to think about the surgery and will contact via the portal.

## 2022-04-04 NOTE — PROGRESS NOTES
"  Physical Therapy Daily Treatment Note     Name: Zheng Talley Jr.  Clinic Number: [id number]    Therapy Diagnosis:   Encounter Diagnoses   Name Primary?    Decreased range of right hip movement Yes    Gait abnormality     Weakness of right lower extremity      Physician: Frankie Carrillo MD    Visit Date: 4/5/2022    Physician Orders: PT Eval and Treat   Medical Diagnosis from Referral:   M79.604 (ICD-10-CM) - Right leg pain   M25.551 (ICD-10-CM) - Right hip pain      Evaluation Date: 3/10/2022  Authorization Period Expiration: 12/31/2022  Plan of Care Expiration: 5/5/2022  Visit # / Visits authorized: 4/20    1st FOTO Follow up: 4/5/2022   2nd FOTO Follow up:     Time In: 819am  Time Out: 900am  Total Billable Time: 41 minutes    Precautions: Standard, cancer and HTN    Subjective     Pt reports: that he feels a little better each day and really is noticing a good amount of improvement since the onset of his treatment. He states that getting out of a chair is not easier and he is having less trouble when walking.   He was compliant with home exercise program.  Response to previous treatment: Felt fine  Functional change: A little less hip pain     Pain: 2/10  Location: right hip and groin      Objective     Zheng received therapeutic exercises to develop strength, endurance and ROM for 21 minutes including:     Bridges, 3 x 8  Sidelying clamshells with GreenTB 3x10 reps each   Shuttle single leg squats 2x10 reps with 50# each   DL shuttle leg press with 87#, 4 minutes  Lateral walking in // bars with OTB around shins, 3 laps     Not Today:  Hip flexor stretch in the chair 3x20 sec holds on R   - attempted sit to stand from chair following and no pain in hip following stretch  Standing on 8" step with self distraction of R hip, 5# around ankle, 10x 10" holds    Zheng received the following manual therapy techniques: Joint mobilizations were applied to the: R hip for 10 minutes, including:    Supine long axis " "hip distraction grade III-IV  Lateral hip mobs with mobilization strap, grade III and IV  Inferior hip mobilizations at 90 degrees of flexion, grade III and IV    Not Today:  Lateral hip distraction grade III-IV  Prone hip extension mobilizations grade III   Side lying hip extension with anterior mobs, grade III    Zheng participated in neuromuscular re-education activities to improve: Balance, Coordination and Kinesthetic for 10 minutes. The following activities were included:    Standing PPT on the wall, 2 x 10 3" holds (not today)  Half kneeling on 2 foam pads with posterior pelvic tilt and hip flexor stretch with paloff press on cables 2x10 reps on each side   Bent over wedge on mat hip extensions 2x10 reps each side   Step ups on 4" step with SL stance at the top, 15x on the L, 2" step used for the R due to pain     Zheng participated in dynamic functional therapeutic activities to improve functional performance for 0  minutes, including:    Home Exercises Provided and Patient Education Provided     Education provided:   - Continue with HEP  - Add in hip flexor stretch in chair   - Importance of hip mobility and strength     Written Home Exercises Provided: Patient instructed to cont prior HEP.  Exercises were reviewed and Zheng was able to demonstrate them prior to the end of the session.  Zheng demonstrated good  understanding of the education provided.     See EMR under Patient Instructions for exercises provided 3/10/2022.    Assessment     Zheng remains with stiffness throughout his R hip but is demonstrating improved joint mobility and overall ROM. Today he ambulated into the clinic with less of an antalgic gait pattern and had equal stance time through his B LEs. Hip mobility and strengthening exercises remain the focus of his treatment sessions and he is being challenged appropriately. He remains with pain and difficulty with step ups on a 4" box or higher so the exercise was performed on a 2" box " today.    Zheng Is progressing well towards his goals.   Pt prognosis is Good.     Pt will continue to benefit from skilled outpatient physical therapy to address the deficits listed in the problem list box on initial evaluation, provide pt/family education and to maximize pt's level of independence in the home and community environment.     Pt's spiritual, cultural and educational needs considered and pt agreeable to plan of care and goals.     Anticipated barriers to physical therapy: none    Goals:   Short Term Goals (4 Weeks):   1. Pt will be compliant with HEP to supplement PT in restoring pain free function. (MET)  2. Pt will improve impaired LE strength by 1/2 MMT grade to improve strength for functional tasks (progressing)  3. Pt improve impaired R hip extension ROM by 5 deg to improve mobility for normal movement patterns. (progressing)     Long Term Goals (8 Weeks):   1. Pt will improve FOTO score to </= 22% limited to decrease perceived limitation with mobility (progressing)  2. Pt will improve impaired LE strength by 1 MMT grade to improve strength for functional tasks. (progressing)  3. Pt will be able to tolerate prolonged weight bearing activities without pain in order to demonstrate improved functional ability. (progressing)    Plan   Plan of care Certification: 3/10/2022 to 5/5/2022.    Continue with current plan of care with emphasis on hip mobility and lower extremity strengthening.     PAPI NOVOA, PT

## 2022-04-05 ENCOUNTER — CLINICAL SUPPORT (OUTPATIENT)
Dept: REHABILITATION | Facility: HOSPITAL | Age: 77
End: 2022-04-05
Attending: ANESTHESIOLOGY
Payer: MEDICARE

## 2022-04-05 ENCOUNTER — PATIENT MESSAGE (OUTPATIENT)
Dept: UROLOGY | Facility: CLINIC | Age: 77
End: 2022-04-05
Payer: MEDICARE

## 2022-04-05 DIAGNOSIS — R26.9 GAIT ABNORMALITY: ICD-10-CM

## 2022-04-05 DIAGNOSIS — M25.651 DECREASED RANGE OF RIGHT HIP MOVEMENT: Primary | ICD-10-CM

## 2022-04-05 DIAGNOSIS — R29.898 WEAKNESS OF RIGHT LOWER EXTREMITY: ICD-10-CM

## 2022-04-05 PROCEDURE — 97110 THERAPEUTIC EXERCISES: CPT | Mod: PO

## 2022-04-05 PROCEDURE — 97140 MANUAL THERAPY 1/> REGIONS: CPT | Mod: PO

## 2022-04-05 PROCEDURE — 97112 NEUROMUSCULAR REEDUCATION: CPT | Mod: PO

## 2022-04-07 ENCOUNTER — OFFICE VISIT (OUTPATIENT)
Dept: INTERNAL MEDICINE | Facility: CLINIC | Age: 77
End: 2022-04-07
Attending: FAMILY MEDICINE
Payer: MEDICARE

## 2022-04-07 VITALS
BODY MASS INDEX: 35.24 KG/M2 | WEIGHT: 246.13 LBS | HEIGHT: 70 IN | DIASTOLIC BLOOD PRESSURE: 70 MMHG | SYSTOLIC BLOOD PRESSURE: 116 MMHG | OXYGEN SATURATION: 95 % | HEART RATE: 68 BPM

## 2022-04-07 DIAGNOSIS — C61 PROSTATE CANCER: Primary | ICD-10-CM

## 2022-04-07 DIAGNOSIS — I10 ESSENTIAL HYPERTENSION: Primary | ICD-10-CM

## 2022-04-07 DIAGNOSIS — Z12.11 SCREENING FOR COLON CANCER: ICD-10-CM

## 2022-04-07 DIAGNOSIS — R79.9 ABNORMAL FINDING OF BLOOD CHEMISTRY, UNSPECIFIED: ICD-10-CM

## 2022-04-07 DIAGNOSIS — E78.5 HYPERLIPIDEMIA LDL GOAL <130: ICD-10-CM

## 2022-04-07 PROCEDURE — 99213 OFFICE O/P EST LOW 20 MIN: CPT | Mod: PBBFAC | Performed by: FAMILY MEDICINE

## 2022-04-07 PROCEDURE — 99214 OFFICE O/P EST MOD 30 MIN: CPT | Mod: S$PBB,,, | Performed by: FAMILY MEDICINE

## 2022-04-07 PROCEDURE — 99999 PR PBB SHADOW E&M-EST. PATIENT-LVL III: ICD-10-PCS | Mod: PBBFAC,,, | Performed by: FAMILY MEDICINE

## 2022-04-07 PROCEDURE — 99214 PR OFFICE/OUTPT VISIT, EST, LEVL IV, 30-39 MIN: ICD-10-PCS | Mod: S$PBB,,, | Performed by: FAMILY MEDICINE

## 2022-04-07 PROCEDURE — 99999 PR PBB SHADOW E&M-EST. PATIENT-LVL III: CPT | Mod: PBBFAC,,, | Performed by: FAMILY MEDICINE

## 2022-04-07 RX ORDER — METOPROLOL SUCCINATE 50 MG/1
50 TABLET, EXTENDED RELEASE ORAL DAILY
Qty: 90 TABLET | Refills: 3 | Status: SHIPPED | OUTPATIENT
Start: 2022-04-07 | End: 2023-04-25

## 2022-04-07 RX ORDER — LOSARTAN POTASSIUM AND HYDROCHLOROTHIAZIDE 25; 100 MG/1; MG/1
1 TABLET ORAL DAILY
Qty: 90 TABLET | Refills: 3 | Status: SHIPPED | OUTPATIENT
Start: 2022-04-07 | End: 2023-04-25

## 2022-04-07 RX ORDER — PRAVASTATIN SODIUM 40 MG/1
40 TABLET ORAL NIGHTLY
Qty: 90 TABLET | Refills: 3 | Status: SHIPPED | OUTPATIENT
Start: 2022-04-07 | End: 2023-07-21 | Stop reason: SDUPTHER

## 2022-04-07 RX ORDER — AMLODIPINE BESYLATE 10 MG/1
10 TABLET ORAL DAILY
Qty: 90 TABLET | Refills: 3 | Status: SHIPPED | OUTPATIENT
Start: 2022-04-07 | End: 2023-04-25

## 2022-04-07 NOTE — TELEPHONE ENCOUNTER
I spoke to the patient appointment schedule 04/26/2022, please order labs to be done before appointment. Thank you

## 2022-04-07 NOTE — PROGRESS NOTES
"CHIEF COMPLAINT:  Annual and hypertension follow-up    HISTORY OF PRESENT ILLNESS: The patient is a 77 year-old male.  No problems recently    The patient has been treated with robotic prostatectomy.  He is closely following with urologic oncology.  All is going well there.      The patient has a history of stable hypertension on current medications.  Patient denies chest pain or shortness of breath today.    The patient has a history of stable hyperlipidemia on current medications.  The patient denies chest pain or shortness of breath today.  The patient denies muscle aches or myalgias suggestive of myositis.    REVIEW OF SYSTEMS:  GENERAL: No fever, chills, fatigability or weight loss.  SKIN: No rashes, itching or changes in color or texture of skin.  HEAD: No headaches or recent head trauma.  EYES: Visual acuity fine. No photophobia, ocular pain or diplopia.  EARS: Denies ear pain, discharge or vertigo.  NOSE: No loss of smell, no epistaxis or postnasal drip.  MOUTH & THROAT: No hoarseness or change in voice. No excessive gum bleeding.  NODES: Denies swollen glands.  CHEST: Denies CLIFFORD, cyanosis, wheezing, cough and sputum production.  CARDIOVASCULAR: Denies chest pain, PND, orthopnea or reduced exercise tolerance.  ABDOMEN: Appetite fine. No weight loss. Denies diarrhea, abdominal pain, hematemesis or blood in stool.  URINARY: No flank pain, dysuria or hematuria.  PERIPHERAL VASCULAR: No claudication or cyanosis.  MUSCULOSKELETAL: No joint stiffness or swelling. Denies back pain.  NEUROLOGIC: No history of seizures, paralysis, alteration of gait or coordination.    SOCIAL HISTORY: The patient does not smoke.  The patient consumes alcohol socially.  The patient is happily .    PHYSICAL EXAMINATION:   Blood pressure 116/70, pulse 68, height 5' 10" (1.778 m), weight 111.7 kg (246 lb 2.3 oz), SpO2 95 %.    APPEARANCE: Well nourished, well developed, in no acute distress.    HEAD: Normocephalic, " atraumatic.  EYES: PERRL. EOMI.  Conjunctivae without injection and  anicteric  NOSE: Mucosa pink. Airway clear.  MOUTH & THROAT: No tonsillar enlargement. No pharyngeal erythema or exudate. No stridor.  NECK: Supple.   NODES: No cervical, axillary or inguinal lymph node enlargement.  CHEST: Lungs clear to auscultation.  No retractions are noted.  No rales or rhonchi are present.  CARDIOVASCULAR: Normal S1, S2. No rubs, murmurs or gallops.  ABDOMEN: Bowel sounds normal. Not distended. Soft. No tenderness or masses.  No ascites is noted.  MUSCULOSKELETAL:  There is no clubbing, cyanosis, or edema of the extremities x4.  There is full range of motion of the lumbar spine.  There is full range of motion of the extremities x4.  There is no deformity noted.    NEUROLOGIC:       Normal speech development.      Hearing normal.      Normal gait.      Motor and sensory exams grossly normal.  PSYCHIATRIC: Patient is alert and oriented x3.  Thought processes are all normal.  There is no homicidality.  There is no suicidality.  There is no evidence of psychosis.    LABORATORY/RADIOLOGY:   Chart reviewed.  We will update blood work today.    ASSESSMENT:   Annual  Prostate cancer, believed to be cured  Hypertension    PLAN:  We will follow-up blood work which we expect to be normal.    Blood pressure medications refilled   I discussed his case with his urologist   Needs colonoscopy  Return to clinic in one year.

## 2022-04-18 ENCOUNTER — TELEPHONE (OUTPATIENT)
Dept: PAIN MEDICINE | Facility: CLINIC | Age: 77
End: 2022-04-18
Payer: MEDICARE

## 2022-04-18 NOTE — TELEPHONE ENCOUNTER
"This message is for patient in regards to his/her appointment 04/19/22 at 9:30a       [facility] Policy: For the safety of all patients and staff members.     Patient Visitor policy: Due to social distancing and limited seating staff are requesting patient to arrive to their schedule appointments on time.     Upon arriving to facility; patients are required to wear a face mask, if patient do not have a face mask one will be provided. Upon arriving patient we ask patients to contact clinic at this number [phone] to notify staff that they have arrived or they may do so by utilizing the Mobile [employer] in [place](if patient have patient portal; clinical staff will send a message through there letting them know it's okay to proceed to their visit). Staff will give the patient the "okay" to come up or wait inside their vehicle until clinic is ready for patient to be seen by Dr. Frankie Carrillo MD. If you have any questions or concerns please contact [phone]    Voicemail full  "

## 2022-04-19 ENCOUNTER — LAB VISIT (OUTPATIENT)
Dept: LAB | Facility: OTHER | Age: 77
End: 2022-04-19
Attending: UROLOGY
Payer: MEDICARE

## 2022-04-19 ENCOUNTER — OFFICE VISIT (OUTPATIENT)
Dept: PAIN MEDICINE | Facility: CLINIC | Age: 77
End: 2022-04-19
Payer: MEDICARE

## 2022-04-19 VITALS
RESPIRATION RATE: 18 BRPM | DIASTOLIC BLOOD PRESSURE: 63 MMHG | HEART RATE: 63 BPM | BODY MASS INDEX: 35.22 KG/M2 | HEIGHT: 70 IN | TEMPERATURE: 98 F | SYSTOLIC BLOOD PRESSURE: 98 MMHG | WEIGHT: 246 LBS

## 2022-04-19 DIAGNOSIS — C61 PROSTATE CANCER: ICD-10-CM

## 2022-04-19 DIAGNOSIS — M25.551 RIGHT HIP PAIN: ICD-10-CM

## 2022-04-19 DIAGNOSIS — M16.0 PRIMARY OSTEOARTHRITIS OF BOTH HIPS: Primary | ICD-10-CM

## 2022-04-19 LAB — COMPLEXED PSA SERPL-MCNC: 0.04 NG/ML (ref 0–4)

## 2022-04-19 PROCEDURE — 84153 ASSAY OF PSA TOTAL: CPT | Performed by: UROLOGY

## 2022-04-19 PROCEDURE — 99999 PR PBB SHADOW E&M-EST. PATIENT-LVL IV: ICD-10-PCS | Mod: PBBFAC,,, | Performed by: ANESTHESIOLOGY

## 2022-04-19 PROCEDURE — 99214 OFFICE O/P EST MOD 30 MIN: CPT | Mod: PBBFAC | Performed by: ANESTHESIOLOGY

## 2022-04-19 PROCEDURE — 99214 OFFICE O/P EST MOD 30 MIN: CPT | Mod: S$PBB,,, | Performed by: ANESTHESIOLOGY

## 2022-04-19 PROCEDURE — 36415 COLL VENOUS BLD VENIPUNCTURE: CPT | Performed by: UROLOGY

## 2022-04-19 PROCEDURE — 99214 PR OFFICE/OUTPT VISIT, EST, LEVL IV, 30-39 MIN: ICD-10-PCS | Mod: S$PBB,,, | Performed by: ANESTHESIOLOGY

## 2022-04-19 PROCEDURE — 99999 PR PBB SHADOW E&M-EST. PATIENT-LVL IV: CPT | Mod: PBBFAC,,, | Performed by: ANESTHESIOLOGY

## 2022-04-19 NOTE — PROGRESS NOTES
Chronic patient Established Note (Follow up visit)      SUBJECTIVE:  Interval history 4/19/2022:  Zheng Talley Jr. presents to the clinic for a follow-up appointment for Right Groin pain. Since the last visit, Zheng Talley Jr. states the pain has been improving. Current pain intensity is 0/10.Xray hip Severe osteoarthritis both hips.He is in active PT now.    Pain Disability Index Review:  Last 3 PDI Scores 2/15/2022 3/17/2020 3/3/2020   Pain Disability Index (PDI) 9 0 20     Interval History 2/15/2022:  Mr. Talley is here in clinic today for the evaluation of new right groin pain. This pain has been present for ~3 months. He has seen a urologist for the pain who had an US performed and evaluated for hernia which was negative. He denies any trauma to the hip or inciting event but says he started to notice it gradually. The pain bothers him most when he stands from a seated position, especially if he was sitting for a while. He sometimes massages the area and feels a tight/tender point. He does some stretching of the hip but says he does not think its consistent enough to tell if it helps.    Interval History 5/19/2020:  Mr. Talley was contacted at home for follow-up. He continues to endorse right dull, aching lower knee pain. He states, however, that it has significantly improved from his prior encounter. He attributes this improvement to rest, stretching, and medications. He continues to take gabapentin 100 mg TID as well as intermittent Naproxen and Tylenol. He has no new complaints or concerns today.     Interval hx 3/17/20  Patient presents today for follow up of his right knee pain. He reports significant pain relief since previous visit. He feels a pain in his knee every once in a while but overall is happy with his pain relief and functionality at this time. He has been taking gabapentin and naproxen as prescribed. His knee xray shows degenerative changes, and his MRI shows non-stenotic spondylosis and  facet arthritis. He has not started physical therapy.      Initial HPI 3/3/20  Zheng Talley Jr. presents to the clinic for the evaluation of right leg pain. The pain started approximately 1 year ago insiduously and symptoms have been unchanged.The pain is starts in his right posterior thigh radiates to the lateral right leg and to the top of his right foot.  The pain is described as aching and intermittent and is rated as 2/10. He says his pain is exacerbated by prolonged sitting, particularly while driving. He says his pain is relieved by standing or repositioning his body to relieve pressure. Symptoms interfere with daily activity and sleeping. He reports spending 0 hours per day reclining. The patient reports 5 hours of uninterrupted sleep per night.  He has never seen Pain Management in the past nor has he had spine or joint surgery.   Of note, he also endorses chronic, intermittent, dull, aching right knee pain exacerbated by prolonged walking and improved with rest.     Patient denies night fever/night sweats, urinary incontinence, bowel incontinence, significant weight loss, significant motor weakness and loss of sensations.    Physical Therapy/Home Exercise: no      Pain Disability Index Review:  Last 3 PDI Scores 2/15/2022 3/17/2020 3/3/2020   Pain Disability Index (PDI) 9 0 20       Pain Medications:  Gabapentin 300 mg TID  Naproxen 500 mg BID  Tylenol 1000 mg TID PRN    None   See medication list     report:  Reviewed and consistent with medication use as prescribed.    Pain Procedures: none to review    [id number] 2/15/2022 Routine     Narrative & Impression  EXAMINATION:  XR HIPS BILATERAL 2 VIEW INCL AP PELVIS     CLINICAL HISTORY:  Pain in right leg     TECHNIQUE:  AP view of the pelvis and frogleg lateral views of both hips were performed.     COMPARISON:  2015     FINDINGS:  Femoral heads are well located with respect to the acetabula.  Femoral heads maintain a normal round  contour.  No acute fracture seen.  Osteophyte formation, subchondral sclerosis, subchondral cyst formation, and severe narrowing femoroacetabular joint bilaterally.     Calcifications in the midline pelvis are nonspecific.     Impression:     No acute osseous abnormality seen.  Severe osteoarthritis both hips.     Nonspecific calcifications project over the pelvis.        Electronically signed by: Farhana Rivera  Date:                                            02/15/2022  Time:                                           12:31        Reading Physician Reading Date Result Priority   Maco Stallworth DO 3/4/2020 Routine      Narrative     EXAMINATION:  XR KNEE 3 VIEW RIGHT    CLINICAL HISTORY:  Radiculopathy, lumbar region    TECHNIQUE:  AP, lateral, and Merchant views of the right knee were performed.    COMPARISON:  01/10/2013    FINDINGS:  Tricompartmental degenerative change of the right knee with joint space loss articular sclerosis and marginal osteophyte formation most pronounced in the lateral femoral tibial compartment.  There is no evidence for acute fracture or dislocation right knee.  No focal bony erosion.  Questionable small suprapatellar bursa effusion.  Scattered vascular calcifications.    Continued punctate ossified focus adjacent to the lateral aspect of the patella which may represent ununited ossification center.  Clinical correlation and further evaluation as warranted.    There is questionable sclerotic focus projected over the proximal tibial diaphysis partially included in the study which may be artifactual is only seen on the frontal view.  Clinical correlation and further evaluation with dedicated views of the tibia advised.    This report was flagged in Epic as abnormal.  .      Impression       Please see above      Electronically signed by: Maco Stallworth DO  Date: 03/04/2020  Time: 16:57       Details     Reading Physician Reading Date Result Priority   Surya Schaffer Jr., MD  3/12/2020 Routine      Narrative     EXAMINATION:  MRI LUMBAR SPINE WITHOUT CONTRAST    CLINICAL HISTORY:  Radiculopathy, lumbar regionlumbar radiculopathy;    TECHNIQUE:  Sagittal T1, sagittal T2, sagittal STIR, axial T1 and axial T2 weighted images of the lumbar spine obtained without contrast.    COMPARISON:  X-ray 02/04/2013    FINDINGS:  Lumbar spine alignment is within normal limits. The vertebral body heights are well maintained, with no fracture.  No marrow signal abnormality suspicious for an infiltrative process.    The conus is normal in appearance.  The adjacent soft tissue structures show no significant abnormalities.    L1-L2: There is no focal disc herniation. No significant central canal or neural foraminal narrowing.    L2-L3: Broad-based disc bulging and minimal spondylitic spurring are present.  There is also some facet arthritis.  No significant central canal or neural foraminal narrowing.    L3-L4: Broad-based disc bulging, spondylitic spurring, and facet arthritis are present.  No significant central canal or neural foraminal narrowing.    L4-L5: Broad-based disc bulging and spondylitic spurring which is asymmetric to the left noted, as well as severe facet arthritis and ligamentum flavum hypertrophy.  No focal disc herniation.  No significant central canal or neural foraminal narrowing.    L5-S1: Broad-based disc bulging and facet arthritis is present with superimposed central and right paracentral disc protrusion.  No focal disc herniation.    There is a prominence of the epidural fat in the lumbosacral region, most notably below L5-S1 which is of doubtful clinical significance.  No significant central canal or neural foraminal narrowing.      Impression       Multilevel nonstenotic spondylosis and facet arthritis      Electronically signed by: Surya Stovall Jr  Date: 03/12/2020  Time: 08:29     Abdominal Ulratrasound 1/24:   Impression:     No evidence for inguinal  hernia.      Allergies:   Review of patient's allergies indicates:   Allergen Reactions    Iodine and iodide containing products Other (See Comments)     Blood in urine in the 1970s       Current Medications:   Current Outpatient Medications   Medication Sig Dispense Refill    amLODIPine (NORVASC) 10 MG tablet Take 1 tablet (10 mg total) by mouth once daily. 90 tablet 3    losartan-hydrochlorothiazide 100-25 mg (HYZAAR) 100-25 mg per tablet Take 1 tablet by mouth once daily. 90 tablet 3    metoprolol succinate (TOPROL-XL) 50 MG 24 hr tablet Take 1 tablet (50 mg total) by mouth once daily. 90 tablet 3    pravastatin (PRAVACHOL) 40 MG tablet Take 1 tablet (40 mg total) by mouth every evening. 90 tablet 3    tadalafil (CIALIS) 20 MG Tab Take 1 tablet (20 mg total) by mouth daily as needed. 12 tablet 11    valACYclovir (VALTREX) 1000 MG tablet Take 1 tablet (1,000 mg total) by mouth once daily. (Patient taking differently: Take 1,000 mg by mouth as needed.) 90 tablet 3     No current facility-administered medications for this visit.       REVIEW OF SYSTEMS:    GENERAL:  No weight loss, malaise or fevers.  HEENT:  Negative for frequent or significant headaches.  NECK:  Negative for lumps, goiter, pain and significant neck swelling.  RESPIRATORY:  Negative for cough, wheezing or shortness of breath.  CARDIOVASCULAR:  Negative for chest pain, leg swelling or palpitations.  GI:  Negative for abdominal discomfort, blood in stools or black stools or change in bowel habits.  MUSCULOSKELETAL:  See HPI.  SKIN:  Negative for lesions, rash, and itching.  PSYCH:  Negative for sleep disturbance, mood disorder and recent psychosocial stressors.  HEMATOLOGY/LYMPHOLOGY:  Negative for prolonged bleeding, bruising easily or swollen nodes.  NEURO:   No history of headaches, syncope, paralysis, seizures or tremors.  All other reviewed and negative other than HPI.    Past Medical History:  Past Medical History:   Diagnosis Date     Arthritis     BPH (benign prostatic hyperplasia)     Cancer     prostate    Cataract     Groin pain     History of gastroesophageal reflux (GERD)     Hyperlipidemia     Hypertension     Nuclear sclerosis - Both Eyes 2014       Past Surgical History:  Past Surgical History:   Procedure Laterality Date    BUNIONECTOMY      bilateral     COLONOSCOPY      Dr. Velez    COLONOSCOPY N/A 2019    Procedure: COLONOSCOPY;  Surgeon: JACQUELINE Lozano MD;  Location: [facility] ENDO [address]);  Service: Endoscopy;  Laterality: N/A;    dental implant      HAND SURGERY      lip surgery      PROSTATE SURGERY      REPAIR OF NAIL BED Right 2018    Procedure: REPAIR, NAIL BED right thumb with I&D;  Surgeon: Elyssa Bradford MD;  Location: [facility];  Service: Orthopedics;  Laterality: Right;  stretcher, supine, hand pan 1 and pan 2       Family History:  Family History   Problem Relation Age of Onset    Cancer Father         leukemia    Diabetes Father     Coronary artery disease Mother     Hypertension Mother     No Known Problems Brother     No Known Problems Daughter     No Known Problems Son     No Known Problems Brother     No Known Problems Brother     Heart disease Neg Hx     Amblyopia Neg Hx     Blindness Neg Hx     Cataracts Neg Hx     Glaucoma Neg Hx     Macular degeneration Neg Hx     Retinal detachment Neg Hx     Strabismus Neg Hx        Social History:  Social History     Socioeconomic History    Marital status:    Tobacco Use    Smoking status: Former Smoker     Types: Cigarettes     Quit date:      Years since quittin.3    Smokeless tobacco: Never Used   Substance and Sexual Activity    Alcohol use: Yes     Alcohol/week: 0.0 standard drinks     Comment: occasionally    Drug use: No    Sexual activity: Never     Partners: Female       OBJECTIVE:    BP 98/63 (BP Location: Right arm, Patient Position: Sitting, BP Method: Large (Automatic))   Pulse 63   " Temp 97.7 °F (36.5 °C) (Temporal)   Resp 18   Ht 5' 10" (1.778 m)   Wt 111.6 kg (246 lb)   BMI 35.30 kg/m²     PHYSICAL EXAMINATION:    General appearance: Well appearing, in no acute distress, alert and oriented x3.  Psych:  Mood and affect appropriate.  Back: Straight leg raising in the sitting and supine positions is negative to radicular pain. No pain to palpation over the spine or costovertebral angles. Normal range of motion without pain reproduction.  Extremities: Peripheral joint ROM is full and pain free without obvious instability or laxity in all four extremities. No deformities, edema, or skin discoloration. Good capillary refill.  Musculoskeletal:Right Hip internal /external rotation cause mild pain. Bilateral upper and lower extremity strength is normal and symmetric.  No atrophy or tone abnormalities are noted.  Gait:Antalgic gait      77 y.o. year old male with right groin pain that sounds to involve muscle/tendon . Pain likely due  caused of osteoarthritis of the hip and we will evaluate for both.    1. Primary osteoarthritis of both hips  Ambulatory referral/consult to Physical/Occupational Therapy   2. Right hip pain         PLAN:   - Continue  physical therapy  and I have stressed the importance of physical activity and a home exercise plan to help with pain and improve health.    - Counseled patient regarding the importance of activity modification, constant sleeping habits and physical therapy.    - Reviewed /Discussed  xray of the right hip     - Prescribed flexeril 5 mg to take at night as needed for pain.    -PRN Tylenol< 3gm/day for pain    -Will order Aqua therapy    - Discussed potential for future steroid injections of the hip joint if pain not improved with physical therapy and medication.    - RTC PRN.     - Counseled patient regarding the importance of activity modification, smoking cessation, alcohol cessation, constant sleeping habits and physical therapy.    The above plan " and management options were discussed at length with patient. Patient is in agreement with the above and verbalized understanding.    Rito Rachel  04/19/2022      I have reviewed and concur with the resident's history, physical, assessment, and plan.  I have personally interviewed and examined the patient at bedside.  See below addendum for my evaluation and additional findings.    Frankie Carrillo MD

## 2022-04-28 ENCOUNTER — TELEPHONE (OUTPATIENT)
Dept: UROLOGY | Facility: CLINIC | Age: 77
End: 2022-04-28
Payer: MEDICARE

## 2022-04-29 ENCOUNTER — CLINICAL SUPPORT (OUTPATIENT)
Dept: REHABILITATION | Facility: HOSPITAL | Age: 77
End: 2022-04-29
Attending: ANESTHESIOLOGY
Payer: MEDICARE

## 2022-04-29 DIAGNOSIS — M25.651 DECREASED RANGE OF RIGHT HIP MOVEMENT: Primary | ICD-10-CM

## 2022-04-29 DIAGNOSIS — R26.9 GAIT ABNORMALITY: ICD-10-CM

## 2022-04-29 DIAGNOSIS — R29.898 WEAKNESS OF RIGHT LOWER EXTREMITY: ICD-10-CM

## 2022-04-29 PROCEDURE — 97140 MANUAL THERAPY 1/> REGIONS: CPT | Mod: PO

## 2022-04-29 PROCEDURE — 97110 THERAPEUTIC EXERCISES: CPT | Mod: PO

## 2022-04-29 PROCEDURE — 97112 NEUROMUSCULAR REEDUCATION: CPT | Mod: PO

## 2022-04-29 NOTE — PROGRESS NOTES
"  Physical Therapy Daily Treatment Note     Name: Zheng Talley Jr.  Clinic Number: [id number]    Therapy Diagnosis:   Encounter Diagnoses   Name Primary?    Decreased range of right hip movement Yes    Gait abnormality     Weakness of right lower extremity      Physician: Frankie Carrillo MD    Visit Date: 4/29/2022    Physician Orders: PT Eval and Treat   Medical Diagnosis from Referral:   M79.604 (ICD-10-CM) - Right leg pain   M25.551 (ICD-10-CM) - Right hip pain      Evaluation Date: 3/10/2022  Authorization Period Expiration: 12/31/2022  Plan of Care Expiration: 5/5/2022  Visit # / Visits authorized: 5/20    1st FOTO Follow up: 4/5/2022   2nd FOTO Follow up:     Time In: 817am  Time Out: 900am  Total Billable Time: 43 minutes    Precautions: Standard, cancer and HTN    Subjective     Pt reports: that he is a little sore today due to over doing it yesterday but overall feels good. He continues to feel like his pain is improving since treatment started.   He was compliant with home exercise program.  Response to previous treatment: Felt fine  Functional change: A little less hip pain     Pain: 2/10  Location: right hip and groin      Objective     Zheng received therapeutic exercises to develop strength, endurance and ROM for 23 minutes including:     Bridges, 3 x 8  Sidelying clamshells with GreenTB 3x10 reps each   DL shuttle leg press with 150#, 3 minutes  SL shuttle leg press on the R, 75#, 3 x 8  Lateral walking in // bars with GTB around shins, 3 laps   Squats at 27" chair holding 10#, 3 x 8    Not Today:  Hip flexor stretch in the chair 3x20 sec holds on R   - attempted sit to stand from chair following and no pain in hip following stretch  Standing on 8" step with self distraction of R hip, 5# around ankle, 10x 10" holds    Zheng received the following manual therapy techniques: Joint mobilizations were applied to the: R hip for 10 minutes, including:    Supine long axis hip distraction grade " "III-IV  Lateral hip mobs with mobilization strap, grade III and IV  Inferior hip mobilizations at 90 degrees of flexion, grade III and IV    Not Today:  Lateral hip distraction grade III-IV  Prone hip extension mobilizations grade III   Side lying hip extension with anterior mobs, grade III    Zheng participated in neuromuscular re-education activities to improve: Balance, Coordination and Kinesthetic for 10 minutes. The following activities were included:    Half kneeling on 2 foam pads with posterior pelvic tilt and hip flexor stretch with paloff press on cables 2x10 reps on each side   Bent over wedge on mat hip extensions 2x10 reps each side   Step ups on 4" step with SL stance at the top, 3 x 8    Zheng participated in dynamic functional therapeutic activities to improve functional performance for 0  minutes, including:    Home Exercises Provided and Patient Education Provided     Education provided:   - Continue with HEP  - Add in hip flexor stretch in chair   - Importance of hip mobility and strength     Written Home Exercises Provided: Patient instructed to cont prior HEP.  Exercises were reviewed and Zheng was able to demonstrate them prior to the end of the session.  Zheng demonstrated good  understanding of the education provided.     See EMR under Patient Instructions for exercises provided 3/10/2022.    Assessment     Pt remains with stiffness throughout his R hip with pain at his end ranges of flexion and IR. He does respond well to manual interventions as his pain and motion both improve. He continues to be challenged with hip strengthening with good tolerance. He struggles with step ups due to pain but is able to tolerated the exercise on a 4" step.     Zhegn Is progressing well towards his goals.   Pt prognosis is Good.     Pt will continue to benefit from skilled outpatient physical therapy to address the deficits listed in the problem list box on initial evaluation, provide pt/family " education and to maximize pt's level of independence in the home and community environment.     Pt's spiritual, cultural and educational needs considered and pt agreeable to plan of care and goals.     Anticipated barriers to physical therapy: none    Goals:   Short Term Goals (4 Weeks):   1. Pt will be compliant with HEP to supplement PT in restoring pain free function. (MET)  2. Pt will improve impaired LE strength by 1/2 MMT grade to improve strength for functional tasks (progressing)  3. Pt improve impaired R hip extension ROM by 5 deg to improve mobility for normal movement patterns. (progressing)     Long Term Goals (8 Weeks):   1. Pt will improve FOTO score to </= 22% limited to decrease perceived limitation with mobility (progressing)  2. Pt will improve impaired LE strength by 1 MMT grade to improve strength for functional tasks. (progressing)  3. Pt will be able to tolerate prolonged weight bearing activities without pain in order to demonstrate improved functional ability. (progressing)    Plan   Plan of care Certification: 3/10/2022 to 5/5/2022.    Continue with current plan of care with emphasis on hip mobility and lower extremity strengthening.     PAPI NOVOA, PT

## 2022-05-02 ENCOUNTER — OFFICE VISIT (OUTPATIENT)
Dept: RADIATION ONCOLOGY | Facility: CLINIC | Age: 77
End: 2022-05-02
Attending: RADIOLOGY
Payer: MEDICARE

## 2022-05-02 VITALS
WEIGHT: 243.69 LBS | BODY MASS INDEX: 34.89 KG/M2 | HEART RATE: 74 BPM | SYSTOLIC BLOOD PRESSURE: 147 MMHG | DIASTOLIC BLOOD PRESSURE: 76 MMHG | RESPIRATION RATE: 16 BRPM | HEIGHT: 70 IN

## 2022-05-02 DIAGNOSIS — C61 PROSTATE CANCER: ICD-10-CM

## 2022-05-02 PROCEDURE — 99999 PR PBB SHADOW E&M-EST. PATIENT-LVL IV: ICD-10-PCS | Mod: PBBFAC,,, | Performed by: RADIOLOGY

## 2022-05-02 PROCEDURE — 99212 PR OFFICE/OUTPT VISIT, EST, LEVL II, 10-19 MIN: ICD-10-PCS | Mod: S$PBB,,, | Performed by: RADIOLOGY

## 2022-05-02 PROCEDURE — 99214 OFFICE O/P EST MOD 30 MIN: CPT | Mod: PBBFAC | Performed by: RADIOLOGY

## 2022-05-02 PROCEDURE — 99999 PR PBB SHADOW E&M-EST. PATIENT-LVL IV: CPT | Mod: PBBFAC,,, | Performed by: RADIOLOGY

## 2022-05-02 PROCEDURE — 99212 OFFICE O/P EST SF 10 MIN: CPT | Mod: S$PBB,,, | Performed by: RADIOLOGY

## 2022-05-02 RX ORDER — CYCLOBENZAPRINE HCL 5 MG
5 TABLET ORAL NIGHTLY
COMMUNITY
Start: 2022-04-18 | End: 2022-09-06

## 2022-05-02 NOTE — PROGRESS NOTES
Subjective:       Patient ID: Zheng Talley Jr. is a 77 y.o. male.    Chief Complaint: Prostate Cancer (F/u prostate ca)    This patient presents for follow up.      Mr. Talley has a history of pathologic stage II (T2, N0, M0, R0, PSA < 10, GG5) prostate cancer.  He initially presented in 2015 with a mildly elevated PSA of 4.2 ng/ml.  Biopsies from the Rt. apex, Lt. base, Lt. mid gland and Lt. apex returned positive for adenocarcinoma.  The Cisco score was 8 (3 +5) in biopsies from the Rt. apex, 7 (3+4) at the Lt. apex and Lt. mid gland and 6 (3+3) at the Lt. base.  Further work up with bone scan and MRI of the abdomen and pelvis were negative.  He subsequently underwent prostatectomy in July of 2015.  Pathology revealed a 40 g prostate with Cisco 9 (4+5) adenocarcinoma involving 10% of both lobes of the prostate.  There was no extraprostatic extension or seminal vesicle invasion.  There was no lymphovascular or perineural invasion.  The margins of resection and 8 (4 Rt, 4 Lt.) pelvic nodes were negative for tumor involvement.  Postoperatively the patient recovered well  His PSA remained < 0.01 ng/ml until February of 2020 when it returned at 0.01 ng/ml.  Repeat PSA in February of 2021 was 0.02 ng/ml and increased to 0.03 ng/ml in September.  We meant with the patient in December.  PSA at that time was < 0.01 ng/ml  Today the patient states he feels well.  No complaints.      Review of Systems   Constitutional: Negative for activity change, appetite change, chills and fatigue.   Respiratory: Negative for cough and shortness of breath.    Gastrointestinal: Negative for abdominal pain, constipation, diarrhea and fecal incontinence.   Genitourinary: Positive for frequency (Every 2 - 3 hours at night). Negative for bladder incontinence, difficulty urinating and dysuria.         Objective:      Physical Exam  Constitutional:       General: He is not in acute distress.     Appearance: Normal appearance.   Abdominal:       General: There is no distension.      Palpations: Abdomen is soft.   Neurological:      Mental Status: He is alert and oriented to person, place, and time.   Psychiatric:         Mood and Affect: Mood normal.         Judgment: Judgment normal.       PSA on 4/22/22 returned  at 0.04 ng/ml  Assessment:       Problem List Items Addressed This Visit     Prostate cancer          Plan:       Discussed his increasing  PSA.   PSA doubling time is about 12 months.  Discussed  the options of continue active surveillance vs. salvage radiotherapy with hormonal therapy.  Discussed the pros and cons of each approach.  Discussed the possible work up with PSMA scan and MRI. Explained if his PSA continues to rise, he can anticipate salvage therapy but at this point, we will proceed with active surveillance.  Will plan follow up in 3 months with PSA.

## 2022-05-04 ENCOUNTER — TELEPHONE (OUTPATIENT)
Dept: UROLOGY | Facility: HOSPITAL | Age: 77
End: 2022-05-04
Payer: MEDICARE

## 2022-05-04 DIAGNOSIS — C61 PROSTATE CANCER: Primary | ICD-10-CM

## 2022-05-04 NOTE — TELEPHONE ENCOUNTER
----- Message from Josr Cheek sent at 5/4/2022  7:24 AM CDT -----  Betsy,currently pt is not scheduled with . When would you like him to see you?

## 2022-05-04 NOTE — PROGRESS NOTES
"  Physical Therapy Daily Treatment Note     Name: Zheng Talley Jr.  Clinic Number: [id number]    Therapy Diagnosis:   Encounter Diagnoses   Name Primary?    Decreased range of right hip movement Yes    Gait abnormality     Weakness of right lower extremity      Physician: Frankie Carrillo MD    Visit Date: 5/5/2022    Physician Orders: PT Eval and Treat   Medical Diagnosis from Referral:   M79.604 (ICD-10-CM) - Right leg pain   M25.551 (ICD-10-CM) - Right hip pain      Evaluation Date: 3/10/2022  Authorization Period Expiration: 12/31/2022  Plan of Care Expiration: 5/5/2022  Visit # / Visits authorized: 6/20    1st FOTO Follow up: 4/5/2022   2nd FOTO Follow up:     Time In: 803am  Time Out: 845am  Total Billable Time: 42 minutes    Precautions: Standard, cancer and HTN    Subjective     Pt reports: that he worked in his garden and mowed his grass yesterday. He is having a bit of muscle soreness today but not much pain.   He was compliant with home exercise program.  Response to previous treatment: Felt fine  Functional change: A little less hip pain     Pain: 2/10  Location: right hip and groin      Objective       Zheng received therapeutic exercises to develop strength, endurance and ROM for 20 minutes including:     Bridges, 3 x 8  Sidelying clamshells with GreenTB 2 x 10 reps each   DL shuttle leg press with 150#, 3 minutes (not today)  SL shuttle leg press on the R, 75#, 3 x 8   Lateral walking in // bars with GTB around shins, 3 laps   Squats at 24" chair holding 10#, 3 x 8    Not Today:  Hip flexor stretch in the chair 3x20 sec holds on R   - attempted sit to stand from chair following and no pain in hip following stretch  Standing on 8" step with self distraction of R hip, 5# around ankle, 10x 10" holds    Zheng received the following manual therapy techniques: Joint mobilizations were applied to the: R hip for 10 minutes, including:    Supine long axis hip distraction grade III-IV  Lateral hip mobs with " "mobilization strap, grade III and IV  Inferior hip mobilizations at 90 degrees of flexion, grade III and IV    Not Today:  Lateral hip distraction grade III-IV  Prone hip extension mobilizations grade III   Side lying hip extension with anterior mobs, grade III    Zheng participated in neuromuscular re-education activities to improve: Balance, Coordination and Kinesthetic for 12 minutes. The following activities were included:    Standing hip ER at the wall, 15x 3" holds on the R side  Half kneeling on 2 foam pads with posterior pelvic tilt and hip flexor stretch with paloff press on cables 2x10 reps on each side   Bent over wedge on mat hip extensions 2x10 reps each side   Step ups on 4" step with SL stance at the top, 3 x 8    Zheng participated in dynamic functional therapeutic activities to improve functional performance for 0  minutes, including:    Home Exercises Provided and Patient Education Provided     Education provided:   - Continue with HEP  - Add in hip flexor stretch in chair   - Importance of hip mobility and strength     Written Home Exercises Provided: Patient instructed to cont prior HEP.  Exercises were reviewed and Zheng was able to demonstrate them prior to the end of the session.  Zheng demonstrated good  understanding of the education provided.     See EMR under Patient Instructions for exercises provided 3/10/2022.    Assessment     Pt continues to require manual interventions to his R hip in order to improve his pain free mobility. Glut strengthening exercises continue and hip intrinsic strengthening was added today to improve joint stability.     Zheng Is progressing well towards his goals.   Pt prognosis is Good.     Pt will continue to benefit from skilled outpatient physical therapy to address the deficits listed in the problem list box on initial evaluation, provide pt/family education and to maximize pt's level of independence in the home and community environment.     Pt's " spiritual, cultural and educational needs considered and pt agreeable to plan of care and goals.     Anticipated barriers to physical therapy: none    Goals:   Short Term Goals (4 Weeks):   1. Pt will be compliant with HEP to supplement PT in restoring pain free function. (MET)  2. Pt will improve impaired LE strength by 1/2 MMT grade to improve strength for functional tasks (progressing)  3. Pt improve impaired R hip extension ROM by 5 deg to improve mobility for normal movement patterns. (progressing)     Long Term Goals (8 Weeks):   1. Pt will improve FOTO score to </= 22% limited to decrease perceived limitation with mobility (progressing)  2. Pt will improve impaired LE strength by 1 MMT grade to improve strength for functional tasks. (progressing)  3. Pt will be able to tolerate prolonged weight bearing activities without pain in order to demonstrate improved functional ability. (progressing)    Plan   Plan of care Certification: 3/10/2022 to 5/5/2022.    Continue with current plan of care with emphasis on hip mobility and lower extremity strengthening.     PAPI NOVOA, PT

## 2022-05-05 ENCOUNTER — CLINICAL SUPPORT (OUTPATIENT)
Dept: REHABILITATION | Facility: HOSPITAL | Age: 77
End: 2022-05-05
Attending: ANESTHESIOLOGY
Payer: MEDICARE

## 2022-05-05 DIAGNOSIS — M25.651 DECREASED RANGE OF RIGHT HIP MOVEMENT: Primary | ICD-10-CM

## 2022-05-05 DIAGNOSIS — R29.898 WEAKNESS OF RIGHT LOWER EXTREMITY: ICD-10-CM

## 2022-05-05 DIAGNOSIS — R26.9 GAIT ABNORMALITY: ICD-10-CM

## 2022-05-05 PROCEDURE — 97110 THERAPEUTIC EXERCISES: CPT | Mod: PO

## 2022-05-05 PROCEDURE — 97112 NEUROMUSCULAR REEDUCATION: CPT | Mod: PO

## 2022-05-05 PROCEDURE — 97140 MANUAL THERAPY 1/> REGIONS: CPT | Mod: PO

## 2022-05-11 NOTE — PROGRESS NOTES
Physical Therapy Daily Treatment Note/ Updated Plan of Care     Name: Zheng Talley Jr.  Clinic Number: [id number]    Therapy Diagnosis:   Encounter Diagnoses   Name Primary?    Decreased range of right hip movement Yes    Gait abnormality     Weakness of right lower extremity      Physician: Frankie Carrillo MD    Visit Date: 5/12/2022    Physician Orders: PT Eval and Treat   Medical Diagnosis from Referral:   M79.604 (ICD-10-CM) - Right leg pain   M25.551 (ICD-10-CM) - Right hip pain      Evaluation Date: 3/10/2022  Authorization Period Expiration: 12/31/2022  Plan of Care Expiration: 5/19/2022  Visit # / Visits authorized: 7/20    1st FOTO Follow up: 4/5/2022   2nd FOTO Follow up:     Time In: 938am  Time Out: 1015am  Total Billable Time: 20 minutes    Precautions: Standard, cancer and HTN    Subjective     Pt reports: that he is a bit sore today because he spent a lot of time painting and working around his house this weekend. He feels like at times his hip feels better but then he makes a wrong movement and it hurts again like it always has been.   He was compliant with home exercise program.  Response to previous treatment: Felt fine  Functional change: A little less hip pain     Pain: 2/10  Location: right hip and groin      Prior Level of Function: no pain or difficulty with ADLs or recreation activities like working in his shop  Current Level of Function: moderate pain and difficulty with ADLs and recreational activities     Objective     **Final 20 minutes of treatment session today was spent 1 on 1 with PT for reassessment**    Observation: pt ambulates into the clinic independently      Posture: pt stands with his R LE externally rotated and has significant knee valgus on the R side     Gait: he ambulates with excessive ER and knee valgus on the R side. His gait is antalgic with decreased stance time on the R LE.      Hip Range of Motion:    Right AROM/PROM Left AROM/PROM   Flexion 90 95   Abduction  "40 40   Extension 0 0   Ext. Rotation 30 30   Int. Rotation 15 20   - Pt had pain with PROM into IR and flexion at his end range  - ER and IR measurements were taken with his hip flexed to 90. With IR assessed in supine with his knee extended (log roll test position) his pain was worse and motion was more limited.     Lower Extremity Strength  Right LE   Left LE     Hip flexion: 3/5 (pain) Hip flexion: 4-/5   Hip Internal Rotation:  3+/5    Hip Internal Rotation: 3+/5      Hip External Rotation: 3+/5    Hip External Rotation: 3+/5      Hip extension:  3/5 Hip extension: 3/5   Hip adduction 4/5 (pain free at 0 and 45) Hip adduction 4/5 (pain free at 0 and 45)   Hip abduction: 3+/5 Hip abduction: 3+/5      Special Tests:  Log roll Test: + for pain on the R  ROXI: + on the R  FADIR: + on the R  Scour: -  Hip extension movement pattern: fires lumbar paraspinals immediately      Step ups on 6" step: unable to perform on the R without UE assistance and pushing up with L LE      Flexibility:               Ely's test: R = 110 degrees ; L = 120 degrees                Joint Mobility: decreased R hip mobility in all directions     Palpation: no TTP noted on the exam today     Sensation: intact to light touch     Edema: none      Zheng received therapeutic exercises to develop strength, endurance and ROM for 37 minutes including:     Assessment as above  Bridges, 3 x 8  Sidelying clamshells with GreenTB 2 x 10 reps each   DL shuttle leg press with 150#, 3 minutes (not today)  SL shuttle leg press on the R, 75#, 3 x 8   Lateral walking in // bars with GTB around shins, 3 laps     Not Today:  Hip flexor stretch in the chair 3x20 sec holds on R   - attempted sit to stand from chair following and no pain in hip following stretch  Standing on 8" step with self distraction of R hip, 5# around ankle, 10x 10" holds  Squats at 24" chair holding 10#, 3 x 8    Zheng received the following manual therapy techniques: Joint mobilizations " "were applied to the: R hip for 0 minutes, including:    Supine long axis hip distraction grade III-IV  Lateral hip mobs with mobilization strap, grade III and IV  Inferior hip mobilizations at 90 degrees of flexion, grade III and IV    Not Today:  Lateral hip distraction grade III-IV  Prone hip extension mobilizations grade III   Side lying hip extension with anterior mobs, grade III    Zheng participated in neuromuscular re-education activities to improve: Balance, Coordination and Kinesthetic for 12 minutes. The following activities were included:    Standing hip ER at the wall, 15x 3" holds on the R side  Half kneeling on 2 foam pads with posterior pelvic tilt and hip flexor stretch with paloff press on cables 2x10 reps on each side   Bent over wedge on mat hip extensions 2x10 reps each side   Step ups on 4" step with SL stance at the top, 3 x 8    Zheng participated in dynamic functional therapeutic activities to improve functional performance for 0  minutes, including:    Home Exercises Provided and Patient Education Provided     Education provided:   - Continue with HEP  - Add in hip flexor stretch in chair   - Importance of hip mobility and strength     Written Home Exercises Provided: Patient instructed to cont prior HEP.  Exercises were reviewed and Zheng was able to demonstrate them prior to the end of the session.  Zheng demonstrated good  understanding of the education provided.     See EMR under Patient Instructions for exercises provided 3/10/2022.    Assessment     Zheng has been seen for a total of 8 visits over the past 9 weeks to address his R hip and groin pain. Since the onset of treatment the patient has made some progress in his pain intensity but still experiences his high levels of his pain when he performs certain movements. Objectively her remains with a very still R hip and his weakness continues. He has pain at his end ranges of all motions of his R hip. At this time the patient was " education to contact his primary care provider to look into getting a referral to an orthopedic specialist to address his hip pain since little to no progress has been made so far with PT. He has 1 more visit scheduled next week and would like to complete that visit before going forward with discharge.     Zheng Is progressing well towards his goals.   Pt prognosis is Good.     Pt will continue to benefit from skilled outpatient physical therapy to address the deficits listed in the problem list box on initial evaluation, provide pt/family education and to maximize pt's level of independence in the home and community environment.     Pt's spiritual, cultural and educational needs considered and pt agreeable to plan of care and goals.     Anticipated barriers to physical therapy: none    Goals:   Short Term Goals (4 Weeks):   1. Pt will be compliant with HEP to supplement PT in restoring pain free function. (MET)  2. Pt will improve impaired LE strength by 1/2 MMT grade to improve strength for functional tasks (progressing)  3. Pt improve impaired R hip extension ROM by 5 deg to improve mobility for normal movement patterns. (progressing)     Long Term Goals (8 Weeks):   1. Pt will improve FOTO score to </= 22% limited to decrease perceived limitation with mobility (progressing)  2. Pt will improve impaired LE strength by 1 MMT grade to improve strength for functional tasks. (progressing)  3. Pt will be able to tolerate prolonged weight bearing activities without pain in order to demonstrate improved functional ability. (progressing)    Plan   Plan of care Certification: 5/12/2022 to 5/19/2022.     Outpatient Physical Therapy 1 times weekly for 1 weeks to include the following interventions: Gait Training, Manual Therapy, Moist Heat/ Ice, Neuromuscular Re-ed, Patient Education, Self Care, Therapeutic Activities and Therapeutic Exercise.      PAPI NOVOA, PT

## 2022-05-12 ENCOUNTER — CLINICAL SUPPORT (OUTPATIENT)
Dept: REHABILITATION | Facility: HOSPITAL | Age: 77
End: 2022-05-12
Attending: ANESTHESIOLOGY
Payer: MEDICARE

## 2022-05-12 DIAGNOSIS — M25.651 DECREASED RANGE OF RIGHT HIP MOVEMENT: Primary | ICD-10-CM

## 2022-05-12 DIAGNOSIS — R29.898 WEAKNESS OF RIGHT LOWER EXTREMITY: ICD-10-CM

## 2022-05-12 DIAGNOSIS — R26.9 GAIT ABNORMALITY: ICD-10-CM

## 2022-05-12 PROCEDURE — 97110 THERAPEUTIC EXERCISES: CPT | Mod: PO

## 2022-05-12 NOTE — PLAN OF CARE
Physical Therapy Daily Treatment Note/ Updated Plan of Care     Name: Zheng Talley Jr.  Clinic Number: [id number]    Therapy Diagnosis:   Encounter Diagnoses   Name Primary?    Decreased range of right hip movement Yes    Gait abnormality     Weakness of right lower extremity      Physician: Frankie Carrillo MD    Visit Date: 5/12/2022    Physician Orders: PT Eval and Treat   Medical Diagnosis from Referral:   M79.604 (ICD-10-CM) - Right leg pain   M25.551 (ICD-10-CM) - Right hip pain      Evaluation Date: 3/10/2022  Authorization Period Expiration: 12/31/2022  Plan of Care Expiration: 5/19/2022  Visit # / Visits authorized: 7/20    1st FOTO Follow up: 4/5/2022   2nd FOTO Follow up:     Time In: 938am  Time Out: 1015am  Total Billable Time: 20 minutes    Precautions: Standard, cancer and HTN    Subjective     Pt reports: that he is a bit sore today because he spent a lot of time painting and working around his house this weekend. He feels like at times his hip feels better but then he makes a wrong movement and it hurts again like it always has been.   He was compliant with home exercise program.  Response to previous treatment: Felt fine  Functional change: A little less hip pain     Pain: 2/10  Location: right hip and groin      Prior Level of Function: no pain or difficulty with ADLs or recreation activities like working in his shop  Current Level of Function: moderate pain and difficulty with ADLs and recreational activities     Objective     **Final 20 minutes of treatment session today was spent 1 on 1 with PT for reassessment**    Observation: pt ambulates into the clinic independently      Posture: pt stands with his R LE externally rotated and has significant knee valgus on the R side     Gait: he ambulates with excessive ER and knee valgus on the R side. His gait is antalgic with decreased stance time on the R LE.      Hip Range of Motion:    Right AROM/PROM Left AROM/PROM   Flexion 90 95   Abduction  "40 40   Extension 0 0   Ext. Rotation 30 30   Int. Rotation 15 20   - Pt had pain with PROM into IR and flexion at his end range  - ER and IR measurements were taken with his hip flexed to 90. With IR assessed in supine with his knee extended (log roll test position) his pain was worse and motion was more limited.     Lower Extremity Strength  Right LE   Left LE     Hip flexion: 3/5 (pain) Hip flexion: 4-/5   Hip Internal Rotation:  3+/5    Hip Internal Rotation: 3+/5      Hip External Rotation: 3+/5    Hip External Rotation: 3+/5      Hip extension:  3/5 Hip extension: 3/5   Hip adduction 4/5 (pain free at 0 and 45) Hip adduction 4/5 (pain free at 0 and 45)   Hip abduction: 3+/5 Hip abduction: 3+/5      Special Tests:  Log roll Test: + for pain on the R  ROXI: + on the R  FADIR: + on the R  Scour: -  Hip extension movement pattern: fires lumbar paraspinals immediately      Step ups on 6" step: unable to perform on the R without UE assistance and pushing up with L LE      Flexibility:               Ely's test: R = 110 degrees ; L = 120 degrees                Joint Mobility: decreased R hip mobility in all directions     Palpation: no TTP noted on the exam today     Sensation: intact to light touch     Edema: none      Zheng received therapeutic exercises to develop strength, endurance and ROM for 37 minutes including:     Assessment as above  Bridges, 3 x 8  Sidelying clamshells with GreenTB 2 x 10 reps each   DL shuttle leg press with 150#, 3 minutes (not today)  SL shuttle leg press on the R, 75#, 3 x 8   Lateral walking in // bars with GTB around shins, 3 laps     Not Today:  Hip flexor stretch in the chair 3x20 sec holds on R   - attempted sit to stand from chair following and no pain in hip following stretch  Standing on 8" step with self distraction of R hip, 5# around ankle, 10x 10" holds  Squats at 24" chair holding 10#, 3 x 8    Zheng received the following manual therapy techniques: Joint mobilizations " "were applied to the: R hip for 0 minutes, including:    Supine long axis hip distraction grade III-IV  Lateral hip mobs with mobilization strap, grade III and IV  Inferior hip mobilizations at 90 degrees of flexion, grade III and IV    Not Today:  Lateral hip distraction grade III-IV  Prone hip extension mobilizations grade III   Side lying hip extension with anterior mobs, grade III    Zheng participated in neuromuscular re-education activities to improve: Balance, Coordination and Kinesthetic for 12 minutes. The following activities were included:    Standing hip ER at the wall, 15x 3" holds on the R side  Half kneeling on 2 foam pads with posterior pelvic tilt and hip flexor stretch with paloff press on cables 2x10 reps on each side   Bent over wedge on mat hip extensions 2x10 reps each side   Step ups on 4" step with SL stance at the top, 3 x 8    Zheng participated in dynamic functional therapeutic activities to improve functional performance for 0  minutes, including:    Home Exercises Provided and Patient Education Provided     Education provided:   - Continue with HEP  - Add in hip flexor stretch in chair   - Importance of hip mobility and strength     Written Home Exercises Provided: Patient instructed to cont prior HEP.  Exercises were reviewed and Zheng was able to demonstrate them prior to the end of the session.  Zheng demonstrated good  understanding of the education provided.     See EMR under Patient Instructions for exercises provided 3/10/2022.    Assessment     Zheng has been seen for a total of 8 visits over the past 9 weeks to address his R hip and groin pain. Since the onset of treatment the patient has made some progress in his pain intensity but still experiences his high levels of his pain when he performs certain movements. Objectively her remains with a very still R hip and his weakness continues. He has pain at his end ranges of all motions of his R hip. At this time the patient was " education to contact his primary care provider to look into getting a referral to an orthopedic specialist to address his hip pain since little to no progress has been made so far with PT. He has 1 more visit scheduled next week and would like to complete that visit before going forward with discharge.     Zheng Is progressing well towards his goals.   Pt prognosis is Good.     Pt will continue to benefit from skilled outpatient physical therapy to address the deficits listed in the problem list box on initial evaluation, provide pt/family education and to maximize pt's level of independence in the home and community environment.     Pt's spiritual, cultural and educational needs considered and pt agreeable to plan of care and goals.     Anticipated barriers to physical therapy: none    Goals:   Short Term Goals (4 Weeks):   1. Pt will be compliant with HEP to supplement PT in restoring pain free function. (MET)  2. Pt will improve impaired LE strength by 1/2 MMT grade to improve strength for functional tasks (progressing)  3. Pt improve impaired R hip extension ROM by 5 deg to improve mobility for normal movement patterns. (progressing)     Long Term Goals (8 Weeks):   1. Pt will improve FOTO score to </= 22% limited to decrease perceived limitation with mobility (progressing)  2. Pt will improve impaired LE strength by 1 MMT grade to improve strength for functional tasks. (progressing)  3. Pt will be able to tolerate prolonged weight bearing activities without pain in order to demonstrate improved functional ability. (progressing)    Plan   Plan of care Certification: 5/12/2022 to 5/19/2022.     Outpatient Physical Therapy 1 times weekly for 1 weeks to include the following interventions: Gait Training, Manual Therapy, Moist Heat/ Ice, Neuromuscular Re-ed, Patient Education, Self Care, Therapeutic Activities and Therapeutic Exercise.      PAPI NOVOA, PT

## 2022-05-16 ENCOUNTER — PATIENT MESSAGE (OUTPATIENT)
Dept: INTERNAL MEDICINE | Facility: CLINIC | Age: 77
End: 2022-05-16
Payer: MEDICARE

## 2022-05-16 DIAGNOSIS — M25.551 RIGHT HIP PAIN: Primary | ICD-10-CM

## 2022-05-17 ENCOUNTER — TELEPHONE (OUTPATIENT)
Dept: ORTHOPEDICS | Facility: CLINIC | Age: 77
End: 2022-05-17
Payer: MEDICARE

## 2022-05-17 ENCOUNTER — PATIENT MESSAGE (OUTPATIENT)
Dept: ORTHOPEDICS | Facility: CLINIC | Age: 77
End: 2022-05-17
Payer: MEDICARE

## 2022-05-19 ENCOUNTER — CLINICAL SUPPORT (OUTPATIENT)
Dept: REHABILITATION | Facility: HOSPITAL | Age: 77
End: 2022-05-19
Attending: ANESTHESIOLOGY
Payer: MEDICARE

## 2022-05-19 DIAGNOSIS — M25.651 DECREASED RANGE OF RIGHT HIP MOVEMENT: Primary | ICD-10-CM

## 2022-05-19 DIAGNOSIS — R26.9 GAIT ABNORMALITY: ICD-10-CM

## 2022-05-19 DIAGNOSIS — R29.898 WEAKNESS OF RIGHT LOWER EXTREMITY: ICD-10-CM

## 2022-05-19 PROCEDURE — 97140 MANUAL THERAPY 1/> REGIONS: CPT | Mod: PO

## 2022-05-19 PROCEDURE — 97112 NEUROMUSCULAR REEDUCATION: CPT | Mod: PO

## 2022-05-19 PROCEDURE — 97110 THERAPEUTIC EXERCISES: CPT | Mod: PO

## 2022-05-19 NOTE — PROGRESS NOTES
Physical Therapy Daily Treatment Note/ Discharge Note     Name: Zheng Talley Jr.  Clinic Number: [id number]    Therapy Diagnosis:   Encounter Diagnoses   Name Primary?    Decreased range of right hip movement Yes    Gait abnormality     Weakness of right lower extremity      Physician: Frankie Carrillo MD    Visit Date: 5/19/2022    Physician Orders: PT Eval and Treat   Medical Diagnosis from Referral:   M79.604 (ICD-10-CM) - Right leg pain   M25.551 (ICD-10-CM) - Right hip pain      Evaluation Date: 3/10/2022  Authorization Period Expiration: 12/31/2022  Plan of Care Expiration: 5/19/2022  Visit # / Visits authorized: 8/20    1st FOTO Follow up: 4/5/2022   2nd FOTO Follow up: 5/19/2022     Time In: 927am  Time Out: 1007am  Total Billable Time: 40 minutes    Precautions: Standard, cancer and HTN    Subjective     Pt reports: that he remains with slow progress. He does have his good days but if he makes the wrong movement his pain returns just like it used to be. He is trying to reach out to orthopedics to get an appointment with a doctor.   He was compliant with home exercise program.  Response to previous treatment: Felt fine  Functional change: A little less hip pain     Pain: 2/10  Location: right hip and groin      Prior Level of Function: no pain or difficulty with ADLs or recreation activities like working in his shop  Current Level of Function: moderate pain and difficulty with ADLs and recreational activities     Objective     Observation: pt ambulates into the clinic independently      Posture: pt stands with his R LE externally rotated and has significant knee valgus on the R side     Gait: he ambulates with excessive ER and knee valgus on the R side. His gait is antalgic with decreased stance time on the R LE.      Hip Range of Motion:    Right AROM/PROM Left AROM/PROM   Flexion 90 95   Abduction 40 40   Extension 0 0   Ext. Rotation 30 30   Int. Rotation 15 20   - Pt had pain with PROM into IR and  "flexion at his end range  - ER and IR measurements were taken with his hip flexed to 90. With IR assessed in supine with his knee extended (log roll test position) his pain was worse and motion was more limited.     Lower Extremity Strength  Right LE   Left LE     Hip flexion: 3/5 (pain) Hip flexion: 4-/5   Hip Internal Rotation:  3+/5    Hip Internal Rotation: 3+/5      Hip External Rotation: 3+/5    Hip External Rotation: 3+/5      Hip extension:  3/5 Hip extension: 3/5   Hip adduction 4/5 (pain free at 0 and 45) Hip adduction 4/5 (pain free at 0 and 45)   Hip abduction: 3+/5 Hip abduction: 3+/5      Special Tests:  Log roll Test: + for pain on the R  ROXI: + on the R  FADIR: + on the R  Scour: -  Hip extension movement pattern: fires lumbar paraspinals immediately      Step ups on 6" step: unable to perform on the R without UE assistance and pushing up with L LE      Flexibility:               Ely's test: R = 110 degrees ; L = 120 degrees                Joint Mobility: decreased R hip mobility in all directions     Palpation: no TTP noted on the exam today     Sensation: intact to light touch     Edema: none      Zheng received therapeutic exercises to develop strength, endurance and ROM for 20 minutes including:     Assessment as above  Bridges, 3 x 8  Sidelying clamshells with GreenTB 2 x 10 reps each   DL shuttle leg press with 150#, 3 minutes   SL shuttle leg press on the R, 75#, 3 x 8   Lateral walking in // bars with GTB around shins, 3 laps     Not Today:  Hip flexor stretch in the chair 3x20 sec holds on R   - attempted sit to stand from chair following and no pain in hip following stretch  Standing on 8" step with self distraction of R hip, 5# around ankle, 10x 10" holds  Squats at 24" chair holding 10#, 3 x 8    Zheng received the following manual therapy techniques: Joint mobilizations were applied to the: R hip for 10 minutes, including:    Supine long axis hip distraction grade III-IV  Lateral hip " "mobs with mobilization strap, grade III and IV  Inferior hip mobilizations at 90 degrees of flexion, grade III and IV    Not Today:  Lateral hip distraction grade III-IV  Prone hip extension mobilizations grade III   Side lying hip extension with anterior mobs, grade III    Zheng participated in neuromuscular re-education activities to improve: Balance, Coordination and Kinesthetic for 10 minutes. The following activities were included:    Standing hip ER at the wall, 15x 3" holds on the R side  Bent over wedge on mat hip extensions 2x10 reps each side   Step ups on 4" step with SL stance at the top, 3 x 8    Zheng participated in dynamic functional therapeutic activities to improve functional performance for 0  minutes, including:    Home Exercises Provided and Patient Education Provided     Education provided:   - Continue with HEP  - Add in hip flexor stretch in chair   - Importance of hip mobility and strength     Written Home Exercises Provided: Patient instructed to cont prior HEP.  Exercises were reviewed and Zheng was able to demonstrate them prior to the end of the session.  Zheng demonstrated good  understanding of the education provided.     See EMR under Patient Instructions for exercises provided 3/10/2022.    Assessment     Zheng has been seen for a total of 9 visits over the past 10 weeks to address his R hip and groin pain. Since treatment began he has made little improvements in his motion and strength. His pain intensity and frequency has improved a bit but he continues with moderate pain with multiple different ADLs. AT this time, given his continued objective impairments and pain as well as the limited progress that has been made thus far, he will be discharged from skilled PT services and is educated to schedule an appointment with an orthopedic specialist to further evaluate him and offer more treatment options. He verbalized agreement with this decision.      Zheng Is progressing well " towards his goals.   Pt prognosis is Good.     Pt will continue to benefit from skilled outpatient physical therapy to address the deficits listed in the problem list box on initial evaluation, provide pt/family education and to maximize pt's level of independence in the home and community environment.     Pt's spiritual, cultural and educational needs considered and pt agreeable to plan of care and goals.     Anticipated barriers to physical therapy: none    Goals:   Short Term Goals (4 Weeks):   1. Pt will be compliant with HEP to supplement PT in restoring pain free function. (MET)  2. Pt will improve impaired LE strength by 1/2 MMT grade to improve strength for functional tasks (MET)  3. Pt improve impaired R hip extension ROM by 5 deg to improve mobility for normal movement patterns.      Long Term Goals (8 Weeks):   1. Pt will improve FOTO score to </= 22% limited to decrease perceived limitation with mobility (Not met)  2. Pt will improve impaired LE strength by 1 MMT grade to improve strength for functional tasks. (Not met)  3. Pt will be able to tolerate prolonged weight bearing activities without pain in order to demonstrate improved functional ability. (Not met)    Plan     Discharge from skilled PT services    [facility]        full weight-bearing

## 2022-05-28 ENCOUNTER — PATIENT MESSAGE (OUTPATIENT)
Dept: ADMINISTRATIVE | Facility: OTHER | Age: 77
End: 2022-05-28
Payer: MEDICARE

## 2022-06-20 ENCOUNTER — OFFICE VISIT (OUTPATIENT)
Dept: ORTHOPEDICS | Facility: CLINIC | Age: 77
End: 2022-06-20
Payer: MEDICARE

## 2022-06-20 ENCOUNTER — HOSPITAL ENCOUNTER (OUTPATIENT)
Dept: RADIOLOGY | Facility: HOSPITAL | Age: 77
Discharge: HOME | End: 2022-06-20
Attending: PHYSICIAN ASSISTANT
Payer: MEDICARE

## 2022-06-20 VITALS — BODY MASS INDEX: 35.07 KG/M2 | WEIGHT: 245 LBS | HEIGHT: 70 IN

## 2022-06-20 DIAGNOSIS — M25.551 RIGHT HIP PAIN: ICD-10-CM

## 2022-06-20 DIAGNOSIS — M16.11 PRIMARY OSTEOARTHRITIS OF RIGHT HIP: Primary | ICD-10-CM

## 2022-06-20 PROCEDURE — 73502 X-RAY EXAM HIP UNI 2-3 VIEWS: CPT | Mod: 26,RT,, | Performed by: RADIOLOGY

## 2022-06-20 PROCEDURE — 73502 X-RAY EXAM HIP UNI 2-3 VIEWS: CPT | Mod: TC,RT

## 2022-06-20 PROCEDURE — 99203 PR OFFICE/OUTPT VISIT, NEW, LEVL III, 30-44 MIN: ICD-10-PCS | Mod: S$PBB,,, | Performed by: PHYSICIAN ASSISTANT

## 2022-06-20 PROCEDURE — 99203 OFFICE O/P NEW LOW 30 MIN: CPT | Mod: S$PBB,,, | Performed by: PHYSICIAN ASSISTANT

## 2022-06-20 PROCEDURE — 99999 PR PBB SHADOW E&M-EST. PATIENT-LVL III: CPT | Mod: PBBFAC,,, | Performed by: PHYSICIAN ASSISTANT

## 2022-06-20 PROCEDURE — 73502 XR HIP WITH PELVIS WHEN PERFORMED, 2 OR 3  VIEWS RIGHT: ICD-10-PCS | Mod: 26,RT,, | Performed by: RADIOLOGY

## 2022-06-20 PROCEDURE — 99999 PR PBB SHADOW E&M-EST. PATIENT-LVL III: ICD-10-PCS | Mod: PBBFAC,,, | Performed by: PHYSICIAN ASSISTANT

## 2022-06-20 PROCEDURE — 99213 OFFICE O/P EST LOW 20 MIN: CPT | Mod: PBBFAC | Performed by: PHYSICIAN ASSISTANT

## 2022-06-20 RX ORDER — MELOXICAM 7.5 MG/1
7.5 TABLET ORAL DAILY
Qty: 30 TABLET | Refills: 1 | Status: SHIPPED | OUTPATIENT
Start: 2022-06-20 | End: 2022-09-15

## 2022-06-20 NOTE — PROGRESS NOTES
SUBJECTIVE:     Chief Complaint   Patient presents with    Right Hip - Pain       History of Present Illness:  Zheng Talley Jr. is a 77 y.o. year old male presents today for constant right hip pain which started several months ago.  There is not a history of trauma.  The pain is located in the groin. The pain is described as achy, feels like a pulling sensation.  He though he strained his groin- had work up for hernia which was negative.  Diagnosed with osteoarthritis when he had x-rays with pain management.  His pain is worse moving from seated to standing.  He has pain with activity.  He has tried PT, which was initially helping but he does not continue to improve.  There is not a history of previous injury or surgery to the hip.  The patient does not use an assistive device.      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 Velez    COLONOSCOPY N/A 2/25/2019    Procedure: COLONOSCOPY;  Surgeon: JACQUELINE Lozano MD;  Location: [address]);  Service: Endoscopy;  Laterality: N/A;    dental implant      HAND SURGERY      lip surgery      PROSTATE SURGERY      REPAIR OF NAIL BED Right 11/2/2018    Procedure: REPAIR, NAIL BED right thumb with I&D;  Surgeon: Elyssa Bradford MD;  Location: [facility];  Service: Orthopedics;  Laterality: Right;  stretcher, supine, hand pan 1 and pan 2       Family History   Problem Relation Age of Onset    Cancer Father         leukemia    Diabetes Father     Coronary artery disease Mother     Hypertension Mother     No Known Problems Brother     No Known Problems Daughter     No Known Problems Son     No Known  "Problems Brother     No Known Problems Brother     Heart disease Neg Hx     Amblyopia Neg Hx     Blindness Neg Hx     Cataracts Neg Hx     Glaucoma Neg Hx     Macular degeneration Neg Hx     Retinal detachment Neg Hx     Strabismus Neg Hx        Review of patient's allergies indicates:   Allergen Reactions    Iodine and iodide containing products Other (See Comments)     Blood in urine in the 1970s         Current Outpatient Medications:     amLODIPine (NORVASC) 10 MG tablet, Take 1 tablet (10 mg total) by mouth once daily., Disp: 90 tablet, Rfl: 3    cyclobenzaprine (FLEXERIL) 5 MG tablet, Take 5 mg by mouth nightly., Disp: , Rfl:     losartan-hydrochlorothiazide 100-25 mg (HYZAAR) 100-25 mg per tablet, Take 1 tablet by mouth once daily., Disp: 90 tablet, Rfl: 3    metoprolol succinate (TOPROL-XL) 50 MG 24 hr tablet, Take 1 tablet (50 mg total) by mouth once daily., Disp: 90 tablet, Rfl: 3    pravastatin (PRAVACHOL) 40 MG tablet, Take 1 tablet (40 mg total) by mouth every evening., Disp: 90 tablet, Rfl: 3    valACYclovir (VALTREX) 1000 MG tablet, Take 1 tablet (1,000 mg total) by mouth once daily. (Patient taking differently: Take 1,000 mg by mouth as needed.), Disp: 90 tablet, Rfl: 3    meloxicam (MOBIC) 7.5 MG tablet, Take 1 tablet (7.5 mg total) by mouth once daily., Disp: 30 tablet, Rfl: 1    tadalafil (CIALIS) 20 MG Tab, Take 1 tablet (20 mg total) by mouth daily as needed., Disp: 12 tablet, Rfl: 11    Review of Systems:  ROS:  Constitutional: no fever or chills  Eyes: no visual changes  Respiratory: no cough or shortness of breath  Musculoskeletal: no arthralgias or myalgias      PE:  Ht 5' 10" (1.778 m)   Wt 111.1 kg (245 lb)   BMI 35.15 kg/m²   General: Pleasant, cooperative, NAD   HEENT: NCAT, sclera nonicteric   Lungs: Respirations are equal and unlabored.   Abdomen: Soft and non-tender.  CV: 2+ bilateral upper and lower extremity pulses.   Skin: Intact throughout LE with no rashes, " erythema, or lesions  Extremities: No LE edema, NVI lower extremities    Right hip  Skin intact  No warmth or erythema  Pain localizes to groin with PROM  100 degrees flexion  10 degrees internal rotation  40 degrees external rotation  5/5 quad, 5/5 hamstring  + sinchfield  Bilateral knee  FROM  No TTP  No effusion    IMAGING:  X-rays of the right hip, personally reviewed by me, demonstrate moderate DJD with joint space narrowing, osteophyte formation and subchondral sclerosis.  No fracture or dislocation.    ASSESSMENT/PLAN:   Zheng Talley Jr. is a 77 y.o. year old male with right hip osteoarthritis    Plan: We discussed with the patient at length all the different treatment options available for the hip including anti-inflammatories, acetaminophen, rest, ice, lower extremity strengthening exercise, occasional steroid injections for temporary relief, and finally total hip arthroplasty.     - Discussed non-operative treatment including continued HEP, low impact exercise, activity modification, cane, NSAIDS as needed  - Short course of meloxicam- prescription sent today  - Can return to pain management for CSI if pain worsens and not yet ready to consider VITO  - Follow up as needed

## 2022-06-27 ENCOUNTER — TELEPHONE (OUTPATIENT)
Dept: ORTHOPEDICS | Facility: CLINIC | Age: 77
End: 2022-06-27
Payer: MEDICARE

## 2022-06-27 NOTE — TELEPHONE ENCOUNTER
----- Message from Yo Navarrete sent at 6/27/2022 10:29 AM CDT -----  Regarding: Pt Advice  Contact: RAYSHAWN ROBBINS JR. [[id number]]  Name of Who is Calling: RAYSHAWN ROBBINS JR. [[id number]]      What is the request in detail: Would like to speak with staff in regards to questions about surgery treatment plans. Please advise      Can the clinic reply by [facility]: no      What Number to Call Back if not [facility]: [phone]

## 2022-06-27 NOTE — TELEPHONE ENCOUNTER
Spoke with pt and was able to get him scheduled for appointment. Pt voiced understanding to appointment day and time with no questions.

## 2022-06-28 ENCOUNTER — OFFICE VISIT (OUTPATIENT)
Dept: ORTHOPEDICS | Facility: CLINIC | Age: 77
End: 2022-06-28
Payer: MEDICARE

## 2022-06-28 VITALS
BODY MASS INDEX: 35.07 KG/M2 | WEIGHT: 244.94 LBS | HEIGHT: 70 IN | DIASTOLIC BLOOD PRESSURE: 75 MMHG | HEART RATE: 67 BPM | SYSTOLIC BLOOD PRESSURE: 127 MMHG

## 2022-06-28 DIAGNOSIS — R20.0 FINGER NUMBNESS: Primary | ICD-10-CM

## 2022-06-28 PROCEDURE — 99999 PR PBB SHADOW E&M-EST. PATIENT-LVL III: ICD-10-PCS | Mod: PBBFAC,,, | Performed by: ORTHOPAEDIC SURGERY

## 2022-06-28 PROCEDURE — 99999 PR PBB SHADOW E&M-EST. PATIENT-LVL III: CPT | Mod: PBBFAC,,, | Performed by: ORTHOPAEDIC SURGERY

## 2022-06-28 PROCEDURE — 99214 OFFICE O/P EST MOD 30 MIN: CPT | Mod: S$PBB,,, | Performed by: ORTHOPAEDIC SURGERY

## 2022-06-28 PROCEDURE — 99214 PR OFFICE/OUTPT VISIT, EST, LEVL IV, 30-39 MIN: ICD-10-PCS | Mod: S$PBB,,, | Performed by: ORTHOPAEDIC SURGERY

## 2022-06-28 PROCEDURE — 99213 OFFICE O/P EST LOW 20 MIN: CPT | Mod: PBBFAC | Performed by: ORTHOPAEDIC SURGERY

## 2022-06-28 RX ORDER — SODIUM, POTASSIUM,MAG SULFATES 17.5-3.13G
SOLUTION, RECONSTITUTED, ORAL ORAL
COMMUNITY
Start: 2022-04-25 | End: 2022-09-06

## 2022-06-28 NOTE — PROGRESS NOTES
I have personally taken the history and examined this patient. I agree with the resident's note as stated above.  Patient was referred for severe bilateral carpal tunnel left worse than right he actually has thenar atrophy on the left EMG nerve conduction study was positive for APB denervation patient 1st was very resistant to surgery he states that all of his friends told him it does not work at this point we spent about 30 minutes on education I did discuss with the patient that if he has constant numbness this may or may not improve after the surgery or if he has weakness that may or may not improve I discussed specially in the case of severe carpal tunnel that the outcome may not be as successful as mild cases and that is dated driven however the patient does have pain pain at night pain he will have to keep his arm out extended to could decrease some of the pain and I stated that this should be relieved after carpal tunnel surgery patient did have nerve damage on his right thumb that has not gotten better in 2 years I stated that the carpal tunnel similar when you have nerve damage that is not predictable patient at this point understands he agrees to proceed with open carpal tunnel release we discussed the surgery as well as postoperative plan risks and benefits were explained in great detail patient does understand that the weakness and full sensation may not return

## 2022-06-29 NOTE — PROGRESS NOTES
[facility]  History & Physical  Orthopedics    SUBJECTIVE:      Chief Complaint: b/l hand numbness and tingling     Referring Provider: N/A     History of Present Illness:  Patient is a 77 y.o. right hand dominant male who presents today with complaints of intermittent b/l hand numbness and tingling last seen 6 weeks ago in hand clinic and sent for EMG/NCS for which results are shown below. He does not wear wrist splints. He denies any weakness of the hands. Wakes at night secondary to symptoms.    Onset of symptoms/DOI was years ago.    Symptoms are aggravated by activity, movement and at night.    Symptoms are alleviated by rest, activity and immobilization.    Symptoms consist of pain and numbness/tingling.    The patient rates their pain as a 3/10.    Attempted treatment(s) and/or interventions include activity modifications, rest, rest, activity modification and anti-inflammatory medications.     The patient denies any fevers, chills, N/V, D/C and presents for evaluation.      Interval update 6/28/22  Patient returns today with continued complaints related to carpal tunnel syndrome including night pain. He states he is here to discuss surgery. Expresses concerns that he has friends who have recommended against surgery as they did not improve.        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 Velez    COLONOSCOPY N/A 2/25/2019    Procedure: COLONOSCOPY;  Surgeon: JACQUELINE Lozano MD;  Location: [address];  Service: Endoscopy;  Laterality: N/A;    dental implant      HAND SURGERY      lip surgery      PROSTATE SURGERY      REPAIR  OF NAIL BED Right 11/2/2018    Procedure: REPAIR, NAIL BED right thumb with I&D;  Surgeon: Elyssa Bradford MD;  Location: [facility];  Service: Orthopedics;  Laterality: Right;  stretcher, supine, hand pan 1 and pan 2     Review of patient's allergies indicates:   Allergen Reactions    Iodine and iodide containing products Other (See Comments)     Blood in urine in the 1970s     Social History     Social History Narrative    Not on file     Family History   Problem Relation Age of Onset    Cancer Father         leukemia    Diabetes Father     Coronary artery disease Mother     Hypertension Mother     No Known Problems Brother     No Known Problems Daughter     No Known Problems Son     No Known Problems Brother     No Known Problems Brother     Heart disease Neg Hx     Amblyopia Neg Hx     Blindness Neg Hx     Cataracts Neg Hx     Glaucoma Neg Hx     Macular degeneration Neg Hx     Retinal detachment Neg Hx     Strabismus Neg Hx          Current Outpatient Medications:     amLODIPine (NORVASC) 10 MG tablet, Take 1 tablet (10 mg total) by mouth once daily., Disp: 90 tablet, Rfl: 3    cyclobenzaprine (FLEXERIL) 5 MG tablet, Take 5 mg by mouth nightly., Disp: , Rfl:     losartan-hydrochlorothiazide 100-25 mg (HYZAAR) 100-25 mg per tablet, Take 1 tablet by mouth once daily., Disp: 90 tablet, Rfl: 3    meloxicam (MOBIC) 7.5 MG tablet, Take 1 tablet (7.5 mg total) by mouth once daily., Disp: 30 tablet, Rfl: 1    metoprolol succinate (TOPROL-XL) 50 MG 24 hr tablet, Take 1 tablet (50 mg total) by mouth once daily., Disp: 90 tablet, Rfl: 3    pravastatin (PRAVACHOL) 40 MG tablet, Take 1 tablet (40 mg total) by mouth every evening., Disp: 90 tablet, Rfl: 3    SUPREP BOWEL PREP KIT 17.5-3.13-1.6 gram SolR, USE AS DIRECTED FOR COLONOSCOPY PREP, Disp: , Rfl:     tadalafil (CIALIS) 20 MG Tab, Take 1 tablet (20 mg total) by mouth daily as needed., Disp: 12 tablet, Rfl: 11    valACYclovir (VALTREX)  "1000 MG tablet, Take 1 tablet (1,000 mg total) by mouth once daily. (Patient taking differently: Take 1,000 mg by mouth as needed.), Disp: 90 tablet, Rfl: 3      Review of Systems:  As per HPI otherwise noncontributory    OBJECTIVE:      Vital Signs (Most Recent):  Vitals:    06/28/22 1330   BP: 127/75   Pulse: 67   Weight: 111.1 kg (244 lb 14.9 oz)   Height: 5' 10" (1.778 m)     Body mass index is 35.14 kg/m².      Physical Exam:  Constitutional: The patient appears well-developed and well-nourished. No distress.   Skin: No lesions appreciated  Head: Normocephalic and atraumatic.   Nose: Nose normal.   Ears: No deformities seen  Eyes: Conjunctivae and EOM are normal.   Neck: No tracheal deviation present.   Cardiovascular: Normal rate and intact distal pulses.    Pulmonary/Chest: Effort normal. No respiratory distress.   Abdominal: There is no guarding.   Neurological: The patient is alert.   Psychiatric: The patient has a normal mood and affect.     Bilateral Hand/Wrist Examination:    Observation/Inspection:  Swelling  none    Deformity  none  Discoloration  none     Scars   none    Atrophy  none    HAND/WRIST EXAMINATION:  Finkelstein's Test   Neg  WHAT Test    Neg  Snuff box tenderness   Neg  Gerber's Test    Neg  Hook of Hamate Tenderness  Neg  CMC grind    Neg  Circumduction test   Neg    Neurovascular Exam:  Digits WWP, brisk CR < 3s throughout  NVI motor/LTS to M/R/U nerves, radial pulse 2+  Tinel's Test - Carpal Tunnel  Positive b/l  Tinel's Test - Cubital Tunnel  Neg  Phalen's Test    Positive b/l  Median Nerve Compression Test Positive b/l    ROM hand full, painless    ROM wrist full, painless    ROM elbow full, painless    Abdomen not guarded  Respirations nonlabored  Perfusion intact    Diagnostic Results:    EMG - b/l carpal tunnel L>R    ASSESSMENT/PLAN:      77 y.o. yo male with b/l carpal tunnel syndrome L>R    Discussed at length that his symptoms are severe and he has developed muscle loss in " left hand. Surgery is recommended. Informed patient that symptoms may or may not improve given severity of symptoms however positional pain is more likely to improve which he does have. He elected to proceed with left carpal tunnel release. May have right CTR in the future.    The risks, benefits and alternatives to surgery were discussed with the patient at great length.  These include pain, bleeding, infection, vessel/nerve damage, numbness, tingling, complex regional pain syndrome, recurrent infection need for further surgery, scarring, no improvement in symptoms, recurrence of symptoms after initial improvement, wound healing complications requiring further procedure for soft tissue coverage including flap coverage, cartilage damage,  DVT, PE, arthritis and death.  Patient states an understanding and wishes to proceed with surgery.   All questions were answered.  No guarantees were implied or stated.  Informed consent was obtained.

## 2022-06-30 DIAGNOSIS — R20.0 FINGER NUMBNESS: ICD-10-CM

## 2022-06-30 DIAGNOSIS — G56.02 CARPAL TUNNEL SYNDROME OF LEFT WRIST: Primary | ICD-10-CM

## 2022-07-02 ENCOUNTER — PATIENT MESSAGE (OUTPATIENT)
Dept: ADMINISTRATIVE | Facility: OTHER | Age: 77
End: 2022-07-02
Payer: MEDICARE

## 2022-07-05 ENCOUNTER — PATIENT MESSAGE (OUTPATIENT)
Dept: ORTHOPEDICS | Facility: CLINIC | Age: 77
End: 2022-07-05
Payer: MEDICARE

## 2022-07-15 ENCOUNTER — ANESTHESIA EVENT (OUTPATIENT)
Dept: SURGERY | Facility: HOSPITAL | Age: 77
End: 2022-07-15
Payer: MEDICARE

## 2022-07-18 NOTE — ANESTHESIA PREPROCEDURE EVALUATION
07/18/2022  Zheng Talley Jr. is a 77 y.o., male.    Pre-operative evaluation for Procedure(s) (LRB):  RELEASE, CARPAL TUNNEL,LEFT (Left)    Zheng Talley Jr. is a 77 y.o. male     Patient Active Problem List   Diagnosis    Hypertension    Colon polyps    External hemorrhoids    Trigger finger    HSV infection    Knee pain    Erectile dysfunction    Hyperlipidemia LDL goal <130    Nuclear sclerosis - Both Eyes    Atypical chest pain    Nocturia    Severe obesity with body mass index (BMI) of 36.0 to 36.9 with serious comorbidity    Obesity, Class II, BMI 35-39.9    Prostate cancer    Open displaced fracture of distal phalanx of right thumb    Laceration of finger nail bed, initial encounter    Adrenal nodule    Screening for colon cancer    Right knee DJD    Spondylosis of lumbar spine    Chronic pain disorder    Primary osteoarthritis of right knee       Review of patient's allergies indicates:   Allergen Reactions    Iodine and iodide containing products Other (See Comments)     Blood in urine in the 1970s       No current facility-administered medications on file prior to encounter.     Current Outpatient Medications on File Prior to Encounter   Medication Sig Dispense Refill    amLODIPine (NORVASC) 10 MG tablet Take 1 tablet (10 mg total) by mouth once daily. 90 tablet 3    losartan-hydrochlorothiazide 100-25 mg (HYZAAR) 100-25 mg per tablet Take 1 tablet by mouth once daily. 90 tablet 3    metoprolol succinate (TOPROL-XL) 50 MG 24 hr tablet Take 1 tablet (50 mg total) by mouth once daily. 90 tablet 3    pravastatin (PRAVACHOL) 40 MG tablet Take 1 tablet (40 mg total) by mouth every evening. 90 tablet 3    cyclobenzaprine (FLEXERIL) 5 MG tablet Take 5 mg by mouth nightly.      meloxicam (MOBIC) 7.5 MG tablet Take 1 tablet (7.5 mg total) by mouth once daily. 30 tablet 1     "SUPREP BOWEL PREP KIT 17.5-3.13-1.6 gram SolR USE AS DIRECTED FOR COLONOSCOPY PREP      tadalafil (CIALIS) 20 MG Tab Take 1 tablet (20 mg total) by mouth daily as needed. 12 tablet 11    valACYclovir (VALTREX) 1000 MG tablet Take 1 tablet (1,000 mg total) by mouth once daily. (Patient taking differently: Take 1,000 mg by mouth as needed.) 90 tablet 3       Past Surgical History:   Procedure Laterality Date    BUNIONECTOMY      bilateral     COLONOSCOPY      Dr. Velez    COLONOSCOPY N/A 2019    Procedure: COLONOSCOPY;  Surgeon: JACQUELINE Lozano MD;  Location: [facility] ([address] [place] [address]);  Service: Endoscopy;  Laterality: N/A;    dental implant      HAND SURGERY      lip surgery      PROSTATE SURGERY      REPAIR OF NAIL BED Right 2018    Procedure: REPAIR, NAIL BED right thumb with I&D;  Surgeon: Elyssa Bradford MD;  Location: [facility];  Service: Orthopedics;  Laterality: Right;  stretcher, supine, hand pan 1 and pan 2       Diagnostic Studies:      EK  Sinus Bradycardia    Pre-op Vitals [22 0820]   BP Pulse Resp Temp SpO2   (!) 142/67 (!) 59 20 37.1 °C (98.7 °F) 96 %      Height Weight BMI (Calculated)     5' 10" 244 lb 35              Pre-op Assessment    I have reviewed the Patient Summary Reports.     I have reviewed the Nursing Notes. I have reviewed the NPO Status.      Review of Systems  Anesthesia Hx:  No problems with previous Anesthesia    Social:  No Alcohol Use, Non-Smoker    Cardiovascular:   Exercise tolerance: good Hypertension ECG has been reviewed.    Pulmonary:   Denies Asthma.    Musculoskeletal:   Arthritis     Endocrine:  Obesity / BMI > 30      Physical Exam  General: Well nourished    Airway:  Mallampati: III   Mouth Opening: Normal  TM Distance: Normal  Tongue: Normal    Chest/Lungs:  Clear to auscultation, Normal Respiratory Rate    Heart:  Rate: Normal  Rhythm: Regular Rhythm        Anesthesia Plan  Type of Anesthesia, risks & benefits " discussed:    Anesthesia Type: Gen Natural Airway  Intra-op Monitoring Plan: Standard ASA Monitors  Post Op Pain Control Plan: multimodal analgesia and IV/PO Opioids PRN  Induction:  IV  Informed Consent: Informed consent signed with the Patient and all parties understand the risks and agree with anesthesia plan.  All questions answered.   ASA Score: 2    Ready For Surgery From Anesthesia Perspective.     .

## 2022-07-20 ENCOUNTER — PATIENT MESSAGE (OUTPATIENT)
Dept: ORTHOPEDICS | Facility: CLINIC | Age: 77
End: 2022-07-20
Payer: MEDICARE

## 2022-07-20 ENCOUNTER — TELEPHONE (OUTPATIENT)
Dept: ORTHOPEDICS | Facility: CLINIC | Age: 77
End: 2022-07-20
Payer: MEDICARE

## 2022-07-20 NOTE — TELEPHONE ENCOUNTER
Attempted to contact pt. Left voicemail. Informed pt of 7:45 arrival time for 07/25/22 surgery at the [facility], NPO status. Asked pt to return call to clinic at [phone] to confirm. [employer] message also sent.

## 2022-07-21 ENCOUNTER — PES CALL (OUTPATIENT)
Dept: ADMINISTRATIVE | Facility: CLINIC | Age: 77
End: 2022-07-21
Payer: MEDICARE

## 2022-07-21 DIAGNOSIS — G89.18 POST-OP PAIN: Primary | ICD-10-CM

## 2022-07-21 RX ORDER — ACETAMINOPHEN AND CODEINE PHOSPHATE 300; 30 MG/1; MG/1
1 TABLET ORAL
Qty: 10 TABLET | Refills: 0 | Status: SHIPPED | OUTPATIENT
Start: 2022-07-21 | End: 2022-09-15

## 2022-07-25 ENCOUNTER — ANESTHESIA (OUTPATIENT)
Dept: SURGERY | Facility: HOSPITAL | Age: 77
End: 2022-07-25
Payer: MEDICARE

## 2022-07-25 ENCOUNTER — HOSPITAL ENCOUNTER (OUTPATIENT)
Facility: HOSPITAL | Age: 77
Discharge: HOME | End: 2022-07-25
Attending: ORTHOPAEDIC SURGERY | Admitting: ORTHOPAEDIC SURGERY
Payer: MEDICARE

## 2022-07-25 VITALS
HEIGHT: 70 IN | DIASTOLIC BLOOD PRESSURE: 79 MMHG | WEIGHT: 244 LBS | SYSTOLIC BLOOD PRESSURE: 132 MMHG | HEART RATE: 50 BPM | RESPIRATION RATE: 15 BRPM | OXYGEN SATURATION: 100 % | BODY MASS INDEX: 34.93 KG/M2 | TEMPERATURE: 98 F

## 2022-07-25 DIAGNOSIS — G56.02 CARPAL TUNNEL SYNDROME OF LEFT WRIST: Primary | ICD-10-CM

## 2022-07-25 DIAGNOSIS — G56.00 CARPAL TUNNEL SYNDROME: ICD-10-CM

## 2022-07-25 PROCEDURE — 27201423 OPTIME MED/SURG SUP & DEVICES STERILE SUPPLY: Performed by: ORTHOPAEDIC SURGERY

## 2022-07-25 PROCEDURE — 63600175 PHARM REV CODE 636 W HCPCS: Performed by: NURSE ANESTHETIST, CERTIFIED REGISTERED

## 2022-07-25 PROCEDURE — 25000003 PHARM REV CODE 250: Performed by: STUDENT IN AN ORGANIZED HEALTH CARE EDUCATION/TRAINING PROGRAM

## 2022-07-25 PROCEDURE — 25000003 PHARM REV CODE 250: Performed by: ORTHOPAEDIC SURGERY

## 2022-07-25 PROCEDURE — 63600175 PHARM REV CODE 636 W HCPCS: Performed by: STUDENT IN AN ORGANIZED HEALTH CARE EDUCATION/TRAINING PROGRAM

## 2022-07-25 PROCEDURE — 94761 N-INVAS EAR/PLS OXIMETRY MLT: CPT

## 2022-07-25 PROCEDURE — 99900035 HC TECH TIME PER 15 MIN (STAT)

## 2022-07-25 PROCEDURE — 25000003 PHARM REV CODE 250: Performed by: NURSE ANESTHETIST, CERTIFIED REGISTERED

## 2022-07-25 PROCEDURE — 71000033 HC RECOVERY, INTIAL HOUR: Performed by: ORTHOPAEDIC SURGERY

## 2022-07-25 PROCEDURE — 36000706: Performed by: ORTHOPAEDIC SURGERY

## 2022-07-25 PROCEDURE — D9220A PRA ANESTHESIA: Mod: ANES,,, | Performed by: ANESTHESIOLOGY

## 2022-07-25 PROCEDURE — D9220A PRA ANESTHESIA: ICD-10-PCS | Mod: ANES,,, | Performed by: ANESTHESIOLOGY

## 2022-07-25 PROCEDURE — 64721 CARPAL TUNNEL SURGERY: CPT | Mod: LT,,, | Performed by: ORTHOPAEDIC SURGERY

## 2022-07-25 PROCEDURE — 37000009 HC ANESTHESIA EA ADD 15 MINS: Performed by: ORTHOPAEDIC SURGERY

## 2022-07-25 PROCEDURE — D9220A PRA ANESTHESIA: ICD-10-PCS | Mod: CRNA,,, | Performed by: NURSE ANESTHETIST, CERTIFIED REGISTERED

## 2022-07-25 PROCEDURE — 36000707: Performed by: ORTHOPAEDIC SURGERY

## 2022-07-25 PROCEDURE — D9220A PRA ANESTHESIA: Mod: CRNA,,, | Performed by: NURSE ANESTHETIST, CERTIFIED REGISTERED

## 2022-07-25 PROCEDURE — 37000008 HC ANESTHESIA 1ST 15 MINUTES: Performed by: ORTHOPAEDIC SURGERY

## 2022-07-25 PROCEDURE — 64721 PR REVISE MEDIAN N/CARPAL TUNNEL SURG: ICD-10-PCS | Mod: LT,,, | Performed by: ORTHOPAEDIC SURGERY

## 2022-07-25 PROCEDURE — 71000015 HC POSTOP RECOV 1ST HR: Performed by: ORTHOPAEDIC SURGERY

## 2022-07-25 RX ORDER — MUPIROCIN 20 MG/G
OINTMENT TOPICAL 2 TIMES DAILY
Status: DISCONTINUED | OUTPATIENT
Start: 2022-07-25 | End: 2022-07-25 | Stop reason: HOSPADM

## 2022-07-25 RX ORDER — BACITRACIN ZINC 500 UNIT/G
OINTMENT (GRAM) TOPICAL
Status: DISCONTINUED | OUTPATIENT
Start: 2022-07-25 | End: 2022-07-25 | Stop reason: HOSPADM

## 2022-07-25 RX ORDER — ACETAMINOPHEN 500 MG
1000 TABLET ORAL ONCE
Status: COMPLETED | OUTPATIENT
Start: 2022-07-25 | End: 2022-07-25

## 2022-07-25 RX ORDER — DEXAMETHASONE SODIUM PHOSPHATE 4 MG/ML
INJECTION, SOLUTION INTRA-ARTICULAR; INTRALESIONAL; INTRAMUSCULAR; INTRAVENOUS; SOFT TISSUE
Status: DISCONTINUED | OUTPATIENT
Start: 2022-07-25 | End: 2022-07-25

## 2022-07-25 RX ORDER — PROPOFOL 10 MG/ML
VIAL (ML) INTRAVENOUS
Status: DISCONTINUED | OUTPATIENT
Start: 2022-07-25 | End: 2022-07-25

## 2022-07-25 RX ORDER — LIDOCAINE HYDROCHLORIDE 20 MG/ML
INJECTION INTRAVENOUS
Status: DISCONTINUED | OUTPATIENT
Start: 2022-07-25 | End: 2022-07-25

## 2022-07-25 RX ORDER — MIDAZOLAM HYDROCHLORIDE 1 MG/ML
INJECTION, SOLUTION INTRAMUSCULAR; INTRAVENOUS
Status: DISCONTINUED | OUTPATIENT
Start: 2022-07-25 | End: 2022-07-25

## 2022-07-25 RX ORDER — CEFAZOLIN SODIUM 1 G/3ML
2 INJECTION, POWDER, FOR SOLUTION INTRAMUSCULAR; INTRAVENOUS
Status: COMPLETED | OUTPATIENT
Start: 2022-07-25 | End: 2022-07-25

## 2022-07-25 RX ORDER — MUPIROCIN 20 MG/G
OINTMENT TOPICAL
Status: DISCONTINUED | OUTPATIENT
Start: 2022-07-25 | End: 2022-07-25 | Stop reason: HOSPADM

## 2022-07-25 RX ORDER — SODIUM CHLORIDE 0.9 % (FLUSH) 0.9 %
3 SYRINGE (ML) INJECTION
Status: DISCONTINUED | OUTPATIENT
Start: 2022-07-25 | End: 2022-07-25 | Stop reason: HOSPADM

## 2022-07-25 RX ORDER — LIDOCAINE HYDROCHLORIDE 10 MG/ML
INJECTION, SOLUTION EPIDURAL; INFILTRATION; INTRACAUDAL; PERINEURAL
Status: DISCONTINUED | OUTPATIENT
Start: 2022-07-25 | End: 2022-07-25 | Stop reason: HOSPADM

## 2022-07-25 RX ORDER — BUPIVACAINE HYDROCHLORIDE 2.5 MG/ML
INJECTION, SOLUTION EPIDURAL; INFILTRATION; INTRACAUDAL
Status: DISCONTINUED | OUTPATIENT
Start: 2022-07-25 | End: 2022-07-25 | Stop reason: HOSPADM

## 2022-07-25 RX ORDER — ONDANSETRON 2 MG/ML
INJECTION INTRAMUSCULAR; INTRAVENOUS
Status: DISCONTINUED | OUTPATIENT
Start: 2022-07-25 | End: 2022-07-25

## 2022-07-25 RX ORDER — PROPOFOL 10 MG/ML
VIAL (ML) INTRAVENOUS CONTINUOUS PRN
Status: DISCONTINUED | OUTPATIENT
Start: 2022-07-25 | End: 2022-07-25

## 2022-07-25 RX ADMIN — ONDANSETRON 4 MG: 2 INJECTION INTRAMUSCULAR; INTRAVENOUS at 09:07

## 2022-07-25 RX ADMIN — PROPOFOL 50 MG: 10 INJECTION, EMULSION INTRAVENOUS at 09:07

## 2022-07-25 RX ADMIN — CEFAZOLIN 2 G: 330 INJECTION, POWDER, FOR SOLUTION INTRAMUSCULAR; INTRAVENOUS at 09:07

## 2022-07-25 RX ADMIN — PROPOFOL 50 MCG/KG/MIN: 10 INJECTION, EMULSION INTRAVENOUS at 09:07

## 2022-07-25 RX ADMIN — MUPIROCIN: 20 OINTMENT TOPICAL at 08:07

## 2022-07-25 RX ADMIN — SODIUM CHLORIDE: 9 INJECTION, SOLUTION INTRAVENOUS at 08:07

## 2022-07-25 RX ADMIN — MIDAZOLAM HYDROCHLORIDE 2 MG: 1 INJECTION, SOLUTION INTRAMUSCULAR; INTRAVENOUS at 09:07

## 2022-07-25 RX ADMIN — ACETAMINOPHEN 1000 MG: 500 TABLET ORAL at 08:07

## 2022-07-25 RX ADMIN — LIDOCAINE HYDROCHLORIDE 75 MG: 20 INJECTION, SOLUTION INTRAVENOUS at 09:07

## 2022-07-25 RX ADMIN — DEXAMETHASONE SODIUM PHOSPHATE 4 MG: 4 INJECTION, SOLUTION INTRAMUSCULAR; INTRAVENOUS at 09:07

## 2022-07-25 NOTE — PLAN OF CARE
VSS. Pt able to tolerate oral liquids. Pt denies c/o pain. Dressing intact. Spouse received home meds per bedside delivery. No distress noted. Pt states he is ready for D/C. D/C instructions reviewed with pt and spouse, verbalized understanding.

## 2022-07-25 NOTE — PLAN OF CARE
Pt belongings received from pt's spouse. Clothing placed in locker #5, 1 bag. Spouse will keep pt's cell phone.    Surgery    Patient feeling much better.  No significant abdominal pain today.  She feels hungry.  No fevers or chills.    Abdomen- soft.  Obese.  Hernia palpated in periumbilical area.  Completely soft without significant tenderness.  No external skin changes.    A/P  Patient feeling better today.  Her pain has resolved and her labs are within normal limits.  I have personally reviewed her imaging studies that show a chronically incarcerated hernia that does not show any signs of ischemia or an acute obstruction.  She does not have any tenderness or external skin changes on examination today.  She is very interested in having her hernia repaired but there are no indications that it needs to be done on acute basis given her resolution of pain and normalization of labs.  She has multiple comorbidities including taking Plavix approximately 24 hours ago.  I do not think the patient requires urgent surgery and is already at a very high risk of having multiple complications if she underwent repair.  I would recommend starting a diet and following symptoms.  If she is able to tolerate without significant pain, I would recommend D/C home and have her get an appropriate preoperative optimization by withholding her anticoagulation and plan for an elective repair.  If she develops increased pain with oral intake or any other symptoms, will have to reconsider performing the repair in an urgent manner.  The patient is in agreement to above.    Humberto Márquez M.D.  [facility]  [phone]

## 2022-07-25 NOTE — DISCHARGE INSTRUCTIONS
AFTER HAND SURGERY    DOS:  Keep affected wrist elevated above the level of your heart  Check circulation frequently in fingers by pressing on the nail bed. Nail bed should turn white and then pink when released.  Exercise fingers to promote circulation.  Advance diet as tolerated  Resume home medications today.      DONT:  No driving for 24 hours or while taking narcotic pain medication      CALL PHYSICIAN FOR:  Obvious bleeding  Excessive swelling  Drainage (pus) from the wound  Pain unrelieved by pain medication.

## 2022-07-25 NOTE — PATIENT INSTRUCTIONS
Take pain medications as prescribed  Do not remove dressing  Elevate arm  Keep dressing dry  No heavy lifting  Referral to PT/OT, someone will contact you to set up appointments  Follow up with Dr. Bradford within 1-2 weeks  Call office for any issues

## 2022-07-25 NOTE — BRIEF OP NOTE
[place] - Surgery ([facility])  Brief Operative Note    Surgery Date: 7/25/2022     Surgeon(s) and Role:     * Elyssa Bradford MD - Primary    Assisting Surgeon: None    Pre-op Diagnosis:  Carpal tunnel syndrome of left wrist [G56.02]  Finger numbness [R20.0]    Post-op Diagnosis:  Post-Op Diagnosis Codes:     * Carpal tunnel syndrome of left wrist [G56.02]     * Finger numbness [R20.0]    Procedure(s) (LRB):  RELEASE, CARPAL TUNNEL,LEFT (Left)    Anesthesia: LMA    Operative Findings: See op note    Estimated Blood Loss: * No values recorded between 7/25/2022  9:39 AM and 7/25/2022  9:57 AM *         Specimens:   Specimen (24h ago, onward)            None            Discharge Note    OUTCOME: Patient tolerated treatment/procedure well without complication and is now ready for discharge.    DISPOSITION: Home or Self Care    FINAL DIAGNOSIS:  Carpal tunnel syndrome of left wrist    FOLLOWUP: In clinic    DISCHARGE INSTRUCTIONS:    Discharge Procedure Orders   Ambulatory referral/consult to Physical/Occupational Therapy   Standing Status: Future   Referral Priority: Routine Referral Type: Physical Medicine   Referral Reason: Specialty Services Required   Requested Specialty: Occupational Therapy   Number of Visits Requested: 1     Ambulatory referral/consult to Physical/Occupational Therapy   Standing Status: Future   Referral Priority: Urgent Referral Type: Physical Medicine   Referral Reason: Specialty Services Required   Requested Specialty: Occupational Therapy     Diet general     Diet general     Call MD for:  temperature >100.4     Call MD for:  persistent nausea and vomiting     Call MD for:  severe uncontrolled pain     Call MD for:  difficulty breathing, headache or visual disturbances     Call MD for:  redness, tenderness, or signs of infection (pain, swelling, redness, odor or green/yellow discharge around incision site)     Call MD for:  hives     Call MD for:  persistent dizziness or light-headedness      Call MD for:  extreme fatigue     Keep surgical extremity elevated     Lifting restrictions     Ice to affected area     No driving, operating heavy equipment or signing legal documents while taking pain medication.     Leave dressing on - Keep it clean, dry, and intact until clinic visit     Call MD for:  temperature >100.4     Call MD for:  persistent nausea and vomiting     Call MD for:  severe uncontrolled pain     Call MD for:  difficulty breathing, headache or visual disturbances     Call MD for:  redness, tenderness, or signs of infection (pain, swelling, redness, odor or green/yellow discharge around incision site)     Call MD for:  hives     Call MD for:  persistent dizziness or light-headedness     Call MD for:  extreme fatigue     Leave dressing on - Keep it clean, dry, and intact until clinic visit     Non weight bearing        Clinical Reference Documents Added to Patient Instructions       Document    CARPAL TUNNEL RELEASE (ENGLISH)    CARPAL TUNNEL SYNDROME (ENGLISH)

## 2022-07-25 NOTE — BRIEF OP NOTE
OPERATIVE NOTE    DATE: 07/25/2022   TIME: 10:03 AM      PRE-OPERATIVE DIAGNOSIS: Carpal tunnel syndrome of left wrist [G56.02]  Finger numbness [R20.0]     POST-OPERATIVE DIAGNOSIS:  Carpal tunnel syndrome of left wrist [G56.02]  Finger numbness [R20.0]      PROCEDURE: Carpal tunnel release of left wrist     ANESTHESIA TYPE: Local, sedation    TISSUE REMOVED: No    COMPLICATIONS: No    BLOOD LOSS: Minimal    FINDINGS: Carpal tunnel syndrome of left wrist     ASSISTANT SURGEON: Marshall Katz PGY6, Sebas GILMORE

## 2022-07-25 NOTE — ANESTHESIA POSTPROCEDURE EVALUATION
Anesthesia Post Evaluation    Patient: Zheng Talley Jr.    Procedure(s) Performed: Procedure(s) (LRB):  RELEASE, CARPAL TUNNEL,LEFT (Left)    Final Anesthesia Type: general      Patient location during evaluation: PACU  Patient participation: Yes- Able to Participate  Level of consciousness: awake and alert  Post-procedure vital signs: reviewed and stable  Pain management: adequate  Airway patency: patent    PONV status at discharge: No PONV  Anesthetic complications: no      Cardiovascular status: blood pressure returned to baseline  Respiratory status: unassisted  Hydration status: euvolemic  Follow-up not needed.          Vitals Value Taken Time   /79 07/25/22 1032   Temp 36.5 °C (97.7 °F) 07/25/22 1030   Pulse 48 07/25/22 1033   Resp 14 07/25/22 1033   SpO2 100 % 07/25/22 1033   Vitals shown include unvalidated device data.      Event Time   Out of Recovery 10:28:00         Pain/Veronica Score: Pain Rating Prior to Med Admin: 0 (7/25/2022  8:35 AM)  Veronica Score: 9 (7/25/2022 10:19 AM)

## 2022-07-25 NOTE — TRANSFER OF CARE
"Anesthesia Transfer of Care Note    Patient: Zheng Talley Jr.    Procedure(s) Performed: Procedure(s) (LRB):  RELEASE, CARPAL TUNNEL,LEFT (Left)    Patient location: PACU    Anesthesia Type: general    Transport from OR: Transported from OR on room air with adequate spontaneous ventilation    Post pain: adequate analgesia    Post assessment: no apparent anesthetic complications and tolerated procedure well    Post vital signs: stable    Level of consciousness: awake, alert and oriented    Nausea/Vomiting: no nausea/vomiting    Complications: none    Transfer of care protocol was followed      Last vitals:   Visit Vitals  BP (!) 142/67 (BP Location: Right arm, Patient Position: Lying)   Pulse 61   Temp 37.1 °C (98.7 °F) (Oral)   Resp 18   Ht 5' 10" (1.778 m)   Wt 110.7 kg (244 lb)   SpO2 96%   BMI 35.01 kg/m²     "

## 2022-07-25 NOTE — DISCHARGE SUMMARY
[place] - Surgery (Hospital)  Discharge Note  Short Stay    Procedure(s) (LRB):  RELEASE, CARPAL TUNNEL,LEFT (Left)    OUTCOME: Patient tolerated treatment/procedure well without complication and is now ready for discharge.    DISPOSITION: Home or Self Care    FINAL DIAGNOSIS:  Carpal tunnel syndrome of left wrist    FOLLOWUP: In clinic    DISCHARGE INSTRUCTIONS:    Discharge Procedure Orders   Ambulatory referral/consult to Physical/Occupational Therapy   Standing Status: Future   Referral Priority: Routine Referral Type: Physical Medicine   Referral Reason: Specialty Services Required   Requested Specialty: Occupational Therapy   Number of Visits Requested: 1     Diet general     Call MD for:  temperature >100.4     Call MD for:  persistent nausea and vomiting     Call MD for:  severe uncontrolled pain     Call MD for:  difficulty breathing, headache or visual disturbances     Call MD for:  redness, tenderness, or signs of infection (pain, swelling, redness, odor or green/yellow discharge around incision site)     Call MD for:  hives     Call MD for:  persistent dizziness or light-headedness     Call MD for:  extreme fatigue     Keep surgical extremity elevated     Lifting restrictions     Ice to affected area     No driving, operating heavy equipment or signing legal documents while taking pain medication.     Leave dressing on - Keep it clean, dry, and intact until clinic visit         Clinical Reference Documents Added to Patient Instructions       Document    CARPAL TUNNEL RELEASE (ENGLISH)    CARPAL TUNNEL SYNDROME (ENGLISH)          TIME SPENT ON DISCHARGE: 15 minutes

## 2022-07-31 NOTE — OP NOTE
[facility] Surgery ([facility])  Surgery Department  Operative Note    SUMMARY     Date of Procedure: 7/25/2022     Procedure: Procedure(s) (LRB):  RELEASE, CARPAL TUNNEL,LEFT (Left)     Surgeon(s) and Role:     * Elyssa Bradford MD - Primary    Assisting Surgeon: None    Pre-Operative Diagnosis: Carpal tunnel syndrome of left wrist [G56.02]  Finger numbness [R20.0]    Post-Operative Diagnosis: Post-Op Diagnosis Codes:     * Carpal tunnel syndrome of left wrist [G56.02]     * Finger numbness [R20.0]    Anesthesia: Local MAC    Technical Procedures Used: surgery    Description of the Findings of the Procedure: PACKS AND DRAINS: None.   IMPLANTS: None.   SPECIMENS: None.   COMPLICATIONS: None.   INDICATIONS FOR PROCEDURE: Mr. Talley is a 77-year-old male who had numbness   and tingling into her left  hand. He has failed conservative treatment, EMG   was positive for carpal tunnel syndrome. Therefore, operative intervention was   deemed necessary. Risks and benefits were explained to the patient in clinic   since performed in clinic.   PROCEDURE IN DETAIL: After correct site was marked with the patient's   participation in the holding area, the patient was brought to the Operating   Room, placed in supine position, underwent MAC anesthesia. A well-padded   nonsterile tourniquet was placed on the left arm. A time-out was called for   correct site, procedure and patient to be indicated. Under sterile conditions,   an injection of lidocaine 1% was injected into the carpal tunnel space. The arm   was prepped and draped in normal sterile fashion. The incision was marked out   using Guerrero cardinal lines. The arm was exsanguinated using Esmarch.   Tourniquet was insufflated to 250 mmHg and that is where it remained for a total   of 10 minutes. The incision was made down to the palmar fascia. The palmar   fascia was sharply incised. The transverse ligament was identified. It was   very thick in nature. It was sharply  incised. Median nerve was identified. A   Mosquito hemostat was passed from the undersurface of the transverse ligament   proximally and distally to free it from the median nerve. Metzenbaum scissors   were utilized to cut the transverse ligament proximally and distally. Once that   was completely released, a Mosquito hemostat was passed again to confirm a   complete release. Once that was performed, the area was irrigated with copious   amounts of normal saline. Nylon closed the skin. Sterile dressing was applied.   Tourniquet was deflated. Brisk capillary refill ensued. The patient was   transported to the Recovery Room in stable condition.   POSTOPERATIVE PLAN FOR THIS PATIENT: She is to keep her dressing clean, dry and   intact. We will see her back in 2 weeks for stitch removal.      Significant Surgical Tasks Conducted by the Assistant(s), if Applicable: none    Complications: No    Estimated Blood Loss (EBL): * No values recorded between 7/25/2022  9:39 AM and 7/25/2022  9:57 AM *           Implants: * No implants in log *    Specimens:   Specimen (24h ago, onward)            None                  Condition: Good    Disposition: PACU - hemodynamically stable.    Attestation: I performed the procedure.    Discharge Note    SUMMARY     Admit Date: 7/25/2022    Discharge Date and Time: 7/25/2022 10:48 AM    Hospital Course (synopsis of major diagnoses, care, treatment, and services provided during the course of the hospital stay): surgery     Final Diagnosis: Post-Op Diagnosis Codes:     * Carpal tunnel syndrome of left wrist [G56.02]     * Finger numbness [R20.0]    Disposition: Home or Self Care    Follow Up/Patient Instructions:     Medications:  Reconciled Home Medications:      Medication List      CHANGE how you take these medications    valACYclovir 1000 MG tablet  Commonly known as: VALTREX  Take 1 tablet (1,000 mg total) by mouth once daily.  What changed:   · when to take this  · reasons to take this         CONTINUE taking these medications    acetaminophen-codeine 300-30mg 300-30 mg Tab  Commonly known as: TYLENOL #3  Take 1 tablet by mouth every 4 to 6 hours as needed (moderate to severe pain).     amLODIPine 10 MG tablet  Commonly known as: NORVASC  Take 1 tablet (10 mg total) by mouth once daily.     cyclobenzaprine 5 MG tablet  Commonly known as: FLEXERIL  Take 5 mg by mouth nightly.     losartan-hydrochlorothiazide 100-25 mg 100-25 mg per tablet  Commonly known as: HYZAAR  Take 1 tablet by mouth once daily.     meloxicam 7.5 MG tablet  Commonly known as: MOBIC  Take 1 tablet (7.5 mg total) by mouth once daily.     metoprolol succinate 50 MG 24 hr tablet  Commonly known as: TOPROL-XL  Take 1 tablet (50 mg total) by mouth once daily.     pravastatin 40 MG tablet  Commonly known as: PRAVACHOL  Take 1 tablet (40 mg total) by mouth every evening.     SUPREP BOWEL PREP KIT 17.5-3.13-1.6 gram Solr  Generic drug: sodium,potassium,mag sulfates  USE AS DIRECTED FOR COLONOSCOPY PREP     tadalafiL 20 MG Tab  Commonly known as: CIALIS  Take 1 tablet (20 mg total) by mouth daily as needed.          Discharge Procedure Orders   Ambulatory referral/consult to Physical/Occupational Therapy   Standing Status: Future   Referral Priority: Routine Referral Type: Physical Medicine   Referral Reason: Specialty Services Required   Requested Specialty: Occupational Therapy   Number of Visits Requested: 1     Diet general     Diet general     Call MD for:  temperature >100.4     Call MD for:  persistent nausea and vomiting     Call MD for:  severe uncontrolled pain     Call MD for:  difficulty breathing, headache or visual disturbances     Call MD for:  redness, tenderness, or signs of infection (pain, swelling, redness, odor or green/yellow discharge around incision site)     Call MD for:  hives     Call MD for:  persistent dizziness or light-headedness     Call MD for:  extreme fatigue     Keep surgical extremity elevated      Lifting restrictions     Ice to affected area     No driving, operating heavy equipment or signing legal documents while taking pain medication.     Leave dressing on - Keep it clean, dry, and intact until clinic visit     Call MD for:  temperature >100.4     Call MD for:  persistent nausea and vomiting     Call MD for:  severe uncontrolled pain     Call MD for:  difficulty breathing, headache or visual disturbances     Call MD for:  redness, tenderness, or signs of infection (pain, swelling, redness, odor or green/yellow discharge around incision site)     Call MD for:  hives     Call MD for:  persistent dizziness or light-headedness     Call MD for:  extreme fatigue     Leave dressing on - Keep it clean, dry, and intact until clinic visit

## 2022-08-05 ENCOUNTER — TELEPHONE (OUTPATIENT)
Dept: ORTHOPEDICS | Facility: CLINIC | Age: 77
End: 2022-08-05
Payer: MEDICARE

## 2022-08-05 NOTE — PROGRESS NOTES
[facility] Occupational Therapy  Initial Evaluation/Discharge Summary     Date: 8/9/2022  Patient: Zheng Talley Jr.  Chart Number: [id number]  Referring Physician: Marshall Katz MD  Therapy Diagnosis:   1. Carpal tunnel syndrome of left wrist  Ambulatory referral/consult to Physical/Occupational Therapy   2. Wrist stiffness, left         Medical Diagnosis: G56.02 (ICD-10-CM) - Carpal tunnel syndrome of left wrist  Physician Orders: Start in 2 weeks, s/p left carpal tunnel release  Evaluation Date: 8/9/2022  Plan of Care Certification Date: 11/9/2022  Authorization Period: 7/25/2023  Surgery Date and Procedure: 7/25/2022 left CTR  Date of Return to MD: JL    Visit #: 1 of 1  Time In: 9:15 AM  Time Out: 10:00 AM  Total Billable Time: 45    Precautions: Standard    Subjective     Involved Side: Left  Dominant Side: Right  Date of Onset: > 1 year  History of Current Condition: Pt w/ gradual onset left hand paresthesias  Imaging: N/A  Previous Therapy: None    Patient's Goals for Therapy: Return to premorbid level of function    Pain:  Functional Pain Scale Rating 0-10:   0/10 on average  0/10 at best  3/10 at worst  Location: Incision site  Description: Sore  Aggravating Factors: weighbearing  Easing Factors: Rest    Previous Level of function Independent w/ ADL's    Current Level of Function Mildly limited w/ gripping, opening containers    Occupation:  Retired    Past Medical History/Physical Systems Review:   Zheng Talley Jr.  has a past medical history of Arthritis, BPH (benign prostatic hyperplasia), Cancer, Cataract, Groin pain, History of gastroesophageal reflux (GERD), Hyperlipidemia, Hypertension, and Nuclear sclerosis - Both Eyes.    Zheng Talley Jr.  has a past surgical history that includes Bunionectomy; Colonoscopy (2011); Hand surgery; lip surgery; dental implant; Repair of nail bed (Right, 11/2/2018); Prostate surgery; Colonoscopy (N/A, 2/25/2019); and Carpal tunnel release (Left,  7/25/2022).    Zheng has a current medication list which includes the following prescription(s): acetaminophen-codeine 300-30mg, amlodipine, cyclobenzaprine, losartan-hydrochlorothiazide 100-25 mg, meloxicam, metoprolol succinate, pravastatin, suprep bowel prep kit, tadalafil, and valacyclovir.    Review of patient's allergies indicates:   Allergen Reactions    Iodine and iodide containing products Other (See Comments)     Blood in urine in the 1970s          Objective     Mental status: alert    Observation:   Incision C/D/I      Sensation: Median Nerve Distribution   8/9/2022    Left   Monofilament Testing    Normal 1.65-2.83    Diminished Light Touch 3.22-3.61 x   Diminished Protective 3.84-4.31    Loss of Protective 4.56-6.65    Untestable >6.65        Edema: Circumferential measurements: In centimters     Left Right   MPs 25.5  26.9    PWC (Proximal Wrist Crease) 20.6  20.7      Range of Motion:   Left    Wrist AROM  WE  WF  UD  RD    65 50 15 20        Strength: (MIGUEL ANGEL Dynamometer in psi.)      8/9/2022 8/9/2022    Left Right   Rung II 52 105       Pinch Strength (Measured in psi)     8/9/2022 8/9/2022    Left Right   Key Pinch 17  24    3pt Pinch 14  19        CMS Impairment/Limitation/Restriction for FOTO Hand/Wrist/Finger Survey    Therapist reviewed FOTO scores for Zheng Talley Jr. on 8/9/2022.   FOTO documents entered into [employer] - see Media section.    Limitation Score: 16%  Category: Other           Treatment     Treatment Time In: 9:41 AM  Treatment Time Out: 10:02 AM  Total Treatment time separate from Evaluation time:21 min    Zheng performed therapeutic exercises for 21 minutes including:  -AROM WEWF/UD/RD, circles 20  -TGE's 10  -Pink putty squeezes 10, lat/2P pinches 2 logs ea      Home Exercise Program/Education:  Issued HEP (see patient instructions in EMR) and educated on modality use for pain management . Exercises were reviewed and Zheng was able to demonstrate them prior to the end of  the session.   Pt received a written copy of exercises to perform at home. Zheng demonstrated good  understanding of the education provided.  Pt was advised to perform these exercises free of pain, and to stop performing them if pain occurs.    Patient/Family Education: role of OT, goals for OT, scheduling/cancellations - pt verbalized understanding. Discussed insurance limitations with patient.      Assessment     Zheng Talley Jr. is a 77 y.o. male presents with limitations as described in problem list. Patient feels he is fully functional and does not feel that he needs therapy.  The patient's rehab potential is Good.     Anticipated barriers to occupational therapy: None  Pt has no cultural, educational or language barriers to learning provided.    Profile and History Assessment of Occupational Performance Level of Clinical Decision Making Complexity Score   Occupational Profile:   Zheng Talley Jr. is a 77 y.o. male who is retired Zheng Talley Jr. has difficulty with  ADLs and IADLs as listed previously, which  affecting his/her daily functional abilities.      Comorbidities:    has a past medical history of Arthritis, BPH (benign prostatic hyperplasia), Cancer, Cataract, Groin pain, History of gastroesophageal reflux (GERD), Hyperlipidemia, Hypertension, and Nuclear sclerosis - Both Eyes.    Medical and Therapy History Review:   Brief               Performance Deficits    Physical:   Strength  Pain    Cognitive:  No Deficits    Psychosocial:    No Deficits     Clinical Decision Making:  low    Assessment Process:  Problem-Focused Assessments    Modification/Need for Assistance:  Not Necessary    Intervention Selection:  Multiple Treatment Options       low  Based on PMHX, co morbidities , data from assessments and functional level of assistance required with task and clinical presentation directly impacting function.           Plan     D/C from OT    MOHSEN LEIVA/SACHIN, CHT  Occupational therapist,  Certified Hand Therapist

## 2022-08-05 NOTE — PATIENT INSTRUCTIONS
Extension (Active With Finger Extension)        With forearm on table and wrist over edge, lift hand with fingers straight.   Repeat 20 times. Do 3 sessions per day.    Radial / Ulnar Deviation (Assistive)        Move hand side to side like a windshield wiper. Do not move elbow.  Repeat 20 times. Do 3 sessions per day.    Circumduction (Active)        With fingers curled, move slowly at wrist in clock- wise circles 20 times. Repeat counterclockwise. Do not move elbow or shoulder.  Do 3 sessions per day.    Tendon Glides         Start at position A and move through each position slowly attempting to achieve full glide.  A-E is ONE repetition.     Complete 10 reps 3 times per day.      Strengthening (Resistive Putty)        Squeeze putty using thumb and all fingers.  Repeat 10-20 times. Do 1-2 sessions per day.      Palmar Pinch Strengthening (Resistive Putty)        Pinch putty between thumb and index fingertip.  Repeat 2-3 logs. Do 1-2 sessions per day.       Lateral Pinch Strengthening (Resistive Putty)        Squeeze between thumb and side of index finger.  Repeat 2-3 logs. Do 1-2 sessions per day.

## 2022-08-08 ENCOUNTER — OFFICE VISIT (OUTPATIENT)
Dept: ORTHOPEDICS | Facility: CLINIC | Age: 77
End: 2022-08-08
Payer: MEDICARE

## 2022-08-08 VITALS
HEART RATE: 69 BPM | SYSTOLIC BLOOD PRESSURE: 146 MMHG | HEIGHT: 70 IN | BODY MASS INDEX: 34.93 KG/M2 | WEIGHT: 244 LBS | DIASTOLIC BLOOD PRESSURE: 64 MMHG

## 2022-08-08 DIAGNOSIS — Z98.890 POST-OPERATIVE STATE: Primary | ICD-10-CM

## 2022-08-08 PROCEDURE — 99213 OFFICE O/P EST LOW 20 MIN: CPT | Mod: PBBFAC | Performed by: PHYSICIAN ASSISTANT

## 2022-08-08 PROCEDURE — 99024 PR POST-OP FOLLOW-UP VISIT: ICD-10-PCS | Mod: POP,,, | Performed by: PHYSICIAN ASSISTANT

## 2022-08-08 PROCEDURE — 99999 PR PBB SHADOW E&M-EST. PATIENT-LVL III: CPT | Mod: PBBFAC,,, | Performed by: PHYSICIAN ASSISTANT

## 2022-08-08 PROCEDURE — 99024 POSTOP FOLLOW-UP VISIT: CPT | Mod: POP,,, | Performed by: PHYSICIAN ASSISTANT

## 2022-08-08 PROCEDURE — 99999 PR PBB SHADOW E&M-EST. PATIENT-LVL III: ICD-10-PCS | Mod: PBBFAC,,, | Performed by: PHYSICIAN ASSISTANT

## 2022-08-08 NOTE — PROGRESS NOTES
"Mr. Talley is here today for a post-operative visit.  He is 14 days status post left carpal tunnel release by Dr. Bradford on 7/25/22. He reports that he is doing well. He does still have numbness, prior EMG with severe bilateral carpal tunnel it was discussed with patient pre op that this may not improve. Pain is minimal. He is scheduled to begin therapy soon. He denies fever, chills, and sweats since the time of the surgery.     Physical exam:    Vitals:    08/08/22 1311   BP: (!) 146/64   Pulse: 69   Weight: 110.7 kg (244 lb)   Height: 5' 10" (1.778 m)   PainSc: 0-No pain     Vital signs are stable, patient is afebrile.  Patient is well dressed and well groomed, no acute distress.  Alert and oriented to person, place, and time.  Post op dressing taken down.  Incision is clean, dry and intact.  There is no erythema or exudate.  There is no sign of any infection. He is NVI. Sutures removed without difficulty. Good ROM.    Assessment:  status post left carpal tunnel release by Dr. Bradford on 7/25/22    Plan:  Zheng was seen today for post-op evaluation.    Diagnoses and all orders for this visit:    Post-operative state        PO instruction reviewed and provided to patient  Therapy is scheduled to begin soon   Discussed severe carpal tunnel, numbness, nerve healing  RTC 4 wks if needed       "

## 2022-08-09 ENCOUNTER — CLINICAL SUPPORT (OUTPATIENT)
Dept: REHABILITATION | Facility: HOSPITAL | Age: 77
End: 2022-08-09
Payer: MEDICARE

## 2022-08-09 DIAGNOSIS — G56.02 CARPAL TUNNEL SYNDROME OF LEFT WRIST: ICD-10-CM

## 2022-08-09 DIAGNOSIS — M25.632 WRIST STIFFNESS, LEFT: ICD-10-CM

## 2022-08-09 PROCEDURE — 97110 THERAPEUTIC EXERCISES: CPT | Mod: PO

## 2022-08-09 PROCEDURE — 97165 OT EVAL LOW COMPLEX 30 MIN: CPT | Mod: PO

## 2022-08-17 ENCOUNTER — PATIENT MESSAGE (OUTPATIENT)
Dept: ADMINISTRATIVE | Facility: CLINIC | Age: 77
End: 2022-08-17
Payer: MEDICARE

## 2022-08-17 ENCOUNTER — TELEPHONE (OUTPATIENT)
Dept: ADMINISTRATIVE | Facility: CLINIC | Age: 77
End: 2022-08-17
Payer: MEDICARE

## 2022-08-19 ENCOUNTER — PES CALL (OUTPATIENT)
Dept: ADMINISTRATIVE | Facility: CLINIC | Age: 77
End: 2022-08-19
Payer: MEDICARE

## 2022-08-30 ENCOUNTER — LAB VISIT (OUTPATIENT)
Dept: LAB | Facility: OTHER | Age: 77
End: 2022-08-30
Attending: RADIOLOGY
Payer: MEDICARE

## 2022-08-30 DIAGNOSIS — C61 PROSTATE CANCER: ICD-10-CM

## 2022-08-30 LAB — COMPLEXED PSA SERPL-MCNC: 0.04 NG/ML (ref 0–4)

## 2022-08-30 PROCEDURE — 84153 ASSAY OF PSA TOTAL: CPT | Performed by: RADIOLOGY

## 2022-08-30 PROCEDURE — 36415 COLL VENOUS BLD VENIPUNCTURE: CPT | Performed by: RADIOLOGY

## 2022-09-05 NOTE — PROGRESS NOTES
Subjective:      Zheng Talley Jr. is a 77 y.o. male who returns today regarding his     Pca    No new issues.    The following portions of the patient's history were reviewed and updated as appropriate: allergies, current medications, past family history, past medical history, past social history, past surgical history and problem list.    Review of Systems  Pertinent items are noted in HPI.  A comprehensive multipoint review of systems was negative except as otherwise stated in the HPI.    Past Medical History:   Diagnosis Date    Arthritis     BPH (benign prostatic hyperplasia)     Cancer     prostate    Cataract     Groin pain     History of gastroesophageal reflux (GERD)     Hyperlipidemia     Hypertension     Nuclear sclerosis - Both Eyes 2/18/2014     Past Surgical History:   Procedure Laterality Date    BUNIONECTOMY      bilateral     CARPAL TUNNEL RELEASE Left 7/25/2022    Procedure: RELEASE, CARPAL TUNNEL,LEFT;  Surgeon: Elyssa Bradford MD;  Location: [facility];  Service: Orthopedics;  Laterality: Left;    COLONOSCOPY  2011    Dr. Velez    COLONOSCOPY N/A 2/25/2019    Procedure: COLONOSCOPY;  Surgeon: JACQUELINE Lozano MD;  Location: [address]);  Service: Endoscopy;  Laterality: N/A;    dental implant      HAND SURGERY      lip surgery      PROSTATE SURGERY      REPAIR OF NAIL BED Right 11/2/2018    Procedure: REPAIR, NAIL BED right thumb with I&D;  Surgeon: Elyssa Bradford MD;  Location: [facility];  Service: Orthopedics;  Laterality: Right;  stretcher, supine, hand pan 1 and pan 2       Review of patient's allergies indicates:   Allergen Reactions    Iodine and iodide containing products Other (See Comments)     Blood in urine in the 1970s          Objective:   Vitals: There were no vitals taken for this visit.    Physical Exam   General: alert and oriented, no acute distress  Respiratory: Symmetric expansion, non-labored breathing  Cardiovascular: no peripheral edema  Abdomen: soft, non  distended  Skin: normal coloration and turgor, no rashes, no suspicious skin lesions noted  Neuro: no gross deficits  Psych: normal judgment and insight, normal mood/affect, and non-anxious    Physical Exam    Lab Review   Urinalysis demonstrates no specimen    Lab Results   Component Value Date    WBC 5.71 05/26/2017    HGB 15.5 05/26/2017    HCT 47.5 05/26/2017    MCV 86 05/26/2017     05/26/2017     Lab Results   Component Value Date    CREATININE 1.3 01/22/2020    BUN 16 01/22/2020     Lab Results   Component Value Date    PSA 3.4 02/24/2014    PSA 2.80 02/04/2013    PSA 3.13 07/11/2012    PSA 2.1 07/21/2011    PSA 1.8 02/19/2009    PSA 1.8 01/09/2009    PSA 1.0 05/17/2005    PSADIAG 0.04 08/30/2022    PSADIAG 0.04 04/19/2022    PSADIAG <0.01 12/13/2021    PSADIAG 0.03 09/14/2021    PSADIAG 0.02 06/04/2021    PSADIAG 0.02 02/12/2021    PSADIAG 0.01 02/18/2020    PSADIAG <0.01 07/03/2019    PSADIAG <0.01 07/03/2018    PSADIAG <0.01 01/02/2018         Imaging  -  Assessment and Plan:   Prostate cancer  Dicussed with Dr Kearns who also saw him today  Cont close observation  RTC 3 months with psa to see me    Erectile dysfunction, unspecified erectile dysfunction type  Cont present treatment

## 2022-09-06 ENCOUNTER — OFFICE VISIT (OUTPATIENT)
Dept: UROLOGY | Facility: CLINIC | Age: 77
End: 2022-09-06
Payer: MEDICARE

## 2022-09-06 ENCOUNTER — OFFICE VISIT (OUTPATIENT)
Dept: RADIATION ONCOLOGY | Facility: CLINIC | Age: 77
End: 2022-09-06
Payer: MEDICARE

## 2022-09-06 VITALS
TEMPERATURE: 100 F | HEART RATE: 64 BPM | WEIGHT: 241.5 LBS | SYSTOLIC BLOOD PRESSURE: 125 MMHG | TEMPERATURE: 100 F | HEART RATE: 64 BPM | BODY MASS INDEX: 34.56 KG/M2 | OXYGEN SATURATION: 98 % | DIASTOLIC BLOOD PRESSURE: 59 MMHG | HEIGHT: 70 IN | WEIGHT: 241.38 LBS | SYSTOLIC BLOOD PRESSURE: 125 MMHG | DIASTOLIC BLOOD PRESSURE: 59 MMHG | BODY MASS INDEX: 34.57 KG/M2 | RESPIRATION RATE: 16 BRPM | HEIGHT: 70 IN | RESPIRATION RATE: 16 BRPM

## 2022-09-06 DIAGNOSIS — C61 PROSTATE CANCER: Primary | ICD-10-CM

## 2022-09-06 DIAGNOSIS — N52.9 ERECTILE DYSFUNCTION, UNSPECIFIED ERECTILE DYSFUNCTION TYPE: ICD-10-CM

## 2022-09-06 PROCEDURE — 99999 PR PBB SHADOW E&M-EST. PATIENT-LVL IV: ICD-10-PCS | Mod: PBBFAC,,, | Performed by: UROLOGY

## 2022-09-06 PROCEDURE — 99213 OFFICE O/P EST LOW 20 MIN: CPT | Mod: PBBFAC,27 | Performed by: RADIOLOGY

## 2022-09-06 PROCEDURE — 99999 PR PBB SHADOW E&M-EST. PATIENT-LVL IV: CPT | Mod: PBBFAC,,, | Performed by: UROLOGY

## 2022-09-06 PROCEDURE — 99999 PR PBB SHADOW E&M-EST. PATIENT-LVL III: CPT | Mod: PBBFAC,,, | Performed by: RADIOLOGY

## 2022-09-06 PROCEDURE — 99214 PR OFFICE/OUTPT VISIT, EST, LEVL IV, 30-39 MIN: ICD-10-PCS | Mod: S$PBB,,, | Performed by: UROLOGY

## 2022-09-06 PROCEDURE — 99212 PR OFFICE/OUTPT VISIT, EST, LEVL II, 10-19 MIN: ICD-10-PCS | Mod: S$PBB,,, | Performed by: RADIOLOGY

## 2022-09-06 PROCEDURE — 99999 PR PBB SHADOW E&M-EST. PATIENT-LVL III: ICD-10-PCS | Mod: PBBFAC,,, | Performed by: RADIOLOGY

## 2022-09-06 PROCEDURE — 99214 OFFICE O/P EST MOD 30 MIN: CPT | Mod: 25,PBBFAC | Performed by: UROLOGY

## 2022-09-06 PROCEDURE — 99214 OFFICE O/P EST MOD 30 MIN: CPT | Mod: S$PBB,,, | Performed by: UROLOGY

## 2022-09-06 PROCEDURE — 99212 OFFICE O/P EST SF 10 MIN: CPT | Mod: S$PBB,,, | Performed by: RADIOLOGY

## 2022-09-06 NOTE — PROGRESS NOTES
Subjective:       Patient ID: Zheng Talley Jr. is a 77 y.o. male.    Chief Complaint: Prostate Cancer (F/u w/psa)    This patient presents for follow up visit.    Mr. Talley has a history of pathologic stage II (T2, N0, M0, R0, PSA < 10, GG5) prostate cancer.  He initially presented in 2015 with a mildly elevated PSA of 4.2 ng/ml.  Biopsies from the Rt. apex, Lt. base, Lt. mid gland and Lt. apex returned positive for adenocarcinoma.  The Cisco score was 8 (3 +5) in biopsies from the Rt. apex, 7 (3+4) at the Lt. apex and Lt. mid gland and 6 (3+3) at the Lt. base.  Further work up with bone scan and MRI of the abdomen and pelvis were negative.  He subsequently underwent prostatectomy in July of 2015.  Pathology revealed a 40 g prostate with Cisco 9 (4+5) adenocarcinoma involving 10% of both lobes of the prostate.  There was no extraprostatic extension or seminal vesicle invasion.  There was no lymphovascular or perineural invasion.  The margins of resection and 8 (4 Rt, 4 Lt.) pelvic nodes were negative for tumor involvement.  Postoperatively the patient recovered well  His PSA remained < 0.01 ng/ml until February of 2020 when it returned at 0.01 ng/ml.  Repeat PSA in February of 2021 was 0.02 ng/ml  Repeat PSA in April of 2022 returned at 0.04 ng/ml  Today the patient states he feels well, no complaints.      Review of Systems   Constitutional:  Negative for activity change, appetite change, chills and fatigue.   Gastrointestinal:  Negative for abdominal pain, change in bowel habit, constipation, diarrhea and change in bowel habit.   Genitourinary:  Negative for bladder incontinence, difficulty urinating, dysuria, flank pain and frequency.       Objective:      Physical Exam  Constitutional:       General: He is not in acute distress.     Appearance: Normal appearance.   Pulmonary:      Effort: Pulmonary effort is normal. No respiratory distress.   Abdominal:      General: Abdomen is flat. There is no  distension.   Neurological:      Mental Status: He is alert and oriented to person, place, and time.   Psychiatric:         Mood and Affect: Mood normal.         Judgment: Judgment normal.        Latest Reference Range & Units 02/18/20 14:41 02/12/21 08:35 06/04/21 08:29 09/14/21 11:21 12/13/21 09:58 04/19/22 10:27 08/30/22 08:24   PSA Diagnostic 0.00 - 4.00 ng/mL 0.01 0.02 0.02 0.03 <0.01 0.04 0.04     Assessment:       Problem List Items Addressed This Visit       Prostate cancer - Primary         Plan:       Discussed his PSA results.  Discussed the options of active surveillance vs. salvage therapy.  Discussed the pros and cons of each approach.  Given no increase, will continue with active surveillance for now.  Discussed with Dr. Wu who was in agreement with the current plan.  Plan follow up with Dr. Wu in December with PSA.

## 2022-09-08 ENCOUNTER — TELEPHONE (OUTPATIENT)
Dept: ORTHOPEDICS | Facility: CLINIC | Age: 77
End: 2022-09-08
Payer: MEDICARE

## 2022-09-09 ENCOUNTER — OFFICE VISIT (OUTPATIENT)
Dept: ORTHOPEDICS | Facility: CLINIC | Age: 77
End: 2022-09-09
Payer: MEDICARE

## 2022-09-09 VITALS
SYSTOLIC BLOOD PRESSURE: 126 MMHG | HEART RATE: 66 BPM | WEIGHT: 239 LBS | DIASTOLIC BLOOD PRESSURE: 73 MMHG | HEIGHT: 70 IN | BODY MASS INDEX: 34.22 KG/M2

## 2022-09-09 DIAGNOSIS — Z98.890 POST-OPERATIVE STATE: Primary | ICD-10-CM

## 2022-09-09 PROCEDURE — 99999 PR PBB SHADOW E&M-EST. PATIENT-LVL III: CPT | Mod: PBBFAC,,, | Performed by: PHYSICIAN ASSISTANT

## 2022-09-09 PROCEDURE — 99024 POSTOP FOLLOW-UP VISIT: CPT | Mod: POP,,, | Performed by: PHYSICIAN ASSISTANT

## 2022-09-09 PROCEDURE — 99213 OFFICE O/P EST LOW 20 MIN: CPT | Mod: PBBFAC | Performed by: PHYSICIAN ASSISTANT

## 2022-09-09 PROCEDURE — 99999 PR PBB SHADOW E&M-EST. PATIENT-LVL III: ICD-10-PCS | Mod: PBBFAC,,, | Performed by: PHYSICIAN ASSISTANT

## 2022-09-09 PROCEDURE — 99024 PR POST-OP FOLLOW-UP VISIT: ICD-10-PCS | Mod: POP,,, | Performed by: PHYSICIAN ASSISTANT

## 2022-09-09 NOTE — PROGRESS NOTES
"Mr. Talley is here today for a post-operative visit.  He is 7 weeks status post left carpal tunnel release by Dr. Bradford on 7/25/22. He reports that he is doing well. Night time symptoms have resolved. He does still have numbness, prior EMG with severe bilateral carpal tunnel it was discussed with patient pre op that this may not improve. He did attend one therapy visit he was doing well and felt he didn't need additional therapy. He has returned to all regular ADLs with the exception of heavier lifting. He denies fever, chills, and sweats since the time of the surgery.     He reports numbness in the left thumb which he attribute to old injury/ laceration to distal thumb, he otherwise has intermittent symptoms in the left median distribution. He denies pain or night time symptoms. He is not interested in left carpal tunnel release at this time. He is aware EMG shows severe carpal tunnel.    Physical exam:    Vitals:    09/09/22 1037   BP: 126/73   Pulse: 66   Weight: 108.4 kg (239 lb)   Height: 5' 10" (1.778 m)   PainSc:   1       Vital signs are stable, patient is afebrile.  Patient is well dressed and well groomed, no acute distress.  Alert and oriented to person, place, and time.  Incision is well healed.  There is no erythema or exudate.  There is no sign of any infection. He is NVI. Good ROM wrist and fingers.     Assessment:  status post left carpal tunnel release by Dr. Bradford on 7/25/22    Plan:  Zheng was seen today for numbness and pain.    Diagnoses and all orders for this visit:    Post-operative state    PO instruction reviewed and provided to patient  Discussed nerve compression severity and nerve regeneration   RTC prn       "

## 2022-09-12 ENCOUNTER — IMMUNIZATION (OUTPATIENT)
Dept: PHARMACY | Facility: CLINIC | Age: 77
End: 2022-09-12
Payer: MEDICARE

## 2022-09-13 ENCOUNTER — TELEPHONE (OUTPATIENT)
Dept: ADMINISTRATIVE | Facility: CLINIC | Age: 77
End: 2022-09-13
Payer: MEDICARE

## 2022-09-13 ENCOUNTER — PATIENT MESSAGE (OUTPATIENT)
Dept: ADMINISTRATIVE | Facility: CLINIC | Age: 77
End: 2022-09-13
Payer: MEDICARE

## 2022-09-13 NOTE — TELEPHONE ENCOUNTER
Called pt; informed pt I was calling to confirm his virtual EAWV on 9/15/22 at 9:00am and to see if he needed any help; pt stated he did not need any help and completed his e-pre check already; pt informed to login 15 minutes prior to appt time; message sent through portal

## 2022-09-15 ENCOUNTER — TELEPHONE (OUTPATIENT)
Dept: ADMINISTRATIVE | Facility: CLINIC | Age: 77
End: 2022-09-15
Payer: MEDICARE

## 2022-09-15 ENCOUNTER — OFFICE VISIT (OUTPATIENT)
Dept: INTERNAL MEDICINE | Facility: CLINIC | Age: 77
End: 2022-09-15
Payer: MEDICARE

## 2022-09-15 ENCOUNTER — TELEPHONE (OUTPATIENT)
Dept: INTERNAL MEDICINE | Facility: CLINIC | Age: 77
End: 2022-09-15
Payer: MEDICARE

## 2022-09-15 DIAGNOSIS — N52.9 ERECTILE DYSFUNCTION, UNSPECIFIED ERECTILE DYSFUNCTION TYPE: ICD-10-CM

## 2022-09-15 DIAGNOSIS — M17.11 PRIMARY OSTEOARTHRITIS OF RIGHT KNEE: ICD-10-CM

## 2022-09-15 DIAGNOSIS — I10 PRIMARY HYPERTENSION: ICD-10-CM

## 2022-09-15 DIAGNOSIS — K63.5 POLYP OF COLON, UNSPECIFIED PART OF COLON, UNSPECIFIED TYPE: ICD-10-CM

## 2022-09-15 DIAGNOSIS — Z00.00 ENCOUNTER FOR PREVENTIVE HEALTH EXAMINATION: Primary | ICD-10-CM

## 2022-09-15 DIAGNOSIS — E78.5 HYPERLIPIDEMIA LDL GOAL <130: ICD-10-CM

## 2022-09-15 DIAGNOSIS — M16.0 PRIMARY OSTEOARTHRITIS OF BOTH HIPS: ICD-10-CM

## 2022-09-15 DIAGNOSIS — E66.9 OBESITY, CLASS II, BMI 35-39.9: ICD-10-CM

## 2022-09-15 DIAGNOSIS — C61 PROSTATE CANCER: ICD-10-CM

## 2022-09-15 DIAGNOSIS — E27.8 ADRENAL NODULE: ICD-10-CM

## 2022-09-15 PROCEDURE — G0439 PR MEDICARE ANNUAL WELLNESS SUBSEQUENT VISIT: ICD-10-PCS | Mod: 95,,, | Performed by: PHYSICIAN ASSISTANT

## 2022-09-15 PROCEDURE — G0439 PPPS, SUBSEQ VISIT: HCPCS | Mod: 95,,, | Performed by: PHYSICIAN ASSISTANT

## 2022-09-15 NOTE — TELEPHONE ENCOUNTER
----- Message from [facility] sent at 9/15/2022 12:09 PM CDT -----  Type:  Needs Medical Advice    Who Called: PT   Would the patient rather a call back or a response via [employer]? Callback   Best Call Back Number: [phone]  Additional Information: Pt requesting callback from office to discuss fax status

## 2022-09-15 NOTE — TELEPHONE ENCOUNTER
Called pt; informed pt I was just making a reminder call for his virtual visit today at 9:00am and to see if he needed any help; pt stated he didn't need any help; pt informed to login 15 minutes prior to appt time

## 2022-09-15 NOTE — PATIENT INSTRUCTIONS
Counseling and Referral of Other Preventative  (Italic type indicates deductible and co-insurance are waived)    Patient Name: Zheng Talley  Today's Date: 9/15/2022    Health Maintenance       Date Due Completion Date    COVID-19 Vaccine (4 - Booster for Moderna series) 01/17/2022 10/25/2021    Colonoscopy 02/25/2022 2/25/2019    Influenza Vaccine (1) 09/01/2022 10/21/2021    Lipid Panel 01/22/2025 1/22/2020    TETANUS VACCINE 03/08/2027 3/8/2017        No orders of the defined types were placed in this encounter.      The following information is provided to all patients.  This information is to help you find resources for any of the problems found today that may be affecting your health:                Living healthy guide: www.[facility].[place].gov      Understanding Diabetes: www.diabetes.org      Eating healthy: www.cdc.gov/healthyweight      [facility] home safety checklist: www.cdc.gov/steadi/patient.html      Agency on Aging: www.goea.[place].gov      Alcoholics anonymous (AA): www.aa.org      Physical Activity: www.silas.nih.gov/od4dnhl      Tobacco use: www.quitwithusla.org

## 2022-09-15 NOTE — PROGRESS NOTES
The patient location is: [place]  The chief complaint leading to consultation is: HRA    Visit type: audiovisual    Face to Face time with patient: 40  50 minutes of total time spent on the encounter, which includes face to face time and non-face to face time preparing to see the patient (eg, review of tests), Obtaining and/or reviewing separately obtained history, Documenting clinical information in the electronic or other health record, Independently interpreting results (not separately reported) and communicating results to the patient/family/caregiver, or Care coordination (not separately reported).         Each patient to whom he or she provides medical services by telemedicine is:  (1) informed of the relationship between the physician and patient and the respective role of any other health care provider with respect to management of the patient; and (2) notified that he or she may decline to receive medical services by telemedicine and may withdraw from such care at any time.    Notes:       Zheng Talley presented for a  Medicare AWV and comprehensive Health Risk Assessment today. The following components were reviewed and updated:    Medical history  Family History  Social history  Allergies and Current Medications  Health Risk Assessment  Health Maintenance  Care Team         ** See Completed Assessments for Annual Wellness Visit within the encounter summary.**         The following assessments were completed:  Living Situation  CAGE  Depression Screening  Fall Risk Assessment (MACH 10)  Hearing Assessment(HHI)  Cognitive Function Screening  Nutrition Screening  ADL Screening  PAQ Screening      There were no vitals filed for this visit.  There is no height or weight on file to calculate BMI.  Physical Exam  Constitutional:       General: He is not in acute distress.     Appearance: He is not ill-appearing.   Pulmonary:      Effort: Pulmonary effort is normal. No respiratory distress.   Neurological:       Mental Status: He is alert and oriented to person, place, and time.   Psychiatric:         Mood and Affect: Mood normal.     Limited exam with virtual video exam        Diagnoses and health risks identified today and associated recommendations/orders:    1. Encounter for preventive health examination  Exam and Assessments performed  Chart Review Complete  Health Maintenance Reviewed and updated  Advised to obtain flu and COVID Booster at any local pharmacy.       2. Prostate cancer  Dx in 2015  S/p prostatectomy  Followed by Rad Onc and Urology    3. Primary osteoarthritis of right knee  Stable  Followed by PCP    4. Adrenal nodule  Last Imaging  Considered benign  Followed by PCP    5. Obesity, Class II, BMI 35-39.9  BMI reviewed and lifestyle  mods discussed  Followed by PCP    6. Polyp of colon, unspecified part of colon, unspecified type  Pt reports colonoscopy UTD  He will send records  Last colonoscopy  Madelaine Reece   6/30/2022  [facility]  Followed by PCP    7. Erectile dysfunction, unspecified erectile dysfunction type  Stable on current meds  Followed by Urology    8. Primary hypertension  Stable on current meds  Followed by PCP    9. Hyperlipidemia LDL goal <130  Stable on statin therapy  Followed by PCP    10. Primary osteoarthritis of both hips  Stable   Followed by Pain Clinic    Provided Zheng with a 5-10 year written screening schedule and personal prevention plan. Recommendations were developed using the USPSTF age appropriate recommendations. Education, counseling, and referrals were provided as needed. After Visit Summary printed and given to patient which includes a list of additional screenings\tests needed.    Follow up in about 7 months (around 4/15/2023) for PCP follow up and 1 year for next Medicare AWV.    Rossy Guillermo PA-C        I offered to discuss advanced care planning, including how to pick a person who would make decisions for you if you were unable to make them for  yourself, called a health care power of , and what kind of decisions you might make such as use of life sustaining treatments such as ventilators and tube feeding when faced with a life limiting illness recorded on a living will that they will need to know. (How you want to be cared for as you near the end of your natural life)     X Patient is interested in learning more about how to make advanced directives.  I provided them paperwork and offered to discuss this with them.    Future Appointments   Date Time Provider Department Center   12/21/2022 10:30 AM LAB, [facility] LABDRAW [facility]   12/30/2022  8:45 AM Derrick Wu MD [facility] UROLOGY [facility]

## 2022-09-17 ENCOUNTER — PATIENT MESSAGE (OUTPATIENT)
Dept: INTERNAL MEDICINE | Facility: CLINIC | Age: 77
End: 2022-09-17
Payer: MEDICARE

## 2022-09-17 PROBLEM — M16.0 PRIMARY OSTEOARTHRITIS OF BOTH HIPS: Status: ACTIVE | Noted: 2022-09-17

## 2022-09-19 ENCOUNTER — PATIENT OUTREACH (OUTPATIENT)
Dept: ADMINISTRATIVE | Facility: HOSPITAL | Age: 77
End: 2022-09-19
Payer: MEDICARE

## 2022-09-19 NOTE — PROGRESS NOTES
Health Maintenance Due   Topic Date Due    COVID-19 Vaccine (4 - Booster for Moderna series) 01/17/2022    Influenza Vaccine (1) 09/01/2022     Uploaded outside colonoscopy results. Updated HM and IMM. Chart reviewed.

## 2022-11-04 ENCOUNTER — PATIENT OUTREACH (OUTPATIENT)
Dept: ADMINISTRATIVE | Facility: HOSPITAL | Age: 77
End: 2022-11-04
Payer: MEDICARE

## 2022-12-19 ENCOUNTER — LAB VISIT (OUTPATIENT)
Dept: LAB | Facility: OTHER | Age: 77
End: 2022-12-19
Attending: UROLOGY
Payer: MEDICARE

## 2022-12-19 DIAGNOSIS — C61 PROSTATE CANCER: ICD-10-CM

## 2022-12-19 DIAGNOSIS — N52.9 ERECTILE DYSFUNCTION, UNSPECIFIED ERECTILE DYSFUNCTION TYPE: ICD-10-CM

## 2022-12-19 LAB — COMPLEXED PSA SERPL-MCNC: 0.03 NG/ML (ref 0–4)

## 2022-12-19 PROCEDURE — 84153 ASSAY OF PSA TOTAL: CPT | Performed by: UROLOGY

## 2022-12-19 PROCEDURE — 36415 COLL VENOUS BLD VENIPUNCTURE: CPT | Performed by: UROLOGY

## 2023-01-06 ENCOUNTER — IMMUNIZATION (OUTPATIENT)
Dept: INTERNAL MEDICINE | Facility: CLINIC | Age: 78
End: 2023-01-06
Payer: MEDICARE

## 2023-01-06 ENCOUNTER — IMMUNIZATION (OUTPATIENT)
Dept: PHARMACY | Facility: CLINIC | Age: 78
End: 2023-01-06
Payer: MEDICARE

## 2023-01-06 DIAGNOSIS — Z23 NEED FOR VACCINATION: Primary | ICD-10-CM

## 2023-01-06 PROCEDURE — 90694 VACC AIIV4 NO PRSRV 0.5ML IM: CPT | Mod: PBBFAC

## 2023-01-27 ENCOUNTER — TELEPHONE (OUTPATIENT)
Dept: UROLOGY | Facility: CLINIC | Age: 78
End: 2023-01-27
Payer: MEDICARE

## 2023-01-28 NOTE — PROGRESS NOTES
Subjective:      Zheng Talley Jr. is a 77 y.o. male who returns today regarding his     Right hip pain  He is seeing ortho for this.  Recent X ray shows no mets; + arthritis    Partial response with cialis    The following portions of the patient's history were reviewed and updated as appropriate: allergies, current medications, past family history, past medical history, past social history, past surgical history and problem list.    Review of Systems  Pertinent items are noted in HPI.  A comprehensive multipoint review of systems was negative except as otherwise stated in the HPI.    Past Medical History:   Diagnosis Date    Arthritis     BPH (benign prostatic hyperplasia)     Cancer     prostate    Cataract     Groin pain     History of gastroesophageal reflux (GERD)     Hyperlipidemia     Hypertension     Nuclear sclerosis - Both Eyes 2/18/2014     Past Surgical History:   Procedure Laterality Date    BUNIONECTOMY      bilateral     CARPAL TUNNEL RELEASE Left 7/25/2022    Procedure: RELEASE, CARPAL TUNNEL,LEFT;  Surgeon: Elyssa Bradford MD;  Location: [facility];  Service: Orthopedics;  Laterality: Left;    COLONOSCOPY  2011    Dr. Velez    COLONOSCOPY N/A 2/25/2019    Procedure: COLONOSCOPY;  Surgeon: JACQUELINE Lozano MD;  Location: [address]);  Service: Endoscopy;  Laterality: N/A;    dental implant      HAND SURGERY      lip surgery      PROSTATE SURGERY      REPAIR OF NAIL BED Right 11/2/2018    Procedure: REPAIR, NAIL BED right thumb with I&D;  Surgeon: Elyssa Bradford MD;  Location: [facility];  Service: Orthopedics;  Laterality: Right;  stretcher, supine, hand pan 1 and pan 2       Review of patient's allergies indicates:   Allergen Reactions    Iodine and iodide containing products Other (See Comments)     Blood in urine in the 1970s          Objective:   Vitals: There were no vitals taken for this visit.    Physical Exam   General: alert and oriented, no acute distress  Respiratory:  Symmetric expansion, non-labored breathing  Cardiovascular: no peripheral edema  Abdomen: soft, non distended  Skin: normal coloration and turgor, no rashes, no suspicious skin lesions noted  Neuro: no gross deficits  Psych: normal judgment and insight, normal mood/affect, and non-anxious    Physical Exam    Lab Review   Urinalysis demonstrates trace blood otherwise neg      Lab Results   Component Value Date    WBC 5.71 05/26/2017    HGB 15.5 05/26/2017    HCT 47.5 05/26/2017    MCV 86 05/26/2017     05/26/2017     Lab Results   Component Value Date    CREATININE 1.3 01/22/2020    BUN 16 01/22/2020     Lab Results   Component Value Date    PSA 3.4 02/24/2014    PSA 2.80 02/04/2013    PSA 3.13 07/11/2012    PSADIAG 0.03 12/19/2022    PSADIAG 0.04 08/30/2022    PSADIAG 0.04 04/19/2022         Imaging     Assessment and Plan:   Prostate cancer  didier 9 4+5 [id number] psa detectable but very low and stable    RTC 4 months with PSA     Erectile dysfunction, unspecified erectile dysfunction type  Not interested in treatment at this time

## 2023-01-30 ENCOUNTER — OFFICE VISIT (OUTPATIENT)
Dept: UROLOGY | Facility: CLINIC | Age: 78
End: 2023-01-30
Payer: MEDICARE

## 2023-01-30 VITALS
OXYGEN SATURATION: 95 % | HEART RATE: 70 BPM | RESPIRATION RATE: 16 BRPM | BODY MASS INDEX: 34.65 KG/M2 | SYSTOLIC BLOOD PRESSURE: 129 MMHG | WEIGHT: 242 LBS | HEIGHT: 70 IN | DIASTOLIC BLOOD PRESSURE: 74 MMHG

## 2023-01-30 DIAGNOSIS — C61 PROSTATE CANCER: Primary | ICD-10-CM

## 2023-01-30 DIAGNOSIS — R10.31 INGUINAL PAIN, RIGHT: ICD-10-CM

## 2023-01-30 DIAGNOSIS — N52.9 ERECTILE DYSFUNCTION, UNSPECIFIED ERECTILE DYSFUNCTION TYPE: ICD-10-CM

## 2023-01-30 PROCEDURE — 99214 OFFICE O/P EST MOD 30 MIN: CPT | Mod: S$GLB,,, | Performed by: UROLOGY

## 2023-01-30 PROCEDURE — 99214 PR OFFICE/OUTPT VISIT, EST, LEVL IV, 30-39 MIN: ICD-10-PCS | Mod: S$GLB,,, | Performed by: UROLOGY

## 2023-01-30 RX ORDER — TADALAFIL 20 MG/1
20 TABLET ORAL DAILY PRN
Qty: 12 TABLET | Refills: 11 | Status: SHIPPED | OUTPATIENT
Start: 2023-01-30 | End: 2023-02-09

## 2023-02-13 ENCOUNTER — PATIENT MESSAGE (OUTPATIENT)
Dept: INTERNAL MEDICINE | Facility: CLINIC | Age: 78
End: 2023-02-13
Payer: MEDICARE

## 2023-02-16 ENCOUNTER — PATIENT MESSAGE (OUTPATIENT)
Dept: INTERNAL MEDICINE | Facility: CLINIC | Age: 78
End: 2023-02-16
Payer: MEDICARE

## 2023-02-23 ENCOUNTER — OFFICE VISIT (OUTPATIENT)
Dept: ORTHOPEDICS | Facility: CLINIC | Age: 78
End: 2023-02-23
Payer: MEDICARE

## 2023-02-23 ENCOUNTER — HOSPITAL ENCOUNTER (OUTPATIENT)
Dept: RADIOLOGY | Facility: HOSPITAL | Age: 78
Discharge: HOME | End: 2023-02-23
Attending: PHYSICIAN ASSISTANT
Payer: MEDICARE

## 2023-02-23 VITALS — BODY MASS INDEX: 34.4 KG/M2 | WEIGHT: 240.31 LBS | HEIGHT: 70 IN

## 2023-02-23 DIAGNOSIS — M16.11 PRIMARY OSTEOARTHRITIS OF RIGHT HIP: ICD-10-CM

## 2023-02-23 DIAGNOSIS — M25.551 RIGHT HIP PAIN: Primary | ICD-10-CM

## 2023-02-23 DIAGNOSIS — M25.551 RIGHT HIP PAIN: ICD-10-CM

## 2023-02-23 PROCEDURE — 99999 PR PBB SHADOW E&M-EST. PATIENT-LVL III: CPT | Mod: PBBFAC,,, | Performed by: PHYSICIAN ASSISTANT

## 2023-02-23 PROCEDURE — 73502 XR HIP WITH PELVIS WHEN PERFORMED, 2 OR 3  VIEWS RIGHT: ICD-10-PCS | Mod: 26,RT,, | Performed by: RADIOLOGY

## 2023-02-23 PROCEDURE — 73502 X-RAY EXAM HIP UNI 2-3 VIEWS: CPT | Mod: TC,RT

## 2023-02-23 PROCEDURE — 99213 OFFICE O/P EST LOW 20 MIN: CPT | Mod: PBBFAC | Performed by: PHYSICIAN ASSISTANT

## 2023-02-23 PROCEDURE — 99213 OFFICE O/P EST LOW 20 MIN: CPT | Mod: S$PBB,,, | Performed by: PHYSICIAN ASSISTANT

## 2023-02-23 PROCEDURE — 73502 X-RAY EXAM HIP UNI 2-3 VIEWS: CPT | Mod: 26,RT,, | Performed by: RADIOLOGY

## 2023-02-23 PROCEDURE — 99999 PR PBB SHADOW E&M-EST. PATIENT-LVL III: ICD-10-PCS | Mod: PBBFAC,,, | Performed by: PHYSICIAN ASSISTANT

## 2023-02-23 PROCEDURE — 99213 PR OFFICE/OUTPT VISIT, EST, LEVL III, 20-29 MIN: ICD-10-PCS | Mod: S$PBB,,, | Performed by: PHYSICIAN ASSISTANT

## 2023-02-23 NOTE — PROGRESS NOTES
"Patient ID: Zheng Talley Jr. is a 78 y.o. male.    Chief Complaint: Pain of the Right Hip      HISTORY:  Zheng Talley Jr. is a 78 y.o. male who returns to me today for follow up of right hip pain.  He was last seen by me 6/20/2022.  Today he is doing well, but notes his pain has progressively worsened.  He did try meloxicam without relief.  His pain is worse with prolonged activity, rising from seated position and when laying down.  His pain in the groin with radiation into the thigh.       PMH/PSH/FamHx/SocHx:    Unchanged from prior visit.    ROS:  Constitution: Negative for chills, fever and weakness.   Respiratory: Negative for cough and shortness of breath.   Musculoskeletal: Positive for right hip pain  Psychiatric/Behavioral: The patient is not nervous/anxious.       PHYSICAL EXAM:   Ht 5' 10" (1.778 m)   Wt 109 kg (240 lb 4.8 oz)   BMI 34.48 kg/m²   Right hip  Skin intact  No warmth   No TTP  ROM limited and painful    IMAGING: X-rays of the right hip, personally reviewed by me, demonstrate severe DJD, joint space narrowing.  No fracture or dislocation.     ASSESSMENT/PLAN:    Zheng was seen today for pain.    Diagnoses and all orders for this visit:    Right hip pain  -     X-Ray Hip 2 or 3 views Right (with Pelvis when performed); Future    Primary osteoarthritis of right hip  -     Procedure Order to Pain Management; Future      - We discussed options including hip replacement- he is still hesitant to have surgery and would like to try all other conservative options.  - Ordered hip CSI to be done with pain management.  He will follow  up after this        "

## 2023-02-24 ENCOUNTER — PATIENT MESSAGE (OUTPATIENT)
Dept: PAIN MEDICINE | Facility: OTHER | Age: 78
End: 2023-02-24
Payer: MEDICARE

## 2023-02-25 ENCOUNTER — PATIENT MESSAGE (OUTPATIENT)
Dept: PAIN MEDICINE | Facility: OTHER | Age: 78
End: 2023-02-25
Payer: MEDICARE

## 2023-03-03 ENCOUNTER — HOSPITAL ENCOUNTER (OUTPATIENT)
Facility: OTHER | Age: 78
Discharge: HOME | End: 2023-03-03
Attending: ANESTHESIOLOGY | Admitting: ANESTHESIOLOGY
Payer: MEDICARE

## 2023-03-03 VITALS
HEART RATE: 58 BPM | HEIGHT: 70 IN | TEMPERATURE: 99 F | DIASTOLIC BLOOD PRESSURE: 68 MMHG | WEIGHT: 242 LBS | BODY MASS INDEX: 34.65 KG/M2 | RESPIRATION RATE: 16 BRPM | SYSTOLIC BLOOD PRESSURE: 128 MMHG | OXYGEN SATURATION: 96 %

## 2023-03-03 DIAGNOSIS — G89.29 CHRONIC PAIN: ICD-10-CM

## 2023-03-03 DIAGNOSIS — G89.29 HIP PAIN, CHRONIC, RIGHT: ICD-10-CM

## 2023-03-03 DIAGNOSIS — M16.51 POST-TRAUMATIC OSTEOARTHRITIS OF RIGHT HIP: Primary | ICD-10-CM

## 2023-03-03 DIAGNOSIS — M25.551 HIP PAIN, CHRONIC, RIGHT: ICD-10-CM

## 2023-03-03 PROCEDURE — 20610 DRAIN/INJ JOINT/BURSA W/O US: CPT | Mod: RT | Performed by: ANESTHESIOLOGY

## 2023-03-03 PROCEDURE — 63600175 PHARM REV CODE 636 W HCPCS: Performed by: ANESTHESIOLOGY

## 2023-03-03 PROCEDURE — 77002 PR FLUOROSCOPIC GUIDANCE NEEDLE PLACEMENT: ICD-10-PCS | Mod: 26,,, | Performed by: ANESTHESIOLOGY

## 2023-03-03 PROCEDURE — 20610 DRAIN/INJ JOINT/BURSA W/O US: CPT | Mod: RT,,, | Performed by: ANESTHESIOLOGY

## 2023-03-03 PROCEDURE — 77002 NEEDLE LOCALIZATION BY XRAY: CPT | Mod: 26,,, | Performed by: ANESTHESIOLOGY

## 2023-03-03 PROCEDURE — 25500020 PHARM REV CODE 255: Performed by: ANESTHESIOLOGY

## 2023-03-03 PROCEDURE — 77002 NEEDLE LOCALIZATION BY XRAY: CPT | Performed by: ANESTHESIOLOGY

## 2023-03-03 PROCEDURE — 25000003 PHARM REV CODE 250: Performed by: ANESTHESIOLOGY

## 2023-03-03 PROCEDURE — 20610 PR DRAIN/INJECT LARGE JOINT/BURSA: ICD-10-PCS | Mod: RT,,, | Performed by: ANESTHESIOLOGY

## 2023-03-03 RX ORDER — SODIUM CHLORIDE 9 MG/ML
500 INJECTION, SOLUTION INTRAVENOUS CONTINUOUS
Status: DISCONTINUED | OUTPATIENT
Start: 2023-03-03 | End: 2023-03-03 | Stop reason: HOSPADM

## 2023-03-03 RX ORDER — TRIAMCINOLONE ACETONIDE 40 MG/ML
INJECTION, SUSPENSION INTRA-ARTICULAR; INTRAMUSCULAR
Status: DISCONTINUED | OUTPATIENT
Start: 2023-03-03 | End: 2023-03-03 | Stop reason: HOSPADM

## 2023-03-03 RX ORDER — DIPHENHYDRAMINE HYDROCHLORIDE 50 MG/ML
25 INJECTION INTRAMUSCULAR; INTRAVENOUS ONCE
Status: COMPLETED | OUTPATIENT
Start: 2023-03-03 | End: 2023-03-03

## 2023-03-03 RX ORDER — BUPIVACAINE HYDROCHLORIDE 2.5 MG/ML
INJECTION, SOLUTION EPIDURAL; INFILTRATION; INTRACAUDAL
Status: DISCONTINUED | OUTPATIENT
Start: 2023-03-03 | End: 2023-03-03 | Stop reason: HOSPADM

## 2023-03-03 RX ORDER — LIDOCAINE HYDROCHLORIDE 20 MG/ML
INJECTION, SOLUTION INFILTRATION; PERINEURAL
Status: DISCONTINUED | OUTPATIENT
Start: 2023-03-03 | End: 2023-03-03 | Stop reason: HOSPADM

## 2023-03-03 RX ORDER — ALPRAZOLAM 0.5 MG/1
0.5 TABLET ORAL
Status: DISCONTINUED | OUTPATIENT
Start: 2023-03-03 | End: 2023-03-03 | Stop reason: HOSPADM

## 2023-03-03 RX ADMIN — DIPHENHYDRAMINE HYDROCHLORIDE 25 MG: 50 INJECTION, SOLUTION INTRAMUSCULAR; INTRAVENOUS at 02:03

## 2023-03-03 RX ADMIN — ALPRAZOLAM 0.5 MG: 0.5 TABLET ORAL at 02:03

## 2023-03-03 NOTE — DISCHARGE INSTRUCTIONS
Thank you for allowing us to care for you today. You may receive a survey about the care we provided. Your feedback is valuable and helps us provide excellent care throughout the community.     Home Care Instructions for Pain Management:    1. DIET:   You may resume your normal diet today.   2. BATHING:   You may shower with luke warm water. No tub baths or anything that will soak injection sites under water for the next 24 hours.  3. DRESSING:   You may remove your bandage today.   4. ACTIVITY LEVEL:   You may resume your normal activities 24 hrs after your procedure. Nothing strenuous today.  5. MEDICATIONS:   You may resume your normal medications today. To restart blood thinners, ask your doctor.  6. DRIVING    If you have received any sedatives by mouth today, you may not drive for 12 hours.    If you have received any sedation through your IV, you may not drive for 24 hrs.   7. SPECIAL INSTRUCTIONS:   No heat to the injection site for 24 hrs including, hot bath or shower, heating pad, moist heat, or hot tubs.    Use ice pack to injection site for any pain or discomfort.  Apply ice packs for 20 minute intervals as needed.    IF you have diabetes, be sure to monitor your blood sugar more closely. IF your injection contained steroids your blood sugar levels may become higher than normal.    If you are still having pain upon discharge:  Your pain may improve over the next 48 hours. The anesthetic (numbing medication) works immediately to 48 hours. IF your injection contained a steroid (anti-inflammatory medication), it takes approximately 3 days to start feeling relief and 7-10 days to see your greatest results from the medication. It is possible you may need subsequent injections. This would be discussed at your follow up appointment with pain management or your referring doctor.    Please call the PAIN MANAGEMENT office at [phone] or ON CALL pager at [phone] if you experienced any:   -Weakness or  loss of sensation  -Fever > 101.5  -Pain uncontrolled with oral medications   -Persistent nausea, vomiting, or diarrhea  -Redness or drainage from the injection sites, or any other worrisome concerns.   If physician on call was not reached or could not communicate with our office for any reason please go to the nearest emergency department. Adult Procedural Sedation Instructions    Recovery After Procedural Sedation (Adult)  You have been given medicine by vein to make you sleep during your surgery. This may have included both a pain medicine and sleeping medicine. Most of the effects have worn off. But you may still have some drowsiness for the next 6 to 8 hours.  Home care  Follow these guidelines when you get home:  For the next 8 hours, you should be watched by a responsible adult. This person should make sure your condition is not getting worse.  Don't drink any alcohol for the next 24 hours.  Don't drive, operate dangerous machinery, or make important business or personal decisions during the next 24 hours.  Note: Your healthcare provider may tell you not to take any medicine by mouth for pain or sleep in the next 4 hours. These medicines may react with the medicines you were given in the hospital. This could cause a much stronger response than usual.  Follow-up care  Follow up with your healthcare provider if you are not alert and back to your usual level of activity within 12 hours.  When to seek medical advice  Call your healthcare provider right away if any of these occur:  Drowsiness gets worse  Weakness or dizziness gets worse  Repeated vomiting  You can't be awakened   Date Last Reviewed: 10/18/2016  © 6379-2042 [employer]. [address] [place] [address], [place] [address]. All rights reserved. This information is not intended as a substitute for professional medical care. Always follow your healthcare professional's instructions.

## 2023-03-03 NOTE — DISCHARGE SUMMARY
Discharge Note  Short Stay      SUMMARY     Admit Date: 3/3/2023    Attending Physician: Frankie Carrillo      Discharge Physician: Frankie Carrillo      Discharge Date: 3/3/2023 2:58 PM    Procedure(s) (LRB):  INJECTION, RIGHT HIP-INTRA-ARTICULAR CONTRAST DIRECT REF (Right)    Final Diagnosis: Primary osteoarthritis of right hip [M16.11]    Disposition: Home or self care    Patient Instructions:   Current Discharge Medication List        CONTINUE these medications which have NOT CHANGED    Details   amLODIPine (NORVASC) 10 MG tablet Take 1 tablet (10 mg total) by mouth once daily.  Qty: 90 tablet, Refills: 3    Comments: .  Associated Diagnoses: Essential hypertension      losartan-hydrochlorothiazide 100-25 mg (HYZAAR) 100-25 mg per tablet Take 1 tablet by mouth once daily.  Qty: 90 tablet, Refills: 3    Comments: .  Associated Diagnoses: Essential hypertension      metoprolol succinate (TOPROL-XL) 50 MG 24 hr tablet Take 1 tablet (50 mg total) by mouth once daily.  Qty: 90 tablet, Refills: 3    Comments: .  Associated Diagnoses: Essential hypertension      pravastatin (PRAVACHOL) 40 MG tablet Take 1 tablet (40 mg total) by mouth every evening.  Qty: 90 tablet, Refills: 3    Associated Diagnoses: Hyperlipidemia LDL goal <130      tadalafiL (CIALIS) 20 MG Tab Take 1 tablet (20 mg total) by mouth daily as needed.  Qty: 12 tablet, Refills: 11      valACYclovir (VALTREX) 1000 MG tablet Take 1 tablet (1,000 mg total) by mouth once daily.  Qty: 90 tablet, Refills: 3    Associated Diagnoses: Essential hypertension                 Discharge Diagnosis: Primary osteoarthritis of right hip [M16.11]  Condition on Discharge: Stable with no complications to procedure   Diet on Discharge: Same as before.  Activity: as per instruction sheet.  Discharge to: Home with a responsible adult.  Follow up: 2-4 weeks

## 2023-03-03 NOTE — OP NOTE
Hip Joint Injection under Fluoroscopic Guidance    The procedure, risks, benefits, and options were discussed with the patient. There are no contraindications to the procedure. The patent expressed understanding and agreed to the procedure. Informed written consent was obtained prior to the start of the procedure and can be found in the patient's chart.    PATIENT NAME: Zheng Talley Jr.   MRN: [id number]     DATE OF PROCEDURE: 03/03/2023    PROCEDURE: Right Hip Joint Injection under Fluoroscopic Guidance    PRE-OP DIAGNOSIS: Primary osteoarthritis of right hip [M16.11]    POST-OP DIAGNOSIS: Primary osteoarthritis of right hip [M16.11]    PHYSICIAN: Frankie Carrillo MD    ASSISTANTS: Dr. Bean     MEDICATIONS INJECTED: Preservative-free Kenalog 40mg with 3cc of Bupivacine 0.25%     LOCAL ANESTHETIC INJECTED: Xylocaine 2%     SEDATION: None    ESTIMATED BLOOD LOSS: None    COMPLICATIONS: None    TECHNIQUE: Time-out was performed to identify the patient and procedure to be performed. With the patient laying in a supine position, the surgical area was prepped and draped in the usual sterile fashion using ChloraPrep and a fenestrated drape. The area overlying the hip joint was determined under fluoroscopy guidance. Skin anesthesia was achieved by injecting Lidocaine 2% over the injection site. A 22 gauge, 5 inch spinal quinke needle was introduced under fluoroscopy until the tip reached the greater trochanter. The tip of the needle was hinged cephalad from the greater trochanter into the joint space.  When the needle tip was in the appropriate position, and there was no blood aspiration, Contrast dye  Omnipaque (300mg/mL) was injected to confirm placement and there was no vascular runoff. 4 mL of the medication mixture listed above was injected slowly. The needles were removed and bleeding was nil. A sterile dressing was applied. No specimens collected. The patient tolerated the procedure well.    The patient was  monitored after the procedure in the recovery area. They were given post-procedure and discharge instructions to follow at home. The patient was discharged in a stable condition.        Frankie Carrillo MD

## 2023-04-12 ENCOUNTER — OFFICE VISIT (OUTPATIENT)
Dept: OPTOMETRY | Facility: CLINIC | Age: 78
End: 2023-04-12
Payer: MEDICARE

## 2023-04-12 DIAGNOSIS — Z13.5 SCREENING FOR GLAUCOMA: ICD-10-CM

## 2023-04-12 DIAGNOSIS — H52.03 HYPEROPIA WITH PRESBYOPIA OF BOTH EYES: ICD-10-CM

## 2023-04-12 DIAGNOSIS — H52.4 HYPEROPIA WITH PRESBYOPIA OF BOTH EYES: ICD-10-CM

## 2023-04-12 DIAGNOSIS — H25.13 NUCLEAR SCLEROSIS, BILATERAL: Primary | ICD-10-CM

## 2023-04-12 PROCEDURE — 99999 PR PBB SHADOW E&M-EST. PATIENT-LVL II: ICD-10-PCS | Mod: PBBFAC,,, | Performed by: OPTOMETRIST

## 2023-04-12 PROCEDURE — 92014 COMPRE OPH EXAM EST PT 1/>: CPT | Mod: S$PBB,,, | Performed by: OPTOMETRIST

## 2023-04-12 PROCEDURE — 92015 DETERMINE REFRACTIVE STATE: CPT | Mod: ,,, | Performed by: OPTOMETRIST

## 2023-04-12 PROCEDURE — 99212 OFFICE O/P EST SF 10 MIN: CPT | Mod: PBBFAC,PO | Performed by: OPTOMETRIST

## 2023-04-12 PROCEDURE — 92014 PR EYE EXAM, EST PATIENT,COMPREHESV: ICD-10-PCS | Mod: S$PBB,,, | Performed by: OPTOMETRIST

## 2023-04-12 PROCEDURE — 92015 PR REFRACTION: ICD-10-PCS | Mod: ,,, | Performed by: OPTOMETRIST

## 2023-04-12 PROCEDURE — 99999 PR PBB SHADOW E&M-EST. PATIENT-LVL II: CPT | Mod: PBBFAC,,, | Performed by: OPTOMETRIST

## 2023-04-12 NOTE — PROGRESS NOTES
HPI     Concerns About Ocular Health            Comments: [facility]: 3/8/2022          Comments    Presents today for routine eye exam. Pt reports no vision complaints.   Reports floaters and occ flashes. Pt denies eye pain. No gtts.          Last edited by Carla Boggs MA on 4/12/2023  8:38 AM.            Assessment /Plan     For exam results, see Encounter Report.    Nuclear sclerosis, bilateral    Screening for glaucoma    Hyperopia with presbyopia of both eyes        Cat OS>OD--discussed surgery, but pt wishes to wait.  Happy w Rx (separate PAls and computer only spex)    PLAN:    rtc 1 yr

## 2023-05-01 ENCOUNTER — PATIENT MESSAGE (OUTPATIENT)
Dept: ORTHOPEDICS | Facility: CLINIC | Age: 78
End: 2023-05-01
Payer: MEDICARE

## 2023-05-19 ENCOUNTER — OFFICE VISIT (OUTPATIENT)
Dept: ORTHOPEDICS | Facility: CLINIC | Age: 78
End: 2023-05-19
Payer: MEDICARE

## 2023-05-19 VITALS — HEIGHT: 70 IN | BODY MASS INDEX: 34.72 KG/M2 | WEIGHT: 242.5 LBS

## 2023-05-19 DIAGNOSIS — M16.11 PRIMARY OSTEOARTHRITIS OF RIGHT HIP: Primary | ICD-10-CM

## 2023-05-19 PROCEDURE — 99214 PR OFFICE/OUTPT VISIT, EST, LEVL IV, 30-39 MIN: ICD-10-PCS | Mod: S$PBB,,, | Performed by: ORTHOPAEDIC SURGERY

## 2023-05-19 PROCEDURE — 99213 OFFICE O/P EST LOW 20 MIN: CPT | Mod: PBBFAC | Performed by: ORTHOPAEDIC SURGERY

## 2023-05-19 PROCEDURE — 99214 OFFICE O/P EST MOD 30 MIN: CPT | Mod: S$PBB,,, | Performed by: ORTHOPAEDIC SURGERY

## 2023-05-19 PROCEDURE — 99999 PR PBB SHADOW E&M-EST. PATIENT-LVL III: CPT | Mod: PBBFAC,,, | Performed by: ORTHOPAEDIC SURGERY

## 2023-05-19 PROCEDURE — 99999 PR PBB SHADOW E&M-EST. PATIENT-LVL III: ICD-10-PCS | Mod: PBBFAC,,, | Performed by: ORTHOPAEDIC SURGERY

## 2023-05-19 NOTE — PROGRESS NOTES
Subjective:      Patient ID: Zheng Talley Jr. is a 78 y.o. male.    Chief Complaint: Pain of the Right Hip      HPI: Zheng Talley Jr. is a 78 y.o. male here with a 1 year history of right hip pain. The patient is a  . There was not a history of trauma.  The pain is severe The pain is located in the groin.  There is is not radiation..  The pain is described as dull. The patient has not had prior surgery. It is aggravated by standing, lying, and walking.  It  is alleviated by rest. There is not numbness or tingling of the lower extremity.  There is not back pain.  He  has tried medications or injections. They have not helped.  He does have difficulty getting in or out of a car, getting dressed, or going up or down stairs.  The patient does not use an assistive device.    Past Medical History:   Diagnosis Date    Arthritis     BPH (benign prostatic hyperplasia)     Cancer     prostate    Cataract     Groin pain     History of gastroesophageal reflux (GERD)     Hyperlipidemia     Hypertension     Nuclear sclerosis - Both Eyes 2/18/2014       Current Outpatient Medications on File Prior to Visit   Medication Sig Dispense Refill    amLODIPine (NORVASC) 10 MG tablet TAKE 1 TABLET ONCE DAILY 90 tablet 0    losartan-hydrochlorothiazide 100-25 mg (HYZAAR) 100-25 mg per tablet TAKE 1 TABLET ONCE DAILY 90 tablet 0    metoprolol succinate (TOPROL-XL) 50 MG 24 hr tablet TAKE 1 TABLET ONCE DAILY 90 tablet 0    pravastatin (PRAVACHOL) 40 MG tablet Take 1 tablet (40 mg total) by mouth every evening. 90 tablet 3    valACYclovir (VALTREX) 1000 MG tablet Take 1 tablet (1,000 mg total) by mouth once daily. (Patient taking differently: Take 1,000 mg by mouth as needed.) 90 tablet 3    tadalafiL (CIALIS) 20 MG Tab Take 1 tablet (20 mg total) by mouth daily as needed. 12 tablet 11     No current facility-administered medications on file prior to visit.       Past Surgical History:   Procedure Laterality Date    BUNIONECTOMY       bilateral     CARPAL TUNNEL RELEASE Left 2022    Procedure: RELEASE, CARPAL TUNNEL,LEFT;  Surgeon: Elyssa Bradford MD;  Location: [facility] OR;  Service: Orthopedics;  Laterality: Left;    COLONOSCOPY      Dr. Velez    COLONOSCOPY N/A 2019    Procedure: COLONOSCOPY;  Surgeon: JACQUELINE Lozano MD;  Location: [facility] ENDO (4TH FLR);  Service: Endoscopy;  Laterality: N/A;    dental implant      HAND SURGERY      INJECTION Right 3/3/2023    Procedure: INJECTION, RIGHT HIP-INTRA-ARTICULAR CONTRAST DIRECT REF;  Surgeon: Frankie Carrillo MD;  Location: [facility] [place] PAIN MGT;  Service: Pain Management;  Laterality: Right;    lip surgery      PROSTATE SURGERY      REPAIR OF NAIL BED Right 2018    Procedure: REPAIR, NAIL BED right thumb with I&D;  Surgeon: Elyssa Bradford MD;  Location: [facility] [place] OR;  Service: Orthopedics;  Laterality: Right;  stretcher, supine, hand pan 1 and pan 2       Family History   Problem Relation Age of Onset    Cancer Father         leukemia    Diabetes Father     Coronary artery disease Mother     Hypertension Mother     No Known Problems Brother     No Known Problems Daughter     No Known Problems Son     No Known Problems Brother     No Known Problems Brother     Heart disease Neg Hx     Amblyopia Neg Hx     Blindness Neg Hx     Cataracts Neg Hx     Glaucoma Neg Hx     Macular degeneration Neg Hx     Retinal detachment Neg Hx     Strabismus Neg Hx        Social History     Socioeconomic History    Marital status:    Tobacco Use    Smoking status: Former     Types: Cigarettes     Quit date:      Years since quittin.4    Smokeless tobacco: Never   Substance and Sexual Activity    Alcohol use: Yes     Alcohol/week: 0.0 standard drinks     Comment: occasionally    Drug use: No    Sexual activity: Never     Partners: Female     Social Determinants of Health     Financial Resource Strain: Low Risk     Difficulty of Paying Living Expenses: Not hard at all   Food Insecurity:  No Food Insecurity    Worried About Running Out of Food in the Last Year: Never true    Ran Out of Food in the Last Year: Never true   Transportation Needs: No Transportation Needs    Lack of Transportation (Medical): No    Lack of Transportation (Non-Medical): No   Stress: No Stress Concern Present    Feeling of Stress : Not at all   Social Connections: Moderately Integrated    Frequency of Communication with Friends and Family: More than three times a week    Frequency of Social Gatherings with Friends and Family: More than three times a week    Attends Sabianist Services: More than 4 times per year    Active Member of Clubs or Organizations: No    Attends Club or Organization Meetings: Never    Marital Status:    Housing Stability: Low Risk     Unable to Pay for Housing in the Last Year: No    Number of Places Lived in the Last Year: 1    Unstable Housing in the Last Year: No         Past Medical History:   Diagnosis Date    Arthritis     BPH (benign prostatic hyperplasia)     Cancer     prostate    Cataract     Groin pain     History of gastroesophageal reflux (GERD)     Hyperlipidemia     Hypertension     Nuclear sclerosis - Both Eyes 2/18/2014     Past Surgical History:   Procedure Laterality Date    BUNIONECTOMY      bilateral     CARPAL TUNNEL RELEASE Left 7/25/2022    Procedure: RELEASE, CARPAL TUNNEL,LEFT;  Surgeon: Elyssa Bradford MD;  Location: [facility] OR;  Service: Orthopedics;  Laterality: Left;    COLONOSCOPY  2011    Dr. Velez    COLONOSCOPY N/A 2/25/2019    Procedure: COLONOSCOPY;  Surgeon: JACQUELINE Lozano MD;  Location: [facility] ENDO [address]);  Service: Endoscopy;  Laterality: N/A;    dental implant      HAND SURGERY      INJECTION Right 3/3/2023    Procedure: INJECTION, RIGHT HIP-INTRA-ARTICULAR CONTRAST DIRECT REF;  Surgeon: Frankie Carrillo MD;  Location: [facility] PAIN MGT;  Service: Pain Management;  Laterality: Right;    lip surgery      PROSTATE SURGERY      REPAIR OF NAIL BED Right  2018    Procedure: REPAIR, NAIL BED right thumb with I&D;  Surgeon: Elyssa Bradford MD;  Location: [facility];  Service: Orthopedics;  Laterality: Right;  stretcher, supine, hand pan 1 and pan 2     Social History     Socioeconomic History    Marital status:    Tobacco Use    Smoking status: Former     Types: Cigarettes     Quit date:      Years since quittin.4    Smokeless tobacco: Never   Substance and Sexual Activity    Alcohol use: Yes     Alcohol/week: 0.0 standard drinks     Comment: occasionally    Drug use: No    Sexual activity: Never     Partners: Female     Social Determinants of Health     Financial Resource Strain: Low Risk     Difficulty of Paying Living Expenses: Not hard at all   Food Insecurity: No Food Insecurity    Worried About Running Out of Food in the Last Year: Never true    Ran Out of Food in the Last Year: Never true   Transportation Needs: No Transportation Needs    Lack of Transportation (Medical): No    Lack of Transportation (Non-Medical): No   Stress: No Stress Concern Present    Feeling of Stress : Not at all   Social Connections: Moderately Integrated    Frequency of Communication with Friends and Family: More than three times a week    Frequency of Social Gatherings with Friends and Family: More than three times a week    Attends Pentecostal Services: More than 4 times per year    Active Member of Clubs or Organizations: No    Attends Club or Organization Meetings: Never    Marital Status:    Housing Stability: Low Risk     Unable to Pay for Housing in the Last Year: No    Number of Places Lived in the Last Year: 1    Unstable Housing in the Last Year: No         Current Outpatient Medications:     amLODIPine (NORVASC) 10 MG tablet, TAKE 1 TABLET ONCE DAILY, Disp: 90 tablet, Rfl: 0    losartan-hydrochlorothiazide 100-25 mg (HYZAAR) 100-25 mg per tablet, TAKE 1 TABLET ONCE DAILY, Disp: 90 tablet, Rfl: 0    metoprolol succinate (TOPROL-XL) 50 MG 24 hr  "tablet, TAKE 1 TABLET ONCE DAILY, Disp: 90 tablet, Rfl: 0    pravastatin (PRAVACHOL) 40 MG tablet, Take 1 tablet (40 mg total) by mouth every evening., Disp: 90 tablet, Rfl: 3    valACYclovir (VALTREX) 1000 MG tablet, Take 1 tablet (1,000 mg total) by mouth once daily. (Patient taking differently: Take 1,000 mg by mouth as needed.), Disp: 90 tablet, Rfl: 3    tadalafiL (CIALIS) 20 MG Tab, Take 1 tablet (20 mg total) by mouth daily as needed., Disp: 12 tablet, Rfl: 11  Review of patient's allergies indicates:   Allergen Reactions    Iodine and iodide containing products Other (See Comments)     Blood in urine in the 1970s       Ht 5' 10" (1.778 m)   Wt 110 kg (242 lb 8.1 oz)   BMI 34.80 kg/m²     Review of Systems   Constitutional: Negative for chills, fever, malaise/fatigue and night sweats.   HENT:  Negative for congestion, hearing loss, hoarse voice and sore throat.    Eyes:  Negative for blurred vision, double vision and pain.   Cardiovascular:  Negative for chest pain, claudication, leg swelling and palpitations.   Respiratory:  Negative for cough, shortness of breath and wheezing.    Endocrine: Negative for polydipsia, polyphagia and polyuria.   Hematologic/Lymphatic: Negative for adenopathy and bleeding problem. Does not bruise/bleed easily.   Skin:  Negative for poor wound healing.   Musculoskeletal:  Positive for joint pain and stiffness.   Gastrointestinal:  Negative for abdominal pain, constipation, diarrhea, heartburn, nausea and vomiting.   Genitourinary:  Negative for bladder incontinence, dysuria, flank pain and urgency.   Neurological:  Negative for dizziness, focal weakness, headaches, numbness, paresthesias, sensory change and tremors.   Psychiatric/Behavioral:  Negative for depression, hallucinations and suicidal ideas. The patient is not nervous/anxious.    Allergic/Immunologic: Negative for persistent infections.       Objective:    Right Hip Exam     Tenderness   The patient is experiencing " tenderness in the anterior.    Range of Motion   Extension:  10   Flexion:  120   External rotation:  50   Internal rotation:  15     Muscle Strength   Abduction: 5/5   Adduction: 5/5   Flexion: 5/5     Tests   ROXI: positive    Other   Erythema: absent  Scars: absent  Sensation: normal  Pulse: present    Comments:  Positive [place]             Assessment:     Imaging: Right hip x ray showing endstage arthritis with sclerosis and subchondral cysts.         1. Primary osteoarthritis of right hip          Plan:          We discussed operative and non-operative treatment of his arthritis. He has not had success with HEP and intra-articular steroid injections. Will plan for R VITO. Sent for pre op. ASA for DVT.

## 2023-05-22 PROBLEM — M16.11 PRIMARY OSTEOARTHRITIS OF RIGHT HIP: Status: ACTIVE | Noted: 2023-05-22

## 2023-05-30 DIAGNOSIS — M16.11 PRIMARY OSTEOARTHRITIS OF RIGHT HIP: Primary | ICD-10-CM

## 2023-05-31 ENCOUNTER — TELEPHONE (OUTPATIENT)
Dept: PREADMISSION TESTING | Facility: HOSPITAL | Age: 78
End: 2023-05-31

## 2023-05-31 DIAGNOSIS — M79.609 PAIN IN EXTREMITY, UNSPECIFIED EXTREMITY: ICD-10-CM

## 2023-05-31 DIAGNOSIS — Z01.818 PRE-OP TESTING: Primary | ICD-10-CM

## 2023-05-31 NOTE — ANESTHESIA PAT ROS NOTE
05/31/2023  Zheng Talley Jr. is a 78 y.o., male.      Pre-op Assessment          Review of Systems         Anesthesia Assessment: Preoperative EQUATION    Planned Procedure: Procedure(s) (LRB):  ARTHROPLASTY, HIP, TOTAL, ANTERIOR APPROACH: RIGHT: HORACE AVENIR & G7 (Right)  Requested Anesthesia Type:Spinal  Surgeon: Teofilo Rodrigues MD  Service: Orthopedics  Known or anticipated Date of Surgery:6/26/2023    Surgeon notes: reviewed    Electronic QUestionnaire Assessment completed via nurse interview with patient.        Triage considerations:     The patient has no apparent active cardiac condition (No unstable coronary Syndrome such as severe unstable angina or recent [<1 month] myocardial infarction, decompensated CHF, severe valvular   disease or significant arrhythmia)    Previous anesthesia records:GETA and Complications noted  7/25/22   RELEASE, CARPAL TUNNEL,LEFT    Airway:  Mallampati: III   Mouth Opening: Normal  TM Distance: Normal  Tongue: Normal    2/25/19 Colonoscopy  At the end of the procedure, the patient's heart rate decreased rapidly. For a brief few seconds it appeared as if the patient was asystolic. The patients blood pressure decreased to SBP of 60s. 0.2 mg of glycopyrrolate was given with a rapid return in the patient's heart rate and blood pressure. The patient was apneic during this time requiring mask ventilation with the ambu bag and an oral airway. He was an easy mask ventilation with the ambu bag, oral airway and jaw thrust. The patient began to respond after about a minute of mask ventilation and a few minutes later the patient was verbally responsive with normal vital signs and was taken to recovery. I explained to the patient and his wife the series of intraoperative events and to mention this to his PCP then next time he has an appointment. The patient feels well now with no  complaints and his vital are back to his baseline.    7/17/15   ROBOTIC ASSISTED LAPAROSCOPIC PROSTATECTOMY   Method of Intubation: Glidescope Inserted by: CRNA Airway Device: Endotracheal Tube Mask Ventilation: Mod Diff - oral Intubated: Postinduction Blade: -- , glide #4  Airway Device Size: 7.5 Style: Cuffed Cuff Inflation: Minimal occlusive pressure Inflation Amount (mL): 6 Placement Verified By: Auscultation;Capnometry Grade: -- , with glide  Complicating Factors: None Findings Post-Intubation: Positive EtCO2;Bilateral breath sounds;Atraumatic/Condition of teeth unchanged Depth of Insertion (cm): 25 Secured at: Lips Complications: None Breath Sounds: Equal Bilateral Insertion attempts (enter comment if more than 2 attempts): 1     Last PCP note: 6-12 months ago , within [facility] PA-C  Subspecialty notes: Ortho, Pain Management, Radiology/ONC, Urology    Other important co-morbidities: GERD, HLD, HTN, Obesity, and BPH, Prostate CA 2015 (s/p Prostatectomy), Adrenal Nodule       Tests already available:  No recent tests.   3/11/20 Lumbar MRI  7/8/15 EKG            Instructions given. (See in Nurse's note)    Optimization:  Anesthesia Preop Clinic Assessment  Indicated POC    Medical Opinion Indicated       Sub-specialist consult indicated:   [facility] NP      Plan:    Testing:  CMP, EKG, Hematology Profile, and PT/INR   Pre-anesthesia  visit       Visit focus: possible regional anesthesia and/or nerve block      Consultation: [facility] NP for medical and anesthesia optimization     Patient  has previously scheduled Medical Appointment: 6/8    Navigation: Tests Scheduled.              Consults scheduled.             Results will be tracked by Preop Clinic.     6/8/23  Patient is optimized for surgery.     Elevated bicarbonate likely due to HCTZ use and snoring.         Gucci Kiran, CHALO  Perioperative Medicine  [facility]

## 2023-06-07 ENCOUNTER — TELEPHONE (OUTPATIENT)
Dept: INTERNAL MEDICINE | Facility: CLINIC | Age: 78
End: 2023-06-07
Payer: MEDICARE

## 2023-06-07 PROBLEM — K21.9 GASTROESOPHAGEAL REFLUX DISEASE: Status: ACTIVE | Noted: 2023-06-07

## 2023-06-07 NOTE — ASSESSMENT & PLAN NOTE
Recent outside colonoscopy 2022- was told it was good    S/P colonoscopy 2019  ) Rectosigmoid polyp.  FINAL PATHOLOGIC DIAGNOSIS  1. TUBULAR ADENOMA  2. FRAGMENT OF NONNEOPLASTIC COLONIC MUCOSA  Diagnosed by: Lyao Braswell M.D.

## 2023-06-07 NOTE — ASSESSMENT & PLAN NOTE
Dx'd in 2015; S/P Lap. prostatectomy in 2016  Followed by Urology; next appt. 6/9/23  Last seen by Radiation Oncology 9/6/22- under active surveillance for low detectable PSA- stable.  LUTS: Nocturia x 2

## 2023-06-07 NOTE — ASSESSMENT & PLAN NOTE
Right adrenal  Per CT abdomen/pelvis 2015; follow-up MRI abdomen shows findings consistent with benign adenoma. No further follow-up needed.  Denies: elevated blood pressure/excessive sweating/tachycardia/CP/SOB/headaches

## 2023-06-07 NOTE — ASSESSMENT & PLAN NOTE
Recommend  weight loss, healthy diet (DASH/Mediterranean) and exercise.   Patient should exercise 30 minutes at least five times weekly once recovered from surgery. Limit alcohol.  Treated with: pravastatin

## 2023-06-07 NOTE — PROGRESS NOTES
Subjective:      Zheng Talley Jr. is a 78 y.o. male who returns today regarding his     Pca    No UTI symptoms    Scheduled for hip surgery in a few weeks.    The following portions of the patient's history were reviewed and updated as appropriate: allergies, current medications, past family history, past medical history, past social history, past surgical history and problem list.    Review of Systems  Pertinent items are noted in HPI.  A comprehensive multipoint review of systems was negative except as otherwise stated in the HPI.    Past Medical History:   Diagnosis Date    Arthritis     BPH (benign prostatic hyperplasia)     Cancer     prostate    Cataract     Groin pain     History of gastroesophageal reflux (GERD)     Hyperlipidemia     Hypertension     Nuclear sclerosis - Both Eyes 2/18/2014     Past Surgical History:   Procedure Laterality Date    BUNIONECTOMY      bilateral     CARPAL TUNNEL RELEASE Left 7/25/2022    Procedure: RELEASE, CARPAL TUNNEL,LEFT;  Surgeon: Elyssa Bradford MD;  [facility]: [facility];  Service: Orthopedics;  Laterality: Left;    COLONOSCOPY  2011    Dr. Velez    COLONOSCOPY N/A 2/25/2019    Procedure: COLONOSCOPY;  Surgeon: JACQUELINE Lozano MD;  Location: [address]);  Service: Endoscopy;  Laterality: N/A;    dental implant      HAND SURGERY      INJECTION Right 3/3/2023    Procedure: INJECTION, RIGHT HIP-INTRA-ARTICULAR CONTRAST DIRECT REF;  Surgeon: Frankie Carrillo MD;  Location: [facility] PAIN MGT;  Service: Pain Management;  Laterality: Right;    lip surgery      PROSTATE SURGERY      REPAIR OF NAIL BED Right 11/2/2018    Procedure: REPAIR, NAIL BED right thumb with I&D;  Surgeon: Elyssa Bradford MD;  Location: [facility] OR;  Service: Orthopedics;  Laterality: Right;  stretcher, supine, hand pan 1 and pan 2       Review of patient's allergies indicates:   Allergen Reactions    Iodine and iodide containing products Other (See Comments)     Blood in urine in the 1970s           Objective:   Vitals: There were no vitals taken for this visit.    Physical Exam   General: alert and oriented, no acute distress  Respiratory: Symmetric expansion, non-labored breathing  Cardiovascular: no peripheral edema  Abdomen: soft, non distended  Skin: normal coloration and turgor, no rashes, no suspicious skin lesions noted  Neuro: no gross deficits  Psych: normal judgment and insight, normal mood/affect, and non-anxious  BLANQUITA NPR    Physical Exam    Lab Review   Urinalysis demonstrates no specimen    Lab Results   Component Value Date    WBC 5.71 05/26/2017    HGB 15.5 05/26/2017    HCT 47.5 05/26/2017    MCV 86 05/26/2017     05/26/2017     Lab Results   Component Value Date    CREATININE 1.3 01/22/2020    BUN 16 01/22/2020     Lab Results   Component Value Date    PSA 3.4 02/24/2014    PSA 2.80 02/04/2013    PSA 3.13 07/11/2012    PSA 2.1 07/21/2011    PSA 1.8 02/19/2009    PSA 1.8 01/09/2009    PSA 1.0 05/17/2005    PSADIAG 0.06 06/08/2023    PSADIAG 0.03 12/19/2022    PSADIAG 0.04 08/30/2022    PSADIAG 0.04 04/19/2022    PSADIAG <0.01 12/13/2021    PSADIAG 0.03 09/14/2021    PSADIAG 0.02 06/04/2021    PSADIAG 0.02 02/12/2021    PSADIAG 0.01 02/18/2020    PSADIAG <0.01 07/03/2019           Imaging  -    Assessment and Plan:   Prostate cancer  didier 9 4+5 [id number] psa detectable but very low and stable     RTC 3-4 months with PSA to see me and Dr Kearns    Erectile dysfunction, unspecified erectile dysfunction type  Cont cialis

## 2023-06-07 NOTE — ASSESSMENT & PLAN NOTE
No recent problems with back   No loss of bladder or bowel control    Denies N/T to buttocks, perineum and inner surfaces of the thighs (saddle anesthesia)      Recent imaging: MRI [facility] 3/1/20  FINDINGS:  Lumbar spine alignment is within normal limits. The vertebral body heights are well maintained, with no fracture.  No marrow signal abnormality suspicious for an infiltrative process.     The conus is normal in appearance.  The adjacent soft tissue structures show no significant abnormalities.     L1-L2: There is no focal disc herniation. No significant central canal or neural foraminal narrowing.     L2-L3: Broad-based disc bulging and minimal spondylitic spurring are present.  There is also some facet arthritis.  No significant central canal or neural foraminal narrowing.     L3-L4: Broad-based disc bulging, spondylitic spurring, and facet arthritis are present.  No significant central canal or neural foraminal narrowing.     L4-L5: Broad-based disc bulging and spondylitic spurring which is asymmetric to the left noted, as well as severe facet arthritis and ligamentum flavum hypertrophy.  No focal disc herniation.  No significant central canal or neural foraminal narrowing.     L5-S1: Broad-based disc bulging and facet arthritis is present with superimposed central and right paracentral disc protrusion.  No focal disc herniation.     There is a prominence of the epidural fat in the lumbosacral region, most notably below L5-S1 which is of doubtful clinical significance.  No significant central canal or neural foraminal narrowing.     Impression:     Multilevel nonstenotic spondylosis and facet arthritis

## 2023-06-07 NOTE — ASSESSMENT & PLAN NOTE
Current BP  controlled today.    Taking: amlodipine/losartan-HCTZ/metoprolol    Lifestyle changes to reduce systolic BP:exercise 30 minutes per day,  5 days per week or 150 minutes weekly; sodium reduction and avoidance of high salt foods such as processed meats, frozen meals and  fast foods.   Keeping a healthy weight/BMI can help with better BP control    BP acceptable for surgery. I recommend monitoring BP during perioperative period as uncontrolled pain can elevate blood pressure.

## 2023-06-08 ENCOUNTER — HOSPITAL ENCOUNTER (OUTPATIENT)
Dept: CARDIOLOGY | Facility: CLINIC | Age: 78
Discharge: HOME | End: 2023-06-08
Payer: MEDICARE

## 2023-06-08 ENCOUNTER — OFFICE VISIT (OUTPATIENT)
Dept: INTERNAL MEDICINE | Facility: CLINIC | Age: 78
End: 2023-06-08
Payer: MEDICARE

## 2023-06-08 VITALS
WEIGHT: 241 LBS | BODY MASS INDEX: 34.5 KG/M2 | TEMPERATURE: 99 F | SYSTOLIC BLOOD PRESSURE: 134 MMHG | OXYGEN SATURATION: 96 % | HEART RATE: 64 BPM | HEIGHT: 70 IN | DIASTOLIC BLOOD PRESSURE: 66 MMHG

## 2023-06-08 DIAGNOSIS — K63.5 POLYP OF COLON, UNSPECIFIED PART OF COLON, UNSPECIFIED TYPE: ICD-10-CM

## 2023-06-08 DIAGNOSIS — Z01.818 PRE-OP TESTING: ICD-10-CM

## 2023-06-08 DIAGNOSIS — E27.8 ADRENAL NODULE: ICD-10-CM

## 2023-06-08 DIAGNOSIS — G89.4 CHRONIC PAIN DISORDER: ICD-10-CM

## 2023-06-08 DIAGNOSIS — M16.11 PRIMARY OSTEOARTHRITIS OF RIGHT HIP: ICD-10-CM

## 2023-06-08 DIAGNOSIS — M47.816 SPONDYLOSIS OF LUMBAR SPINE: ICD-10-CM

## 2023-06-08 DIAGNOSIS — Z01.818 PREOPERATIVE EXAMINATION: Primary | ICD-10-CM

## 2023-06-08 DIAGNOSIS — N28.9 RENAL INSUFFICIENCY: ICD-10-CM

## 2023-06-08 DIAGNOSIS — I10 PRIMARY HYPERTENSION: ICD-10-CM

## 2023-06-08 DIAGNOSIS — E66.9 OBESITY, CLASS II, BMI 35-39.9: ICD-10-CM

## 2023-06-08 DIAGNOSIS — E78.5 HYPERLIPIDEMIA LDL GOAL <130: ICD-10-CM

## 2023-06-08 DIAGNOSIS — R06.83 SNORING: ICD-10-CM

## 2023-06-08 DIAGNOSIS — C61 PROSTATE CANCER: ICD-10-CM

## 2023-06-08 DIAGNOSIS — K21.9 GASTROESOPHAGEAL REFLUX DISEASE, UNSPECIFIED WHETHER ESOPHAGITIS PRESENT: ICD-10-CM

## 2023-06-08 PROCEDURE — 93005 ELECTROCARDIOGRAM TRACING: CPT | Mod: PBBFAC | Performed by: INTERNAL MEDICINE

## 2023-06-08 PROCEDURE — 99999 PR PBB SHADOW E&M-EST. PATIENT-LVL III: ICD-10-PCS | Mod: PBBFAC,,, | Performed by: NURSE PRACTITIONER

## 2023-06-08 PROCEDURE — 93010 ELECTROCARDIOGRAM REPORT: CPT | Mod: S$PBB,,, | Performed by: INTERNAL MEDICINE

## 2023-06-08 PROCEDURE — 93010 EKG 12-LEAD: ICD-10-PCS | Mod: S$PBB,,, | Performed by: INTERNAL MEDICINE

## 2023-06-08 PROCEDURE — 99215 PR OFFICE/OUTPT VISIT, EST, LEVL V, 40-54 MIN: ICD-10-PCS | Mod: S$PBB,,, | Performed by: NURSE PRACTITIONER

## 2023-06-08 PROCEDURE — 99213 OFFICE O/P EST LOW 20 MIN: CPT | Mod: PBBFAC | Performed by: NURSE PRACTITIONER

## 2023-06-08 PROCEDURE — 99999 PR PBB SHADOW E&M-EST. PATIENT-LVL III: CPT | Mod: PBBFAC,,, | Performed by: NURSE PRACTITIONER

## 2023-06-08 PROCEDURE — 99215 OFFICE O/P EST HI 40 MIN: CPT | Mod: S$PBB,,, | Performed by: NURSE PRACTITIONER

## 2023-06-08 NOTE — OUTPATIENT SUBJECTIVE & OBJECTIVE
Outpatient Subjective & Objective      Chief Complaint: Preoperative evaulation, perioperative medical management, and complication reduction plan.     Functional Capacity: no regular exercise regimen but still does yardwork without CP/SOB. Parked in garage today without symptoms.       Anesthesia issues: None    Difficulty mouth opening: No    Steroid use in the last 12 months: yes for right hip     Dental Issues: No    Family anesthesia difficulty: None       Family Hx of Thrombosis: None    Past Medical History:   Diagnosis Date    Arthritis     BPH (benign prostatic hyperplasia)     Cancer     prostate    Cataract     Groin pain     History of gastroesophageal reflux (GERD)     Hyperlipidemia     Hypertension     Nuclear sclerosis - Both Eyes 02/18/2014         Past Medical History Pertinent Negatives:   Diagnosis Date Noted    Amblyopia 02/18/2014    Anemia 12/27/2018    Angina pectoris 12/27/2018    Anxiety 12/27/2018    Asthma 12/27/2018    COPD (chronic obstructive pulmonary disease) 06/08/2023    Coronary artery disease 06/08/2023    Deep vein thrombosis 06/08/2023    Depression 12/27/2018    Diabetes mellitus 05/14/2014    Diabetes mellitus, type 2 06/08/2023    Diabetic retinopathy 02/18/2014    Disorder of kidney and ureter 06/08/2023    Glaucoma 01/06/2021    Heart failure 12/15/2014    Heart transplanted 12/27/2018    Hepatitis B 12/27/2018    Hepatitis C 12/27/2018    Liver transplanted 12/27/2018    Lung transplanted 12/27/2018    Macular degeneration 02/18/2014    Myocardial infarction 06/08/2023    Pneumonia 12/15/2014    Pulmonary embolism 06/08/2023    Retinal detachment 02/18/2014    Seizures 12/27/2018    Sickle cell anemia 01/06/2021    Sickle cell trait 01/06/2021    Sleep apnea 12/27/2018    Strabismus 02/18/2014    Stroke 12/27/2018    Type II or unspecified type diabetes mellitus without mention of complication, not stated as uncontrolled 12/15/2014    Uveitis 02/18/2014         Past  Surgical History:   Procedure Laterality Date    BUNIONECTOMY      bilateral     CARPAL TUNNEL RELEASE Left 7/25/2022    Procedure: RELEASE, CARPAL TUNNEL,LEFT;  Surgeon: Elyssa Bradford MD;  Location: [facility] OR;  Service: Orthopedics;  Laterality: Left;    COLONOSCOPY  2011    Dr. Velez    COLONOSCOPY N/A 2/25/2019    Procedure: COLONOSCOPY;  Surgeon: JACQUELINE Lozano MD;  Location: [facility] ENDO (4TH FLR);  Service: Endoscopy;  Laterality: N/A;    dental implant      HAND SURGERY      INJECTION Right 3/3/2023    Procedure: INJECTION, RIGHT HIP-INTRA-ARTICULAR CONTRAST DIRECT REF;  Surgeon: Frankie Carrillo MD;  Location: [facility] PAIN MGT;  Service: Pain Management;  Laterality: Right;    lip surgery      PROSTATE SURGERY      REPAIR OF NAIL BED Right 11/2/2018    Procedure: REPAIR, NAIL BED right thumb with I&D;  Surgeon: Elyssa Bradford MD;  Location: [facility] OR;  Service: Orthopedics;  Laterality: Right;  stretcher, supine, hand pan 1 and pan 2       Review of Systems   Constitutional:  Negative for chills, fatigue, fever and unexpected weight change.   HENT:  Negative for dental problem, hearing loss, postnasal drip, rhinorrhea, sore throat, tinnitus and trouble swallowing.    Eyes:  Negative for photophobia, pain, discharge and visual disturbance.   Respiratory:  Negative for apnea, cough, chest tightness, shortness of breath and wheezing.         STOP BANG risk factors:  Snoring  HTN  Male sex   Cardiovascular:  Negative for chest pain, palpitations and leg swelling.   Gastrointestinal:  Negative for abdominal pain, blood in stool, constipation, nausea and vomiting.        Denies Fatty liver, Hepatitis   Endocrine: Negative for cold intolerance and heat intolerance.   Genitourinary:  Negative for decreased urine volume, difficulty urinating, dysuria, frequency, hematuria and urgency.        Nocturia x 2   Musculoskeletal:  Positive for arthralgias (right hip). Negative for back pain, neck pain and neck  "stiffness.   Skin:  Negative for rash and wound.   Neurological:  Positive for numbness (bilateral hand- attributes to carpal tunnel syndrome). Negative for dizziness, tremors, seizures, syncope, weakness and headaches.   Hematological:  Negative for adenopathy. Does not bruise/bleed easily.   Psychiatric/Behavioral:  Negative for confusion, sleep disturbance and suicidal ideas.             VITALS  Visit Vitals  /66 (BP Location: Left arm, Patient Position: Sitting)   Pulse 64   Temp 98.6 °F (37 °C) (Oral)   Ht 5' 10" (1.778 m)   Wt 109.3 kg (241 lb)   SpO2 96%   BMI 34.58 kg/m²          Physical Exam  Vitals reviewed.   Constitutional:       General: He is not in acute distress.     Appearance: He is well-developed. He is obese.   HENT:      Head: Normocephalic.      Nose: Nose normal.      Mouth/Throat:      Pharynx: No oropharyngeal exudate.   Eyes:      General:         Right eye: No discharge.         Left eye: No discharge.      Conjunctiva/sclera: Conjunctivae normal.      Pupils: Pupils are equal, round, and reactive to light.   Neck:      Thyroid: No thyromegaly.      Vascular: No carotid bruit or JVD.      Trachea: No tracheal deviation.   Cardiovascular:      Rate and Rhythm: Normal rate and regular rhythm.      Pulses:           Carotid pulses are 2+ on the right side and 2+ on the left side.       Dorsalis pedis pulses are 2+ on the right side and 2+ on the left side.        Posterior tibial pulses are 2+ on the right side and 2+ on the left side.      Heart sounds: Normal heart sounds. No murmur heard.  Pulmonary:      Effort: Pulmonary effort is normal. No respiratory distress.      Breath sounds: Normal breath sounds. No stridor. No wheezing, rhonchi or rales.   Abdominal:      General: Bowel sounds are normal. There is no distension.      Palpations: Abdomen is soft.      Tenderness: There is no abdominal tenderness. There is no guarding.   Musculoskeletal:      Cervical back: No pain with " movement. Decreased range of motion (mild-moderate with extension).      Right lower le+ Edema present.      Left lower leg: Edema (trace) present.   Lymphadenopathy:      Cervical: No cervical adenopathy.   Skin:     General: Skin is warm and dry.      Capillary Refill: Capillary refill takes less than 2 seconds.      Findings: No erythema or rash.   Neurological:      Mental Status: He is alert and oriented to person, place, and time.      Coordination: Coordination normal.        Significant Labs:  Lab Results   Component Value Date    WBC 5.38 2023    HGB 16.0 2023    HCT 49.9 2023     2023    CHOL 128 2020    TRIG 127 2020    HDL 36 (L) 2020    ALT 16 2023    AST 18 2023     2023    K 4.1 2023     2023    CREATININE 1.3 2023    BUN 22 2023    CO2 32 (H) 2023    TSH 1.446 2020    PSA 3.4 2014    INR 1.1 2023    HGBA1C 5.8 (H) 2020       Diagnostic Studies: No relevant studies.    EK23  Normal sinus rhythm   Left axis deviation   Inferior infarct ,age undetermined   Nonspecific ST and/or T wave abnormalities   Abnormal ECG   When compared with ECG of 2015 09:42,   No significant change was found   Confirmed by Dong Copeland MD ([id number]) on 2023 1:05:18 PM           Active Cardiac Conditions: None      Revised Cardiac Risk Index   High -Risk Surgery  Intraperitoneal; Intrathoracic; suprainguinal vascular Yes- + 1 No- 0   History of Ischemic Heart Disease   (Hx of MI/positive exercise test/current chest pain due to ischemia/use of nitrate therapy/EKG with pathological Q waves) Yes- + 1 No- 0   History of CHF  (Pulmonary edema/bilateral rales or S3 gallop/PND/CXR showing pulmonary vascular redistribution) Yes- + 1 No- 0   History of CVA   (Prior stroke or TIA) Yes- + 1 No- 0   Pre-operative treatment with insulin Yes- + 1 No- 0   Pre-operative creatinine > 2mg/dl  Yes- + 1 No- 0   Total: 0      Risk Status:  Estimated risk of cardiac complications after non-cardiac surgery using the Revised Cardiac Risk Index for Preoperative risk is 3.9 %      ARISCAT (Canet) risk index: Intermediate: 13.3% risk of post-op pulmonary complications.    American Society of Anesthesiologists Physical Status classification (ASA): 2           No further cardiac workup needed prior to surgery.    Outpatient Subjective & Objective

## 2023-06-08 NOTE — DISCHARGE INSTRUCTIONS
Your surgery has been scheduled for:______6/26/23____________________________________    You should report to:  ____[facility], located on the [place] side of the first floor of the           [facility] ([phone])  ____[facility], located on the [facility] side of the            Second floor of the [facility] ([phone])  ____3rd Floor [facility] located on the [facility] side of the [facility] [phone]  __X__[facility]: located at [address] [place] [address]. [address]     Please Note   Tell your doctor if you take Aspirin, products containing Aspirin, herbal medications  or blood thinners, such as Coumadin, Ticlid, or Plavix.  (Consult your provider regarding holding or stopping before surgery).  Arrange for someone to drive you home following surgery.  You will not be allowed to leave the surgical facility alone or drive yourself home following sedation and anesthesia.    Before Surgery  Stop taking all herbal medications, vitamins, and supplements 7 days prior to surgery  No Motrin/Advil (Ibuprofen) 7 days before surgery  No Aleve (Naproxen) 7 days before surgery  Stop Taking Asprin, products containing Aspirin __7___days before surgery   No Goody's/BC Powder 7 days before surgery  Refrain from drinking alcoholic beverages for 24 hours before and after surgery  Stop or limit smoking at least 24 hours prior to surgery  You may take Tylenol for pain    Night before Surgery  Do not eat or drink after midnight  Take a shower or bath (shower is recommended).  Bathe with Hibiclens soap or an antibacterial soap from the neck down.  If not supplied by your surgeon, hibiclens soap will need to be purchased over the counter in pharmacy.  Rinse soap off thoroughly.  Shampoo your hair with your regular shampoo    The Day of Surgery  Take another bath or shower with hibiclens  or any antibacterial soap, to reduce the chance of infection.  Take heart and blood pressure medications with a small sip of water, as advised by the perioperative team.  Do not take fluid pills  You may brush your teeth and rinse your mouth, but do not swallow any additional water.   Do not apply perfumes, powder, body lotions or deodorant on the day of surgery.  Nail polish should be removed.  Do not wear makeup or moisturizer  Wear comfortable clothes, such as a button front shirt and loose fitting pants.  Leave all jewelry, including body piercings, and valuables at home.    Bring any devices you will neeed after surgery such as crutches or canes.  If you have sleep apnea, please bring your CPAP machine  In the event that your physical condition changes including the onset of a cold or respiratory illness, or if you have to delay or cancel your surgery, please notify your surgeon.

## 2023-06-08 NOTE — ASSESSMENT & PLAN NOTE
Denies SACHA. Possible sleep apnea: recommend caution with sedating medication in the perioperative period.   Sleep Study referral declined

## 2023-06-08 NOTE — PROGRESS NOTES
[facility]  Progress Note    Patient Name: Zheng Talley Jr.  MRN: [id number]  Date of Evaluation- 06/09/2023  PCP- Gio Rizo MD    Future cases for Zheng Talley Jr. [[id number]]       Case ID Status Date Time Aubrey Procedure Provider Location    [id number] [facility] 6/26/2023  1:10  ARTHROPLASTY, HIP, TOTAL, ANTERIOR APPROACH: RIGHT: [address] Teofilo Rodrigues MD [[id number]] [facility] OR            HPI:  This is a 78 y.o. male  who presents today with wife for a preoperative evaluation in preparation for an Orthopedic  procedure.  Scheduled for  right hip arthroplasty.   Surgery is indicated for osteoarthritis of right hip.   Patient is new to me.  Details of current problem: The duration of problem is 2 years.  Denies trauma. Reports history of steroid injections with only temporary relief.   Reports symptoms of intermittent sharp and throbbing pain to right groin/hip.  Aggravating factors include: sitting, difficulty rising from sitting, lack of sleep, and decreased ADLs.   Relieving factors are  rest.     Denies pain while sitting; no use of assistive devices.   The history has been obtained by speaking with the patient and reviewing the electronic medical record and/or outside health information. Significant health conditions for the perioperative period are discussed below in assessment and plan.     Patient reports current health status to be Excellent.  Denies any new symptoms before surgery.        Subjective/ Objective:     Chief Complaint: Preoperative evaulation, perioperative medical management, and complication reduction plan.     Functional Capacity: no regular exercise regimen but still does yardwork without CP/SOB. Parked in garage today without symptoms.       Anesthesia issues: None    Difficulty mouth opening: No    Steroid use in the last 12 months: yes for right hip     Dental Issues: No    Family anesthesia difficulty: None       Family Hx of Thrombosis: None    Past  Medical History:   Diagnosis Date    Arthritis     BPH (benign prostatic hyperplasia)     Cancer     prostate    Cataract     Groin pain     History of gastroesophageal reflux (GERD)     Hyperlipidemia     Hypertension     Nuclear sclerosis - Both Eyes 02/18/2014         Past Medical History Pertinent Negatives:   Diagnosis Date Noted    Amblyopia 02/18/2014    Anemia 12/27/2018    Angina pectoris 12/27/2018    Anxiety 12/27/2018    Asthma 12/27/2018    COPD (chronic obstructive pulmonary disease) 06/08/2023    Coronary artery disease 06/08/2023    Deep vein thrombosis 06/08/2023    Depression 12/27/2018    Diabetes mellitus 05/14/2014    Diabetes mellitus, type 2 06/08/2023    Diabetic retinopathy 02/18/2014    Disorder of kidney and ureter 06/08/2023    Glaucoma 01/06/2021    Heart failure 12/15/2014    Heart transplanted 12/27/2018    Hepatitis B 12/27/2018    Hepatitis C 12/27/2018    Liver transplanted 12/27/2018    Lung transplanted 12/27/2018    Macular degeneration 02/18/2014    Myocardial infarction 06/08/2023    Pneumonia 12/15/2014    Pulmonary embolism 06/08/2023    Retinal detachment 02/18/2014    Seizures 12/27/2018    Sickle cell anemia 01/06/2021    Sickle cell trait 01/06/2021    Sleep apnea 12/27/2018    Strabismus 02/18/2014    Stroke 12/27/2018    Type II or unspecified type diabetes mellitus without mention of complication, not stated as uncontrolled 12/15/2014    Uveitis 02/18/2014         Past Surgical History:   Procedure Laterality Date    BUNIONECTOMY      bilateral     CARPAL TUNNEL RELEASE Left 7/25/2022    Procedure: RELEASE, CARPAL TUNNEL,LEFT;  Surgeon: Elyssa Bradford MD;  Location: [facility] OR;  Service: Orthopedics;  Laterality: Left;    COLONOSCOPY  2011    Dr. Velez    COLONOSCOPY N/A 2/25/2019    Procedure: COLONOSCOPY;  Surgeon: JACQUELINE Lozaon MD;  Location: [facility] ENDO ([address] [place] [address]);  Service: Endoscopy;  Laterality: N/A;    dental implant      HAND SURGERY      INJECTION Right  3/3/2023    Procedure: INJECTION, RIGHT HIP-INTRA-ARTICULAR CONTRAST DIRECT REF;  Surgeon: Frankie Carrillo MD;  Location: [facility] [place] PAIN MGT;  Service: Pain Management;  Laterality: Right;    lip surgery      PROSTATE SURGERY      REPAIR OF NAIL BED Right 11/2/2018    Procedure: REPAIR, NAIL BED right thumb with I&D;  Surgeon: Elyssa Bradford MD;  Location: [facility] [place] OR;  Service: Orthopedics;  Laterality: Right;  stretcher, supine, hand pan 1 and pan 2       Review of Systems   Constitutional:  Negative for chills, fatigue, fever and unexpected weight change.   HENT:  Negative for dental problem, hearing loss, postnasal drip, rhinorrhea, sore throat, tinnitus and trouble swallowing.    Eyes:  Negative for photophobia, pain, discharge and visual disturbance.   Respiratory:  Negative for apnea, cough, chest tightness, shortness of breath and wheezing.         STOP BANG risk factors:  Snoring  HTN  Male sex   Cardiovascular:  Negative for chest pain, palpitations and leg swelling.   Gastrointestinal:  Negative for abdominal pain, blood in stool, constipation, nausea and vomiting.        Denies Fatty liver, Hepatitis   Endocrine: Negative for cold intolerance and heat intolerance.   Genitourinary:  Negative for decreased urine volume, difficulty urinating, dysuria, frequency, hematuria and urgency.        Nocturia x 2   Musculoskeletal:  Positive for arthralgias (right hip). Negative for back pain, neck pain and neck stiffness.   Skin:  Negative for rash and wound.   Neurological:  Positive for numbness (bilateral hand- attributes to carpal tunnel syndrome). Negative for dizziness, tremors, seizures, syncope, weakness and headaches.   Hematological:  Negative for adenopathy. Does not bruise/bleed easily.   Psychiatric/Behavioral:  Negative for confusion, sleep disturbance and suicidal ideas.             VITALS  Visit Vitals  /66 (BP Location: Left arm, Patient Position: Sitting)   Pulse 64   Temp 98.6 °F (37 °C)  "(Oral)   Ht 5' 10" (1.778 m)   Wt 109.3 kg (241 lb)   SpO2 96%   BMI 34.58 kg/m²          Physical Exam  Vitals reviewed.   Constitutional:       General: He is not in acute distress.     Appearance: He is well-developed. He is obese.   HENT:      Head: Normocephalic.      Nose: Nose normal.      Mouth/Throat:      Pharynx: No oropharyngeal exudate.   Eyes:      General:         Right eye: No discharge.         Left eye: No discharge.      Conjunctiva/sclera: Conjunctivae normal.      Pupils: Pupils are equal, round, and reactive to light.   Neck:      Thyroid: No thyromegaly.      Vascular: No carotid bruit or JVD.      Trachea: No tracheal deviation.   Cardiovascular:      Rate and Rhythm: Normal rate and regular rhythm.      Pulses:           Carotid pulses are 2+ on the right side and 2+ on the left side.       Dorsalis pedis pulses are 2+ on the right side and 2+ on the left side.        Posterior tibial pulses are 2+ on the right side and 2+ on the left side.      Heart sounds: Normal heart sounds. No murmur heard.  Pulmonary:      Effort: Pulmonary effort is normal. No respiratory distress.      Breath sounds: Normal breath sounds. No stridor. No wheezing, rhonchi or rales.   Abdominal:      General: Bowel sounds are normal. There is no distension.      Palpations: Abdomen is soft.      Tenderness: There is no abdominal tenderness. There is no guarding.   Musculoskeletal:      Cervical back: No pain with movement. Decreased range of motion (mild-moderate with extension).      Right lower le+ Edema present.      Left lower leg: Edema (trace) present.   Lymphadenopathy:      Cervical: No cervical adenopathy.   Skin:     General: Skin is warm and dry.      Capillary Refill: Capillary refill takes less than 2 seconds.      Findings: No erythema or rash.   Neurological:      Mental Status: He is alert and oriented to person, place, and time.      Coordination: Coordination normal.        Significant Labs:  Lab " Results   Component Value Date    WBC 5.38 2023    HGB 16.0 2023    HCT 49.9 2023     2023    CHOL 128 2020    TRIG 127 2020    HDL 36 (L) 2020    ALT 16 2023    AST 18 2023     2023    K 4.1 2023     2023    CREATININE 1.3 2023    BUN 22 2023    CO2 32 (H) 2023    TSH 1.446 2020    PSA 3.4 2014    INR 1.1 2023    HGBA1C 5.8 (H) 2020       Diagnostic Studies: No relevant studies.    EK23  Normal sinus rhythm   Left axis deviation   Inferior infarct ,age undetermined   Nonspecific ST and/or T wave abnormalities   Abnormal ECG   When compared with ECG of 2015 09:42,   No significant change was found   Confirmed by Dong Copeland MD ([id number]) on 2023 1:05:18 PM           Active Cardiac Conditions: None      Revised Cardiac Risk Index   High -Risk Surgery  Intraperitoneal; Intrathoracic; suprainguinal vascular Yes- + 1 No- 0   History of Ischemic Heart Disease   (Hx of MI/positive exercise test/current chest pain due to ischemia/use of nitrate therapy/EKG with pathological Q waves) Yes- + 1 No- 0   History of CHF  (Pulmonary edema/bilateral rales or S3 gallop/PND/CXR showing pulmonary vascular redistribution) Yes- + 1 No- 0   History of CVA   (Prior stroke or TIA) Yes- + 1 No- 0   Pre-operative treatment with insulin Yes- + 1 No- 0   Pre-operative creatinine > 2mg/dl Yes- + 1 No- 0   Total: 0      Risk Status:  Estimated risk of cardiac complications after non-cardiac surgery using the Revised Cardiac Risk Index for Preoperative risk is 3.9 %      ARISCAT (Canet) risk index: Intermediate: 13.3% risk of post-op pulmonary complications.    American Society of Anesthesiologists Physical Status classification (ASA): 2           No further cardiac workup needed prior to surgery.                   Assessment/Plan:     Spondylosis of lumbar spine  No recent problems with back   No  loss of bladder or bowel control    Denies N/T to buttocks, perineum and inner surfaces of the thighs (saddle anesthesia)      Recent imaging: [facility] 3/1/20  FINDINGS:  Lumbar spine alignment is within normal limits. The vertebral body heights are well maintained, with no fracture.  No marrow signal abnormality suspicious for an infiltrative process.     The conus is normal in appearance.  The adjacent soft tissue structures show no significant abnormalities.     L1-L2: There is no focal disc herniation. No significant central canal or neural foraminal narrowing.     L2-L3: Broad-based disc bulging and minimal spondylitic spurring are present.  There is also some facet arthritis.  No significant central canal or neural foraminal narrowing.     L3-L4: Broad-based disc bulging, spondylitic spurring, and facet arthritis are present.  No significant central canal or neural foraminal narrowing.     L4-L5: Broad-based disc bulging and spondylitic spurring which is asymmetric to the left noted, as well as severe facet arthritis and ligamentum flavum hypertrophy.  No focal disc herniation.  No significant central canal or neural foraminal narrowing.     L5-S1: Broad-based disc bulging and facet arthritis is present with superimposed central and right paracentral disc protrusion.  No focal disc herniation.     There is a prominence of the epidural fat in the lumbosacral region, most notably below L5-S1 which is of doubtful clinical significance.  No significant central canal or neural foraminal narrowing.     Impression:     Multilevel nonstenotic spondylosis and facet arthritis            Chronic pain disorder  Location: right hip at this time  Not currently treated   Last seen by pain management: 4/19/22     Hypertension  Current BP  controlled today.    Taking: amlodipine/losartan-HCTZ/metoprolol    Lifestyle changes to reduce systolic BP:exercise 30 minutes per day,  5 days per week or 150 minutes weekly; sodium  reduction and avoidance of high salt foods such as processed meats, frozen meals and  fast foods.   Keeping a healthy weight/BMI can help with better BP control    BP acceptable for surgery. I recommend monitoring BP during perioperative period as uncontrolled pain can elevate blood pressure.           Hyperlipidemia LDL goal <130  Recommend  weight loss, healthy diet (DASH/Mediterranean) and exercise.   Patient should exercise 30 minutes at least five times weekly once recovered from surgery. Limit alcohol.  Treated with: pravastatin    Prostate cancer  Dx'd in 2015; S/P Lap. prostatectomy in 2016  Followed by Urology; next appt. 6/9/23  Last seen by Radiation Oncology 9/6/22- under active surveillance for low detectable PSA- stable.  LUTS: Nocturia x 2    Obesity, Class II, BMI 35-39.9  Lifestyle changes should be made by eating healthy, exercising at least 150 minutes weekly, and avoiding sedentary behavior.       Adrenal nodule  Right adrenal  Per CT abdomen/pelvis 2015; follow-up MRI abdomen shows findings consistent with benign adenoma. No further follow-up needed.  Denies: elevated blood pressure/excessive sweating/tachycardia/CP/SOB/headaches      Colon polyps  Recent outside colonoscopy 2022- was told it was good    S/P colonoscopy 2019  ) Rectosigmoid polyp.  FINAL PATHOLOGIC DIAGNOSIS  1. TUBULAR ADENOMA  2. FRAGMENT OF NONNEOPLASTIC COLONIC MUCOSA  Diagnosed by: Layo Braswell M.D.      Gastroesophageal reflux disease  Not currently having symptoms and is not treated now.      Primary osteoarthritis of right hip  Scheduled with Dr. Rodrigues on 6/26/23 for right hip arthroplasty.     Snoring  Denies SACHA. Possible sleep apnea: recommend caution with sedating medication in the perioperative period.   Sleep Study referral declined     Renal insufficiency  No changes in urine volume.     Most recent GFR:  56.6  BUN= 22   Cr = 1.3  Recommend to avoid NSAIDs, reduce salt intake, maintain adequate hydration, and  exercise.    Tylenol as needed for pain    I  suggest monitoring renal function, input and output status casey-operatively. I  suggest avoiding nephrotoxic medication including NSAIDs, COX2 inhibitors, intravenous contrast agent,avoiding hypotension to prevent further renal impairment.          Discussion/Management of Perioperative Care    Thromboembolic prophylaxis (VTE) Care: Risk factors for thrombosis include: age, obesity, and surgical procedure.  I recommend prophylaxis of thromboembolism with the use of compression stockings/pneumatic devices, and/or pharmacologic agents. The benefits should outweigh the risks for pharmacologic prophylaxis in the perioperative period. I also encourage early ambulation if not contraindicated during the post-operative period.    Risk factors for post-operative pulmonary complications include:age > 65 years, HTN, and surgery > 3 hours. To reduce the risk of pulmonary complications, prophylactic recommendations include: incentive spirometry use/deep breathing, early ambulation, and pain control.    Risk factors for renal complications include: age and HTN. To reduce the risk of postoperative renal complications, I recommend the patient maintain adequate fluid volume status by drinking 2 liters of water daily.  Avoid/reduce NSAIDS and WIGGINS-2 inhibitors use as well as IV contrast for renal protection.    I recommend the use of appropriate prophylactic antibiotics to reduce the risk of surgical site infections.    Delirium risk factors include advanced age. I recommend to avoid/reduce use of benzodiazepine use (not for patients who take on a regular basis), anticholinergics, Benadryl,  and agents that may cause postoperative serotonin syndrome.  Controlled pain can decrease the risk for postop delirium and since opioids are used for postoperative pain control, I suggest using the lowest dose for the shortest amount of time necessary for pain management.     The patient is at an  increased risk for urinary retention due to : Nocturia, possible regional anesthesia and advanced age. I recommend to avoid/decrease the use of benzodiazepines, anticholinergics, and Benadryl in the perioperative period. I also recommend using opioids for the shortest period of time if possible.          This visit was focused on Preoperative evaluation, Perioperative Medical management, complication reduction plans. I suggest that the patient follows up with primary care or relevant sub specialists for ongoing health care.    I appreciate the opportunity to be involved in this patients care. Please feel free to contact me if there were any questions about this consultation.        I spent a total of 54 minutes on the day of the visit.This includes face to face time and non-face to face time preparing to see the patient (e.g., review of tests), obtaining and/or reviewing separately obtained history, documenting clinical information in the electronic or other health record, independently interpreting results and communicating results to the patient/family/caregiver, or care coordinator.       Patient is optimized for surgery.    Elevated bicarbonate likely due to HCTZ use and snoring.       Gucci Kiran NP  Perioperative Medicine  [facility]

## 2023-06-08 NOTE — HPI
This is a 78 y.o. male  who presents today with wife for a preoperative evaluation in preparation for an Orthopedic  procedure.  Scheduled for  right hip arthroplasty.   Surgery is indicated for osteoarthritis of right hip.   Patient is new to me.  Details of current problem: The duration of problem is 2 years.  Denies trauma. Reports history of steroid injections with only temporary relief.   Reports symptoms of intermittent sharp and throbbing pain to right groin/hip.  Aggravating factors include: sitting, difficulty rising from sitting, lack of sleep, and decreased ADLs.   Relieving factors are  rest.     Denies pain while sitting; no use of assistive devices.   The history has been obtained by speaking with the patient and reviewing the electronic medical record and/or outside health information. Significant health conditions for the perioperative period are discussed below in assessment and plan.     Patient reports current health status to be Excellent.  Denies any new symptoms before surgery.

## 2023-06-09 ENCOUNTER — OFFICE VISIT (OUTPATIENT)
Dept: UROLOGY | Facility: CLINIC | Age: 78
End: 2023-06-09
Payer: MEDICARE

## 2023-06-09 VITALS
BODY MASS INDEX: 34.72 KG/M2 | SYSTOLIC BLOOD PRESSURE: 137 MMHG | WEIGHT: 242.5 LBS | HEIGHT: 70 IN | DIASTOLIC BLOOD PRESSURE: 65 MMHG | HEART RATE: 61 BPM

## 2023-06-09 DIAGNOSIS — C61 PROSTATE CANCER: Primary | ICD-10-CM

## 2023-06-09 DIAGNOSIS — R10.31 INGUINAL PAIN, RIGHT: ICD-10-CM

## 2023-06-09 DIAGNOSIS — N52.9 ERECTILE DYSFUNCTION, UNSPECIFIED ERECTILE DYSFUNCTION TYPE: ICD-10-CM

## 2023-06-09 PROBLEM — N28.9 RENAL INSUFFICIENCY: Status: ACTIVE | Noted: 2023-06-09

## 2023-06-09 PROCEDURE — 99214 PR OFFICE/OUTPT VISIT, EST, LEVL IV, 30-39 MIN: ICD-10-PCS | Mod: S$GLB,,, | Performed by: UROLOGY

## 2023-06-09 PROCEDURE — 99214 OFFICE O/P EST MOD 30 MIN: CPT | Mod: S$GLB,,, | Performed by: UROLOGY

## 2023-06-09 NOTE — ASSESSMENT & PLAN NOTE
No changes in urine volume.     Most recent GFR:  56.6  BUN= 22   Cr = 1.3  Recommend to avoid NSAIDs, reduce salt intake, maintain adequate hydration, and exercise.    Tylenol as needed for pain    I  suggest monitoring renal function, input and output status casey-operatively. I  suggest avoiding nephrotoxic medication including NSAIDs, COX2 inhibitors, intravenous contrast agent,avoiding hypotension to prevent further renal impairment.

## 2023-06-16 ENCOUNTER — OFFICE VISIT (OUTPATIENT)
Dept: ORTHOPEDICS | Facility: CLINIC | Age: 78
End: 2023-06-16
Payer: MEDICARE

## 2023-06-16 ENCOUNTER — PATIENT MESSAGE (OUTPATIENT)
Dept: ADMINISTRATIVE | Facility: OTHER | Age: 78
End: 2023-06-16
Payer: MEDICARE

## 2023-06-16 ENCOUNTER — HOSPITAL ENCOUNTER (OUTPATIENT)
Dept: RADIOLOGY | Facility: HOSPITAL | Age: 78
Discharge: HOME | End: 2023-06-16
Attending: ORTHOPAEDIC SURGERY
Payer: MEDICARE

## 2023-06-16 VITALS — BODY MASS INDEX: 35.11 KG/M2 | WEIGHT: 245.25 LBS | HEIGHT: 70 IN

## 2023-06-16 DIAGNOSIS — M16.11 PRIMARY OSTEOARTHRITIS OF RIGHT HIP: ICD-10-CM

## 2023-06-16 DIAGNOSIS — M16.11 PRIMARY OSTEOARTHRITIS OF RIGHT HIP: Primary | ICD-10-CM

## 2023-06-16 DIAGNOSIS — M16.10 HIP ARTHRITIS: ICD-10-CM

## 2023-06-16 DIAGNOSIS — Z96.641 STATUS POST RIGHT HIP REPLACEMENT: Primary | ICD-10-CM

## 2023-06-16 PROCEDURE — 99214 OFFICE O/P EST MOD 30 MIN: CPT | Mod: S$PBB,,, | Performed by: NURSE PRACTITIONER

## 2023-06-16 PROCEDURE — 99999 PR PBB SHADOW E&M-EST. PATIENT-LVL III: ICD-10-PCS | Mod: PBBFAC,,, | Performed by: NURSE PRACTITIONER

## 2023-06-16 PROCEDURE — 99999 PR PBB SHADOW E&M-EST. PATIENT-LVL III: CPT | Mod: PBBFAC,,, | Performed by: NURSE PRACTITIONER

## 2023-06-16 PROCEDURE — 99213 OFFICE O/P EST LOW 20 MIN: CPT | Mod: PBBFAC | Performed by: NURSE PRACTITIONER

## 2023-06-16 PROCEDURE — 99214 PR OFFICE/OUTPT VISIT, EST, LEVL IV, 30-39 MIN: ICD-10-PCS | Mod: S$PBB,,, | Performed by: NURSE PRACTITIONER

## 2023-06-16 PROCEDURE — 72170 X-RAY EXAM OF PELVIS: CPT | Mod: TC

## 2023-06-16 PROCEDURE — 72170 X-RAY EXAM OF PELVIS: CPT | Mod: 26,,, | Performed by: RADIOLOGY

## 2023-06-16 PROCEDURE — 72170 XR PELVIS ROUTINE AP: ICD-10-PCS | Mod: 26,,, | Performed by: RADIOLOGY

## 2023-06-16 RX ORDER — FAMOTIDINE 20 MG/1
20 TABLET, FILM COATED ORAL 2 TIMES DAILY
Status: CANCELLED | OUTPATIENT
Start: 2023-06-16

## 2023-06-16 RX ORDER — MORPHINE SULFATE 2 MG/ML
2 INJECTION, SOLUTION INTRAMUSCULAR; INTRAVENOUS
Status: CANCELLED | OUTPATIENT
Start: 2023-06-16

## 2023-06-16 RX ORDER — POLYETHYLENE GLYCOL 3350 17 G/17G
17 POWDER, FOR SOLUTION ORAL DAILY PRN
Status: CANCELLED | OUTPATIENT
Start: 2023-06-16

## 2023-06-16 RX ORDER — SODIUM CHLORIDE 9 MG/ML
INJECTION, SOLUTION INTRAVENOUS
Status: CANCELLED | OUTPATIENT
Start: 2023-06-16

## 2023-06-16 RX ORDER — PREGABALIN 75 MG/1
75 CAPSULE ORAL NIGHTLY
Status: CANCELLED | OUTPATIENT
Start: 2023-06-16

## 2023-06-16 RX ORDER — FENTANYL CITRATE 50 UG/ML
25 INJECTION, SOLUTION INTRAMUSCULAR; INTRAVENOUS EVERY 5 MIN PRN
Status: CANCELLED | OUTPATIENT
Start: 2023-06-16

## 2023-06-16 RX ORDER — PREGABALIN 75 MG/1
75 CAPSULE ORAL
Status: CANCELLED | OUTPATIENT
Start: 2023-06-16

## 2023-06-16 RX ORDER — OXYCODONE HYDROCHLORIDE 5 MG/1
10 TABLET ORAL
Status: CANCELLED | OUTPATIENT
Start: 2023-06-16

## 2023-06-16 RX ORDER — CEFAZOLIN SODIUM 2 G/50ML
2 SOLUTION INTRAVENOUS
Status: CANCELLED | OUTPATIENT
Start: 2023-06-16

## 2023-06-16 RX ORDER — MUPIROCIN 20 MG/G
1 OINTMENT TOPICAL 2 TIMES DAILY
Status: CANCELLED | OUTPATIENT
Start: 2023-06-16 | End: 2023-06-21

## 2023-06-16 RX ORDER — SODIUM CHLORIDE 9 MG/ML
INJECTION, SOLUTION INTRAVENOUS CONTINUOUS
Status: CANCELLED | OUTPATIENT
Start: 2023-06-16 | End: 2023-06-17

## 2023-06-16 RX ORDER — FENTANYL CITRATE 50 UG/ML
100 INJECTION, SOLUTION INTRAMUSCULAR; INTRAVENOUS
Status: CANCELLED | OUTPATIENT
Start: 2023-06-16 | End: 2023-06-17

## 2023-06-16 RX ORDER — PROCHLORPERAZINE EDISYLATE 5 MG/ML
5 INJECTION INTRAMUSCULAR; INTRAVENOUS EVERY 6 HOURS PRN
Status: CANCELLED | OUTPATIENT
Start: 2023-06-16

## 2023-06-16 RX ORDER — AMOXICILLIN 250 MG
1 CAPSULE ORAL 2 TIMES DAILY
Status: CANCELLED | OUTPATIENT
Start: 2023-06-16

## 2023-06-16 RX ORDER — ASPIRIN 81 MG/1
81 TABLET ORAL 2 TIMES DAILY
Status: CANCELLED | OUTPATIENT
Start: 2023-06-16

## 2023-06-16 RX ORDER — POLYETHYLENE GLYCOL 3350 17 G/17G
17 POWDER, FOR SOLUTION ORAL DAILY
Status: CANCELLED | OUTPATIENT
Start: 2023-06-16

## 2023-06-16 RX ORDER — METHOCARBAMOL 750 MG/1
750 TABLET, FILM COATED ORAL 3 TIMES DAILY
Status: CANCELLED | OUTPATIENT
Start: 2023-06-16

## 2023-06-16 RX ORDER — CEFAZOLIN SODIUM 2 G/50ML
2 SOLUTION INTRAVENOUS
Status: CANCELLED | OUTPATIENT
Start: 2023-06-16 | End: 2023-06-17

## 2023-06-16 RX ORDER — ACETAMINOPHEN 500 MG
1000 TABLET ORAL
Status: CANCELLED | OUTPATIENT
Start: 2023-06-16

## 2023-06-16 RX ORDER — MUPIROCIN 20 MG/G
1 OINTMENT TOPICAL
Status: CANCELLED | OUTPATIENT
Start: 2023-06-16

## 2023-06-16 RX ORDER — OXYCODONE HYDROCHLORIDE 5 MG/1
5 TABLET ORAL
Status: CANCELLED | OUTPATIENT
Start: 2023-06-16

## 2023-06-16 RX ORDER — MIDAZOLAM HYDROCHLORIDE 1 MG/ML
4 INJECTION INTRAMUSCULAR; INTRAVENOUS
Status: CANCELLED | OUTPATIENT
Start: 2023-06-16 | End: 2023-06-17

## 2023-06-16 RX ORDER — LIDOCAINE HYDROCHLORIDE 10 MG/ML
1 INJECTION, SOLUTION EPIDURAL; INFILTRATION; INTRACAUDAL; PERINEURAL
Status: CANCELLED | OUTPATIENT
Start: 2023-06-16

## 2023-06-16 RX ORDER — ONDANSETRON 2 MG/ML
4 INJECTION INTRAMUSCULAR; INTRAVENOUS EVERY 8 HOURS PRN
Status: CANCELLED | OUTPATIENT
Start: 2023-06-16

## 2023-06-16 RX ORDER — NALOXONE HCL 0.4 MG/ML
0.02 VIAL (ML) INJECTION
Status: CANCELLED | OUTPATIENT
Start: 2023-06-16

## 2023-06-16 RX ORDER — ACETAMINOPHEN 500 MG
1000 TABLET ORAL EVERY 6 HOURS
Status: CANCELLED | OUTPATIENT
Start: 2023-06-16

## 2023-06-16 NOTE — PROGRESS NOTES
[facility] - [facility]    HOME HEALTH ORDERS  FACE TO FACE ENCOUNTER    Patient Name: Zheng Talley Jr.  YOB: 1945    PCP: Gio Rizo MD   PCP Address: [address] [place] [address] / [place] [address]  PCP Phone Number: [phone]  PCP Fax: [phone]    Encounter Date: 06/16/2023    Admit to Home Health    Diagnoses:  There are no hospital problems to display for this patient.      Future Appointments   Date Time Provider Department Center   6/20/2023  1:30 PM JOINT CAMP, [facility] OCLASS [facility]   6/28/2023 10:30 AM TELEMED NURSE, [facility] ORTHO NOM ORTHO [facility]   7/11/2023 10:20 AM Avelina Rivera NP [facility] ORTHO [facility]   9/18/2023 10:00 AM LAB, [facility]DRAW [facility]   9/18/2023 11:00 AM Greg Kearns Jr., MD [facility]   9/22/2023 10:45 AM Derrick Wu MD [facility]           I have seen and examined this patient face to face today. My clinical findings that support the need for the home health skilled services and home bound status are the following:  Weakness/numbness causing balance and gait disturbance due to Joint Replacement making it taxing to leave home.  Requiring assistive device to leave home due to unsteady gait caused by Joint Replacement.    Allergies:  Review of patient's allergies indicates:   Allergen Reactions    Iodine and iodide containing products Other (See Comments)     Blood in urine in the 1970s       Diet: regular diet    Activities: activity as tolerated    Home Health Admitting Clinician:   SN/PT to complete comprehensive assessment including routine vital signs. Instruct on disease process and s/s of complications to report to MD. Follow specific home health arthoplasty protocol. Review/verify medication list sent home with the patient at time of discharge  and instruct patient/caregiver as needed. If coumadin ordered, coumadin clinic to manage INR with INR draws 2x per week  with a goal to maintain INR between 1.8 and 2.2. Frequency may be adjusted depending on start of care date.    Notify MD if SBP > 160 or < 90; DBP > 90 or < 50; HR > 120 or < 50; Temp > 101    Home Medical Equipment:  Walker, 3-1 bedside commode, transfer tub bench    CONSULTS:    Physical Therapy may admit if patient not on coumadin, PT to perform comprehensive assessment if performing admit visit and generate therapy plan of care. Evaluate for home safety and equipment needs; Establish/upgrade home exercise program. Perform/instruct on therapeutic exercises, gait training, transfer training, and Range of Motion.      OTHER: (only select if patient needs other therapy disciplines)  Occupational Therapy to evaluate and treat. Evaluate home environment for safety and equipment needs. Perform/Instruct on transfers, ADL training, ROM, and therapeutic exercises.      WOUND CARE ORDERS:  Assess Surgical Incision/DSRG each TX  Aquacel AG drsg applied post-op leave on 14 days post op. Call MD if any drainage reaches border to border of drsg horizontally, s/s of infection, temp >101, induration, swelling or redness.  If dressing is removed per MD order, then apply island dressing, change/teach caregiver to perform daily dressing change if island dressing present.    Medications: Review discharge medications with patient and family and provide education.      Cannot display discharge medications since this is not an admission.      I certify that this patient is confined to his home and needs physical therapy and occupational therapy.

## 2023-06-16 NOTE — H&P
CC:  Right hip pain    Zheng Talley Jr. is a 78 y.o. male with Right hip pain.  Pain is worse with activity and weight bearing.  Patient has experienced interference of activities of daily living due to increased pain and decreased range of motion. Patient has failed non-operative treatment including NSAIDs, as well as greater than 3 months of activity modification. Zheng Talley Jr. ambulates independently.     Relevant medical conditions of significance in perioperative period:  HTN- on medication managed by pcp  HLD- on medication managed by pcp      Past Medical History:   Diagnosis Date    Arthritis     BPH (benign prostatic hyperplasia)     Cancer     prostate    Cataract     Groin pain     History of gastroesophageal reflux (GERD)     Hyperlipidemia     Hypertension     Nuclear sclerosis - Both Eyes 02/18/2014    Renal insufficiency 6/9/2023       Past Surgical History:   Procedure Laterality Date    BUNIONECTOMY      bilateral     CARPAL TUNNEL RELEASE Left 7/25/2022    Procedure: RELEASE, CARPAL TUNNEL,LEFT;  Surgeon: Elyssa Bradford MD;  Location: [facility] OR;  Service: Orthopedics;  Laterality: Left;    COLONOSCOPY  2011    Dr. Velez    COLONOSCOPY N/A 2/25/2019    Procedure: COLONOSCOPY;  Surgeon: JACQUELINE Lozano MD;  Location: [facility] ENDO [address]);  Service: Endoscopy;  Laterality: N/A;    dental implant      HAND SURGERY      INJECTION Right 3/3/2023    Procedure: INJECTION, RIGHT HIP-INTRA-ARTICULAR CONTRAST DIRECT REF;  Surgeon: Frankie Carrillo MD;  Location: [facility] PAIN MGT;  Service: Pain Management;  Laterality: Right;    lip surgery      PROSTATE SURGERY      REPAIR OF NAIL BED Right 11/2/2018    Procedure: REPAIR, NAIL BED right thumb with I&D;  Surgeon: Elyssa Bradford MD;  Location: [facility] OR;  Service: Orthopedics;  Laterality: Right;  stretcher, supine, hand pan 1 and pan 2       Family History   Problem Relation Age of Onset    Coronary artery disease Mother     Hypertension Mother      Cancer Father         leukemia    Diabetes Father     Osteoarthritis Sister     Hypertension Sister     Diabetes Sister     No Known Problems Sister     No Known Problems Brother     No Known Problems Brother     No Known Problems Brother     No Known Problems Daughter     No Known Problems Son     Heart disease Neg Hx     Amblyopia Neg Hx     Blindness Neg Hx     Cataracts Neg Hx     Glaucoma Neg Hx     Macular degeneration Neg Hx     Retinal detachment Neg Hx     Strabismus Neg Hx        Review of patient's allergies indicates:   Allergen Reactions    Iodine and iodide containing products Other (See Comments)     Blood in urine in the 1970s         Current Outpatient Medications:     amLODIPine (NORVASC) 10 MG tablet, TAKE 1 TABLET ONCE DAILY, Disp: 90 tablet, Rfl: 0    losartan-hydrochlorothiazide 100-25 mg (HYZAAR) 100-25 mg per tablet, TAKE 1 TABLET ONCE DAILY, Disp: 90 tablet, Rfl: 0    metoprolol succinate (TOPROL-XL) 50 MG 24 hr tablet, TAKE 1 TABLET ONCE DAILY, Disp: 90 tablet, Rfl: 0    pravastatin (PRAVACHOL) 40 MG tablet, Take 1 tablet (40 mg total) by mouth every evening., Disp: 90 tablet, Rfl: 3    valACYclovir (VALTREX) 1000 MG tablet, Take 1 tablet (1,000 mg total) by mouth once daily. (Patient taking differently: Take 1,000 mg by mouth as needed.), Disp: 90 tablet, Rfl: 3    tadalafiL (CIALIS) 20 MG Tab, Take 1 tablet (20 mg total) by mouth daily as needed., Disp: 12 tablet, Rfl: 11    Review of Systems:   Constitutional: no fever or chills  Eyes: no visual changes  ENT: no nasal congestion or sore throat  Respiratory: no cough or shortness of breath  Cardiovascular: no chest pain or palpitations  Gastrointestinal: no nausea or vomiting, tolerating diet  Genitourinary: no hematuria or dysuria  Integument/Breast: no rash or pruritis  Hematologic/Lymphatic: no easy bruising or lymphadenopathy  Musculoskeletal: positive for hip pain  Neurological: no seizures or tremors  Behavioral/Psych: no  "auditory or visual hallucinations  Endocrine: no heat or cold intolerance    PE:  Ht 5' 10" (1.778 m)   Wt 111.3 kg (245 lb 4.2 oz)   BMI 35.19 kg/m²   General: Pleasant, cooperative, NAD   Gait: antalgic  HEENT: NCAT, sclera nonicteric   Lungs: Respirations are clear, equal and unlabored.   CV: S1S2; 2+ bilateral upper and lower extremity pulses.   Skin: Intact throughout LE with no rashes, erythema, or lesions  Extremities: No LE edema, NVI lower extremities    Right Hip Exam:  90 degrees flexion  0 degrees extension   15 degrees internal rotation  15 degrees external rotation  20 degrees abduction  20 degrees adduction  There is pain with passive range of motion.     Radiographs: Radiographs reveal advanced degenerative changes including subchondral cyst formation, subchondral sclerosis, osteophyte formation, joint space narrowing.     Diagnosis: Primary osteoarthritis Right hip    Plan: Right total hip arthroplasty     Due to the serious nature of total joint infection and the high prevalence of community acquired MRSA, vancomycin will be used perioperatively.              "

## 2023-06-16 NOTE — PROGRESS NOTES
Zheng Talley Jr. is a 78 y.o. year old here today for pre surgery optimization visit  in preparation for a Right total hip arthroplasty to be performed by Dr. Rodrigues  on 6/26/2023.  he was last seen and treated in the clinic on 5/19/2023. he will be medically optimized by the pre op center. There has been no significant change in medical status since last visit. No fever, chills, malaise, or unexplained weight change.      Allergies, Medications, past medical and surgical history reviewed.    Focused examination performed.    Patient declined to see surgeon today. All questions answered. Patient encouraged to call with questions. Contact information given.     Pre, casey, and post operative procedures and expectations discussed. Goals of successful surgery reviewed and include manageable pain levels, surgical site free of infection, medication management, and ambulation with PT/OT assistance. Healthy weight management discussed with patient and caregiver who were receptive to eduction of healthy diet and activity. No other necessary lifestyle changes identified. Educated patient about signs and symptoms of infection, medication management, anticoagulation therapy, risk of tobacco and alcohol use, and self-care to promote healing. Surgical guide given for future reference. Hibiclens given to patient with instructions. All questions were answered.     Zheng Talley Jr. verbalized an understanding to the education and goals. Patient has displayed readiness to engage in care and is ready to proceed with surgery.  Patient reports his wife is able and ready to provide assistance at home after discharge.    Surgical and blood consents signed.    Zheng Talley Jr. will contact us if there are any questions, concerns, or changes in medical status prior to surgery.       Joint class: 6/20    Patient has discussed discharge planning with surgeon. Patient will be discharged to home following surgery.   patient will be scheduled  with Home Health during hospitalization.     30 minutes of time was spent on patient education, review of records, templating, H&P, , appointment scheduling and optimizing patient for surgery.

## 2023-06-23 ENCOUNTER — TELEPHONE (OUTPATIENT)
Dept: ORTHOPEDICS | Facility: CLINIC | Age: 78
End: 2023-06-23
Payer: MEDICARE

## 2023-06-23 ENCOUNTER — ANESTHESIA EVENT (OUTPATIENT)
Dept: SURGERY | Facility: HOSPITAL | Age: 78
DRG: 469 | End: 2023-06-23
Payer: MEDICARE

## 2023-06-23 NOTE — TELEPHONE ENCOUNTER
I called the patient today regarding surgery on 6/26/2023 with Dr. Teofilo Rodrigues. I informed the patient that his surgery will be at  [facility] ([facility] [place] [address]). I informed the patient they must arrive at 5:00am and their surgery will start at 7:00am.     I reminded the patient that they cannot eat or drink after midnight, the night before surgery.     I reminded the patient to be careful of their skin over the next few days to make sure they do not get any cuts, scratches or scrapes.    The patient verbalized understanding and has no further questions.

## 2023-06-25 DIAGNOSIS — Z96.649 STATUS POST HIP REPLACEMENT, UNSPECIFIED LATERALITY: Primary | ICD-10-CM

## 2023-06-25 RX ORDER — ASPIRIN 81 MG/1
81 TABLET ORAL 2 TIMES DAILY
Qty: 60 TABLET | Refills: 0 | Status: SHIPPED | OUTPATIENT
Start: 2023-06-25 | End: 2024-06-24

## 2023-06-25 RX ORDER — METHOCARBAMOL 750 MG/1
750 TABLET, FILM COATED ORAL 4 TIMES DAILY PRN
Qty: 40 TABLET | Refills: 0 | Status: ON HOLD | OUTPATIENT
Start: 2023-06-25 | End: 2023-06-28 | Stop reason: HOSPADM

## 2023-06-25 RX ORDER — DEXTROMETHORPHAN HYDROBROMIDE, GUAIFENESIN 5; 100 MG/5ML; MG/5ML
650 LIQUID ORAL EVERY 8 HOURS
Qty: 120 TABLET | Refills: 0 | Status: SHIPPED | OUTPATIENT
Start: 2023-06-25 | End: 2023-08-08

## 2023-06-25 RX ORDER — OXYCODONE HYDROCHLORIDE 5 MG/1
TABLET ORAL
Qty: 30 TABLET | Refills: 0 | Status: SHIPPED | OUTPATIENT
Start: 2023-06-25 | End: 2023-08-08

## 2023-06-25 RX ORDER — DOCUSATE SODIUM 100 MG/1
100 CAPSULE, LIQUID FILLED ORAL 2 TIMES DAILY
Qty: 60 CAPSULE | Refills: 0 | Status: SHIPPED | OUTPATIENT
Start: 2023-06-25 | End: 2023-08-08

## 2023-06-26 ENCOUNTER — ANESTHESIA EVENT (OUTPATIENT)
Dept: SURGERY | Facility: HOSPITAL | Age: 78
DRG: 469 | End: 2023-06-26
Payer: MEDICARE

## 2023-06-26 ENCOUNTER — ANESTHESIA (OUTPATIENT)
Dept: SURGERY | Facility: HOSPITAL | Age: 78
DRG: 469 | End: 2023-06-26
Payer: MEDICARE

## 2023-06-26 ENCOUNTER — HOSPITAL ENCOUNTER (INPATIENT)
Facility: HOSPITAL | Age: 78
LOS: 3 days | Discharge: HOME HEALTH | DRG: 469 | End: 2023-06-29
Attending: ORTHOPAEDIC SURGERY | Admitting: ORTHOPAEDIC SURGERY
Payer: MEDICARE

## 2023-06-26 ENCOUNTER — TELEPHONE (OUTPATIENT)
Dept: INTERNAL MEDICINE | Facility: CLINIC | Age: 78
End: 2023-06-26

## 2023-06-26 DIAGNOSIS — M16.11 PRIMARY OSTEOARTHRITIS OF RIGHT HIP: ICD-10-CM

## 2023-06-26 DIAGNOSIS — M16.11 PRIMARY OSTEOARTHRITIS OF RIGHT HIP: Primary | ICD-10-CM

## 2023-06-26 DIAGNOSIS — J96.22 ACUTE ON CHRONIC RESPIRATORY FAILURE WITH HYPERCAPNIA: ICD-10-CM

## 2023-06-26 DIAGNOSIS — R06.83 SNORING: Primary | ICD-10-CM

## 2023-06-26 DIAGNOSIS — G47.33 OSA (OBSTRUCTIVE SLEEP APNEA): ICD-10-CM

## 2023-06-26 DIAGNOSIS — I47.20 V-TACH: ICD-10-CM

## 2023-06-26 DIAGNOSIS — M16.10 HIP ARTHRITIS: ICD-10-CM

## 2023-06-26 PROBLEM — J96.02 ACUTE HYPERCAPNIC RESPIRATORY FAILURE: Status: ACTIVE | Noted: 2023-06-26

## 2023-06-26 LAB
ABO + RH BLD: NORMAL
ALBUMIN SERPL BCP-MCNC: 2.9 G/DL (ref 3.5–5.2)
ALLENS TEST: ABNORMAL
ALP SERPL-CCNC: 43 U/L (ref 55–135)
ALT SERPL W/O P-5'-P-CCNC: 11 U/L (ref 10–44)
ANION GAP SERPL CALC-SCNC: 8 MMOL/L (ref 8–16)
AST SERPL-CCNC: 14 U/L (ref 10–40)
BASOPHILS # BLD AUTO: 0.03 K/UL (ref 0–0.2)
BASOPHILS NFR BLD: 0.2 % (ref 0–1.9)
BILIRUB SERPL-MCNC: 0.3 MG/DL (ref 0.1–1)
BLD GP AB SCN CELLS X3 SERPL QL: NORMAL
BUN SERPL-MCNC: 17 MG/DL (ref 8–23)
BUN SERPL-MCNC: 19 MG/DL (ref 6–30)
CALCIUM SERPL-MCNC: 7.8 MG/DL (ref 8.7–10.5)
CHLORIDE SERPL-SCNC: 106 MMOL/L (ref 95–110)
CHLORIDE SERPL-SCNC: 99 MMOL/L (ref 95–110)
CO2 SERPL-SCNC: 26 MMOL/L (ref 23–29)
CREAT SERPL-MCNC: 1.3 MG/DL (ref 0.5–1.4)
CREAT SERPL-MCNC: 1.5 MG/DL (ref 0.5–1.4)
DELSYS: ABNORMAL
DIFFERENTIAL METHOD: ABNORMAL
EOSINOPHIL # BLD AUTO: 0 K/UL (ref 0–0.5)
EOSINOPHIL NFR BLD: 0.1 % (ref 0–8)
EP: 5
EP: 7
ERYTHROCYTE [DISTWIDTH] IN BLOOD BY AUTOMATED COUNT: 14.3 % (ref 11.5–14.5)
ERYTHROCYTE [SEDIMENTATION RATE] IN BLOOD BY WESTERGREN METHOD: 16 MM/H
ERYTHROCYTE [SEDIMENTATION RATE] IN BLOOD BY WESTERGREN METHOD: 18 MM/H
ERYTHROCYTE [SEDIMENTATION RATE] IN BLOOD BY WESTERGREN METHOD: 20 MM/H
EST. GFR  (NO RACE VARIABLE): 56.6 ML/MIN/1.73 M^2
FIO2: 21
FIO2: 30
FIO2: 40
GLUCOSE SERPL-MCNC: 179 MG/DL (ref 70–110)
GLUCOSE SERPL-MCNC: 204 MG/DL (ref 70–110)
HCO3 UR-SCNC: 28.9 MMOL/L (ref 24–28)
HCO3 UR-SCNC: 29 MMOL/L (ref 24–28)
HCO3 UR-SCNC: 29.3 MMOL/L (ref 24–28)
HCO3 UR-SCNC: 30.1 MMOL/L (ref 24–28)
HCO3 UR-SCNC: 34.4 MMOL/L (ref 24–28)
HCT VFR BLD AUTO: 39 % (ref 40–54)
HCT VFR BLD CALC: 40 %PCV (ref 36–54)
HGB BLD-MCNC: 12.2 G/DL (ref 14–18)
IMM GRANULOCYTES # BLD AUTO: 0.08 K/UL (ref 0–0.04)
IMM GRANULOCYTES NFR BLD AUTO: 0.5 % (ref 0–0.5)
INR PPP: 1.2 (ref 0.8–1.2)
IP: 12
IP: 13
LYMPHOCYTES # BLD AUTO: 0.8 K/UL (ref 1–4.8)
LYMPHOCYTES NFR BLD: 5.4 % (ref 18–48)
MAGNESIUM SERPL-MCNC: 2 MG/DL (ref 1.6–2.6)
MCH RBC QN AUTO: 28.6 PG (ref 27–31)
MCHC RBC AUTO-ENTMCNC: 31.3 G/DL (ref 32–36)
MCV RBC AUTO: 92 FL (ref 82–98)
MIN VOL: 9.5
MODE: ABNORMAL
MONOCYTES # BLD AUTO: 0.6 K/UL (ref 0.3–1)
MONOCYTES NFR BLD: 3.8 % (ref 4–15)
NEUTROPHILS # BLD AUTO: 13.4 K/UL (ref 1.8–7.7)
NEUTROPHILS NFR BLD: 90 % (ref 38–73)
NRBC BLD-RTO: 0 /100 WBC
PCO2 BLDA: 120.6 MMHG (ref 35–45)
PCO2 BLDA: 66.4 MMHG (ref 35–45)
PCO2 BLDA: 70 MMHG (ref 35–45)
PCO2 BLDA: 71.9 MMHG (ref 35–45)
PCO2 BLDA: 76.8 MMHG (ref 35–45)
PH SMN: 7.06 [PH] (ref 7.35–7.45)
PH SMN: 7.19 [PH] (ref 7.35–7.45)
PH SMN: 7.21 [PH] (ref 7.35–7.45)
PH SMN: 7.24 [PH] (ref 7.35–7.45)
PH SMN: 7.25 [PH] (ref 7.35–7.45)
PHOSPHATE SERPL-MCNC: 6.5 MG/DL (ref 2.7–4.5)
PLATELET # BLD AUTO: 148 K/UL (ref 150–450)
PMV BLD AUTO: 10.9 FL (ref 9.2–12.9)
PO2 BLDA: 113 MMHG (ref 80–100)
PO2 BLDA: 131 MMHG (ref 80–100)
PO2 BLDA: 167 MMHG (ref 80–100)
PO2 BLDA: 171 MMHG (ref 80–100)
PO2 BLDA: 66 MMHG (ref 40–60)
POC BE: 1 MMOL/L
POC BE: 1 MMOL/L
POC BE: 2 MMOL/L
POC BE: 3 MMOL/L
POC BE: 4 MMOL/L
POC IONIZED CALCIUM: 1.25 MMOL/L (ref 1.06–1.42)
POC SATURATED O2: 80 % (ref 95–100)
POC SATURATED O2: 97 % (ref 95–100)
POC SATURATED O2: 98 % (ref 95–100)
POC SATURATED O2: 99 % (ref 95–100)
POC SATURATED O2: 99 % (ref 95–100)
POC TCO2 (MEASURED): 34 MMOL/L (ref 23–29)
POC TCO2: 31 MMOL/L (ref 23–27)
POC TCO2: 31 MMOL/L (ref 23–27)
POC TCO2: 32 MMOL/L (ref 23–27)
POC TCO2: 32 MMOL/L (ref 23–27)
POC TCO2: 38 MMOL/L (ref 24–29)
POCT GLUCOSE: 230 MG/DL (ref 70–110)
POTASSIUM BLD-SCNC: 5.3 MMOL/L (ref 3.5–5.1)
POTASSIUM SERPL-SCNC: 5.1 MMOL/L (ref 3.5–5.1)
PROT SERPL-MCNC: 5.1 G/DL (ref 6–8.4)
PROTHROMBIN TIME: 12.4 SEC (ref 9–12.5)
RBC # BLD AUTO: 4.26 M/UL (ref 4.6–6.2)
SAMPLE: ABNORMAL
SITE: ABNORMAL
SODIUM BLD-SCNC: 140 MMOL/L (ref 136–145)
SODIUM SERPL-SCNC: 140 MMOL/L (ref 136–145)
SP02: 100
SP02: 100
SP02: 92
SP02: 98
SPECIMEN OUTDATE: NORMAL
SPONT RATE: 16
WBC # BLD AUTO: 14.92 K/UL (ref 3.9–12.7)

## 2023-06-26 PROCEDURE — 27201423 OPTIME MED/SURG SUP & DEVICES STERILE SUPPLY: Performed by: ORTHOPAEDIC SURGERY

## 2023-06-26 PROCEDURE — 20000000 HC ICU ROOM

## 2023-06-26 PROCEDURE — 63600175 PHARM REV CODE 636 W HCPCS: Performed by: STUDENT IN AN ORGANIZED HEALTH CARE EDUCATION/TRAINING PROGRAM

## 2023-06-26 PROCEDURE — 82962 GLUCOSE BLOOD TEST: CPT | Performed by: ORTHOPAEDIC SURGERY

## 2023-06-26 PROCEDURE — 27200750 HC INSULATED NEEDLE/ STIMUPLEX: Performed by: STUDENT IN AN ORGANIZED HEALTH CARE EDUCATION/TRAINING PROGRAM

## 2023-06-26 PROCEDURE — 25000003 PHARM REV CODE 250

## 2023-06-26 PROCEDURE — 63600175 PHARM REV CODE 636 W HCPCS: Performed by: NURSE ANESTHETIST, CERTIFIED REGISTERED

## 2023-06-26 PROCEDURE — D9220A PRA ANESTHESIA: ICD-10-PCS | Mod: CRNA,,, | Performed by: NURSE ANESTHETIST, CERTIFIED REGISTERED

## 2023-06-26 PROCEDURE — 84100 ASSAY OF PHOSPHORUS: CPT | Performed by: ORTHOPAEDIC SURGERY

## 2023-06-26 PROCEDURE — 27000221 HC OXYGEN, UP TO 24 HOURS

## 2023-06-26 PROCEDURE — 99291 PR CRITICAL CARE, E/M 30-74 MINUTES: ICD-10-PCS | Mod: GC,,, | Performed by: INTERNAL MEDICINE

## 2023-06-26 PROCEDURE — 83735 ASSAY OF MAGNESIUM: CPT | Performed by: ORTHOPAEDIC SURGERY

## 2023-06-26 PROCEDURE — 63600175 PHARM REV CODE 636 W HCPCS: Performed by: NURSE PRACTITIONER

## 2023-06-26 PROCEDURE — 37799 UNLISTED PX VASCULAR SURGERY: CPT

## 2023-06-26 PROCEDURE — 99900035 HC TECH TIME PER 15 MIN (STAT)

## 2023-06-26 PROCEDURE — 85025 COMPLETE CBC W/AUTO DIFF WBC: CPT | Performed by: ORTHOPAEDIC SURGERY

## 2023-06-26 PROCEDURE — 76942 PENG SINGLE SHOT: ICD-10-PCS | Mod: 26,,, | Performed by: STUDENT IN AN ORGANIZED HEALTH CARE EDUCATION/TRAINING PROGRAM

## 2023-06-26 PROCEDURE — 84295 ASSAY OF SERUM SODIUM: CPT

## 2023-06-26 PROCEDURE — 82962 GLUCOSE BLOOD TEST: CPT

## 2023-06-26 PROCEDURE — 25000003 PHARM REV CODE 250: Performed by: STUDENT IN AN ORGANIZED HEALTH CARE EDUCATION/TRAINING PROGRAM

## 2023-06-26 PROCEDURE — D9220A PRA ANESTHESIA: Mod: ANES,,, | Performed by: STUDENT IN AN ORGANIZED HEALTH CARE EDUCATION/TRAINING PROGRAM

## 2023-06-26 PROCEDURE — 82565 ASSAY OF CREATININE: CPT

## 2023-06-26 PROCEDURE — 27130 PR TOTAL HIP ARTHROPLASTY: ICD-10-PCS | Mod: RT,,, | Performed by: ORTHOPAEDIC SURGERY

## 2023-06-26 PROCEDURE — 36415 COLL VENOUS BLD VENIPUNCTURE: CPT | Performed by: ORTHOPAEDIC SURGERY

## 2023-06-26 PROCEDURE — 37000009 HC ANESTHESIA EA ADD 15 MINS: Performed by: ORTHOPAEDIC SURGERY

## 2023-06-26 PROCEDURE — 71000033 HC RECOVERY, INTIAL HOUR: Performed by: ORTHOPAEDIC SURGERY

## 2023-06-26 PROCEDURE — 84132 ASSAY OF SERUM POTASSIUM: CPT

## 2023-06-26 PROCEDURE — 27000190 HC CPAP FULL FACE MASK W/VALVE

## 2023-06-26 PROCEDURE — 25000003 PHARM REV CODE 250: Performed by: NURSE PRACTITIONER

## 2023-06-26 PROCEDURE — 82803 BLOOD GASES ANY COMBINATION: CPT

## 2023-06-26 PROCEDURE — C1713 ANCHOR/SCREW BN/BN,TIS/BN: HCPCS | Performed by: ORTHOPAEDIC SURGERY

## 2023-06-26 PROCEDURE — 85610 PROTHROMBIN TIME: CPT | Performed by: ORTHOPAEDIC SURGERY

## 2023-06-26 PROCEDURE — 64450 PENG SINGLE SHOT: ICD-10-PCS | Mod: 59,RT,, | Performed by: STUDENT IN AN ORGANIZED HEALTH CARE EDUCATION/TRAINING PROGRAM

## 2023-06-26 PROCEDURE — 71000039 HC RECOVERY, EACH ADD'L HOUR: Performed by: ORTHOPAEDIC SURGERY

## 2023-06-26 PROCEDURE — 37000008 HC ANESTHESIA 1ST 15 MINUTES: Performed by: ORTHOPAEDIC SURGERY

## 2023-06-26 PROCEDURE — 27100025 HC TUBING, SET FLUID WARMER: Performed by: STUDENT IN AN ORGANIZED HEALTH CARE EDUCATION/TRAINING PROGRAM

## 2023-06-26 PROCEDURE — 94761 N-INVAS EAR/PLS OXIMETRY MLT: CPT

## 2023-06-26 PROCEDURE — 94660 CPAP INITIATION&MGMT: CPT

## 2023-06-26 PROCEDURE — 63600175 PHARM REV CODE 636 W HCPCS: Performed by: ORTHOPAEDIC SURGERY

## 2023-06-26 PROCEDURE — D9220A PRA ANESTHESIA: Mod: CRNA,,, | Performed by: NURSE ANESTHETIST, CERTIFIED REGISTERED

## 2023-06-26 PROCEDURE — D9220A PRA ANESTHESIA: ICD-10-PCS | Mod: ANES,,, | Performed by: STUDENT IN AN ORGANIZED HEALTH CARE EDUCATION/TRAINING PROGRAM

## 2023-06-26 PROCEDURE — C1729 CATH, DRAINAGE: HCPCS | Performed by: ORTHOPAEDIC SURGERY

## 2023-06-26 PROCEDURE — 12000002 HC ACUTE/MED SURGE SEMI-PRIVATE ROOM

## 2023-06-26 PROCEDURE — 63600175 PHARM REV CODE 636 W HCPCS

## 2023-06-26 PROCEDURE — 25000003 PHARM REV CODE 250: Performed by: NURSE ANESTHETIST, CERTIFIED REGISTERED

## 2023-06-26 PROCEDURE — 36000711: Performed by: ORTHOPAEDIC SURGERY

## 2023-06-26 PROCEDURE — 27130 TOTAL HIP ARTHROPLASTY: CPT | Mod: RT,,, | Performed by: ORTHOPAEDIC SURGERY

## 2023-06-26 PROCEDURE — 82330 ASSAY OF CALCIUM: CPT

## 2023-06-26 PROCEDURE — 64450 NJX AA&/STRD OTHER PN/BRANCH: CPT | Mod: 59,RT,, | Performed by: STUDENT IN AN ORGANIZED HEALTH CARE EDUCATION/TRAINING PROGRAM

## 2023-06-26 PROCEDURE — 80053 COMPREHEN METABOLIC PANEL: CPT | Performed by: ORTHOPAEDIC SURGERY

## 2023-06-26 PROCEDURE — 99285 EMERGENCY DEPT VISIT HI MDM: CPT | Mod: 25

## 2023-06-26 PROCEDURE — 76942 ECHO GUIDE FOR BIOPSY: CPT | Performed by: STUDENT IN AN ORGANIZED HEALTH CARE EDUCATION/TRAINING PROGRAM

## 2023-06-26 PROCEDURE — C1776 JOINT DEVICE (IMPLANTABLE): HCPCS | Performed by: ORTHOPAEDIC SURGERY

## 2023-06-26 PROCEDURE — 85014 HEMATOCRIT: CPT

## 2023-06-26 PROCEDURE — 86900 BLOOD TYPING SEROLOGIC ABO: CPT | Performed by: ORTHOPAEDIC SURGERY

## 2023-06-26 PROCEDURE — 99291 CRITICAL CARE FIRST HOUR: CPT | Mod: GC,,, | Performed by: INTERNAL MEDICINE

## 2023-06-26 PROCEDURE — 36000710: Performed by: ORTHOPAEDIC SURGERY

## 2023-06-26 DEVICE — LINER G7 DUAL MOB COCR G 46MM: Type: IMPLANTABLE DEVICE | Site: HIP | Status: FUNCTIONAL

## 2023-06-26 DEVICE — HEAD BIOLOX FEM +0X28MM: Type: IMPLANTABLE DEVICE | Site: HIP | Status: FUNCTIONAL

## 2023-06-26 DEVICE — SCREW BONE 6.5X30 SELF-TAP: Type: IMPLANTABLE DEVICE | Site: HIP | Status: FUNCTIONAL

## 2023-06-26 DEVICE — IMPLANTABLE DEVICE: Type: IMPLANTABLE DEVICE | Site: HIP | Status: FUNCTIONAL

## 2023-06-26 RX ORDER — MORPHINE SULFATE 2 MG/ML
2 INJECTION, SOLUTION INTRAMUSCULAR; INTRAVENOUS
Status: DISCONTINUED | OUTPATIENT
Start: 2023-06-26 | End: 2023-06-27

## 2023-06-26 RX ORDER — NALOXONE HCL 0.4 MG/ML
0.02 VIAL (ML) INJECTION
Status: DISCONTINUED | OUTPATIENT
Start: 2023-06-26 | End: 2023-06-26

## 2023-06-26 RX ORDER — HYDROMORPHONE HYDROCHLORIDE 1 MG/ML
0.2 INJECTION, SOLUTION INTRAMUSCULAR; INTRAVENOUS; SUBCUTANEOUS EVERY 5 MIN PRN
Status: DISCONTINUED | OUTPATIENT
Start: 2023-06-26 | End: 2023-06-26 | Stop reason: HOSPADM

## 2023-06-26 RX ORDER — FENTANYL CITRATE 50 UG/ML
25 INJECTION, SOLUTION INTRAMUSCULAR; INTRAVENOUS EVERY 5 MIN PRN
Status: DISCONTINUED | OUTPATIENT
Start: 2023-06-26 | End: 2023-06-26 | Stop reason: HOSPADM

## 2023-06-26 RX ORDER — OXYCODONE HYDROCHLORIDE 5 MG/1
5 TABLET ORAL
Status: DISCONTINUED | OUTPATIENT
Start: 2023-06-26 | End: 2023-06-27

## 2023-06-26 RX ORDER — PREGABALIN 75 MG/1
75 CAPSULE ORAL NIGHTLY
Status: DISCONTINUED | OUTPATIENT
Start: 2023-06-26 | End: 2023-06-29 | Stop reason: HOSPADM

## 2023-06-26 RX ORDER — ACETAMINOPHEN 500 MG
1000 TABLET ORAL
Status: COMPLETED | OUTPATIENT
Start: 2023-06-26 | End: 2023-06-26

## 2023-06-26 RX ORDER — FENTANYL CITRATE 50 UG/ML
25 INJECTION, SOLUTION INTRAMUSCULAR; INTRAVENOUS EVERY 5 MIN PRN
Status: DISCONTINUED | OUTPATIENT
Start: 2023-06-26 | End: 2023-06-26

## 2023-06-26 RX ORDER — ACETAMINOPHEN 500 MG
1000 TABLET ORAL EVERY 6 HOURS
Status: DISCONTINUED | OUTPATIENT
Start: 2023-06-26 | End: 2023-06-29 | Stop reason: HOSPADM

## 2023-06-26 RX ORDER — HALOPERIDOL 5 MG/ML
5 INJECTION INTRAMUSCULAR ONCE
Status: COMPLETED | OUTPATIENT
Start: 2023-06-26 | End: 2023-06-26

## 2023-06-26 RX ORDER — LOSARTAN POTASSIUM AND HYDROCHLOROTHIAZIDE 25; 100 MG/1; MG/1
1 TABLET ORAL DAILY
Status: DISCONTINUED | OUTPATIENT
Start: 2023-06-27 | End: 2023-06-26

## 2023-06-26 RX ORDER — DEXAMETHASONE SODIUM PHOSPHATE 4 MG/ML
INJECTION, SOLUTION INTRA-ARTICULAR; INTRALESIONAL; INTRAMUSCULAR; INTRAVENOUS; SOFT TISSUE
Status: DISCONTINUED | OUTPATIENT
Start: 2023-06-26 | End: 2023-06-26

## 2023-06-26 RX ORDER — MUPIROCIN 20 MG/G
1 OINTMENT TOPICAL 2 TIMES DAILY
Status: CANCELLED | OUTPATIENT
Start: 2023-06-26 | End: 2023-07-01

## 2023-06-26 RX ORDER — LABETALOL HYDROCHLORIDE 5 MG/ML
10 INJECTION, SOLUTION INTRAVENOUS ONCE
Status: DISCONTINUED | OUTPATIENT
Start: 2023-06-26 | End: 2023-06-26 | Stop reason: HOSPADM

## 2023-06-26 RX ORDER — OXYCODONE HYDROCHLORIDE 5 MG/1
5 TABLET ORAL
Status: CANCELLED | OUTPATIENT
Start: 2023-06-26

## 2023-06-26 RX ORDER — ONDANSETRON 2 MG/ML
4 INJECTION INTRAMUSCULAR; INTRAVENOUS EVERY 8 HOURS PRN
Status: DISCONTINUED | OUTPATIENT
Start: 2023-06-26 | End: 2023-06-29 | Stop reason: HOSPADM

## 2023-06-26 RX ORDER — MORPHINE SULFATE 2 MG/ML
2 INJECTION, SOLUTION INTRAMUSCULAR; INTRAVENOUS
Status: CANCELLED | OUTPATIENT
Start: 2023-06-26

## 2023-06-26 RX ORDER — PROCHLORPERAZINE EDISYLATE 5 MG/ML
5 INJECTION INTRAMUSCULAR; INTRAVENOUS EVERY 6 HOURS PRN
Status: CANCELLED | OUTPATIENT
Start: 2023-06-26

## 2023-06-26 RX ORDER — PREGABALIN 75 MG/1
75 CAPSULE ORAL
Status: CANCELLED | OUTPATIENT
Start: 2023-06-26

## 2023-06-26 RX ORDER — DEXMEDETOMIDINE HYDROCHLORIDE 100 UG/ML
INJECTION, SOLUTION INTRAVENOUS
Status: DISCONTINUED | OUTPATIENT
Start: 2023-06-26 | End: 2023-06-26

## 2023-06-26 RX ORDER — POLYETHYLENE GLYCOL 3350 17 G/17G
17 POWDER, FOR SOLUTION ORAL DAILY PRN
Status: DISCONTINUED | OUTPATIENT
Start: 2023-06-26 | End: 2023-06-29 | Stop reason: HOSPADM

## 2023-06-26 RX ORDER — ONDANSETRON 2 MG/ML
4 INJECTION INTRAMUSCULAR; INTRAVENOUS DAILY PRN
Status: DISCONTINUED | OUTPATIENT
Start: 2023-06-26 | End: 2023-06-26

## 2023-06-26 RX ORDER — MUPIROCIN 20 MG/G
1 OINTMENT TOPICAL
Status: CANCELLED | OUTPATIENT
Start: 2023-06-26

## 2023-06-26 RX ORDER — SODIUM CHLORIDE 0.9 % (FLUSH) 0.9 %
10 SYRINGE (ML) INJECTION
Status: DISCONTINUED | OUTPATIENT
Start: 2023-06-26 | End: 2023-06-29 | Stop reason: HOSPADM

## 2023-06-26 RX ORDER — ENOXAPARIN SODIUM 100 MG/ML
40 INJECTION SUBCUTANEOUS EVERY 24 HOURS
Status: DISCONTINUED | OUTPATIENT
Start: 2023-06-27 | End: 2023-06-29 | Stop reason: HOSPADM

## 2023-06-26 RX ORDER — KETAMINE HCL IN 0.9 % NACL 50 MG/5 ML
SYRINGE (ML) INTRAVENOUS
Status: DISCONTINUED | OUTPATIENT
Start: 2023-06-26 | End: 2023-06-26

## 2023-06-26 RX ORDER — MIDAZOLAM HYDROCHLORIDE 1 MG/ML
4 INJECTION INTRAMUSCULAR; INTRAVENOUS
Status: DISCONTINUED | OUTPATIENT
Start: 2023-06-26 | End: 2023-06-26 | Stop reason: HOSPADM

## 2023-06-26 RX ORDER — HYDROCHLOROTHIAZIDE 25 MG/1
25 TABLET ORAL DAILY
Status: DISCONTINUED | OUTPATIENT
Start: 2023-06-27 | End: 2023-06-29 | Stop reason: HOSPADM

## 2023-06-26 RX ORDER — ACETAMINOPHEN 500 MG
1000 TABLET ORAL
Status: CANCELLED | OUTPATIENT
Start: 2023-06-26

## 2023-06-26 RX ORDER — LIDOCAINE HYDROCHLORIDE 10 MG/ML
1 INJECTION, SOLUTION EPIDURAL; INFILTRATION; INTRACAUDAL; PERINEURAL
Status: DISCONTINUED | OUTPATIENT
Start: 2023-06-26 | End: 2023-06-26 | Stop reason: HOSPADM

## 2023-06-26 RX ORDER — LIDOCAINE HYDROCHLORIDE AND EPINEPHRINE 15; 5 MG/ML; UG/ML
INJECTION, SOLUTION EPIDURAL
Status: DISCONTINUED | OUTPATIENT
Start: 2023-06-26 | End: 2023-06-26

## 2023-06-26 RX ORDER — NALOXONE HCL 0.4 MG/ML
0.02 VIAL (ML) INJECTION
Status: CANCELLED | OUTPATIENT
Start: 2023-06-26

## 2023-06-26 RX ORDER — PHENYLEPHRINE HYDROCHLORIDE 10 MG/ML
INJECTION INTRAVENOUS
Status: DISCONTINUED | OUTPATIENT
Start: 2023-06-26 | End: 2023-06-26

## 2023-06-26 RX ORDER — BUPIVACAINE HYDROCHLORIDE 2.5 MG/ML
INJECTION, SOLUTION EPIDURAL; INFILTRATION; INTRACAUDAL
Status: COMPLETED | OUTPATIENT
Start: 2023-06-26 | End: 2023-06-26

## 2023-06-26 RX ORDER — FAMOTIDINE 20 MG/1
20 TABLET, FILM COATED ORAL DAILY
Status: DISCONTINUED | OUTPATIENT
Start: 2023-06-27 | End: 2023-06-27

## 2023-06-26 RX ORDER — SODIUM CHLORIDE 9 MG/ML
INJECTION, SOLUTION INTRAVENOUS
Status: DISCONTINUED | OUTPATIENT
Start: 2023-06-26 | End: 2023-06-26 | Stop reason: HOSPADM

## 2023-06-26 RX ORDER — OXYCODONE HYDROCHLORIDE 5 MG/1
5 TABLET ORAL
Status: DISCONTINUED | OUTPATIENT
Start: 2023-06-26 | End: 2023-06-26

## 2023-06-26 RX ORDER — SODIUM CHLORIDE 9 MG/ML
INJECTION, SOLUTION INTRAVENOUS CONTINUOUS
Status: CANCELLED | OUTPATIENT
Start: 2023-06-26 | End: 2023-06-27

## 2023-06-26 RX ORDER — HALOPERIDOL 5 MG/ML
0.5 INJECTION INTRAMUSCULAR EVERY 10 MIN PRN
Status: DISCONTINUED | OUTPATIENT
Start: 2023-06-26 | End: 2023-06-26

## 2023-06-26 RX ORDER — SODIUM CHLORIDE 9 MG/ML
INJECTION, SOLUTION INTRAVENOUS CONTINUOUS
Status: DISCONTINUED | OUTPATIENT
Start: 2023-06-26 | End: 2023-06-27

## 2023-06-26 RX ORDER — POLYETHYLENE GLYCOL 3350 17 G/17G
17 POWDER, FOR SOLUTION ORAL DAILY
Status: CANCELLED | OUTPATIENT
Start: 2023-06-26

## 2023-06-26 RX ORDER — OXYCODONE HYDROCHLORIDE 10 MG/1
10 TABLET ORAL
Status: DISCONTINUED | OUTPATIENT
Start: 2023-06-26 | End: 2023-06-27

## 2023-06-26 RX ORDER — ONDANSETRON 2 MG/ML
INJECTION INTRAMUSCULAR; INTRAVENOUS
Status: DISCONTINUED | OUTPATIENT
Start: 2023-06-26 | End: 2023-06-26

## 2023-06-26 RX ORDER — EPHEDRINE SULFATE 50 MG/ML
INJECTION, SOLUTION INTRAVENOUS
Status: DISCONTINUED | OUTPATIENT
Start: 2023-06-26 | End: 2023-06-26

## 2023-06-26 RX ORDER — FENTANYL CITRATE 50 UG/ML
INJECTION, SOLUTION INTRAMUSCULAR; INTRAVENOUS
Status: DISCONTINUED | OUTPATIENT
Start: 2023-06-26 | End: 2023-06-26

## 2023-06-26 RX ORDER — MUPIROCIN 20 MG/G
1 OINTMENT TOPICAL
Status: COMPLETED | OUTPATIENT
Start: 2023-06-26 | End: 2023-06-26

## 2023-06-26 RX ORDER — FAMOTIDINE 10 MG/ML
INJECTION INTRAVENOUS
Status: DISCONTINUED | OUTPATIENT
Start: 2023-06-26 | End: 2023-06-26

## 2023-06-26 RX ORDER — METHOCARBAMOL 750 MG/1
750 TABLET, FILM COATED ORAL 4 TIMES DAILY PRN
Status: DISCONTINUED | OUTPATIENT
Start: 2023-06-26 | End: 2023-06-27

## 2023-06-26 RX ORDER — ASPIRIN 81 MG/1
81 TABLET ORAL 2 TIMES DAILY
Status: DISCONTINUED | OUTPATIENT
Start: 2023-06-26 | End: 2023-06-26

## 2023-06-26 RX ORDER — VASOPRESSIN 20 [USP'U]/ML
INJECTION, SOLUTION INTRAMUSCULAR; SUBCUTANEOUS
Status: DISCONTINUED | OUTPATIENT
Start: 2023-06-26 | End: 2023-06-26

## 2023-06-26 RX ORDER — PROPOFOL 10 MG/ML
VIAL (ML) INTRAVENOUS CONTINUOUS PRN
Status: DISCONTINUED | OUTPATIENT
Start: 2023-06-26 | End: 2023-06-26

## 2023-06-26 RX ORDER — SODIUM CHLORIDE 9 MG/ML
INJECTION, SOLUTION INTRAVENOUS
Status: CANCELLED | OUTPATIENT
Start: 2023-06-26

## 2023-06-26 RX ORDER — FAMOTIDINE 20 MG/1
20 TABLET, FILM COATED ORAL 2 TIMES DAILY
Status: DISCONTINUED | OUTPATIENT
Start: 2023-06-26 | End: 2023-06-26

## 2023-06-26 RX ORDER — PROCHLORPERAZINE EDISYLATE 5 MG/ML
5 INJECTION INTRAMUSCULAR; INTRAVENOUS EVERY 6 HOURS PRN
Status: DISCONTINUED | OUTPATIENT
Start: 2023-06-26 | End: 2023-06-26

## 2023-06-26 RX ORDER — AMLODIPINE BESYLATE 10 MG/1
10 TABLET ORAL DAILY
Status: DISCONTINUED | OUTPATIENT
Start: 2023-06-27 | End: 2023-06-29 | Stop reason: HOSPADM

## 2023-06-26 RX ORDER — PREGABALIN 75 MG/1
75 CAPSULE ORAL
Status: COMPLETED | OUTPATIENT
Start: 2023-06-26 | End: 2023-06-26

## 2023-06-26 RX ORDER — ACETAMINOPHEN 10 MG/ML
1000 INJECTION, SOLUTION INTRAVENOUS ONCE
Status: COMPLETED | OUTPATIENT
Start: 2023-06-26 | End: 2023-06-26

## 2023-06-26 RX ORDER — ENOXAPARIN SODIUM 100 MG/ML
40 INJECTION SUBCUTANEOUS EVERY 24 HOURS
Status: CANCELLED | OUTPATIENT
Start: 2023-06-26

## 2023-06-26 RX ORDER — FAMOTIDINE 20 MG/1
20 TABLET, FILM COATED ORAL 2 TIMES DAILY
Status: CANCELLED | OUTPATIENT
Start: 2023-06-26

## 2023-06-26 RX ORDER — LABETALOL HYDROCHLORIDE 5 MG/ML
INJECTION, SOLUTION INTRAVENOUS
Status: DISCONTINUED | OUTPATIENT
Start: 2023-06-26 | End: 2023-06-26

## 2023-06-26 RX ORDER — FUROSEMIDE 10 MG/ML
40 INJECTION INTRAMUSCULAR; INTRAVENOUS ONCE
Status: COMPLETED | OUTPATIENT
Start: 2023-06-26 | End: 2023-06-26

## 2023-06-26 RX ORDER — OXYCODONE HYDROCHLORIDE 5 MG/1
10 TABLET ORAL
Status: CANCELLED | OUTPATIENT
Start: 2023-06-26

## 2023-06-26 RX ORDER — PRAVASTATIN SODIUM 40 MG/1
40 TABLET ORAL NIGHTLY
Status: DISCONTINUED | OUTPATIENT
Start: 2023-06-26 | End: 2023-06-29 | Stop reason: HOSPADM

## 2023-06-26 RX ORDER — AMOXICILLIN 250 MG
1 CAPSULE ORAL 2 TIMES DAILY
Status: DISCONTINUED | OUTPATIENT
Start: 2023-06-26 | End: 2023-06-26

## 2023-06-26 RX ORDER — VANCOMYCIN HYDROCHLORIDE 1 G/20ML
INJECTION, POWDER, LYOPHILIZED, FOR SOLUTION INTRAVENOUS
Status: DISCONTINUED | OUTPATIENT
Start: 2023-06-26 | End: 2023-06-26 | Stop reason: HOSPADM

## 2023-06-26 RX ORDER — LIDOCAINE HYDROCHLORIDE 20 MG/ML
INJECTION INTRAVENOUS
Status: DISCONTINUED | OUTPATIENT
Start: 2023-06-26 | End: 2023-06-26

## 2023-06-26 RX ORDER — DOCUSATE SODIUM 100 MG/1
100 CAPSULE, LIQUID FILLED ORAL 2 TIMES DAILY
Status: DISCONTINUED | OUTPATIENT
Start: 2023-06-26 | End: 2023-06-29 | Stop reason: HOSPADM

## 2023-06-26 RX ORDER — PREGABALIN 75 MG/1
75 CAPSULE ORAL NIGHTLY
Status: CANCELLED | OUTPATIENT
Start: 2023-06-26

## 2023-06-26 RX ORDER — FENTANYL CITRATE 50 UG/ML
100 INJECTION, SOLUTION INTRAMUSCULAR; INTRAVENOUS
Status: DISCONTINUED | OUTPATIENT
Start: 2023-06-26 | End: 2023-06-26 | Stop reason: HOSPADM

## 2023-06-26 RX ORDER — LOSARTAN POTASSIUM 50 MG/1
100 TABLET ORAL DAILY
Status: DISCONTINUED | OUTPATIENT
Start: 2023-06-27 | End: 2023-06-29 | Stop reason: HOSPADM

## 2023-06-26 RX ORDER — CARBOXYMETHYLCELLULOSE SODIUM 10 MG/ML
GEL OPHTHALMIC
Status: DISCONTINUED | OUTPATIENT
Start: 2023-06-26 | End: 2023-06-26

## 2023-06-26 RX ORDER — METOPROLOL SUCCINATE 50 MG/1
50 TABLET, EXTENDED RELEASE ORAL DAILY
Status: DISCONTINUED | OUTPATIENT
Start: 2023-06-27 | End: 2023-06-29 | Stop reason: HOSPADM

## 2023-06-26 RX ORDER — OXYCODONE HYDROCHLORIDE 5 MG/1
5 TABLET ORAL EVERY 4 HOURS PRN
Status: DISCONTINUED | OUTPATIENT
Start: 2023-06-26 | End: 2023-06-26

## 2023-06-26 RX ORDER — METHOCARBAMOL 750 MG/1
750 TABLET, FILM COATED ORAL 3 TIMES DAILY
Status: DISCONTINUED | OUTPATIENT
Start: 2023-06-26 | End: 2023-06-28

## 2023-06-26 RX ADMIN — DOCUSATE SODIUM 100 MG: 50 CAPSULE, LIQUID FILLED ORAL at 08:06

## 2023-06-26 RX ADMIN — NALXONE HYDROCHLORIDE 0.02 MG: 0.4 INJECTION INTRAMUSCULAR; INTRAVENOUS; SUBCUTANEOUS at 12:06

## 2023-06-26 RX ADMIN — PHENYLEPHRINE HYDROCHLORIDE 100 MCG: 10 INJECTION INTRAVENOUS at 12:06

## 2023-06-26 RX ADMIN — METHOCARBAMOL 750 MG: 750 TABLET, FILM COATED ORAL at 08:06

## 2023-06-26 RX ADMIN — SODIUM CHLORIDE: 0.9 INJECTION, SOLUTION INTRAVENOUS at 06:06

## 2023-06-26 RX ADMIN — LABETALOL HYDROCHLORIDE 10 MG: 5 INJECTION, SOLUTION INTRAVENOUS at 08:06

## 2023-06-26 RX ADMIN — MEPIVACAINE HYDROCHLORIDE 2.8 ML: 20 INJECTION, SOLUTION EPIDURAL; INFILTRATION at 07:06

## 2023-06-26 RX ADMIN — Medication 30 MG: at 07:06

## 2023-06-26 RX ADMIN — NALXONE HYDROCHLORIDE 0.02 MG: 0.4 INJECTION INTRAMUSCULAR; INTRAVENOUS; SUBCUTANEOUS at 11:06

## 2023-06-26 RX ADMIN — PREGABALIN 75 MG: 75 CAPSULE ORAL at 08:06

## 2023-06-26 RX ADMIN — CARBOXYMETHYLCELLULOSE SODIUM 2 DROP: 10 GEL OPHTHALMIC at 07:06

## 2023-06-26 RX ADMIN — LABETALOL HYDROCHLORIDE 5 MG: 5 INJECTION, SOLUTION INTRAVENOUS at 07:06

## 2023-06-26 RX ADMIN — SODIUM CHLORIDE, SODIUM GLUCONATE, SODIUM ACETATE, POTASSIUM CHLORIDE, MAGNESIUM CHLORIDE, SODIUM PHOSPHATE, DIBASIC, AND POTASSIUM PHOSPHATE: .53; .5; .37; .037; .03; .012; .00082 INJECTION, SOLUTION INTRAVENOUS at 09:06

## 2023-06-26 RX ADMIN — DEXMEDETOMIDINE HYDROCHLORIDE 4 MCG: 100 INJECTION, SOLUTION INTRAVENOUS at 01:06

## 2023-06-26 RX ADMIN — DEXAMETHASONE SODIUM PHOSPHATE 8 MG: 4 INJECTION, SOLUTION INTRAMUSCULAR; INTRAVENOUS at 07:06

## 2023-06-26 RX ADMIN — VANCOMYCIN HYDROCHLORIDE 1500 MG: 1.5 INJECTION, POWDER, LYOPHILIZED, FOR SOLUTION INTRAVENOUS at 06:06

## 2023-06-26 RX ADMIN — PROPOFOL 100 MCG/KG/MIN: 10 INJECTION, EMULSION INTRAVENOUS at 07:06

## 2023-06-26 RX ADMIN — PREGABALIN 75 MG: 75 CAPSULE ORAL at 05:06

## 2023-06-26 RX ADMIN — DEXMEDETOMIDINE HYDROCHLORIDE 8 MCG: 100 INJECTION, SOLUTION INTRAVENOUS at 01:06

## 2023-06-26 RX ADMIN — VASOPRESSIN 1 UNITS: 20 INJECTION INTRAVENOUS at 12:06

## 2023-06-26 RX ADMIN — LIDOCAINE HYDROCHLORIDE,EPINEPHRINE BITARTRATE 3 ML: 15; .005 INJECTION, SOLUTION EPIDURAL; INFILTRATION; INTRACAUDAL; PERINEURAL at 09:06

## 2023-06-26 RX ADMIN — MUPIROCIN 1 G: 20 OINTMENT TOPICAL at 05:06

## 2023-06-26 RX ADMIN — HALOPERIDOL LACTATE 5 MG: 5 INJECTION, SOLUTION INTRAMUSCULAR at 12:06

## 2023-06-26 RX ADMIN — CARBOXYMETHYLCELLULOSE SODIUM 2 DROP: 10 GEL OPHTHALMIC at 09:06

## 2023-06-26 RX ADMIN — FENTANYL CITRATE 50 MCG: 50 INJECTION, SOLUTION INTRAMUSCULAR; INTRAVENOUS at 07:06

## 2023-06-26 RX ADMIN — ACETAMINOPHEN 1000 MG: 10 INJECTION INTRAVENOUS at 12:06

## 2023-06-26 RX ADMIN — TRANEXAMIC ACID 1000 MG: 100 INJECTION, SOLUTION INTRAVENOUS at 07:06

## 2023-06-26 RX ADMIN — EPHEDRINE SULFATE 10 MG: 50 INJECTION INTRAVENOUS at 01:06

## 2023-06-26 RX ADMIN — CEFAZOLIN 2 G: 2 INJECTION, POWDER, FOR SOLUTION INTRAMUSCULAR; INTRAVENOUS at 07:06

## 2023-06-26 RX ADMIN — CEFAZOLIN 2 G: 2 INJECTION, POWDER, FOR SOLUTION INTRAMUSCULAR; INTRAVENOUS at 06:06

## 2023-06-26 RX ADMIN — ONDANSETRON 4 MG: 2 INJECTION, SOLUTION INTRAMUSCULAR; INTRAVENOUS at 07:06

## 2023-06-26 RX ADMIN — PRAVASTATIN SODIUM 40 MG: 40 TABLET ORAL at 08:06

## 2023-06-26 RX ADMIN — BUPIVACAINE HYDROCHLORIDE 20 ML: 2.5 INJECTION, SOLUTION EPIDURAL; INFILTRATION; INTRACAUDAL; PERINEURAL at 06:06

## 2023-06-26 RX ADMIN — FUROSEMIDE 40 MG: 10 INJECTION, SOLUTION INTRAMUSCULAR; INTRAVENOUS at 08:06

## 2023-06-26 RX ADMIN — PHENYLEPHRINE HYDROCHLORIDE 200 MCG: 10 INJECTION INTRAVENOUS at 12:06

## 2023-06-26 RX ADMIN — LIDOCAINE HYDROCHLORIDE 60 MG: 20 INJECTION INTRAVENOUS at 07:06

## 2023-06-26 RX ADMIN — SODIUM CHLORIDE, SODIUM GLUCONATE, SODIUM ACETATE, POTASSIUM CHLORIDE, MAGNESIUM CHLORIDE, SODIUM PHOSPHATE, DIBASIC, AND POTASSIUM PHOSPHATE: .53; .5; .37; .037; .03; .012; .00082 INJECTION, SOLUTION INTRAVENOUS at 07:06

## 2023-06-26 RX ADMIN — FENTANYL CITRATE 50 MCG: 50 INJECTION, SOLUTION INTRAMUSCULAR; INTRAVENOUS at 09:06

## 2023-06-26 RX ADMIN — ACETAMINOPHEN 1000 MG: 500 TABLET ORAL at 05:06

## 2023-06-26 RX ADMIN — TRANEXAMIC ACID 1000 MG: 100 INJECTION, SOLUTION INTRAVENOUS at 09:06

## 2023-06-26 RX ADMIN — SODIUM CHLORIDE: 9 INJECTION, SOLUTION INTRAVENOUS at 11:06

## 2023-06-26 RX ADMIN — FAMOTIDINE 20 MG: 10 INJECTION, SOLUTION INTRAVENOUS at 07:06

## 2023-06-26 RX ADMIN — FENTANYL CITRATE 100 MCG: 50 INJECTION, SOLUTION INTRAMUSCULAR; INTRAVENOUS at 06:06

## 2023-06-26 NOTE — H&P
CC:  Right hip pain     Zheng Talley Jr. is a 78 y.o. male with Right hip pain.  Pain is worse with activity and weight bearing.  Patient has experienced interference of activities of daily living due to increased pain and decreased range of motion. Patient has failed non-operative treatment including NSAIDs, as well as greater than 3 months of activity modification. Zheng Talley Jr. ambulates independently.      Relevant medical conditions of significance in perioperative period:  HTN- on medication managed by pcp  HLD- on medication managed by pcp             Past Medical History:   Diagnosis Date    Arthritis      BPH (benign prostatic hyperplasia)      Cancer       prostate    Cataract      Groin pain      History of gastroesophageal reflux (GERD)      Hyperlipidemia      Hypertension      Nuclear sclerosis - Both Eyes 02/18/2014    Renal insufficiency 6/9/2023               Past Surgical History:   Procedure Laterality Date    BUNIONECTOMY         bilateral     CARPAL TUNNEL RELEASE Left 7/25/2022     Procedure: RELEASE, CARPAL TUNNEL,LEFT;  Surgeon: Elyssa Bradford MD;  Location: [facility] OR;  Service: Orthopedics;  Laterality: Left;    COLONOSCOPY   2011     Dr. Velez    COLONOSCOPY N/A 2/25/2019     Procedure: COLONOSCOPY;  Surgeon: JACQUELINE Lozano MD;  Location: [facility] ENDO [address]);  Service: Endoscopy;  Laterality: N/A;    dental implant        HAND SURGERY        INJECTION Right 3/3/2023     Procedure: INJECTION, RIGHT HIP-INTRA-ARTICULAR CONTRAST DIRECT REF;  Surgeon: Frankie Carrillo MD;  Location: [facility] PAIN MGT;  Service: Pain Management;  Laterality: Right;    lip surgery        PROSTATE SURGERY        REPAIR OF NAIL BED Right 11/2/2018     Procedure: REPAIR, NAIL BED right thumb with I&D;  Surgeon: Elyssa Bradford MD;  Location: [facility] OR;  Service: Orthopedics;  Laterality: Right;  stretcher, supine, hand pan 1 and pan 2               Family History   Problem Relation Age of Onset     Coronary artery disease Mother      Hypertension Mother      Cancer Father           leukemia    Diabetes Father      Osteoarthritis Sister      Hypertension Sister      Diabetes Sister      No Known Problems Sister      No Known Problems Brother      No Known Problems Brother      No Known Problems Brother      No Known Problems Daughter      No Known Problems Son      Heart disease Neg Hx      Amblyopia Neg Hx      Blindness Neg Hx      Cataracts Neg Hx      Glaucoma Neg Hx      Macular degeneration Neg Hx      Retinal detachment Neg Hx      Strabismus Neg Hx                 Review of patient's allergies indicates:   Allergen Reactions    Iodine and iodide containing products Other (See Comments)       Blood in urine in the 1970s            Current Outpatient Medications:     amLODIPine (NORVASC) 10 MG tablet, TAKE 1 TABLET ONCE DAILY, Disp: 90 tablet, Rfl: 0    losartan-hydrochlorothiazide 100-25 mg (HYZAAR) 100-25 mg per tablet, TAKE 1 TABLET ONCE DAILY, Disp: 90 tablet, Rfl: 0    metoprolol succinate (TOPROL-XL) 50 MG 24 hr tablet, TAKE 1 TABLET ONCE DAILY, Disp: 90 tablet, Rfl: 0    pravastatin (PRAVACHOL) 40 MG tablet, Take 1 tablet (40 mg total) by mouth every evening., Disp: 90 tablet, Rfl: 3    valACYclovir (VALTREX) 1000 MG tablet, Take 1 tablet (1,000 mg total) by mouth once daily. (Patient taking differently: Take 1,000 mg by mouth as needed.), Disp: 90 tablet, Rfl: 3    tadalafiL (CIALIS) 20 MG Tab, Take 1 tablet (20 mg total) by mouth daily as needed., Disp: 12 tablet, Rfl: 11     Review of Systems:   Constitutional: no fever or chills  Eyes: no visual changes  ENT: no nasal congestion or sore throat  Respiratory: no cough or shortness of breath  Cardiovascular: no chest pain or palpitations  Gastrointestinal: no nausea or vomiting, tolerating diet  Genitourinary: no hematuria or dysuria  Integument/Breast: no rash or pruritis  Hematologic/Lymphatic: no easy bruising or  "lymphadenopathy  Musculoskeletal: positive for hip pain  Neurological: no seizures or tremors  Behavioral/Psych: no auditory or visual hallucinations  Endocrine: no heat or cold intolerance     PE:  Ht 5' 10" (1.778 m)   Wt 111.3 kg (245 lb 4.2 oz)   BMI 35.19 kg/m²   General: Pleasant, cooperative, NAD   Gait: antalgic  HEENT: NCAT, sclera nonicteric   Lungs: Respirations are clear, equal and unlabored.   CV: S1S2; 2+ bilateral upper and lower extremity pulses.   Skin: Intact throughout LE with no rashes, erythema, or lesions  Extremities: No LE edema, NVI lower extremities     Right Hip Exam:  90 degrees flexion  0 degrees extension   15 degrees internal rotation  15 degrees external rotation  20 degrees abduction  20 degrees adduction  There is pain with passive range of motion.      Radiographs: Radiographs reveal advanced degenerative changes including subchondral cyst formation, subchondral sclerosis, osteophyte formation, joint space narrowing.      Diagnosis: Primary osteoarthritis Right hip     Plan: Right total hip arthroplasty      Due to the serious nature of total joint infection and the high prevalence of community acquired MRSA, vancomycin will be used perioperatively.                 "

## 2023-06-26 NOTE — SUBJECTIVE & OBJECTIVE
"Principal Problem:Acute hypercapnic respiratory failure    Principal Orthopedic Problem: s/p right total hip arthroplasty on 6/26    Interval History: Transferred to [facility] yesterday for acute hypercapnic respiratory failure. Placed on continuous BiPAP in the ICU. Patient alert this morning.    Review of patient's allergies indicates:   Allergen Reactions    Iodine and iodide containing products Other (See Comments)     Blood in urine in the 1970s       Current Facility-Administered Medications   Medication    acetaminophen tablet 1,000 mg    amLODIPine tablet 10 mg    docusate sodium capsule 100 mg    enoxaparin injection 40 mg    famotidine tablet 20 mg    hydroCHLOROthiazide tablet 25 mg    losartan tablet 100 mg    methocarbamoL tablet 750 mg    methocarbamoL tablet 750 mg    metoprolol succinate (TOPROL-XL) 24 hr tablet 50 mg    ondansetron injection 4 mg    oxyCODONE immediate release tablet 5 mg    polyethylene glycol packet 17 g    pravastatin tablet 40 mg    pregabalin capsule 75 mg    sodium chloride 0.9% flush 10 mL     Objective:     Vital Signs (Most Recent):  Temp: 99.6 °F (37.6 °C) (06/27/23 0030)  Pulse: 75 (06/27/23 0741)  Resp: (!) 21 (06/27/23 0741)  BP: (!) 158/70 (06/27/23 0600)  SpO2: 96 % (06/27/23 0741) Vital Signs (24h Range):  Temp:  [99.6 °F (37.6 °C)] 99.6 °F (37.6 °C)  Pulse:  [49-81] 75  Resp:  [0-28] 21  SpO2:  [92 %-100 %] 96 %  BP: ()/(48-84) 158/70  Arterial Line BP: ()/(38-79) 143/72     Weight: 111.1 kg (245 lb)  Height: 5' 10" (177.8 cm)  Body mass index is 35.15 kg/m².      Intake/Output Summary (Last 24 hours) at 6/27/2023 1051  Last data filed at 6/27/2023 0655  Gross per 24 hour   Intake 92.81 ml   Output 845 ml   Net -752.19 ml        Ortho/SPM Exam  Gen: NAD, sitting comfortably in bed  CV: regular rate  Resp: on continuous BiPAP    Right Lower Extremity Exam  - Dressing clean, dry, and intact  - Drain with bloody output  - Quad and hip flexor intact  - " Compartments soft and compressible  - TA/EHL/Gastroc/FHL assessed in isolation without deficit  - SILT throughout  - DP and PT 2+     Significant Labs: All pertinent labs within the past 24 hours have been reviewed.    Significant Imaging: I have reviewed all pertinent imaging results/findings.

## 2023-06-26 NOTE — OP NOTE
Ravi BURGOS right hip  OPERATIVE NOTE      Date of Operation:   6/26/2023    Providers Performing:   Surgeon(s):  Teofilo Rodrigues MD  Assistant: Teofilo Hazel MD    Preoperative Diagnosis:   Right hip osteoarthritis, M16.11    Postoperative Diagnosis:   Same    Operative Procedure:   Right Total Hip Arthroplasty, Anterior Approach, CPT 63002    Anesthesia:   Spinal+catheter  PENG    Estimated Blood Loss:   600 cc     Specimens:   None    Complications:   None, stable to PACU.    Implants Utilized:       Implant Name Type Inv. Item Serial No.  Lot No. LRB No. Used Action   SHELL OSSEOTI MULTIHOLE 58MM G - [id number]  SHELL OSSEOTI MULTIHOLE 58MM G  [employer] [id number] Right 1 Implanted   SCREW BONE 6.5X30 SELF-TAP - [id number]  SCREW BONE 6.5X30 SELF-TAP  [employer] [id number] Right 1 Implanted   SCREW BONE 6.5X30 SELF-TAP - [id number]  SCREW BONE 6.5X30 SELF-TAP  [employer] [id number] Right 1 Implanted   LINER G7 DUAL MOB COCR G 46MM - [id number]  LINER G7 DUAL MOB COCR G 46MM  [employer] [id number] Right 1 Implanted   Dual Mobility Longevity Bearing 28mm x 46mm Size G    [employer] [id number] Right 1 Implanted   Femoral Stem Cementless Collared Offset Size 6    [employer] [id number] Right 1 Implanted   HEAD BIOLOX FEM +0X28MM - [id number]  HEAD BIOLOX FEM +0X28MM  [employer] [id number] Right 1 Implanted       Indications:   The patient has longstanding right hip pain secondary to the above diagnosis.. They have failed conservative management which includes anti-inflammatory medications and activity modification. We reviewed the risks and benefits of the direct anterior hip replacement with the patient prior to surgery including risk of infection, lateral thigh numbness, blood clot, leg length inequality, dislocation, components loosening, components wearing out, need for revision surgery, continued pain, limping, and injury to nerves and vessels.  After discussing the risks and benefits of the procedure they  would like to proceed with surgery.    Operative Notes:   The patient was met in the holding area. The operative site was marked. Anesthesia administered spinal anesthesia. The patient was placed supine on a Spalding table and the operative site was identified. The hip was prepped and draped in the usual fashion. IV antibiotics were administered and a timeout was performed.     I performed a direct anterior approach to the hip. Skin incision was made and the fascia of the tensor fascia garry was identified. I utilized the interval between the tensor fascia garry and sartorious for direct dissection to the hip joint. I identified and coagulated the ascending branch of the lateral circumflex vessel. Standard retractors over the anterior hip capsule were placed and the capsulotomy was performed. The capsule was tagged for retraction. The femoral head was identified and the femoral neck osteotomy was made in accordance with preoperative templating. The femoral head was then removed with a corkscrew.    The standard anterior, posterior, and superior retractors were placed around the acetabulum. The capsular labrum and foveal contents were removed. Sequential acetabular reamers were used to prepare the acetabulum. Once good good fit was achieved, the final acetabular cup was selected to be one millimeter larger in diameter than the last reamer used. The cup was checked for a good rim fit and a provisional impaction was performed to verify positioning on fluoroscopy. Once this was satisfactory, the impaction was completed. I checked to make sure there was no overhanging osteophytes anteriorly as well as making sure that there was not excessive acetabular cup exposed. Screws were placed in the cup to augment initial fixation. The trial liner was screwed into place and I confirmed that it was well seated.    The hydraulic hook was placed around the femur. The femur was externally rotated and the standard calcar and greater  trochanter retractors were placed. A provisional posterior capsulotomy was performed. Then, gross traction was released and the leg was extended and adducted. The appropriate release was performed to allow elevation of the femur for good visualization of the femoral canal.     A rongeur was used to open the femoral canal and a rasp was used to sound the canal and lateralize. The starter broach and sequential broaches were used until stability was appropriate. A trial reduction was performed and intraoperative fluoroscopy was used to confirm appropriate leg lengths and offset.  Once the trial femoral components appeared appropriately positioned, the hip was dislocated, the femur re-exposed, and the trial femoral components were removed. The final liner was placed after removing the trial.  The canal was irrigated and the final femoral stem was impacted to the level of the neck cut. The final ball was impacted onto a dry trunnion and the hip was reduced checking for appropriate soft tissue tension. Final intra-operative fluoroscopy was obtained to confirm implant positioning, leg length, and offset.     While checking xray, a 0.35% betadine solution was left to soak in the wound. The wound was copiously irrigated. I checked for any residual bleeders and made sure that there was appropriate hemostasis.  There was diffuse oozing, especially from the bone, so we elected to place 1 medium Hemovac drain.  1 gram of vancomycin powder was placed in the wound. The wound was closed in layers using #1 vicryl at the capsule and fascia, then 2-0 vicryl, 3-0 monocryl, and Prineo Mesh+Dermabond. A sterile Aquacel dressing was placed and the drain was connected to the reservoir.     There were no complications or evidence of intraoperative fracture. All counts were correct.    The first-assistant was critical to all steps of the operation, including retraction during exposure and bone preparation, as well as the deep and superficial  wound closure.  Dr. Rodrigues performed and/or supervised the key portions of this surgical procedure, including evaluation of the bone cuts, reshaping of the bony elements, and insertion of the provisional and final components.    Post Op Plan:   The patient will be weightbearing as tolerated with a walker with no extremes of motion  ASA for DVT prophylaxis (81mg BID for one month)  24 hours of IV antibiotics.      Signed by: Teofilo Rodrigues MD

## 2023-06-26 NOTE — PLAN OF CARE
[place] - [facility] (Hospital)    HOME HEALTH ORDERS  FACE TO FACE ENCOUNTER    Patient Name: Zheng Talley Jr.  YOB: 1945    PCP: Gio Rizo MD   PCP Address: [address] [place] [address] / [place] [address]  PCP Phone Number: [phone]  PCP Fax: [phone]    Encounter Date: 06/26/2023    Admit to Home Health    Diagnoses:  Active Hospital Problems    Diagnosis  POA    *Primary osteoarthritis of right hip [M16.11]  Yes      Resolved Hospital Problems   No resolved problems to display.       Future Appointments   Date Time Provider Department Center   6/28/2023 10:30 AM TELEMED NURSE, [facility]   7/11/2023 10:20 AM Avelina Rivera NP [facility] ORTHO [facility]   9/18/2023 10:00 AM LAB, [facility]DRAW [facility]   9/18/2023 11:00 AM Greg Kearns Jr., MD [facility] RAD ONC [facility]   9/22/2023 10:45 AM Derrick Wu MD [facility] UROLOGY [facility]           I have seen and examined this patient face to face today. My clinical findings that support the need for the home health skilled services and home bound status are the following:  Weakness/numbness causing balance and gait disturbance due to Joint Replacement making it taxing to leave home.  Requiring assistive device to leave home due to unsteady gait caused by Joint Replacement.    Allergies:  Review of patient's allergies indicates:   Allergen Reactions    Iodine and iodide containing products Other (See Comments)     Blood in urine in the 1970s       Diet: regular diet    Activities: activity as tolerated    Home Health Admitting Clinician:   SN/PT to complete comprehensive assessment including routine vital signs. Instruct on disease process and s/s of complications to report to MD. Follow specific home health arthoplasty protocol. Review/verify medication list sent home with the patient at time of discharge  and instruct patient/caregiver as needed. If coumadin ordered, coumadin clinic to manage  INR with INR draws 2x per week with a goal to maintain INR between 1.8 and 2.2. Frequency may be adjusted depending on start of care date.    Notify MD if SBP > 160 or < 90; DBP > 90 or < 50; HR > 120 or < 50; Temp > 101    Home Medical Equipment:  Walker, 3-1 bedside commode, transfer tub bench    CONSULTS:    Physical Therapy may admit if patient not on coumadin, PT to perform comprehensive assessment if performing admit visit and generate therapy plan of care. Evaluate for home safety and equipment needs; Establish/upgrade home exercise program. Perform/instruct on therapeutic exercises, gait training, transfer training, and Range of Motion.      WOUND CARE ORDERS:  Assess Surgical Incision/DSRG each TX  Aquacel AG drsg applied post-op leave on 14 days post op. Call MD if any drainage reaches border to border of drsg horizontally, s/s of infection, temp >101, induration, swelling or redness.  If dressing is removed per MD order, then apply island dressing, change/teach caregiver to perform daily dressing change if island dressing present.    Medications: Review discharge medications with patient and family and provide education.      Current Discharge Medication List        CONTINUE these medications which have NOT CHANGED    Details   amLODIPine (NORVASC) 10 MG tablet TAKE 1 TABLET ONCE DAILY  Qty: 90 tablet, Refills: 0    Associated Diagnoses: Essential hypertension      losartan-hydrochlorothiazide 100-25 mg (HYZAAR) 100-25 mg per tablet TAKE 1 TABLET ONCE DAILY  Qty: 90 tablet, Refills: 0    Associated Diagnoses: Essential hypertension      metoprolol succinate (TOPROL-XL) 50 MG 24 hr tablet TAKE 1 TABLET ONCE DAILY  Qty: 90 tablet, Refills: 0    Associated Diagnoses: Essential hypertension      pravastatin (PRAVACHOL) 40 MG tablet Take 1 tablet (40 mg total) by mouth every evening.  Qty: 90 tablet, Refills: 3    Associated Diagnoses: Hyperlipidemia LDL goal <130      valACYclovir (VALTREX) 1000 MG tablet  Take 1 tablet (1,000 mg total) by mouth once daily.  Qty: 90 tablet, Refills: 3    Associated Diagnoses: Essential hypertension      acetaminophen (TYLENOL) 650 MG TbSR Take 1 tablet (650 mg total) by mouth every 8 (eight) hours.  Qty: 120 tablet, Refills: 0    Associated Diagnoses: Status post hip replacement, unspecified laterality      aspirin (ECOTRIN) 81 MG EC tablet Take 1 tablet (81 mg total) by mouth 2 (two) times a day.  Qty: 60 tablet, Refills: 0    Associated Diagnoses: Status post hip replacement, unspecified laterality      docusate sodium (COLACE) 100 MG capsule Take 1 capsule (100 mg total) by mouth 2 (two) times daily.  Qty: 60 capsule, Refills: 0    Associated Diagnoses: Status post hip replacement, unspecified laterality      methocarbamoL (ROBAXIN) 750 MG Tab Take 1 tablet (750 mg total) by mouth 4 (four) times daily as needed (muscle spasms).  Qty: 40 tablet, Refills: 0    Associated Diagnoses: Status post hip replacement, unspecified laterality      oxyCODONE (ROXICODONE) 5 MG immediate release tablet Take 1 tablet every 4-6 hours as needed  Qty: 30 tablet, Refills: 0    Comments: Quantity prescribed more than 7 day supply? No. Deliver to bedside. Surgery 6/26  Associated Diagnoses: Status post hip replacement, unspecified laterality      tadalafiL (CIALIS) 20 MG Tab Take 1 tablet (20 mg total) by mouth daily as needed.  Qty: 12 tablet, Refills: 11             I certify that this patient is confined to his home and needs physical therapy.

## 2023-06-26 NOTE — ASSESSMENT & PLAN NOTE
-- likely undiagnosed sleep apnea. Patient and wife were in the process of trying to get appointment with sleep medicine clinic   -- will place referral to sleep medicine clinic at discharge

## 2023-06-26 NOTE — SUBJECTIVE & OBJECTIVE
Past Medical History:   Diagnosis Date    Arthritis     BPH (benign prostatic hyperplasia)     Cancer     prostate    Cataract     Groin pain     History of gastroesophageal reflux (GERD)     Hyperlipidemia     Hypertension     Nuclear sclerosis - Both Eyes 2014    Renal insufficiency 2023       Past Surgical History:   Procedure Laterality Date    BUNIONECTOMY      bilateral     CARPAL TUNNEL RELEASE Left 2022    Procedure: RELEASE, CARPAL TUNNEL,LEFT;  Surgeon: Elyssa Bradford MD;  Location: [facility] OR;  Service: Orthopedics;  Laterality: Left;    COLONOSCOPY      Dr. Velez    COLONOSCOPY N/A 2019    Procedure: COLONOSCOPY;  Surgeon: JACQUELINE Lozano MD;  Location: [facility] ENDO (4TH FLR);  Service: Endoscopy;  Laterality: N/A;    dental implant      HAND SURGERY      INJECTION Right 3/3/2023    Procedure: INJECTION, RIGHT HIP-INTRA-ARTICULAR CONTRAST DIRECT REF;  Surgeon: Frankie Carrillo MD;  Location: [facility] PAIN MGT;  Service: Pain Management;  Laterality: Right;    lip surgery      PROSTATE SURGERY      REPAIR OF NAIL BED Right 2018    Procedure: REPAIR, NAIL BED right thumb with I&D;  Surgeon: Elyssa Bradford MD;  Location: [facility] OR;  Service: Orthopedics;  Laterality: Right;  stretcher, supine, hand pan 1 and pan 2       Review of patient's allergies indicates:   Allergen Reactions    Iodine and iodide containing products Other (See Comments)     Blood in urine in the 1970s       Family History       Problem Relation (Age of Onset)    Cancer Father    Coronary artery disease Mother    Diabetes Father, Sister    Hypertension Mother, Sister    No Known Problems Sister, Brother, Brother, Brother, Daughter, Son    Osteoarthritis Sister          Tobacco Use    Smoking status: Former     Types: Cigarettes     Quit date:      Years since quittin.5    Smokeless tobacco: Never   Substance and Sexual Activity    Alcohol use: Yes     Alcohol/week: 3.0 standard drinks      Types: 3 Cans of beer per week     Comment: occasionally    Drug use: No    Sexual activity: Never     Partners: Female      Review of Systems   Constitutional:  Positive for fatigue. Negative for chills and fever.   HENT:  Negative for congestion and sore throat.    Respiratory:  Negative for cough and shortness of breath.    Cardiovascular:  Negative for chest pain and palpitations.   Gastrointestinal:  Negative for abdominal pain, nausea and vomiting.   Genitourinary:  Negative for dysuria and frequency.   Musculoskeletal:  Negative for arthralgias and back pain.   Skin:  Negative for rash and wound.   Neurological:  Negative for dizziness and headaches.   Psychiatric/Behavioral:  Negative for agitation and confusion.    Objective:     Vital Signs (Most Recent):  Temp: 97.9 °F (36.6 °C) (06/26/23 1019)  Pulse: 65 (06/26/23 1758)  Resp: 18 (06/26/23 1758)  BP: 137/81 (06/26/23 1758)  SpO2: 100 % (06/26/23 1653) Vital Signs (24h Range):  Temp:  [97.5 °F (36.4 °C)-97.9 °F (36.6 °C)] 97.9 °F (36.6 °C)  Pulse:  [49-74] 65  Resp:  [11-25] 18  SpO2:  [92 %-100 %] 100 %  BP: ()/(48-83) 137/81  Arterial Line BP: ()/(38-65) 112/51   Weight: 111.1 kg (245 lb)  Body mass index is 35.15 kg/m².      Intake/Output Summary (Last 24 hours) at 6/26/2023 1833  Last data filed at 6/26/2023 1200  Gross per 24 hour   Intake 2600 ml   Output 95 ml   Net 2505 ml          Physical Exam  Vitals reviewed.   Constitutional:       General: He is not in acute distress.     Appearance: He is obese. He is not ill-appearing, toxic-appearing or diaphoretic.      Comments: Asleep but easily arousable   HENT:      Head: Normocephalic and atraumatic.      Mouth/Throat:      Mouth: Mucous membranes are moist.   Eyes:      Extraocular Movements: Extraocular movements intact.      Pupils: Pupils are equal, round, and reactive to light.   Cardiovascular:      Rate and Rhythm: Normal rate and regular rhythm.      Pulses: Normal pulses.       Heart sounds: Normal heart sounds. No murmur heard.    No friction rub. No gallop.   Pulmonary:      Effort: No respiratory distress.      Comments: On bipap  Abdominal:      General: Bowel sounds are normal.      Palpations: Abdomen is soft.      Tenderness: There is no abdominal tenderness.   Musculoskeletal:      Cervical back: Normal range of motion and neck supple.      Right lower leg: No edema.      Left lower leg: No edema.   Skin:     General: Skin is warm and dry.   Neurological:      General: No focal deficit present.      Mental Status: He is alert and oriented to person, place, and time.      Comments: Patient asleep but arouses to voice and oriented x3   Psychiatric:         Mood and Affect: Mood normal.         Behavior: Behavior normal.          Vents:  Oxygen Concentration (%): 40 (06/26/23 1653)  Lines/Drains/Airways       Drain  Duration                  Closed/Suction Drain 06/26/23 0950 Right;Anterior Hip Accordion 10 Fr. <1 day              Arterial Line  Duration             Arterial Line 06/26/23 1230 Left Radial <1 day              Peripheral Intravenous Line  Duration                  Peripheral IV - Single Lumen 06/26/23 0554 18 G Posterior;Right Hand <1 day                  Significant Labs:    CBC/Anemia Profile:  Recent Labs   Lab 06/26/23  1200 06/26/23  1230   WBC  --  14.92*   HGB  --  12.2*   HCT 40 39.0*   PLT  --  148*   MCV  --  92   RDW  --  14.3        Chemistries:  Recent Labs   Lab 06/26/23  1230      K 5.1      CO2 26   BUN 17   CREATININE 1.3   CALCIUM 7.8*   ALBUMIN 2.9*   PROT 5.1*   BILITOT 0.3   ALKPHOS 43*   ALT 11   AST 14   MG 2.0   PHOS 6.5*       ABGs:   Recent Labs   Lab 06/26/23  1653   PH 7.241*   PCO2 70.0*   HCO3 30.1*   POCSATURATED 99   BE 3       Significant Imaging: I have reviewed all pertinent imaging results/findings within the past 24 hours.    X-Ray Hip 2 or 3 views Right (with Pelvis when performed)   Final Result      Expected immediate  postop changes of total right hip arthroplasty.         Electronically signed by: Bubba Manrique MD   Date:    06/26/2023   Time:    15:25      [facility] Surgery Fluoro Usage   Final Result

## 2023-06-26 NOTE — ANESTHESIA POSTPROCEDURE EVALUATION
Anesthesia Post Evaluation    Patient: Zheng Talley Jr.    Procedure(s) Performed: Procedure(s) (LRB):  ARTHROPLASTY, HIP, TOTAL, ANTERIOR APPROACH: RIGHT: HORACE AVENIR & G7: SAME DAY (Right)    Final Anesthesia Type: CSE      Patient location during evaluation: PACU  Patient participation: Yes- Able to Participate  Level of consciousness: confused and agitated  Post-procedure vital signs: reviewed and stable  Pain management: adequate  Airway patency: patent  SACHA mitigation strategies: Multimodal analgesia  PONV status at discharge: No PONV  Anesthetic complications: no      Cardiovascular status: blood pressure returned to baseline and hemodynamically stable  Respiratory status: BIPAP  Hydration status: euvolemic  Follow-up not needed.          Vitals Value Taken Time   /60 06/26/23 1030   Temp 36.6 °C (97.9 °F) 06/26/23 1019   Pulse 58 06/26/23 1031   Resp 21 06/26/23 1031   SpO2 98 % 06/26/23 1031   Vitals shown include unvalidated device data.      No case tracking events are documented in the log.      Pain/Veronica Score: Pain Rating Prior to Med Admin: 0 (6/26/2023  6:40 AM)  Veronica Score: 8 (6/26/2023  6:55 AM)    Patient hypercapnic (likely with severe undiagnosed SACHA). NVBG with CO2 122 -> narcan given and placed on bipap -> ABG wih CO2 in 70s. Patient still confused/combative. Unable to tolerate bipap without precedex. Decision to transfer to [facility] for continuous bipap.     Electrolytes wnl, Hct 40s, pupils reactive bilaterally, lungs clear.

## 2023-06-26 NOTE — ADDENDUM NOTE
Addendum  created 06/26/23 1341 by Bandar Danielson MD    Clinical Note Signed, Intraprocedure Meds edited

## 2023-06-26 NOTE — PROGRESS NOTES
Pt hypotensive with SBP 80s. 1L IVF bolus given. Dr. Danielson and charge RN at bedside. STAT VBG ordered.

## 2023-06-26 NOTE — HPI
Mr Talley is a 77 yo male w/ hx of Obesity, HTN , HLD, osteoarthritis of both hips and knees, prostate cancer s/p prostatectomy who presents from the ED after undergoing right total hip arthroplasty this morning. Per chart review patient was not arousable following  post op. Bedside ABG showing CO2 retention and acidosis. Bipap started which improved acidosis but patient tolerated poorly  without precedex. Patient was also hypotensive with SBP in the 80s, only responsive to pain and apneic. he later received narcan, became agitated and subsequently given haldol 5mg.     In the ED, somnolent but arousable. WBC of 14, plts  HGB 12 and phos 6.5, glucose of 179

## 2023-06-26 NOTE — ASSESSMENT & PLAN NOTE
Patient with Hypercapnic Respiratory failure which is Acute.  he is not on home oxygen. Supplemental oxygen was provided and noted- Oxygen Concentration (%):  [40] 40    .   Signs/symptoms of respiratory failure include- lethargy. Contributing diagnoses includes - Obesity Hypoventilation Labs and images were reviewed. Patient Has recent ABG, which has been reviewed. Will treat underlying causes and adjust management of respiratory failure as follows-     79 y/o M with hx of obesity presenting with lethargy and hypercapnic respiratory failure following an elective hip replacement surgery. Patient given dose of narcan with minimal improvement. Placed on bipap and transferred to [facility] for higher level of care. Sxs likely 2/2 undiagnosed SACHA c/b multiple anesthetic agents during perioperative period. Initial ABG with pH 7.19/CO2 76. On interview patient more awake, sleepy but easily arousable and answering questions appropriately. PH improved to 7.24/CO2 66.     Plan:  -- Bipap overnight for additional ventilation  -- goal O2 88-92%  -- monitor mental status closely  -- anticipate stepdown to the floor in the am if mental status at baseline and hypercapnia improved.

## 2023-06-26 NOTE — ASSESSMENT & PLAN NOTE
Zheng Mayank Martinez is a 78 y.o. male s/p R VITO 6/26/23. Transferred to [facility] for continuous BiPAP in the ICU after surgery. Improving this morning.    Plan:  - Weight bearing status: WBAT RLE  - Pain control: multimodal  - Antibiotics: Ancef  - DVT Prophylaxis: minimum of ASA 81 mg BID  - Respiratory: per ICU    Dispo: pending clinical improvement

## 2023-06-26 NOTE — ED PROVIDER NOTES
Encounter Date: 5/30/2023       History     Chief Complaint   Patient presents with    Post-op Problem     R hip replacement surgery today, had long periods of apnea in PACU, hypercapnic. Pt arrived with A line and on bipap      Patient is a 78-year-old male history of BPH, renal insufficiency, hypertension, hyperlipidemia presenting from outside surgical center for evaluation of apnea and acidosis.  Patient is status postop day 0 and was in the PACU when he became apneic.  VBG was obtained and he was acidotic with a pH of 7.0 and a pCO2 of 100.  Patient was placed on BiPAP and transferred over to the emergency department due to concerns of worsening respiratory status.  Upon arrival patient is easily arousable alert and oriented.  Patient states he has no symptoms and is feeling okay however on exam patient has episodes of apnea in which he is easily brought back.  He denies any chest pain, shortness of breath .     The history is provided by the patient, the EMS personnel and medical records.   Review of patient's allergies indicates:   Allergen Reactions    Iodine and iodide containing products Other (See Comments)     Blood in urine in the 1970s     Past Medical History:   Diagnosis Date    Arthritis     BPH (benign prostatic hyperplasia)     Cancer     prostate    Cataract     Groin pain     History of gastroesophageal reflux (GERD)     Hyperlipidemia     Hypertension     Nuclear sclerosis - Both Eyes 02/18/2014    Renal insufficiency 6/9/2023     Past Surgical History:   Procedure Laterality Date    BUNIONECTOMY      bilateral     CARPAL TUNNEL RELEASE Left 7/25/2022    Procedure: RELEASE, CARPAL TUNNEL,LEFT;  Surgeon: Elyssa Bradford MD;  Location: [facility];  Service: Orthopedics;  Laterality: Left;    COLONOSCOPY  2011    Dr. Velez    COLONOSCOPY N/A 2/25/2019    Procedure: COLONOSCOPY;  Surgeon: JACQUELINE Lozano MD;  Location: [address]);  Service: Endoscopy;  Laterality: N/A;    dental  implant      HAND SURGERY      INJECTION Right 3/3/2023    Procedure: INJECTION, RIGHT HIP-INTRA-ARTICULAR CONTRAST DIRECT REF;  Surgeon: Frankie Carrillo MD;  Location: [facility], [place], operated by [facility]IN [facility]GT;  Service: Pain Management;  Laterality: Right;    lip surgery      PROSTATE SURGERY      REPAIR OF NAIL BED Right 2018    Procedure: REPAIR, NAIL BED right thumb with I&D;  Surgeon: Elyssa Bradford MD;  Location: [facility], [place], operated by [facility] OR;  Service: Orthopedics;  Laterality: Right;  stretcher, supine, hand pan 1 and pan 2     Family History   Problem Relation Age of Onset    Coronary artery disease Mother     Hypertension Mother     Cancer Father         leukemia    Diabetes Father     Osteoarthritis Sister     Hypertension Sister     Diabetes Sister     No Known Problems Sister     No Known Problems Brother     No Known Problems Brother     No Known Problems Brother     No Known Problems Daughter     No Known Problems Son     Heart disease Neg Hx     Amblyopia Neg Hx     Blindness Neg Hx     Cataracts Neg Hx     Glaucoma Neg Hx     Macular degeneration Neg Hx     Retinal detachment Neg Hx     Strabismus Neg Hx      Social History     Tobacco Use    Smoking status: Former     Types: Cigarettes     Quit date:      Years since quittin.5    Smokeless tobacco: Never   Substance Use Topics    Alcohol use: Yes     Alcohol/week: 3.0 standard drinks     Types: 3 Cans of beer per week     Comment: occasionally    Drug use: No       Physical Exam     Initial Vitals [23 0552]   BP Pulse Resp Temp SpO2   (!) 140/76 65 18 97.5 °F (36.4 °C) 96 %      MAP       --         Physical Exam    Nursing note and vitals reviewed.  Constitutional: He appears well-developed and well-nourished.   HENT:   Head: Normocephalic and atraumatic.   Eyes: EOM are normal. Pupils are equal, round, and reactive to light.   Neck:   Normal range of motion.  Cardiovascular:  Normal rate and regular rhythm.           Pulmonary/Chest: Breath sounds normal. No  respiratory distress. He has no wheezes.   Abdominal: Abdomen is soft. He exhibits no distension. There is no abdominal tenderness.   Musculoskeletal:         General: Normal range of motion.      Cervical back: Normal range of motion.     Neurological: He is alert and oriented to person, place, and time.   Skin: Skin is warm and dry. Capillary refill takes less than 2 seconds.       ED Course   Procedures  Labs Reviewed   CBC W/ AUTO DIFFERENTIAL - Abnormal; Notable for the following components:       Result Value    WBC 14.92 (*)     RBC 4.26 (*)     Hemoglobin 12.2 (*)     Hematocrit 39.0 (*)     MCHC 31.3 (*)     Platelets 148 (*)     Gran # (ANC) 13.4 (*)     Immature Grans (Abs) 0.08 (*)     Lymph # 0.8 (*)     Gran % 90.0 (*)     Lymph % 5.4 (*)     Mono % 3.8 (*)     All other components within normal limits   COMPREHENSIVE METABOLIC PANEL - Abnormal; Notable for the following components:    Glucose 179 (*)     Calcium 7.8 (*)     Total Protein 5.1 (*)     Albumin 2.9 (*)     Alkaline Phosphatase 43 (*)     eGFR 56.6 (*)     All other components within normal limits   PHOSPHORUS - Abnormal; Notable for the following components:    Phosphorus 6.5 (*)     All other components within normal limits   POCT GLUCOSE - Abnormal; Notable for the following components:    POCT Glucose 230 (*)     All other components within normal limits   ISTAT PROCEDURE - Abnormal; Notable for the following components:    POC Glucose 204 (*)     POC Creatinine 1.5 (*)     POC Potassium 5.3 (*)     POC TCO2 (MEASURED) 34 (*)     All other components within normal limits   ISTAT PROCEDURE - Abnormal; Notable for the following components:    POC PH 7.064 (*)     POC PCO2 120.6 (*)     POC PO2 66 (*)     POC HCO3 34.4 (*)     POC SATURATED O2 80 (*)     POC TCO2 38 (*)     All other components within normal limits   ISTAT PROCEDURE - Abnormal; Notable for the following components:    POC PH 7.190 (*)     POC PCO2 76.8 (*)     POC PO2 113  (*)     POC HCO3 29.3 (*)     POC TCO2 32 (*)     All other components within normal limits   ISTAT PROCEDURE - Abnormal; Notable for the following components:    POC PH 7.212 (*)     POC PCO2 71.9 (*)     POC PO2 167 (*)     POC HCO3 28.9 (*)     POC TCO2 31 (*)     All other components within normal limits   ISTAT PROCEDURE - Abnormal; Notable for the following components:    POC PH 7.241 (*)     POC PCO2 70.0 (*)     POC PO2 171 (*)     POC HCO3 30.1 (*)     POC TCO2 32 (*)     All other components within normal limits   MAGNESIUM   PROTIME-INR   TYPE & SCREEN          Imaging Results              X-Ray Hip 2 or 3 views Right (with Pelvis when performed) (Final result)  Result time 06/26/23 15:25:07      Final result by Bubba Manrique MD (06/26/23 15:25:07)                   Impression:      Expected immediate postop changes of total right hip arthroplasty.      Electronically signed by: Bubba Manrique MD  Date:    06/26/2023  Time:    15:25               Narrative:    EXAMINATION:  XR HIP WITH PELVIS WHEN PERFORMED, 2 OR 3  VIEWS RIGHT    CLINICAL HISTORY:  S/P VITO; Unilateral primary osteoarthritis, right hip    TECHNIQUE:  AP view of the pelvis and frog leg lateral view of the right hip were performed.    COMPARISON:  02/23/2023.    FINDINGS:  There are immediate postop changes of total right hip arthroplasty.  The femoral and acetabular components are well seated.  No periprosthetic lucency is identified.  There is a drainage catheter in place.    There is joint space narrowing.  There is a soft tissue swelling and postoperative edema in the right thigh.  There is no evidence of a fracture or dislocation.                                       SURG [place] Surgery Fluoro Usage (Final result)  Result time 06/26/23 09:42:07      Final result by Access, Silent  (06/26/23 09:42:07)                   Narrative:    See OP Notes for results.     IMPRESSION: See OP Notes for results.             This procedure was  auto-finalized by: Virtual Radiologist                                     Medications   0.9%  NaCl infusion ( Intravenous New Bag 6/26/23 1140)   acetaminophen tablet 1,000 mg (0 mg Oral Hold 6/26/23 1800)   pregabalin capsule 75 mg (has no administration in time range)   oxyCODONE immediate release tablet 5 mg (has no administration in time range)   oxyCODONE immediate release tablet 10 mg (has no administration in time range)   morphine injection 2 mg (has no administration in time range)   polyethylene glycol packet 17 g (has no administration in time range)   ondansetron injection 4 mg (has no administration in time range)   ceFAZolin 2 g in dextrose 5 % in water (D5W) 5 % 50 mL IVPB (MB+) (2 g Intravenous New Bag 6/26/23 1835)   methocarbamoL tablet 750 mg (0 mg Oral Hold 6/26/23 1500)   sodium chloride 0.9% flush 10 mL (has no administration in time range)   enoxaparin injection 40 mg (has no administration in time range)   amLODIPine tablet 10 mg (has no administration in time range)   docusate sodium capsule 100 mg (has no administration in time range)   methocarbamoL tablet 750 mg (has no administration in time range)   metoprolol succinate (TOPROL-XL) 24 hr tablet 50 mg (has no administration in time range)   pravastatin tablet 40 mg (has no administration in time range)   losartan tablet 100 mg (has no administration in time range)   hydroCHLOROthiazide tablet 25 mg (has no administration in time range)   mupirocin 2 % ointment 1 g (1 g Nasal Given 6/26/23 0557)   acetaminophen tablet 1,000 mg (1,000 mg Oral Given 6/26/23 0557)   pregabalin capsule 75 mg (75 mg Oral Given 6/26/23 0557)   tranexamic acid (CYKLOKAPRON) 1,000 mg in sodium chloride 0.9 % 100 mL IVPB (MB+) (1,000 mg Intravenous Bolus 6/26/23 0922)   ceFAZolin 2 g in dextrose 5 % in water (D5W) 5 % 50 mL IVPB (MB+) (2 g Intravenous New Bag 6/26/23 0710)   vancomycin 1,500 mg in dextrose 5 % (D5W) 250 mL IVPB (Vial-Mate) (1,500 mg Intravenous  New Bag 6/26/23 0608)   haloperidol lactate injection 5 mg (5 mg Intravenous Given 6/26/23 1248)   acetaminophen 1,000 mg/100 mL (10 mg/mL) injection 1,000 mg (0 mg Intravenous Stopped 6/26/23 1313)     Medical Decision Making:   Initial Assessment:   78-year-old male presented emergency department outside surgical center PACU for evaluation of hypercapnia and episodes of apnea.  Upon arrival patient is on BiPAP easily arousable but continued episodes of apnea.  Patient is afebrile with vitals within normal limits.  Physical exam findings noted above.  Differential Diagnosis:   Hypercapnia  Prolonged anesthesia    Clinical Tests:   Lab Tests: Ordered and Reviewed  ED Management:  Patient presented from Surgical Center due to concerns of episodes of apnea and hypercapnia.  Patient arrived on BiPAP had visible apneic periods with low tidal volumes but easily arousable.  At this time repeat ABG obtained after 1 hour of BiPAP and improvement in acidosis with hypercapnia reduced from 120-70 on repeat.  Plan to continue to monitor on BiPAP.  Patient remained on BiPAP for 2 hours.  Upon reassessment patient is still having apneic.  And VBG still acidotic at 7.2 however improved from initial ABG, however given persistent acidosis despite prolonged BiPAP consulted critical care.  After discussion with critical care decision was made to admit to ICU for continued monitoring and BiPAP.  Plan discussed with wife and patient who are agreeable with admission.  Orthopedic surgery made aware that patient is here postop and recommended PT OT in the morning for early mobility.                        Clinical Impression:   Final diagnoses:  [M16.10] Hip arthritis        ED Disposition Condition    Admit                 Alena Johnson MD  Resident  06/26/23 [id number]

## 2023-06-26 NOTE — ASSESSMENT & PLAN NOTE
-- most recent LDL 66 on 1/2020  -- continue home pravastatin  -- repeat Lipid panel ordered on admission      The ASCVD Risk score (Chang BEYER, et al., 2019) failed to calculate for the following reasons:    Cannot find a previous HDL lab    Cannot find a previous total cholesterol lab

## 2023-06-26 NOTE — ANESTHESIA PROCEDURE NOTES
PENG single shot    Patient location during procedure: pre-op   Block not for primary anesthetic.  Reason for block: at surgeon's request and post-op pain management   Post-op Pain Location: right hip   Start time: 6/26/2023 6:41 AM  Timeout: 6/26/2023 6:40 AM   End time: 6/26/2023 6:44 AM    Staffing  Authorizing Provider: Bandar Danielson MD  Performing Provider: Bandar Danielson MD    Preanesthetic Checklist  Completed: patient identified, IV checked, site marked, risks and benefits discussed, surgical consent, monitors and equipment checked, pre-op evaluation and timeout performed  Peripheral Block  Patient position: supine  Prep: ChloraPrep  Patient monitoring: heart rate, continuous pulse ox and cardiac monitor  Block type: PENG  Laterality: right  Injection technique: single shot  Needle  Needle type: Stimuplex   Needle gauge: 21 G  Needle length: 4 in  Needle localization: ultrasound guidance   -ultrasound image captured on disc.  Assessment  Injection assessment: negative aspiration and negative parasthesia  Paresthesia pain: none  Heart rate change: no  Slow fractionated injection: yes  Pain Tolerance: comfortable throughout block and no complaints  Medications:    Medications: bupivacaine (pf) (MARCAINE) injection 0.25% - Perineural   20 mL - 6/26/2023 6:42:00 AM

## 2023-06-26 NOTE — CARE UPDATE
Patient not arousable immediately post op. Bedside ABG showing CO2 retention and acidosis. Bipap started which improved acidosis but patient tolerated poorly and was not oriented. Discussed with anesthesia. Discussed with transfer center and decided to transfer patient to [facility] ED due to need for higher level of care. Will follow up with ED team and admitting team once stabilized.

## 2023-06-26 NOTE — ANESTHESIA PROCEDURE NOTES
CSE    Patient location during procedure: OR  Start time: 6/26/2023 7:06 AM  Timeout: 6/26/2023 7:03 AM  End time: 6/26/2023 7:12 AM      Staffing  Authorizing Provider: Bandar Danielson MD  Performing Provider: Bandar Danielson MD    Preanesthetic Checklist  Completed: patient identified, IV checked, site marked, risks and benefits discussed, surgical consent, monitors and equipment checked, pre-op evaluation and timeout performed  CSE  Patient position: sitting  Prep: ChloraPrep  Patient monitoring: heart rate, cardiac monitor, frequent blood pressure checks and continuous pulse ox  Approach: midline  Epidural Needle  Injection technique: GAYATHRI saline  Location: L3-4  Needle localization: anatomical landmarks   Assessment  Intrathecal Medications:   administered: primary anesthetic mcg of    Medications:    Medications: Intrathecal - mepivacaine 20 mg/mL (2 %) injection - Intrathecal   2.8 mL - 6/26/2023 7:11:00 AM

## 2023-06-26 NOTE — ASSESSMENT & PLAN NOTE
-- hx of HTN on amlodipine, HCTZ, metoprolol succinate   -- continue all home antihypertensive medications while admitted

## 2023-06-26 NOTE — ANESTHESIA PREPROCEDURE EVALUATION
06/26/2023  Pre-operative evaluation for Procedure(s) (LRB):  ARTHROPLASTY, HIP, TOTAL, ANTERIOR APPROACH: RIGHT: HORACE AVENIR & G7: SAME DAY (Right)    Zheng Talley Jr. is a 78 y.o. male     Patient Active Problem List   Diagnosis    Hypertension    Colon polyps    External hemorrhoids    Trigger finger    HSV infection    Knee pain    Erectile dysfunction    Hyperlipidemia LDL goal <130    Nuclear sclerosis - Both Eyes    Atypical chest pain    Nocturia    Severe obesity with body mass index (BMI) of 36.0 to 36.9 with serious comorbidity    Obesity, Class II, BMI 35-39.9    Prostate cancer    Open displaced fracture of distal phalanx of right thumb    Laceration of finger nail bed, initial encounter    Adrenal nodule    Screening for colon cancer    Right knee DJD    Spondylosis of lumbar spine    Chronic pain disorder    Primary osteoarthritis of right knee    Carpal tunnel syndrome of left wrist    Wrist stiffness, left    Primary osteoarthritis of both hips    Primary osteoarthritis of right hip    Gastroesophageal reflux disease    Snoring    Renal insufficiency       Review of patient's allergies indicates:   Allergen Reactions    Iodine and iodide containing products Other (See Comments)     Blood in urine in the 1970s       No current facility-administered medications on file prior to encounter.     Current Outpatient Medications on File Prior to Encounter   Medication Sig Dispense Refill    amLODIPine (NORVASC) 10 MG tablet TAKE 1 TABLET ONCE DAILY 90 tablet 0    losartan-hydrochlorothiazide 100-25 mg (HYZAAR) 100-25 mg per tablet TAKE 1 TABLET ONCE DAILY 90 tablet 0    metoprolol succinate (TOPROL-XL) 50 MG 24 hr tablet TAKE 1 TABLET ONCE DAILY 90 tablet 0    pravastatin (PRAVACHOL) 40 MG tablet Take 1 tablet (40 mg total) by mouth every evening. 90 tablet 3     valACYclovir (VALTREX) 1000 MG tablet Take 1 tablet (1,000 mg total) by mouth once daily. (Patient taking differently: Take 1,000 mg by mouth as needed.) 90 tablet 3    tadalafiL (CIALIS) 20 MG Tab Take 1 tablet (20 mg total) by mouth daily as needed. 12 tablet 11       Past Surgical History:   Procedure Laterality Date    BUNIONECTOMY      bilateral     CARPAL TUNNEL RELEASE Left 2022    Procedure: RELEASE, CARPAL TUNNEL,LEFT;  Surgeon: Elyssa Bradford MD;  [facility] OR;  Service: Orthopedics;  Laterality: Left;    COLONOSCOPY      Dr. Velez    COLONOSCOPY N/A 2019    Procedure: COLONOSCOPY;  Surgeon: JACQUELINE Lozano MD;  Location: [facility] ENDO ([address] [place] [address]);  Service: Endoscopy;  Laterality: N/A;    dental implant      HAND SURGERY      INJECTION Right 3/3/2023    Procedure: INJECTION, RIGHT HIP-INTRA-ARTICULAR CONTRAST DIRECT REF;  Surgeon: Frankie Carrillo MD;  Location: [facility] PAIN MGT;  Service: Pain Management;  Laterality: Right;    lip surgery      PROSTATE SURGERY      REPAIR OF NAIL BED Right 2018    Procedure: REPAIR, NAIL BED right thumb with I&D;  Surgeon: Elyssa Bradford MD;  Location: [facility] OR;  Service: Orthopedics;  Laterality: Right;  stretcher, supine, hand pan 1 and pan 2       Social History     Socioeconomic History    Marital status:    Tobacco Use    Smoking status: Former     Types: Cigarettes     Quit date:      Years since quittin.5    Smokeless tobacco: Never   Substance and Sexual Activity    Alcohol use: Yes     Alcohol/week: 3.0 standard drinks     Types: 3 Cans of beer per week     Comment: occasionally    Drug use: No    Sexual activity: Never     Partners: Female     Social Determinants of Health     Financial Resource Strain: Low Risk     Difficulty of Paying Living Expenses: Not hard at all   Food Insecurity: No Food Insecurity    Worried About Running Out of Food in the Last Year: Never true    Ran Out of Food in  the Last Year: Never true   Transportation Needs: No Transportation Needs    Lack of Transportation (Medical): No    Lack of Transportation (Non-Medical): No   Stress: No Stress Concern Present    Feeling of Stress : Not at all   Social Connections: Moderately Integrated    Frequency of Communication with Friends and Family: More than three times a week    Frequency of Social Gatherings with Friends and Family: More than three times a week    Attends Jewish Services: More than 4 times per year    Active Member of Clubs or Organizations: No    Attends Club or Organization Meetings: Never    Marital Status:    Housing Stability: Low Risk     Unable to Pay for Housing in the Last Year: No    Number of Places Lived in the Last Year: 1    Unstable Housing in the Last Year: No         CBC: No results for input(s): WBC, RBC, HGB, HCT, PLT, MCV, MCH, MCHC in the last 72 hours.    CMP: No results for input(s): NA, K, CL, CO2, BUN, CREATININE, GLU, MG, PHOS, CALCIUM, ALBUMIN, PROT, ALKPHOS, ALT, AST, BILITOT in the last 72 hours.    INR  No results for input(s): PT, INR, PROTIME, APTT in the last 72 hours.        Diagnostic Studies:      EKD Echo:  No results found for this or any previous visit.      Pre-op Assessment    I have reviewed the Patient Summary Reports.     I have reviewed the Nursing Notes. I have reviewed the NPO Status.   I have reviewed the Medications.     Review of Systems  Cardiovascular:   Hypertension    Hepatic/GI:   GERD    Musculoskeletal:   Arthritis         Physical Exam  General: Well nourished and Cooperative    Airway:  Mallampati: III   Mouth Opening: Normal  TM Distance: Normal  Tongue: Normal  Neck ROM: Normal ROM    Chest/Lungs:  Clear to auscultation, Normal Respiratory Rate    Heart:  Rate: Normal  Rhythm: Regular Rhythm  Sounds: Normal        Anesthesia Plan  Type of Anesthesia, risks & benefits discussed:    Anesthesia Type: Gen ETT, Gen Natural Airway, CSE,  Regional  Intra-op Monitoring Plan: Standard ASA Monitors  Post Op Pain Control Plan: multimodal analgesia and IV/PO Opioids PRN  Induction:  IV  Airway Plan: Direct and Video, Post-Induction  Informed Consent: Informed consent signed with the Patient and all parties understand the risks and agree with anesthesia plan.  All questions answered.   ASA Score: 3    Ready For Surgery From Anesthesia Perspective.     .

## 2023-06-26 NOTE — ASSESSMENT & PLAN NOTE
-- s/p hip replacement 6/26. Wound drain in place  -- PT consulted  -- Orthopedics to follow for wound drain management

## 2023-06-26 NOTE — PROGRESS NOTES
Pt continues to be hypotensive with SBP 80s. Only responsive to pain. Dr. Hall at bedside to place arterial line. Labs drawn, ABG drawn.

## 2023-06-26 NOTE — TRANSFER OF CARE
"Anesthesia Transfer of Care Note    Patient: Zheng Talley Jr.    Procedure(s) Performed: Procedure(s) (LRB):  ARTHROPLASTY, HIP, TOTAL, ANTERIOR APPROACH: RIGHT: HORACE AVENIR & G7: SAME DAY (Right)    Patient location: PACU    Anesthesia Type: general    Transport from OR: Transported from OR on 6-10 L/min O2 by face mask with adequate spontaneous ventilation    Post pain: adequate analgesia    Post assessment: no apparent anesthetic complications    Post vital signs: stable    Level of consciousness: sedated    Nausea/Vomiting: no nausea/vomiting    Complications: none    Transfer of care protocol was followed      Last vitals:   Visit Vitals  BP (!) 147/80 (BP Location: Right arm, Patient Position: Lying)   Pulse (!) 57   Temp 36.4 °C (97.5 °F) (Oral)   Resp 15   Ht 5' 10" (1.778 m)   Wt 111.1 kg (245 lb)   SpO2 100%   BMI 35.15 kg/m²     "

## 2023-06-26 NOTE — TELEPHONE ENCOUNTER
----- Message from Edelmira Justice sent at 6/26/2023  1:16 PM CDT -----  Regarding: call back  Name of caller: Casie ( wife)       What is the requesting detail: pt is requesting a call back. States pt has been diagnosed with severe sleep apnea needs an order to be seen at the sleep clinic. Please give her a call back to further discuss.       Can the clinic reply by [facility]:       What number to call back:[phone]

## 2023-06-26 NOTE — PLAN OF CARE
RN called [facility] to f/u with transfer. Call back number provided for updates. Still awaiting labs  by lab logistics. Primary nurse updated.

## 2023-06-27 LAB
ALBUMIN SERPL BCP-MCNC: 3.4 G/DL (ref 3.5–5.2)
ALLENS TEST: ABNORMAL
ALLENS TEST: ABNORMAL
ALP SERPL-CCNC: 44 U/L (ref 55–135)
ALT SERPL W/O P-5'-P-CCNC: 13 U/L (ref 10–44)
ANION GAP SERPL CALC-SCNC: 12 MMOL/L (ref 8–16)
AST SERPL-CCNC: 18 U/L (ref 10–40)
BASOPHILS # BLD AUTO: 0.01 K/UL (ref 0–0.2)
BASOPHILS NFR BLD: 0.1 % (ref 0–1.9)
BILIRUB SERPL-MCNC: 0.4 MG/DL (ref 0.1–1)
BUN SERPL-MCNC: 24 MG/DL (ref 8–23)
CALCIUM SERPL-MCNC: 8.7 MG/DL (ref 8.7–10.5)
CHLORIDE SERPL-SCNC: 105 MMOL/L (ref 95–110)
CO2 SERPL-SCNC: 23 MMOL/L (ref 23–29)
CREAT SERPL-MCNC: 1.4 MG/DL (ref 0.5–1.4)
DELSYS: ABNORMAL
DELSYS: ABNORMAL
DIFFERENTIAL METHOD: ABNORMAL
EOSINOPHIL # BLD AUTO: 0 K/UL (ref 0–0.5)
EOSINOPHIL NFR BLD: 0 % (ref 0–8)
EP: 5
ERYTHROCYTE [DISTWIDTH] IN BLOOD BY AUTOMATED COUNT: 14.1 % (ref 11.5–14.5)
ERYTHROCYTE [SEDIMENTATION RATE] IN BLOOD BY WESTERGREN METHOD: 18 MM/H
EST. GFR  (NO RACE VARIABLE): 51.8 ML/MIN/1.73 M^2
FIO2: 21
GLUCOSE SERPL-MCNC: 114 MG/DL (ref 70–110)
HCO3 UR-SCNC: 28.6 MMOL/L (ref 24–28)
HCO3 UR-SCNC: 30.4 MMOL/L (ref 24–28)
HCT VFR BLD AUTO: 37.1 % (ref 40–54)
HGB BLD-MCNC: 11.9 G/DL (ref 14–18)
IMM GRANULOCYTES # BLD AUTO: 0.05 K/UL (ref 0–0.04)
IMM GRANULOCYTES NFR BLD AUTO: 0.4 % (ref 0–0.5)
IP: 14
LYMPHOCYTES # BLD AUTO: 0.7 K/UL (ref 1–4.8)
LYMPHOCYTES NFR BLD: 5.3 % (ref 18–48)
MAGNESIUM SERPL-MCNC: 1.8 MG/DL (ref 1.6–2.6)
MCH RBC QN AUTO: 28.1 PG (ref 27–31)
MCHC RBC AUTO-ENTMCNC: 32.1 G/DL (ref 32–36)
MCV RBC AUTO: 88 FL (ref 82–98)
MIN VOL: 16.5
MODE: ABNORMAL
MONOCYTES # BLD AUTO: 1.3 K/UL (ref 0.3–1)
MONOCYTES NFR BLD: 9.5 % (ref 4–15)
NEUTROPHILS # BLD AUTO: 11.1 K/UL (ref 1.8–7.7)
NEUTROPHILS NFR BLD: 84.7 % (ref 38–73)
NRBC BLD-RTO: 0 /100 WBC
PCO2 BLDA: 47.7 MMHG (ref 35–45)
PCO2 BLDA: 53.8 MMHG (ref 35–45)
PH SMN: 7.33 [PH] (ref 7.35–7.45)
PH SMN: 7.41 [PH] (ref 7.35–7.45)
PLATELET # BLD AUTO: 147 K/UL (ref 150–450)
PMV BLD AUTO: 10.7 FL (ref 9.2–12.9)
PO2 BLDA: 55 MMHG (ref 40–60)
PO2 BLDA: 71 MMHG (ref 80–100)
POC BE: 3 MMOL/L
POC BE: 6 MMOL/L
POC SATURATED O2: 88 % (ref 95–100)
POC SATURATED O2: 92 % (ref 95–100)
POC TCO2: 30 MMOL/L (ref 23–27)
POC TCO2: 32 MMOL/L (ref 24–29)
POTASSIUM SERPL-SCNC: 4.7 MMOL/L (ref 3.5–5.1)
PROT SERPL-MCNC: 6 G/DL (ref 6–8.4)
RBC # BLD AUTO: 4.24 M/UL (ref 4.6–6.2)
SAMPLE: ABNORMAL
SAMPLE: ABNORMAL
SITE: ABNORMAL
SITE: ABNORMAL
SODIUM SERPL-SCNC: 140 MMOL/L (ref 136–145)
SP02: 96
SPONT RATE: 18
WBC # BLD AUTO: 13.15 K/UL (ref 3.9–12.7)

## 2023-06-27 PROCEDURE — 63600175 PHARM REV CODE 636 W HCPCS: Performed by: NURSE PRACTITIONER

## 2023-06-27 PROCEDURE — 97161 PT EVAL LOW COMPLEX 20 MIN: CPT

## 2023-06-27 PROCEDURE — 99233 PR SUBSEQUENT HOSPITAL CARE,LEVL III: ICD-10-PCS | Mod: GC,,, | Performed by: INTERNAL MEDICINE

## 2023-06-27 PROCEDURE — 25000003 PHARM REV CODE 250

## 2023-06-27 PROCEDURE — 63600175 PHARM REV CODE 636 W HCPCS

## 2023-06-27 PROCEDURE — 99233 SBSQ HOSP IP/OBS HIGH 50: CPT | Mod: GC,,, | Performed by: INTERNAL MEDICINE

## 2023-06-27 PROCEDURE — 83735 ASSAY OF MAGNESIUM: CPT

## 2023-06-27 PROCEDURE — 94761 N-INVAS EAR/PLS OXIMETRY MLT: CPT

## 2023-06-27 PROCEDURE — 97165 OT EVAL LOW COMPLEX 30 MIN: CPT

## 2023-06-27 PROCEDURE — 94660 CPAP INITIATION&MGMT: CPT

## 2023-06-27 PROCEDURE — 11000001 HC ACUTE MED/SURG PRIVATE ROOM

## 2023-06-27 PROCEDURE — 80053 COMPREHEN METABOLIC PANEL: CPT

## 2023-06-27 PROCEDURE — 25000003 PHARM REV CODE 250: Performed by: NURSE PRACTITIONER

## 2023-06-27 PROCEDURE — 37799 UNLISTED PX VASCULAR SURGERY: CPT

## 2023-06-27 PROCEDURE — 99900035 HC TECH TIME PER 15 MIN (STAT)

## 2023-06-27 PROCEDURE — 27000221 HC OXYGEN, UP TO 24 HOURS

## 2023-06-27 PROCEDURE — 97116 GAIT TRAINING THERAPY: CPT

## 2023-06-27 PROCEDURE — 97535 SELF CARE MNGMENT TRAINING: CPT

## 2023-06-27 PROCEDURE — 25000003 PHARM REV CODE 250: Performed by: INTERNAL MEDICINE

## 2023-06-27 PROCEDURE — 85025 COMPLETE CBC W/AUTO DIFF WBC: CPT

## 2023-06-27 PROCEDURE — 82803 BLOOD GASES ANY COMBINATION: CPT

## 2023-06-27 RX ORDER — FAMOTIDINE 20 MG/1
20 TABLET, FILM COATED ORAL 2 TIMES DAILY
Status: DISCONTINUED | OUTPATIENT
Start: 2023-06-27 | End: 2023-06-29 | Stop reason: HOSPADM

## 2023-06-27 RX ORDER — OXYCODONE HYDROCHLORIDE 5 MG/1
5 TABLET ORAL EVERY 4 HOURS PRN
Status: DISCONTINUED | OUTPATIENT
Start: 2023-06-27 | End: 2023-06-28

## 2023-06-27 RX ADMIN — FAMOTIDINE 20 MG: 20 TABLET ORAL at 08:06

## 2023-06-27 RX ADMIN — OXYCODONE HYDROCHLORIDE 5 MG: 5 TABLET ORAL at 07:06

## 2023-06-27 RX ADMIN — PRAVASTATIN SODIUM 40 MG: 40 TABLET ORAL at 08:06

## 2023-06-27 RX ADMIN — METOPROLOL SUCCINATE 50 MG: 50 TABLET, EXTENDED RELEASE ORAL at 09:06

## 2023-06-27 RX ADMIN — LOSARTAN POTASSIUM 100 MG: 50 TABLET, FILM COATED ORAL at 09:06

## 2023-06-27 RX ADMIN — METHOCARBAMOL 750 MG: 750 TABLET, FILM COATED ORAL at 09:06

## 2023-06-27 RX ADMIN — FAMOTIDINE 20 MG: 20 TABLET ORAL at 09:06

## 2023-06-27 RX ADMIN — ENOXAPARIN SODIUM 40 MG: 40 INJECTION SUBCUTANEOUS at 05:06

## 2023-06-27 RX ADMIN — AMLODIPINE BESYLATE 10 MG: 10 TABLET ORAL at 09:06

## 2023-06-27 RX ADMIN — DOCUSATE SODIUM 100 MG: 50 CAPSULE, LIQUID FILLED ORAL at 08:06

## 2023-06-27 RX ADMIN — CEFAZOLIN 2 G: 2 INJECTION, POWDER, FOR SOLUTION INTRAMUSCULAR; INTRAVENOUS at 01:06

## 2023-06-27 RX ADMIN — ACETAMINOPHEN 1000 MG: 500 TABLET ORAL at 01:06

## 2023-06-27 RX ADMIN — METHOCARBAMOL 750 MG: 750 TABLET, FILM COATED ORAL at 02:06

## 2023-06-27 RX ADMIN — DOCUSATE SODIUM 100 MG: 50 CAPSULE, LIQUID FILLED ORAL at 09:06

## 2023-06-27 RX ADMIN — HYDROCHLOROTHIAZIDE 25 MG: 25 TABLET ORAL at 09:06

## 2023-06-27 NOTE — PT/OT/SLP EVAL
Occupational Therapy   CoEvaluation    Name: Zheng Talley Jr.  MRN: [id number]  Admitting Diagnosis: Acute hypercapnic respiratory failure  Recent Surgery: Procedure(s) (LRB):  ARTHROPLASTY, HIP, TOTAL, ANTERIOR APPROACH: RIGHT: HORACE AVENIR & G7: SAME DAY (Right) 1 Day Post-Op  Pt was t/f from [facility] after elective VITO for eval of apnea and acidosis.       Recommendations:     Discharge Recommendations: other (see comments) Return home with family and HH  Discharge Equipment Recommendations:  walker, rolling, bedside commode    Assessment:     Zheng Talley Jr. is a 78 y.o. male with a medical diagnosis of Acute hypercapnic respiratory failure. Pt with impaired functional mobility/ADL skills and impaired functional endurance. Anticipate pt will progress well and will be ready for d/c home with family support.     Rehab Prognosis: Good; patient would benefit from acute skilled OT services to address these deficits and reach maximum level of function.       Plan:     Patient to be seen daily to address the above listed problems via self-care/home management, therapeutic activities, therapeutic exercises  Plan of Care Expires:    Plan of Care Reviewed with: patient    Subjective   Pt agreeable to therapy.     Occupational Profile:  Pt resides with spouse in one story home with no SAEID. Pt was driving and independent prior to arrival.   Pts spouse to assist as needed upon d/c.     Pain/Comfort:  Pain Rating 1: 5/10  Location - Side 1: Right  Location 1: leg  Pain Addressed 1: Reposition, Distraction, Nurse notified    Patients cultural, spiritual, [employer]ology conflicts given the current situation: no    Objective:     Communicated with: nsg  prior to session.  Patient found in bed with rivera, tele, pulse ox, BP cuff and hemovac  Coeval completed this date to optimize functional performance and safety   General Precautions: Standard, fall  Orthopedic Precautions: RLE weight bearing as tolerated, RLE anterior  precautions    Occupational Performance:    Bed Mobility:    Supine>sit with MIN A     Functional Mobility/Transfers:  Sit>Stand with CGA with cues for hand placement.     Activities of Daily Living:  Feeding: Independent  G/H: standing with SBA for g/h skills as sink. Pt with good standing balance/endurance for standing self care skill. Good B UE integration   UE dressing with set-up simulated  LE dressing: MAX A     Cognitive/Visual Perceptual:  Pt awake, alert and following commands. Pt is hard of hearing. Pt remained pleasant with good cooperation.     Physical Exam:  Pt is right hand dominant and demo WFL UE strength/ROM, coordination and sensation   AMPAC 6 Click ADL:  AMPAC Total Score: 16    Treatment & Education:  Education provided re: current ortho precautions.     Pt completed functional moblity in room with CGA with cues for walker use/sequencing.   Education provided re: OT POC and safety with functional mobility/ADl skills.       Patient left up in chair with all lines intact, call button in reach, and nsg notified    GOALS:   Multidisciplinary Problems       Occupational Therapy Goals          Problem: Occupational Therapy    Goal Priority Disciplines Outcome Interventions   Occupational Therapy Goal     OT, PT/OT Ongoing, Progressing    Description: Goals to be met by: 7 days  7/4/23     Patient will increase functional independence with ADLs by performing:    Pt to complete standing g/h skills with Supervision.   Pt to complete UE dressing with set-up  Pt to complete LE dressing with set-up with AE as needed.  Pt to complete toileting with SBA  Pt to complete t/f bed, chair and commode with supervision.                        History:     Past Medical History:   Diagnosis Date    Arthritis     BPH (benign prostatic hyperplasia)     Cancer     prostate    Cataract     Groin pain     History of gastroesophageal reflux (GERD)     Hyperlipidemia     Hypertension     Nuclear sclerosis - Both Eyes  02/18/2014    Renal insufficiency 6/9/2023         Past Surgical History:   Procedure Laterality Date    BUNIONECTOMY      bilateral     CARPAL TUNNEL RELEASE Left 7/25/2022    Procedure: RELEASE, CARPAL TUNNEL,LEFT;  Surgeon: Elyssa Bradford MD;  Location: [facility] OR;  Service: Orthopedics;  Laterality: Left;    COLONOSCOPY  2011    Dr. Velez    COLONOSCOPY N/A 2/25/2019    Procedure: COLONOSCOPY;  Surgeon: JACQUELINE Lozano MD;  [facility]  Service: Endoscopy;  Laterality: N/A;    dental implant      HAND SURGERY      INJECTION Right 3/3/2023    Procedure: INJECTION, RIGHT HIP-INTRA-ARTICULAR CONTRAST DIRECT REF;  Surgeon: Franike Carrillo MD;  Location: [facility] PAIN MGT;  Service: Pain Management;  Laterality: Right;    lip surgery      PROSTATE SURGERY      REPAIR OF NAIL BED Right 11/2/2018    Procedure: REPAIR, NAIL BED right thumb with I&D;  Surgeon: Elyssa Bradford MD;  Location: [facility] OR;  Service: Orthopedics;  Laterality: Right;  stretcher, supine, hand pan 1 and pan 2       Time Tracking:     OT Date of Treatment: 06/27/23  OT Start Time: 0757  OT Stop Time: 0805  OT Total Time (min): 8 min  Returned after removal of A-line and pain medication.   [id number] ( 16 min )     Billable Minutes:Evaluation 12  Self Care/Home Management 12    6/27/2023

## 2023-06-27 NOTE — ANESTHESIA POST-OP PAIN MANAGEMENT
Acute Pain Service Progress Note    Zheng Talley Jr. is a 78 y.o., male, [id number].    Surgery:  Right Total Hip Arthroplasty, Anterior Approach,    Post Op Day #: 1    Problem List:    Active Hospital Problems    Diagnosis  POA    *Acute hypercapnic respiratory failure [J96.02]  Unknown    Snoring [R06.83]  Yes    Gastroesophageal reflux disease [K21.9]  Yes    Primary osteoarthritis of right hip [M16.11]  Yes    Obesity, Class II, BMI 35-39.9 [E66.9]  Yes    Hyperlipidemia LDL goal <130 [E78.5]  Yes    Hypertension [I10]  Yes      Resolved Hospital Problems   No resolved problems to display.       Subjective:     General appearance of alert, oriented, no complaints   Pain with rest: 4    Numbers   Pain with movement: unable to assess   Side Effects    1. Pruritis No    2. Nausea No    3. Motor Blockade No, 0=Ability to raise lower extremities off bed    4. Sedation No, 1=awake and alert    Objective:     Vitals   Vitals:    06/27/23 0741   BP:    Pulse: 75   Resp: (!) 21   Temp:         Labs    Admission on 06/26/2023   Component Date Value Ref Range Status    POCT Glucose 06/26/2023 230 (H)  70 - 110 mg/dL Final    POC Glucose 06/26/2023 204 (H)  70 - 110 mg/dL Final    POC BUN 06/26/2023 19  6 - 30 mg/dL Final    POC Creatinine 06/26/2023 1.5 (H)  0.5 - 1.4 mg/dL Final    POC Sodium 06/26/2023 140  136 - 145 mmol/L Final    POC Potassium 06/26/2023 5.3 (H)  3.5 - 5.1 mmol/L Final    POC Chloride 06/26/2023 99  95 - 110 mmol/L Final    POC TCO2 (MEASURED) 06/26/2023 34 (H)  23 - 29 mmol/L Final    POC Ionized Calcium 06/26/2023 1.25  1.06 - 1.42 mmol/L Final    POC Hematocrit 06/26/2023 40  36 - 54 %PCV Final    Sample 06/26/2023 VENOUS   Final    POC PH 06/26/2023 7.064 (L)  7.35 - 7.45 Final    POC PCO2 06/26/2023 120.6 (H)  35 - 45 mmHg Final    POC PO2 06/26/2023 66 (HH)  40 - 60 mmHg Final    POC HCO3 06/26/2023 34.4 (H)  24 - 28 mmol/L Final    POC BE 06/26/2023 4  -2 to 2 mmol/L Final     POC SATURATED O2 06/26/2023 80 (L)  95 - 100 % Final    POC TCO2 06/26/2023 38 (H)  24 - 29 mmol/L Final    Rate 06/26/2023 16   Final    Sample 06/26/2023 VENOUS   Final    Site 06/26/2023 Other   Final    Allens Test 06/26/2023 N/A   Final    DelSys 06/26/2023 CPAP/BiPAP   Final    Mode 06/26/2023 BiPAP   Final    FiO2 06/26/2023 40   Final    Spont Rate 06/26/2023 16   Final    Min Vol 06/26/2023 9.5   Final    Sp02 06/26/2023 92   Final    IP 06/26/2023 12   Final    EP 06/26/2023 7   Final    WBC 06/26/2023 14.92 (H)  3.90 - 12.70 K/uL Final    RBC 06/26/2023 4.26 (L)  4.60 - 6.20 M/uL Final    Hemoglobin 06/26/2023 12.2 (L)  14.0 - 18.0 g/dL Final    Hematocrit 06/26/2023 39.0 (L)  40.0 - 54.0 % Final    MCV 06/26/2023 92  82 - 98 fL Final    MCH 06/26/2023 28.6  27.0 - 31.0 pg Final    MCHC 06/26/2023 31.3 (L)  32.0 - 36.0 g/dL Final    RDW 06/26/2023 14.3  11.5 - 14.5 % Final    Platelets 06/26/2023 148 (L)  150 - 450 K/uL Final    MPV 06/26/2023 10.9  9.2 - 12.9 fL Final    Immature Granulocytes 06/26/2023 0.5  0.0 - 0.5 % Final    Gran # (ANC) 06/26/2023 13.4 (H)  1.8 - 7.7 K/uL Final    Immature Grans (Abs) 06/26/2023 0.08 (H)  0.00 - 0.04 K/uL Final    Lymph # 06/26/2023 0.8 (L)  1.0 - 4.8 K/uL Final    Mono # 06/26/2023 0.6  0.3 - 1.0 K/uL Final    Eos # 06/26/2023 0.0  0.0 - 0.5 K/uL Final    Baso # 06/26/2023 0.03  0.00 - 0.20 K/uL Final    nRBC 06/26/2023 0  0 /100 WBC Final    Gran % 06/26/2023 90.0 (H)  38.0 - 73.0 % Final    Lymph % 06/26/2023 5.4 (L)  18.0 - 48.0 % Final    Mono % 06/26/2023 3.8 (L)  4.0 - 15.0 % Final    Eosinophil % 06/26/2023 0.1  0.0 - 8.0 % Final    Basophil % 06/26/2023 0.2  0.0 - 1.9 % Final    Differential Method 06/26/2023 Automated   Final    Sodium 06/26/2023 140  136 - 145 mmol/L Final    Potassium 06/26/2023 5.1  3.5 - 5.1 mmol/L Final    Chloride 06/26/2023 106  95 - 110 mmol/L Final    CO2 06/26/2023 26  23 - 29 mmol/L  Final    Glucose 06/26/2023 179 (H)  70 - 110 mg/dL Final    BUN 06/26/2023 17  8 - 23 mg/dL Final    Creatinine 06/26/2023 1.3  0.5 - 1.4 mg/dL Final    Calcium 06/26/2023 7.8 (L)  8.7 - 10.5 mg/dL Final    Total Protein 06/26/2023 5.1 (L)  6.0 - 8.4 g/dL Final    Albumin 06/26/2023 2.9 (L)  3.5 - 5.2 g/dL Final    Total Bilirubin 06/26/2023 0.3  0.1 - 1.0 mg/dL Final    Alkaline Phosphatase 06/26/2023 43 (L)  55 - 135 U/L Final    AST 06/26/2023 14  10 - 40 U/L Final    ALT 06/26/2023 11  10 - 44 U/L Final    eGFR 06/26/2023 56.6 (A)  >60 mL/min/1.73 m^2 Final    Anion Gap 06/26/2023 8  8 - 16 mmol/L Final    Magnesium 06/26/2023 2.0  1.6 - 2.6 mg/dL Final    Phosphorus 06/26/2023 6.5 (H)  2.7 - 4.5 mg/dL Final    Prothrombin Time 06/26/2023 12.4  9.0 - 12.5 sec Final    INR 06/26/2023 1.2  0.8 - 1.2 Final    Group & Rh 06/26/2023 O POS   Final    Indirect [place] 06/26/2023 NEG   Final    Specimen Outdate 06/26/2023 06/29/2023 23:59   Final    POC PH 06/26/2023 7.190 (LL)  7.35 - 7.45 Final    POC PCO2 06/26/2023 76.8 (HH)  35 - 45 mmHg Final    POC PO2 06/26/2023 113 (H)  80 - 100 mmHg Final    POC HCO3 06/26/2023 29.3 (H)  24 - 28 mmol/L Final    POC BE 06/26/2023 1  -2 to 2 mmol/L Final    POC SATURATED O2 06/26/2023 97  95 - 100 % Final    POC TCO2 06/26/2023 32 (H)  23 - 27 mmol/L Final    Rate 06/26/2023 18   Final    Sample 06/26/2023 ARTERIAL   Final    Site 06/26/2023 Barbara/UAC   Final    Allens Test 06/26/2023 N/A   Final    DelSys 06/26/2023 Room Air   Final    Mode 06/26/2023 SPONT   Final    FiO2 06/26/2023 21   Final    Sp02 06/26/2023 98   Final    POC PH 06/26/2023 7.212 (LL)  7.35 - 7.45 Final    POC PCO2 06/26/2023 71.9 (HH)  35 - 45 mmHg Final    POC PO2 06/26/2023 167 (H)  80 - 100 mmHg Final    POC HCO3 06/26/2023 28.9 (H)  24 - 28 mmol/L Final    POC BE 06/26/2023 1  -2 to 2 mmol/L Final    POC SATURATED O2 06/26/2023 99  95 - 100 % Final    POC TCO2  06/26/2023 31 (H)  23 - 27 mmol/L Final    Rate 06/26/2023 20   Final    Sample 06/26/2023 ARTERIAL   Final    Site 06/26/2023 [facility]   Final    Allens Test 06/26/2023 N/A   Final    DelSys 06/26/2023 CPAP/BiPAP   Final    Mode 06/26/2023 BiPAP   Final    FiO2 06/26/2023 40   Final    Sp02 06/26/2023 100   Final    IP 06/26/2023 12   Final    EP 06/26/2023 5   Final    POC PH 06/26/2023 7.241 (LL)  7.35 - 7.45 Final    POC PCO2 06/26/2023 70.0 (HH)  35 - 45 mmHg Final    POC PO2 06/26/2023 171 (H)  80 - 100 mmHg Final    POC HCO3 06/26/2023 30.1 (H)  24 - 28 mmol/L Final    POC BE 06/26/2023 3  -2 to 2 mmol/L Final    POC SATURATED O2 06/26/2023 99  95 - 100 % Final    POC TCO2 06/26/2023 32 (H)  23 - 27 mmol/L Final    Sample 06/26/2023 ARTERIAL   Final    Site 06/26/2023 [facility]   Final    Allens Test 06/26/2023 N/A   Final    DelSys 06/26/2023 CPAP/BiPAP   Final    Mode 06/26/2023 BiPAP   Final    FiO2 06/26/2023 40   Final    Sp02 06/26/2023 100   Final    IP 06/26/2023 12   Final    EP 06/26/2023 5   Final    POC PH 06/26/2023 7.249 (LL)  7.35 - 7.45 Final    POC PCO2 06/26/2023 66.4 (HH)  35 - 45 mmHg Final    POC PO2 06/26/2023 131 (H)  80 - 100 mmHg Final    POC HCO3 06/26/2023 29.0 (H)  24 - 28 mmol/L Final    POC BE 06/26/2023 2  -2 to 2 mmol/L Final    POC SATURATED O2 06/26/2023 98  95 - 100 % Final    POC TCO2 06/26/2023 31 (H)  23 - 27 mmol/L Final    Sample 06/26/2023 ARTERIAL   Final    Site 06/26/2023 Barbara/UAC   Final    Allens Test 06/26/2023 N/A   Final    DelSys 06/26/2023 CPAP/BiPAP   Final    Mode 06/26/2023 BiPAP   Final    FiO2 06/26/2023 30   Final    IP 06/26/2023 13   Final    EP 06/26/2023 5   Final    Sodium 06/27/2023 140  136 - 145 mmol/L Final    Potassium 06/27/2023 4.7  3.5 - 5.1 mmol/L Final    Chloride 06/27/2023 105  95 - 110 mmol/L Final    CO2 06/27/2023 23  23 - 29 mmol/L Final    Glucose 06/27/2023 114 (H)  70 - 110 mg/dL  Final    BUN 06/27/2023 24 (H)  8 - 23 mg/dL Final    Creatinine 06/27/2023 1.4  0.5 - 1.4 mg/dL Final    Calcium 06/27/2023 8.7  8.7 - 10.5 mg/dL Final    Total Protein 06/27/2023 6.0  6.0 - 8.4 g/dL Final    Albumin 06/27/2023 3.4 (L)  3.5 - 5.2 g/dL Final    Total Bilirubin 06/27/2023 0.4  0.1 - 1.0 mg/dL Final    Alkaline Phosphatase 06/27/2023 44 (L)  55 - 135 U/L Final    AST 06/27/2023 18  10 - 40 U/L Final    ALT 06/27/2023 13  10 - 44 U/L Final    eGFR 06/27/2023 51.8 (A)  >60 mL/min/1.73 m^2 Final    Anion Gap 06/27/2023 12  8 - 16 mmol/L Final    WBC 06/27/2023 13.15 (H)  3.90 - 12.70 K/uL Final    RBC 06/27/2023 4.24 (L)  4.60 - 6.20 M/uL Final    Hemoglobin 06/27/2023 11.9 (L)  14.0 - 18.0 g/dL Final    Hematocrit 06/27/2023 37.1 (L)  40.0 - 54.0 % Final    MCV 06/27/2023 88  82 - 98 fL Final    MCH 06/27/2023 28.1  27.0 - 31.0 pg Final    MCHC 06/27/2023 32.1  32.0 - 36.0 g/dL Final    RDW 06/27/2023 14.1  11.5 - 14.5 % Final    Platelets 06/27/2023 147 (L)  150 - 450 K/uL Final    MPV 06/27/2023 10.7  9.2 - 12.9 fL Final    Immature Granulocytes 06/27/2023 0.4  0.0 - 0.5 % Final    Gran # (ANC) 06/27/2023 11.1 (H)  1.8 - 7.7 K/uL Final    Immature Grans (Abs) 06/27/2023 0.05 (H)  0.00 - 0.04 K/uL Final    Lymph # 06/27/2023 0.7 (L)  1.0 - 4.8 K/uL Final    Mono # 06/27/2023 1.3 (H)  0.3 - 1.0 K/uL Final    Eos # 06/27/2023 0.0  0.0 - 0.5 K/uL Final    Baso # 06/27/2023 0.01  0.00 - 0.20 K/uL Final    nRBC 06/27/2023 0  0 /100 WBC Final    Gran % 06/27/2023 84.7 (H)  38.0 - 73.0 % Final    Lymph % 06/27/2023 5.3 (L)  18.0 - 48.0 % Final    Mono % 06/27/2023 9.5  4.0 - 15.0 % Final    Eosinophil % 06/27/2023 0.0  0.0 - 8.0 % Final    Basophil % 06/27/2023 0.1  0.0 - 1.9 % Final    Differential Method 06/27/2023 Automated   Final    Magnesium 06/27/2023 1.8  1.6 - 2.6 mg/dL Final    POC PH 06/27/2023 7.334 (L)  7.35 - 7.45 Final    POC PCO2 06/27/2023 53.8 (H)  35 -  45 mmHg Final    POC PO2 06/27/2023 71 (L)  80 - 100 mmHg Final    POC HCO3 06/27/2023 28.6 (H)  24 - 28 mmol/L Final    POC BE 06/27/2023 3  -2 to 2 mmol/L Final    POC SATURATED O2 06/27/2023 92 (L)  95 - 100 % Final    POC TCO2 06/27/2023 30 (H)  23 - 27 mmol/L Final    Rate 06/27/2023 18   Final    Sample 06/27/2023 ARTERIAL   Final    Site 06/27/2023 Barbara/UAC   Final    Allens Test 06/27/2023 N/A   Final    DelSys 06/27/2023 CPAP/BiPAP   Final    Mode 06/27/2023 BiPAP   Final    FiO2 06/27/2023 21   Final    Spont Rate 06/27/2023 18   Final    Min Vol 06/27/2023 16.5   Final    Sp02 06/27/2023 96   Final    IP 06/27/2023 14   Final    EP 06/27/2023 5   Final        Meds   Current Facility-Administered Medications   Medication Dose Route Frequency Provider Last Rate Last Admin    acetaminophen tablet 1,000 mg  1,000 mg Oral Q6H Avelina Rivera NP   1,000 mg at 06/27/23 0131    amLODIPine tablet 10 mg  10 mg Oral Daily Olaf Umana MD   10 mg at 06/27/23 0938    docusate sodium capsule 100 mg  100 mg Oral BID Olaf Umana MD   100 mg at 06/27/23 0900    enoxaparin injection 40 mg  40 mg Subcutaneous Daily Olaf Umana MD        famotidine tablet 20 mg  20 mg Oral Daily Olaf Umana MD   20 mg at 06/27/23 0938    hydroCHLOROthiazide tablet 25 mg  25 mg Oral Daily Keith Mott MD   25 mg at 06/27/23 0937    losartan tablet 100 mg  100 mg Oral Daily Keith Mott MD   100 mg at 06/27/23 0939    methocarbamoL tablet 750 mg  750 mg Oral TID Avelina Rivera NP   750 mg at 06/27/23 0938    methocarbamoL tablet 750 mg  750 mg Oral QID PRN Olaf Umana MD        metoprolol succinate (TOPROL-XL) 24 hr tablet 50 mg  50 mg Oral Daily Olaf Umana MD   50 mg at 06/27/23 0937    ondansetron injection 4 mg  4 mg Intravenous Q8H PRN Avelina Rivera NP        oxyCODONE immediate release tablet 5 mg  5 mg Oral Q4H PRN Genesis Hi MD         polyethylene glycol packet 17 g  17 g Oral Daily PRN Avelina Rivera NP        pravastatin tablet 40 mg  40 mg Oral QHS Olaf Umana MD   40 mg at 06/26/23 2024    pregabalin capsule 75 mg  75 mg Oral QHS Avelina Rivera NP   75 mg at 06/26/23 2024    sodium chloride 0.9% flush 10 mL  10 mL Intravenous PRN Olaf Umana MD            Anticoagulant dose enoxaparin at 40 mg.    Assessment:  Pt POD 1 from right total hip with Dr. Rodrigues at [place]. Post operatively pt was somnolent and workup was significant for acute hypercapnic respiratory failure. He was transferred to [facility] and admitted to the ICU for continuous bipap and closer monitoring.      This AM, pt alert and oriented. From a pain perspective he complains of some soreness but overall feels well.    Plan:  - Pt currently admitted to the medical ICU with marked improvement in his mental and respiratory status.   - Continue multimodals: tylenol, robaxin, lyrica    Genesis Hi PGY4   Acute Pain and Regional Anesthesia

## 2023-06-27 NOTE — PROGRESS NOTES
[facility] - Cardiac Medical ICU  Critical Care Medicine  Progress Note    Patient Name: Zheng Talley Jr.  MRN: [id number]  Admission Date: 6/26/2023  Hospital Length of Stay: 1 days  Code Status: Full Code  Attending Provider: Keith Mott*  Primary Care Provider: Gio Rizo MD   Principal Problem: Acute hypercapnic respiratory failure    Subjective:     HPI:  Mr Talley is a 79 yo male w/ hx of Obesity, HTN , HLD, osteoarthritis of both hips and knees, prostate cancer s/p prostatectomy who presents from the ED after undergoing right total hip arthroplasty this morning. Per chart review patient was not arousable following  post op. Bedside ABG showing CO2 retention and acidosis. Bipap started which improved acidosis but patient tolerated poorly  without precedex. Patient was also hypotensive with SBP in the 80s, only responsive to pain and apneic. he later received narcan, became agitated and subsequently given haldol 5mg.     In the ED, somnolent but arousable. WBC of 14, plts  HGB 12 and phos 6.5, glucose of 179         Hospital/ICU Course:  On the MICU placed on Bipap on arrival and QHS with significant improvement. Stepdown       Interval History/Significant Events: Significant improvement overnight . Family reports hx of witnessed apneic episodes. Discussed medicamentous interaction between opioids and methocarbamol in the setting of sleep apnea. Ready for stepdown. Recommend follow up with sleep medicine for SACHA.    Review of Systems   Constitutional:  Positive for fatigue. Negative for chills and fever.   HENT:  Positive for sore throat. Negative for congestion.    Respiratory:  Positive for cough. Negative for shortness of breath.    Cardiovascular:  Negative for chest pain and palpitations.   Gastrointestinal:  Negative for abdominal pain, nausea and vomiting.   Genitourinary:  Negative for dysuria and frequency.   Musculoskeletal:  Negative for arthralgias and back pain.   Skin:   Negative for rash and wound.   Neurological:  Negative for dizziness and headaches.   Psychiatric/Behavioral:  Negative for agitation and confusion.    Objective:     Vital Signs (Most Recent):  Temp: 98.6 °F (37 °C) (06/27/23 1100)  Pulse: 70 (06/27/23 1100)  Resp: 20 (06/27/23 1100)  BP: (!) 160/73 (06/27/23 0800)  SpO2: 97 % (06/27/23 1100) Vital Signs (24h Range):  Temp:  [98 °F (36.7 °C)-99.6 °F (37.6 °C)] 98.6 °F (37 °C)  Pulse:  [49-81] 70  Resp:  [0-28] 20  SpO2:  [92 %-100 %] 97 %  BP: ()/(59-84) 160/73  Arterial Line BP: ()/(38-79) 105/71   Weight: 111.1 kg (245 lb)  Body mass index is 35.15 kg/m².      Intake/Output Summary (Last 24 hours) at 6/27/2023 1221  Last data filed at 6/27/2023 1214  Gross per 24 hour   Intake 92.81 ml   Output 1250 ml   Net -1157.19 ml          Physical Exam  Vitals reviewed.   Constitutional:       General: He is not in acute distress.     Appearance: He is obese. He is not ill-appearing, toxic-appearing or diaphoretic.      Comments: Asleep but easily arousable   HENT:      Head: Normocephalic and atraumatic.      Mouth/Throat:      Mouth: Mucous membranes are moist.   Eyes:      Extraocular Movements: Extraocular movements intact.      Pupils: Pupils are equal, round, and reactive to light.   Cardiovascular:      Rate and Rhythm: Normal rate and regular rhythm.      Pulses: Normal pulses.      Heart sounds: Normal heart sounds. No murmur heard.    No friction rub. No gallop.   Pulmonary:      Effort: No respiratory distress.      Comments: On bipap  Abdominal:      General: Bowel sounds are normal.      Palpations: Abdomen is soft.      Tenderness: There is no abdominal tenderness.   Musculoskeletal:      Cervical back: Normal range of motion and neck supple.      Right lower leg: No edema.      Left lower leg: No edema.   Skin:     General: Skin is warm and dry.   Neurological:      General: No focal deficit present.      Mental Status: He is alert and oriented  to person, place, and time.      Comments: Patient asleep but arouses to voice and oriented x3   Psychiatric:         Mood and Affect: Mood normal.         Behavior: Behavior normal.          Vents:  Oxygen Concentration (%): 21 (06/27/23 0600)  Lines/Drains/Airways       Drain  Duration                  Closed/Suction Drain 06/26/23 0950 Right;Anterior Hip Accordion 10 Fr. 1 day              Peripheral Intravenous Line  Duration                  Peripheral IV - Single Lumen 06/26/23 0554 18 G Posterior;Right Hand 1 day         Peripheral IV - Single Lumen 06/27/23 0009 20 G Right Antecubital <1 day                  Significant Labs:    CBC/Anemia Profile:  Recent Labs   Lab 06/26/23  1200 06/26/23  1230 06/27/23  0252   WBC  --  14.92* 13.15*   HGB  --  12.2* 11.9*   HCT 40 39.0* 37.1*   PLT  --  148* 147*   MCV  --  92 88   RDW  --  14.3 14.1        Chemistries:  Recent Labs   Lab 06/26/23  1230 06/27/23  0252    140   K 5.1 4.7    105   CO2 26 23   BUN 17 24*   CREATININE 1.3 1.4   CALCIUM 7.8* 8.7   ALBUMIN 2.9* 3.4*   PROT 5.1* 6.0   BILITOT 0.3 0.4   ALKPHOS 43* 44*   ALT 11 13   AST 14 18   MG 2.0 1.8   PHOS 6.5*  --        Recent Lab Results  (Last 5 results in the past 24 hours)        06/27/23  0635   06/27/23  0252   06/26/23  1916   06/26/23  1653   06/26/23  1537        Albumin   3.4             Alkaline Phosphatase   44             Allens Test N/A     N/A   N/A   N/A       ALT   13             Anion Gap   12             AST   18             Baso #   0.01             Basophil %   0.1             BILIRUBIN TOTAL   0.4  Comment: For infants and newborns, interpretation of results should be based  on gestational age, weight and in agreement with clinical  observations.    Premature Infant recommended reference ranges:  Up to 24 hours.............<8.0 mg/dL  Up to 48 hours............<12.0 mg/dL  3-5 days..................<15.0 mg/dL  6-29 days.................<15.0 mg/dL               Site  Barbara/UAC     Barbara/UAC   Barbara/UAC   Barbara/UAC       BUN   24             Calcium   8.7             Chloride   105             CO2   23             Creatinine   1.4             DelSys CPAP/BiPAP     CPAP/BiPAP   CPAP/BiPAP   CPAP/BiPAP       Differential Method   Automated             eGFR   51.8             Eos #   0.0             Eosinophil %   0.0             EP 5     5   5   5       FiO2 21     30   40   40       Glucose   114             Gran # (ANC)   11.1             Gran %   84.7             Hematocrit   37.1             Hemoglobin   11.9             Immature Grans (Abs)   0.05  Comment: Mild elevation in immature granulocytes is non specific and   can be seen in a variety of conditions including stress response,   acute inflammation, trauma and pregnancy. Correlation with other   laboratory and clinical findings is essential.               Immature Granulocytes   0.4             IP 14     13   12   12       Lymph #   0.7             Lymph %   5.3             Magnesium   1.8             MCH   28.1             MCHC   32.1             MCV   88             Min Vol 16.5               Mode BiPAP     BiPAP   BiPAP   BiPAP       Mono #   1.3             Mono %   9.5             MPV   10.7             nRBC   0             Platelets   147             POC BE 3     2   3   1       POC HCO3 28.6     29.0   30.1   28.9       POC PCO2 53.8     66.4   70.0   71.9       POC PH 7.334     7.249   7.241   7.212       POC PO2 71     131   171   167       POC SATURATED O2 92     98   99   99       POC TCO2 30     31   32   31       Potassium   4.7             PROTEIN TOTAL   6.0             Rate 18         20       RBC   4.24             RDW   14.1             Sample ARTERIAL     ARTERIAL   ARTERIAL   ARTERIAL       Sodium   140             Sp02 96       100   100       Spont Rate 18               WBC   13.15                                    Significant Imaging:  I have reviewed all pertinent imaging results/findings within  the past 24 hours.      ABG  Recent Labs   Lab 06/27/23  0635   PH 7.334*   PO2 71*   PCO2 53.8*   HCO3 28.6*   BE 3     Assessment/Plan:     Pulmonary  * Acute hypercapnic respiratory failure  Patient with Hypercapnic Respiratory failure which is Acute.  he is not on home oxygen. Supplemental oxygen was provided and noted- Oxygen Concentration (%):  [21-40] 21    .   Signs/symptoms of respiratory failure include- lethargy. Contributing diagnoses includes - Obesity Hypoventilation Labs and images were reviewed. Patient Has recent ABG, which has been reviewed. Will treat underlying causes and adjust management of respiratory failure as follows-     77 y/o M with hx of obesity presenting with lethargy and hypercapnic respiratory failure following an elective hip replacement surgery. Patient given dose of narcan with minimal improvement. Placed on bipap and transferred to [facility] for higher level of care. Sxs likely 2/2 undiagnosed SACHA c/b multiple anesthetic agents during perioperative period. Initial ABG with pH 7.19/CO2 76. On interview patient more awake, sleepy but easily arousable and answering questions appropriately. PH improved to 7.24/CO2 66.     Plan:  -- Bipap overnight for additional ventilation  -- goal O2 88-92%  -ready for stepdown    Cardiac/Vascular  Hyperlipidemia LDL goal <130  -- most recent LDL 66 on 1/2020  -- continue home pravastatin  -- repeat Lipid panel ordered on admission      The ASCVD Risk score (Chang BEYER, et al., 2019) failed to calculate for the following reasons:    Cannot find a previous HDL lab    Cannot find a previous total cholesterol lab      Hypertension  -- hx of HTN on amlodipine, HCTZ, metoprolol succinate   -- continue all home antihypertensive medications while admitted     Endocrine  Obesity, Class II, BMI 35-39.9  -- patient counseled on diet and lifestyle modifications    GI  Gastroesophageal reflux disease  -- Pepcid while admitted    Orthopedic  Primary  osteoarthritis of right hip  -- s/p hip replacement 6/26. Wound drain in place  -- PT consulted  -- Orthopedics to follow for wound drain management     Other  Snoring  -- likely undiagnosed sleep apnea. Patient and wife were in the process of trying to get appointment with sleep medicine clinic   -- will place referral to sleep medicine clinic at discharge   -Cn try to discharge BiPAP by getting a blood gas on room air or his baseline oxygen use and document pCO2 > 45. with BMI > 35, and having had acute on chronic hypercapnic respiratory failure, he would qualify for BiPAP when he leaves the hospital.         Critical Care Daily Checklist:    A: Awake: RASS Goal/Actual Goal: RASS Goal: 0-->alert and calm  Actual:     B: Spontaneous Breathing Trial Performed?  NA   C: SAT & SBT Coordinated?  na                    D: Delirium: CAM-ICU Overall CAM-ICU: Negative   E: Early Mobility Performed? Yes   F: Feeding Goal:    Status:     Current Diet Order   Procedures    Diet Adult Regular (IDDSI Level 7) [facility]; Standard Tray     Order Specific Question:   Indicate patient location for additional diet options:     Answer:   [facility]     Order Specific Question:   Tray type:     Answer:   Standard Tray      AS: Analgesia/Sedation na   T: Thromboembolic Prophylaxis na   H: HOB > 300 Yes   U: Stress Ulcer Prophylaxis (if needed) na   G: Glucose Control yes   B: Bowel Function     I: Indwelling Catheter (Lines & Lopez) Necessity no   D: De-escalation of Antimicrobials/Pharmacotherapies no    Plan for the day/ETD stepdown    Code Status:  Family/Goals of Care: Full Code              Critical care was time spent personally by me on the following activities: development of treatment plan with patient or surrogate and bedside caregivers, discussions with consultants, evaluation of patient's response to treatment, examination of patient, ordering and performing treatments and interventions, ordering and review of  laboratory studies, ordering and review of radiographic studies, pulse oximetry, re-evaluation of patient's condition. This critical care time did not overlap with that of any other provider or involve time for any procedures.     Kaden Bergman MD  Critical Care Medicine  [facility] - Cardiac Medical ICU

## 2023-06-27 NOTE — SUBJECTIVE & OBJECTIVE
Interval History/Significant Events: Significant improvement overnight . Family reports hx of witnessed apneic episodes. Discussed medicamentous interaction between opioids and methocarbamol in the setting of sleep apnea. Ready for stepdown. Recommend follow up with sleep medicine for SACHA.    Review of Systems   Constitutional:  Positive for fatigue. Negative for chills and fever.   HENT:  Positive for sore throat. Negative for congestion.    Respiratory:  Positive for cough. Negative for shortness of breath.    Cardiovascular:  Negative for chest pain and palpitations.   Gastrointestinal:  Negative for abdominal pain, nausea and vomiting.   Genitourinary:  Negative for dysuria and frequency.   Musculoskeletal:  Negative for arthralgias and back pain.   Skin:  Negative for rash and wound.   Neurological:  Negative for dizziness and headaches.   Psychiatric/Behavioral:  Negative for agitation and confusion.    Objective:     Vital Signs (Most Recent):  Temp: 98.6 °F (37 °C) (06/27/23 1100)  Pulse: 70 (06/27/23 1100)  Resp: 20 (06/27/23 1100)  BP: (!) 160/73 (06/27/23 0800)  SpO2: 97 % (06/27/23 1100) Vital Signs (24h Range):  Temp:  [98 °F (36.7 °C)-99.6 °F (37.6 °C)] 98.6 °F (37 °C)  Pulse:  [49-81] 70  Resp:  [0-28] 20  SpO2:  [92 %-100 %] 97 %  BP: ()/(59-84) 160/73  Arterial Line BP: ()/(38-79) 105/71   Weight: 111.1 kg (245 lb)  Body mass index is 35.15 kg/m².      Intake/Output Summary (Last 24 hours) at 6/27/2023 1221  Last data filed at 6/27/2023 1214  Gross per 24 hour   Intake 92.81 ml   Output 1250 ml   Net -1157.19 ml          Physical Exam  Vitals reviewed.   Constitutional:       General: He is not in acute distress.     Appearance: He is obese. He is not ill-appearing, toxic-appearing or diaphoretic.      Comments: Asleep but easily arousable   HENT:      Head: Normocephalic and atraumatic.      Mouth/Throat:      Mouth: Mucous membranes are moist.   Eyes:      Extraocular Movements:  Extraocular movements intact.      Pupils: Pupils are equal, round, and reactive to light.   Cardiovascular:      Rate and Rhythm: Normal rate and regular rhythm.      Pulses: Normal pulses.      Heart sounds: Normal heart sounds. No murmur heard.    No friction rub. No gallop.   Pulmonary:      Effort: No respiratory distress.      Comments: On bipap  Abdominal:      General: Bowel sounds are normal.      Palpations: Abdomen is soft.      Tenderness: There is no abdominal tenderness.   Musculoskeletal:      Cervical back: Normal range of motion and neck supple.      Right lower leg: No edema.      Left lower leg: No edema.   Skin:     General: Skin is warm and dry.   Neurological:      General: No focal deficit present.      Mental Status: He is alert and oriented to person, place, and time.      Comments: Patient asleep but arouses to voice and oriented x3   Psychiatric:         Mood and Affect: Mood normal.         Behavior: Behavior normal.          Vents:  Oxygen Concentration (%): 21 (06/27/23 0600)  Lines/Drains/Airways       Drain  Duration                  Closed/Suction Drain 06/26/23 0950 Right;Anterior Hip Accordion 10 Fr. 1 day              Peripheral Intravenous Line  Duration                  Peripheral IV - Single Lumen 06/26/23 0554 18 G Posterior;Right Hand 1 day         Peripheral IV - Single Lumen 06/27/23 0009 20 G Right Antecubital <1 day                  Significant Labs:    CBC/Anemia Profile:  Recent Labs   Lab 06/26/23  1200 06/26/23  1230 06/27/23  0252   WBC  --  14.92* 13.15*   HGB  --  12.2* 11.9*   HCT 40 39.0* 37.1*   PLT  --  148* 147*   MCV  --  92 88   RDW  --  14.3 14.1        Chemistries:  Recent Labs   Lab 06/26/23  1230 06/27/23  0252    140   K 5.1 4.7    105   CO2 26 23   BUN 17 24*   CREATININE 1.3 1.4   CALCIUM 7.8* 8.7   ALBUMIN 2.9* 3.4*   PROT 5.1* 6.0   BILITOT 0.3 0.4   ALKPHOS 43* 44*   ALT 11 13   AST 14 18   MG 2.0 1.8   PHOS 6.5*  --        Recent Lab  Results  (Last 5 results in the past 24 hours)        06/27/23  0635   06/27/23  0252   06/26/23  1916   06/26/23  1653   06/26/23  1537        Albumin   3.4             Alkaline Phosphatase   44             Allens Test N/A     N/A   N/A   N/A       ALT   13             Anion Gap   12             AST   18             Baso #   0.01             Basophil %   0.1             BILIRUBIN TOTAL   0.4  Comment: For infants and newborns, interpretation of results should be based  on gestational age, weight and in agreement with clinical  observations.    Premature Infant recommended reference ranges:  Up to 24 hours.............<8.0 mg/dL  Up to 48 hours............<12.0 mg/dL  3-5 days..................<15.0 mg/dL  6-29 days.................<15.0 mg/dL               Site Barbara/UAC     Barbara/UAC   Barbara/UAC   Barbara/UAC       BUN   24             Calcium   8.7             Chloride   105             CO2   23             Creatinine   1.4             DelSys CPAP/BiPAP     CPAP/BiPAP   CPAP/BiPAP   CPAP/BiPAP       Differential Method   Automated             eGFR   51.8             Eos #   0.0             Eosinophil %   0.0             EP 5     5   5   5       FiO2 21     30   40   40       Glucose   114             Gran # (ANC)   11.1             Gran %   84.7             Hematocrit   37.1             Hemoglobin   11.9             Immature Grans (Abs)   0.05  Comment: Mild elevation in immature granulocytes is non specific and   can be seen in a variety of conditions including stress response,   acute inflammation, trauma and pregnancy. Correlation with other   laboratory and clinical findings is essential.               Immature Granulocytes   0.4             IP 14     13   12   12       Lymph #   0.7             Lymph %   5.3             Magnesium   1.8             MCH   28.1             MCHC   32.1             MCV   88             Min Vol 16.5               Mode BiPAP     BiPAP   BiPAP   BiPAP       Mono #   1.3              Mono %   9.5             MPV   10.7             nRBC   0             Platelets   147             POC BE 3     2   3   1       POC HCO3 28.6     29.0   30.1   28.9       POC PCO2 53.8     66.4   70.0   71.9       POC PH 7.334     7.249   7.241   7.212       POC PO2 71     131   171   167       POC SATURATED O2 92     98   99   99       POC TCO2 30     31   32   31       Potassium   4.7             PROTEIN TOTAL   6.0             Rate 18         20       RBC   4.24             RDW   14.1             Sample ARTERIAL     ARTERIAL   ARTERIAL   ARTERIAL       Sodium   140             Sp02 96       100   100       Spont Rate 18               WBC   13.15                                    Significant Imaging:  I have reviewed all pertinent imaging results/findings within the past 24 hours.   no

## 2023-06-27 NOTE — PROGRESS NOTES
[facility] - Cardiac Medical ICU  Orthopedics  Progress Note    Patient Name: Zheng Talley Jr.  MRN: [id number]  Admission Date: 6/26/2023  Hospital Length of Stay: 1 days  Attending Provider: Keith Mott*  Primary Care Provider: Gio Rizo MD  Follow-up For: Procedure(s) (LRB):  ARTHROPLASTY, HIP, TOTAL, ANTERIOR APPROACH: RIGHT: HORACE AVENIR & G7: SAME DAY (Right)    Post-Operative Day: 1 Day Post-Op  Subjective:     Principal Problem:Acute hypercapnic respiratory failure    Principal Orthopedic Problem: s/p right total hip arthroplasty on 6/26    Interval History: Transferred to [facility] yesterday for acute hypercapnic respiratory failure. Placed on continuous BiPAP in the ICU. Patient alert this morning.    Review of patient's allergies indicates:   Allergen Reactions    Iodine and iodide containing products Other (See Comments)     Blood in urine in the 1970s       Current Facility-Administered Medications   Medication    acetaminophen tablet 1,000 mg    amLODIPine tablet 10 mg    docusate sodium capsule 100 mg    enoxaparin injection 40 mg    famotidine tablet 20 mg    hydroCHLOROthiazide tablet 25 mg    losartan tablet 100 mg    methocarbamoL tablet 750 mg    methocarbamoL tablet 750 mg    metoprolol succinate (TOPROL-XL) 24 hr tablet 50 mg    ondansetron injection 4 mg    oxyCODONE immediate release tablet 5 mg    polyethylene glycol packet 17 g    pravastatin tablet 40 mg    pregabalin capsule 75 mg    sodium chloride 0.9% flush 10 mL     Objective:     Vital Signs (Most Recent):  Temp: 99.6 °F (37.6 °C) (06/27/23 0030)  Pulse: 75 (06/27/23 0741)  Resp: (!) 21 (06/27/23 0741)  BP: (!) 158/70 (06/27/23 0600)  SpO2: 96 % (06/27/23 0741) Vital Signs (24h Range):  Temp:  [99.6 °F (37.6 °C)] 99.6 °F (37.6 °C)  Pulse:  [49-81] 75  Resp:  [0-28] 21  SpO2:  [92 %-100 %] 96 %  BP: ()/(48-84) 158/70  Arterial Line BP: ()/(38-79) 143/72     Weight: 111.1 kg (245  "lb)  Height: 5' 10" (177.8 cm)  Body mass index is 35.15 kg/m².      Intake/Output Summary (Last 24 hours) at 6/27/2023 1051  Last data filed at 6/27/2023 0655  Gross per 24 hour   Intake 92.81 ml   Output 845 ml   Net -752.19 ml        Ortho/SPM Exam  Gen: NAD, sitting comfortably in bed  CV: regular rate  Resp: on continuous BiPAP    Right Lower Extremity Exam  - Dressing clean, dry, and intact  - Drain with bloody output  - Quad and hip flexor intact  - Compartments soft and compressible  - TA/EHL/Gastroc/FHL assessed in isolation without deficit  - SILT throughout  - DP and PT 2+     Significant Labs: All pertinent labs within the past 24 hours have been reviewed.    Significant Imaging: I have reviewed all pertinent imaging results/findings.    Assessment/Plan:     Primary osteoarthritis of right hip  Zheng Talley Jr. is a 78 y.o. male s/p R VITO 6/26/23. Transferred to [facility] for continuous BiPAP in the ICU after surgery. Improving this morning.    Plan:  - Weight bearing status: WBAT RLE  - Pain control: multimodal  - Antibiotics: Ancef  - DVT Prophylaxis: minimum of ASA 81 mg BID  - Respiratory: per ICU    Dispo: pending clinical improvement            Bhanu Watson MD  Orthopedics  [facility] - Cardiac Medical ICU  "

## 2023-06-27 NOTE — PLAN OF CARE
Problem: Bleeding (Hip Arthroplasty)  Goal: Absence of Bleeding  Outcome: Ongoing, Progressing     Problem: Infection (Hip Arthroplasty)  Goal: Absence of Infection Signs and Symptoms  Outcome: Ongoing, Progressing     Problem: Fall Injury Risk  Goal: Absence of Fall and Fall-Related Injury  Outcome: Ongoing, Progressing      ..  CMICU DAILY GOALS       A: Awake    RASS: Goal - RASS Goal: 0-->alert and calm  Actual - RASS ([place] Agitation-Sedation Scale): drowsy   Restraint necessity:    B: Breathe   SBT: Not intubated   C: Coordinate A & B, analgesics/sedatives   Pain: managed    SAT: Not intubated  D: Delirium   CAM-ICU: Overall CAM-ICU: Negative  E: Early(intubated/ Progressive (non-intubated) Mobility   MOVE Screen: Pass   Activity: Activity Management: Arm raise - L1  FAS: Feeding/Nutrition   Diet order: Diet/Nutrition Received: NPO,    T: Thrombus   DVT prophylaxis: VTE Required Core Measure: Pharmacological prophylaxis initiated/maintained  H: HOB Elevation   Head of Bed (HOB) Positioning: HOB at 30-45 degrees  U: Ulcer Prophylaxis   GI: yes  G: Glucose control   managed    S: Skin   Bathing/Skin Care: linen changed, electrode patches/site rotation  Device Skin Pressure Protection: absorbent pad utilized/changed, adhesive use limited, skin-to-device areas padded, skin-to-skin areas padded, pressure points protected  Pressure Reduction Devices: pressure-redistributing mattress utilized, positioning supports utilized  Pressure Reduction Techniques: weight shift assistance provided  Skin Protection: adhesive use limited, skin-to-device areas padded, transparent dressing maintained, skin-to-skin areas padded, tubing/devices free from skin contact  B: Bowel Function   no issues   I: Indwelling Catheters   Lopez necessity:     CVC necessity: No  D: De-escalation Antibiotics   Yes    Family/Goals of care/Code Status   Code Status: Full Code    24H Vital Sign Range  Temp:  [97.9 °F (36.6 °C)-99.6 °F (37.6 °C)]    Pulse:  [49-81]   Resp:  [0-28]   BP: ()/(48-84)   SpO2:  [92 %-100 %]   Arterial Line BP: ()/(38-79)      Shift Events   No acute events throughout shift, Patient brought up from ED by RT and nurse. Patient on bipap throughout the night. Patient denied wanting a bath, said his daughter and wife could bath him, patient given tylenol and Arterial Line dressing changed. New condom cath placed and labs monitored    VS and assessment per flow sheet, patient progressing towards goals as tolerated, plan of care reviewed with [unfilled] and family, all concerns addressed, will continue to monitor.    Severino Kearney

## 2023-06-27 NOTE — RESIDENT HANDOFF
Handoff     Primary Team: Networked reference to record [facility]  Room Number: 6080/6080 A     Patient Name: Zheng Talley Jr. MRN: [id number]     Date of Birth: [id number] Allergies: Iodine and iodide containing products     Age: 78 y.o. Admit Date: 6/26/2023     Sex: male  BMI: Body mass index is 35.15 kg/m².     Code Status: Full Code        Illness Level (current clinical status): Watcher - No    Reason for Admission: Acute hypercapnic respiratory failure    Brief HPI   Mr Talley is a 79 yo male w/ hx of Obesity, HTN , HLD, osteoarthritis of both hips and knees, prostate cancer s/p prostatectomy who presents from the ED after undergoing right total hip arthroplasty this morning. Per chart review patient was not arousable following  post op. Bedside ABG showing CO2 retention and acidosis. Bipap started which improved acidosis but patient tolerated poorly  without precedex. Patient was also hypotensive with SBP in the 80s, only responsive to pain and apneic. he later received narcan, became agitated and subsequently given haldol 5mg.       Hospital Course   Placed on Bipap on arrival and QHS with significant improvement.     Tasks   Fu PT/OT recs  Fu ortho recs  Reiterate medicamentous interactions between methocarbamol and oxycodone in the setting of sleep apnea.  Sleep Medicine referral, will need BIPAP. Cn try to get him home on BiPAP prior discharge by getting a blood gas on room air or his baseline oxygen use and document pCO2 > 45. with BMI > 35, and having had acute on chronic hypercapnic respiratory failure, he would qualify for BiPAP when he leaves the hospital. PCP referral        Discharge Disposition: Home or Self Care    Mentored By: Dr. Mott

## 2023-06-27 NOTE — PLAN OF CARE
Problem: Occupational Therapy  Goal: Occupational Therapy Goal  Description: Goals to be met by: 7 days  7/4/23     Patient will increase functional independence with ADLs by performing:    Pt to complete standing g/h skills with Supervision.   Pt to complete UE dressing with set-up  Pt to complete LE dressing with set-up with AE as needed.  Pt to complete toileting with SBA  Pt to complete t/f bed, chair and commode with supervision.   Outcome: Ongoing, Progressing

## 2023-06-27 NOTE — PT/OT/SLP EVAL
Physical Therapy  Co-Evaluation and treatment with OT    Patient Name:  Zheng Talley Jr.   MRN:  [id number]    Recommendations:     Discharge Recommendations: other (see comments)   Discharge Equipment Recommendations: walker, rolling, bedside commode   Barriers to discharge: None    Assessment:     Zheng Talley Jr. is a 78 y.o. male admitted with a medical diagnosis of Acute hypercapnic respiratory failure.  He presents with the following impairments/functional limitations: impaired endurance, impaired functional mobility, gait instability, decreased lower extremity function, impaired balance, decreased safety awareness pt tolerated treatment well and will benefit from skilled PT daily to progress physically. Pt will be able to discharge home with further PT needs when medically stable. Pt was transferred from [place] after R VITO anterior approach 6/26/23. Pt was not able to awaken after surgery.     Rehab Prognosis: Good; patient would benefit from acute skilled PT services to address these deficits and reach maximum level of function.    Recent Surgery: Procedure(s) (LRB):  ARTHROPLASTY, HIP, TOTAL, ANTERIOR APPROACH: RIGHT: HORACE AVENIR & G7: SAME DAY (Right) 1 Day Post-Op    Plan:     During this hospitalization, patient to be seen daily to address the identified rehab impairments via gait training, therapeutic activities and progress toward the following goals:    Plan of Care Expires:  07/25/23    Subjective     Chief Complaint: pt c/o pain during treatment.   Patient/Family Comments/goals:  to get better and go home.   Pain/Comfort:  Pain Rating 1: 5/10 (R hip)  Pain Addressed 1: Reposition, Distraction, Pre-medicate for activity  Pain Rating Post-Intervention 1: 5/10 (R hip)    Patients cultural, spiritual, Pentecostal conflicts given the current situation: no    Living Environment:  Pt is retired and lives with his wife in [address] [place] [address].   Prior to admission, patients level of function was Independent .   Equipment used at home: none.  DME owned (not currently used): none.  Upon discharge, patient will have assistance from wife.    Objective:     Communicated with nurse prior to session.  Patient found supine with telemetry, arterial line, pulse ox (continuous), blood pressure cuff, rivera catheter, peripheral IV  upon PT entry to room.    General Precautions: Standard, fall  Orthopedic Precautions:RLE weight bearing as tolerated, RLE anterior precautions   Braces:    Respiratory Status: Room air    Exams:  Cognitive Exam:  Patient is oriented to Person, Place, Time, and Situation  RLE ROM: WFL not formally tested due to surgery   RLE Strength: WFL not formally tested due to surgery  LLE ROM: WFL  LLE Strength: WFL    Functional Mobility:  Bed Mobility: pt needed verbal cues for hand placement and sequencing for functional mobility.     Rolling Right: minimum assistance  Supine to Sit: minimum assistance    Transfers:     Sit to Stand:  contact guard assistance with rolling walker    Gait: pt received gait training ~ 18 ft with RW and CGA. Pt needed verbal cues for sequencing for RW usage.     Balance: pt was SBA standing at sink performing ADLS with OT.     Due to pt complex medical condition, the skill of 2 licensed therapists is needed to maximize treatment session and progression towards goals  Pt white board updated with current therapists name and level of mobility assistance needed.         AM-PAC 6 CLICK MOBILITY  Total Score:16       Treatment & Education:  Pt received verbal instructions in role of PT and POC, anterior hip precautions and WB status. Pt verbally expressed understanding of such but will need re-instruction.     Patient left up in chair with all lines intact, call button in reach, and RN  notified.    GOALS:   Multidisciplinary Problems       Physical Therapy Goals          Problem: Physical Therapy    Goal Priority Disciplines Outcome Goal Variances Interventions   Physical Therapy Goal      PT, PT/OT Ongoing, Progressing     Description: Goals to be met by: 23     Patient will increase functional independence with mobility by performin. Supine to sit with Stand-by Assistance  2. Sit to stand transfer with Stand-by Assistance with RW   3. Gait  x 150 feet with Stand-by Assistance using Rolling Walker WBAT RLE.                           History:     Past Medical History:   Diagnosis Date    Arthritis     BPH (benign prostatic hyperplasia)     Cancer     prostate    Cataract     Groin pain     History of gastroesophageal reflux (GERD)     Hyperlipidemia     Hypertension     Nuclear sclerosis - Both Eyes 2014    Renal insufficiency 2023       Past Surgical History:   Procedure Laterality Date    BUNIONECTOMY      bilateral     CARPAL TUNNEL RELEASE Left 2022    Procedure: RELEASE, CARPAL TUNNEL,LEFT;  Surgeon: Elyssa Bradford MD;  Location: [facility] OR;  Service: Orthopedics;  Laterality: Left;    COLONOSCOPY      Dr. Velez    COLONOSCOPY N/A 2019    Procedure: COLONOSCOPY;  Surgeon: JACQUELINE Lozano MD;  Location: [facility] ENDO (4TH FLR);  Service: Endoscopy;  Laterality: N/A;    dental implant      HAND SURGERY      INJECTION Right 3/3/2023    Procedure: INJECTION, RIGHT HIP-INTRA-ARTICULAR CONTRAST DIRECT REF;  Surgeon: Frankie Carrillo MD;  Location: [facility] PAIN MGT;  Service: Pain Management;  Laterality: Right;    lip surgery      PROSTATE SURGERY      REPAIR OF NAIL BED Right 2018    Procedure: REPAIR, NAIL BED right thumb with I&D;  Surgeon: Elyssa Bradford MD;  Location: [facility] OR;  Service: Orthopedics;  Laterality: Right;  stretcher, supine, hand pan 1 and pan 2       Time Tracking:     PT Received On: 23  PT Start Time: 0757     PT Stop Time: 0805  PT Total Time (min): 8 min     2nd PT start time: 8:36  2nd PT end time: 8:51  (8 min + 15 min = 23 min total)     Billable Minutes: Evaluation 8 min  and Gait Training 15 min       2023

## 2023-06-27 NOTE — ASSESSMENT & PLAN NOTE
-- likely undiagnosed sleep apnea. Patient and wife were in the process of trying to get appointment with sleep medicine clinic   -- will place referral to sleep medicine clinic at discharge   -Cn try to discharge BiPAP by getting a blood gas on room air or his baseline oxygen use and document pCO2 > 45. with BMI > 35, and having had acute on chronic hypercapnic respiratory failure, he would qualify for BiPAP when he leaves the hospital.

## 2023-06-27 NOTE — ASSESSMENT & PLAN NOTE
Patient with Hypercapnic Respiratory failure which is Acute.  he is not on home oxygen. Supplemental oxygen was provided and noted- Oxygen Concentration (%):  [21-40] 21    .   Signs/symptoms of respiratory failure include- lethargy. Contributing diagnoses includes - Obesity Hypoventilation Labs and images were reviewed. Patient Has recent ABG, which has been reviewed. Will treat underlying causes and adjust management of respiratory failure as follows-     79 y/o M with hx of obesity presenting with lethargy and hypercapnic respiratory failure following an elective hip replacement surgery. Patient given dose of narcan with minimal improvement. Placed on bipap and transferred to [facility] for higher level of care. Sxs likely 2/2 undiagnosed SACHA c/b multiple anesthetic agents during perioperative period. Initial ABG with pH 7.19/CO2 76. On interview patient more awake, sleepy but easily arousable and answering questions appropriately. PH improved to 7.24/CO2 66.     Plan:  -- Bipap overnight for additional ventilation  -- goal O2 88-92%  -ready for stepdown

## 2023-06-27 NOTE — ASSESSMENT & PLAN NOTE
-- most recent LDL 66 on 1/2020  -- continue home pravastatin  -- repeat Lipid panel ordered on admission      The ASCVD Risk score (Chang BEYRE, et al., 2019) failed to calculate for the following reasons:    Cannot find a previous HDL lab    Cannot find a previous total cholesterol lab

## 2023-06-27 NOTE — PLAN OF CARE
"[facility] - Cardiac Medical ICU  Initial Discharge Assessment       Primary Care Provider: Gio Rizo MD    Admission Diagnosis: Hip arthritis [M16.10]  Primary osteoarthritis of right hip [M16.11]    Admission Date: 6/26/2023  Expected Discharge Date: 7/3/2023    Transition of Care Barriers: None    Payor: MEDICARE / Plan: MEDICARE PART A & B / Product Type: Government /     Extended Emergency Contact Information  Primary Emergency Contact: Casie Talley  Address: [address] [place] [address] [facility]  Home Phone: [phone]  Mobile Phone: [phone]  Relation: Spouse  Secondary Emergency Contact: Aaliyah Talley  Mobile Phone: [phone]  Relation: Daughter    Discharge Plan A: Home Health  Discharge Plan B: Home with family      [facility] - [address] AT Portal to Registered [facility]  [facility]  [place] [address]  Phone: [phone] Fax: [phone]    [id number] - [place], [place] - [address]  [address]  [facility] [address]  Phone: [phone] Fax: [phone]    [facility]  [address]  [facility] [address]  Phone: [phone] Fax: [phone]      Initial Assessment (most recent)       Adult Discharge Assessment - 06/27/23 1620          Discharge Assessment    Assessment Type Discharge Planning Assessment     Confirmed/corrected address, phone number and insurance Yes     Confirmed Demographics Correct on Facesheet     Source of Information patient;family     When was your last doctors appointment? --   "It been a while"    Does patient/caregiver understand observation status No     Was observation education provided? --   n/a    Communicated TALIA with patient/caregiver Date not available/Unable to determine     Reason For Admission Acute Hypercapnic Respiratory Failure     People in Home spouse;child(chance), adult     Facility Arrived " From: [facility]     Do you expect to return to your current living situation? Yes     Do you have help at home or someone to help you manage your care at home? Yes     Who are your caregiver(s) and their phone number(s)? Casie Mayank, spouse/cp# [phone] and Brant Talley, son     Prior to hospitilization cognitive status: Alert/Oriented     Current cognitive status: Alert/Oriented     Home Layout Able to live on 1st floor     Equipment Currently Used at Home none     Readmission within 30 days? No     Patient currently being followed by outpatient case management? No     Do you currently have service(s) that help you manage your care at home? No     Do you take prescription medications? Yes     Do you have prescription coverage? Yes     Coverage Medicare A B and [employer]/Managed Choice POS     Do you have any problems affording any of your prescribed medications? TBD     Is the patient taking medications as prescribed? yes     Who is going to help you get home at discharge? Spouse     How do you get to doctors appointments? car, drives self     Are you on dialysis? No     Do you take coumadin? No     Discharge Plan A Home Health     Discharge Plan B Home with family     DME Needed Upon Discharge  bedside commode;walker, rolling     Discharge Plan discussed with: Patient;Spouse/sig other     Name(s) and Number(s) Casie Mayank, spouse/cp# [phone]     Transition of Care Barriers None        Physical Activity    On average, how many days per week do you engage in moderate to strenuous exercise (like a brisk walk)? 0 days     On average, how many minutes do you engage in exercise at this level? 0 min        Financial Resource Strain    How hard is it for you to pay for the very basics like food, housing, medical care, and heating? Not hard at all        Housing Stability    In the last 12 months, was there a time when you were not able to pay the mortgage or rent on time? No     In the last 12  months, how many places have you lived? 1     In the last 12 months, was there a time when you did not have a steady place to sleep or slept in a shelter (including now)? No        Transportation Needs    In the past 12 months, has lack of transportation kept you from medical appointments or from getting medications? No     In the past 12 months, has lack of transportation kept you from meetings, work, or from getting things needed for daily living? No        Food Insecurity    Within the past 12 months, you worried that your food would run out before you got the money to buy more. Never true     Within the past 12 months, the food you bought just didn't last and you didn't have money to get more. Never true        Stress    Do you feel stress - tense, restless, nervous, or anxious, or unable to sleep at night because your mind is troubled all the time - these days? Not at all        Social Connections    In a typical week, how many times do you talk on the phone with family, friends, or neighbors? Twice a week     How often do you get together with friends or relatives? Twice a week     How often do you attend Confucianism or Restoration services? More than 4 times per year     Do you belong to any clubs or organizations such as Confucianism groups, unions, fraternal or athletic groups, or school groups? No     How often do you attend meetings of the clubs or organizations you belong to? Patient refused     Are you , , , , never , or living with a partner?         Alcohol Use    Q1: How often do you have a drink containing alcohol? 2-4 times a month     Q2: How many drinks containing alcohol do you have on a typical day when you are drinking? 1 or 2     Q3: How often do you have six or more drinks on one occasion? Never        OTHER    Name(s) of People in Home Casie, spouse and Brant, son                   Spoke to patient and spouse at bedside.  Patient lives with spouse and adult  son. Post hospital stay spouse will be his support person and help in the home.  Patient has transportation at discharge with spouse.  There have been no hospitalizations within the last 30 days per patient. Verified patient's PCP and preferred Pharmacy.  Patient states not on Coumadin and is not receiving dialysis.  All questions answered regarding Case Management Discharge Planning, patient/spouse verbalized understanding.  SW placed name and contact # on white board.      Olga Lidia Rosales LMSW  [facility]  [phone]

## 2023-06-27 NOTE — H&P
[facility] - Emergency Dept  Critical Care Medicine  History & Physical    Patient Name: Zheng Talley Jr.  MRN: [id number]  Admission Date: 6/26/2023  Hospital Length of Stay: 0 days  Code Status: Full Code  Attending Physician: Keith Mott*   Primary Care Provider: Gio Rizo MD   Principal Problem: Acute hypercapnic respiratory failure    Subjective:     HPI:  Mr Talley is a 79 yo male w/ hx of Obesity, HTN , HLD, osteoarthritis of both hips and knees, prostate cancer s/p prostatectomy who presents from the ED after undergoing right total hip arthroplasty this morning. Per chart review patient was not arousable following  post op. Bedside ABG showing CO2 retention and acidosis. Bipap started which improved acidosis but patient tolerated poorly  without precedex. Patient was also hypotensive with SBP in the 80s, only responsive to pain and apneic. he later received narcan, became agitated and subsequently given haldol 5mg.     In the ED, somnolent but arousable. WBC of 14, plts  HGB 12 and phos 6.5, glucose of 179         Hospital/ICU Course:  No notes on file     Past Medical History:   Diagnosis Date    Arthritis     BPH (benign prostatic hyperplasia)     Cancer     prostate    Cataract     Groin pain     History of gastroesophageal reflux (GERD)     Hyperlipidemia     Hypertension     Nuclear sclerosis - Both Eyes 02/18/2014    Renal insufficiency 6/9/2023       Past Surgical History:   Procedure Laterality Date    BUNIONECTOMY      bilateral     CARPAL TUNNEL RELEASE Left 7/25/2022    Procedure: RELEASE, CARPAL TUNNEL,LEFT;  Surgeon: Elyssa Bradford MD;  Location: [facility];  Service: Orthopedics;  Laterality: Left;    COLONOSCOPY  2011    Dr. Velez    COLONOSCOPY N/A 2/25/2019    Procedure: COLONOSCOPY;  Surgeon: JACQUELINE Lozano MD;  Location: [address]);  Service: Endoscopy;  Laterality: N/A;    dental implant      HAND SURGERY      INJECTION Right  3/3/2023    Procedure: INJECTION, RIGHT HIP-INTRA-ARTICULAR CONTRAST DIRECT REF;  Surgeon: Frankie Carrillo MD;  Location: [facility];  Service: Pain Management;  Laterality: Right;    lip surgery      PROSTATE SURGERY      REPAIR OF NAIL BED Right 2018    Procedure: REPAIR, NAIL BED right thumb with I&D;  Surgeon: Elyssa Bradford MD;  Location: [facility] OR;  Service: Orthopedics;  Laterality: Right;  stretcher, supine, hand pan 1 and pan 2       Review of patient's allergies indicates:   Allergen Reactions    Iodine and iodide containing products Other (See Comments)     Blood in urine in the 1970s       Family History       Problem Relation (Age of Onset)    Cancer Father    Coronary artery disease Mother    Diabetes Father, Sister    Hypertension Mother, Sister    No Known Problems Sister, Brother, Brother, Brother, Daughter, Son    Osteoarthritis Sister          Tobacco Use    Smoking status: Former     Types: Cigarettes     Quit date:      Years since quittin.5    Smokeless tobacco: Never   Substance and Sexual Activity    Alcohol use: Yes     Alcohol/week: 3.0 standard drinks     Types: 3 Cans of beer per week     Comment: occasionally    Drug use: No    Sexual activity: Never     Partners: Female      Review of Systems   Constitutional:  Positive for fatigue. Negative for chills and fever.   HENT:  Negative for congestion and sore throat.    Respiratory:  Negative for cough and shortness of breath.    Cardiovascular:  Negative for chest pain and palpitations.   Gastrointestinal:  Negative for abdominal pain, nausea and vomiting.   Genitourinary:  Negative for dysuria and frequency.   Musculoskeletal:  Negative for arthralgias and back pain.   Skin:  Negative for rash and wound.   Neurological:  Negative for dizziness and headaches.   Psychiatric/Behavioral:  Negative for agitation and confusion.    Objective:     Vital Signs (Most Recent):  Temp: 97.9 °F (36.6 °C) (23  1019)  Pulse: 65 (06/26/23 1758)  Resp: 18 (06/26/23 1758)  BP: 137/81 (06/26/23 1758)  SpO2: 100 % (06/26/23 1653) Vital Signs (24h Range):  Temp:  [97.5 °F (36.4 °C)-97.9 °F (36.6 °C)] 97.9 °F (36.6 °C)  Pulse:  [49-74] 65  Resp:  [11-25] 18  SpO2:  [92 %-100 %] 100 %  BP: ()/(48-83) 137/81  Arterial Line BP: ()/(38-65) 112/51   Weight: 111.1 kg (245 lb)  Body mass index is 35.15 kg/m².      Intake/Output Summary (Last 24 hours) at 6/26/2023 1833  Last data filed at 6/26/2023 1200  Gross per 24 hour   Intake 2600 ml   Output 95 ml   Net 2505 ml          Physical Exam  Vitals reviewed.   Constitutional:       General: He is not in acute distress.     Appearance: He is obese. He is not ill-appearing, toxic-appearing or diaphoretic.      Comments: Asleep but easily arousable   HENT:      Head: Normocephalic and atraumatic.      Mouth/Throat:      Mouth: Mucous membranes are moist.   Eyes:      Extraocular Movements: Extraocular movements intact.      Pupils: Pupils are equal, round, and reactive to light.   Cardiovascular:      Rate and Rhythm: Normal rate and regular rhythm.      Pulses: Normal pulses.      Heart sounds: Normal heart sounds. No murmur heard.    No friction rub. No gallop.   Pulmonary:      Effort: No respiratory distress.      Comments: On bipap  Abdominal:      General: Bowel sounds are normal.      Palpations: Abdomen is soft.      Tenderness: There is no abdominal tenderness.   Musculoskeletal:      Cervical back: Normal range of motion and neck supple.      Right lower leg: No edema.      Left lower leg: No edema.   Skin:     General: Skin is warm and dry.   Neurological:      General: No focal deficit present.      Mental Status: He is alert and oriented to person, place, and time.      Comments: Patient asleep but arouses to voice and oriented x3   Psychiatric:         Mood and Affect: Mood normal.         Behavior: Behavior normal.          Vents:  Oxygen Concentration (%): 40  (06/26/23 1653)  Lines/Drains/Airways       Drain  Duration                  Closed/Suction Drain 06/26/23 0950 Right;Anterior Hip Accordion 10 Fr. <1 day              Arterial Line  Duration             Arterial Line 06/26/23 1230 Left Radial <1 day              Peripheral Intravenous Line  Duration                  Peripheral IV - Single Lumen 06/26/23 0554 18 G Posterior;Right Hand <1 day                  Significant Labs:    CBC/Anemia Profile:  Recent Labs   Lab 06/26/23  1200 06/26/23  1230   WBC  --  14.92*   HGB  --  12.2*   HCT 40 39.0*   PLT  --  148*   MCV  --  92   RDW  --  14.3        Chemistries:  Recent Labs   Lab 06/26/23  1230      K 5.1      CO2 26   BUN 17   CREATININE 1.3   CALCIUM 7.8*   ALBUMIN 2.9*   PROT 5.1*   BILITOT 0.3   ALKPHOS 43*   ALT 11   AST 14   MG 2.0   PHOS 6.5*       ABGs:   Recent Labs   Lab 06/26/23  1653   PH 7.241*   PCO2 70.0*   HCO3 30.1*   POCSATURATED 99   BE 3       Significant Imaging: I have reviewed all pertinent imaging results/findings within the past 24 hours.    X-Ray Hip 2 or 3 views Right (with Pelvis when performed)   Final Result      Expected immediate postop changes of total right hip arthroplasty.         Electronically signed by: Bubba Manrique MD   Date:    06/26/2023   Time:    15:25      SURG FL Surgery Fluoro Usage   Final Result            Assessment/Plan:     Pulmonary  * Acute hypercapnic respiratory failure  Patient with Hypercapnic Respiratory failure which is Acute.  he is not on home oxygen. Supplemental oxygen was provided and noted- Oxygen Concentration (%):  [40] 40    .   Signs/symptoms of respiratory failure include- lethargy. Contributing diagnoses includes - Obesity Hypoventilation Labs and images were reviewed. Patient Has recent ABG, which has been reviewed. Will treat underlying causes and adjust management of respiratory failure as follows-     77 y/o M with hx of obesity presenting with lethargy and hypercapnic respiratory  failure following an elective hip replacement surgery. Patient given dose of narcan with minimal improvement. Placed on bipap and transferred to [facility] for higher level of care. Sxs likely 2/2 undiagnosed SACHA c/b multiple anesthetic agents during perioperative period. Initial ABG with pH 7.19/CO2 76. On interview patient more awake, sleepy but easily arousable and answering questions appropriately. PH improved to 7.24/CO2 66.     Plan:  -- Bipap overnight for additional ventilation  -- goal O2 88-92%  -- monitor mental status closely  -- anticipate stepdown to the floor in the am if mental status at baseline and hypercapnia improved.     Cardiac/Vascular  Hyperlipidemia LDL goal <130  -- most recent LDL 66 on 1/2020  -- continue home pravastatin  -- repeat Lipid panel ordered on admission      The ASCVD Risk score (Chang BEYER, et al., 2019) failed to calculate for the following reasons:    Cannot find a previous HDL lab    Cannot find a previous total cholesterol lab      Hypertension  -- hx of HTN on amlodipine, HCTZ, metoprolol succinate   -- continue all home antihypertensive medications while admitted     Endocrine  Obesity, Class II, BMI 35-39.9  -- patient counseled on diet and lifestyle modifications    GI  Gastroesophageal reflux disease  -- Pepcid while admitted    Orthopedic  Primary osteoarthritis of right hip  -- s/p hip replacement 6/26. Wound drain in place  -- PT consulted  -- Orthopedics to follow for wound drain management     Other  Snoring  -- likely undiagnosed sleep apnea. Patient and wife were in the process of trying to get appointment with sleep medicine clinic   -- will place referral to sleep medicine clinic at discharge         Critical Care Daily Checklist:    A: Awake: RASS Goal/Actual Goal: RASS Goal: 0-->alert and calm  Actual:     B: Spontaneous Breathing Trial Performed?     C: SAT & SBT Coordinated?  N/A                      D: Delirium: CAM-ICU     E: Early Mobility  Performed? Yes   F: Feeding Goal:    Status:     Current Diet Order   Procedures    Diet NPO      AS: Analgesia/Sedation oxy   T: Thromboembolic Prophylaxis lovenox   H: HOB > 300 Yes   U: Stress Ulcer Prophylaxis (if needed) pepcid   G: Glucose Control At goal   B: Bowel Function     I: Indwelling Catheter (Lines & Lopez) Necessity necessary   D: De-escalation of Antimicrobials/Pharmacotherapies No abx    Plan for the day/ETD bipap    Code Status:  Family/Goals of Care: Full Code         Critical secondary to Patient has a condition that poses threat to life and bodily function: Severe Respiratory Distress     Critical care was time spent personally by me on the following activities: development of treatment plan with patient or surrogate and bedside caregivers, discussions with consultants, evaluation of patient's response to treatment, examination of patient, ordering and performing treatments and interventions, ordering and review of laboratory studies, ordering and review of radiographic studies, pulse oximetry, re-evaluation of patient's condition. This critical care time did not overlap with that of any other provider or involve time for any procedures.     Olaf Umana MD  Critical Care Medicine  [facility] - Emergency Dept

## 2023-06-27 NOTE — PLAN OF CARE
Problem: Physical Therapy  Goal: Physical Therapy Goal  Description: Goals to be met by: 23     Patient will increase functional independence with mobility by performin. Supine to sit with Stand-by Assistance  2. Sit to stand transfer with Stand-by Assistance with RW   3. Gait  x 150 feet with Stand-by Assistance using Rolling Walker WBAT RLE.      Outcome: Ongoing, Progressing   Evaluation completed and goals appropriate. 2023

## 2023-06-28 ENCOUNTER — PATIENT MESSAGE (OUTPATIENT)
Dept: ORTHOPEDICS | Facility: CLINIC | Age: 78
End: 2023-06-28
Payer: MEDICARE

## 2023-06-28 PROBLEM — J96.22 ACUTE ON CHRONIC RESPIRATORY FAILURE WITH HYPERCAPNIA: Status: ACTIVE | Noted: 2023-06-26

## 2023-06-28 LAB
ALBUMIN SERPL BCP-MCNC: 3.4 G/DL (ref 3.5–5.2)
ALP SERPL-CCNC: 46 U/L (ref 55–135)
ALT SERPL W/O P-5'-P-CCNC: 9 U/L (ref 10–44)
ANION GAP SERPL CALC-SCNC: 9 MMOL/L (ref 8–16)
AST SERPL-CCNC: 17 U/L (ref 10–40)
BASOPHILS # BLD AUTO: 0.04 K/UL (ref 0–0.2)
BASOPHILS NFR BLD: 0.4 % (ref 0–1.9)
BILIRUB SERPL-MCNC: 0.5 MG/DL (ref 0.1–1)
BUN SERPL-MCNC: 20 MG/DL (ref 8–23)
CALCIUM SERPL-MCNC: 9.2 MG/DL (ref 8.7–10.5)
CHLORIDE SERPL-SCNC: 101 MMOL/L (ref 95–110)
CHOLEST SERPL-MCNC: 96 MG/DL (ref 120–199)
CHOLEST/HDLC SERPL: 3.1 {RATIO} (ref 2–5)
CO2 SERPL-SCNC: 28 MMOL/L (ref 23–29)
CREAT SERPL-MCNC: 1.1 MG/DL (ref 0.5–1.4)
DIFFERENTIAL METHOD: ABNORMAL
EOSINOPHIL # BLD AUTO: 0 K/UL (ref 0–0.5)
EOSINOPHIL NFR BLD: 0.2 % (ref 0–8)
ERYTHROCYTE [DISTWIDTH] IN BLOOD BY AUTOMATED COUNT: 14.2 % (ref 11.5–14.5)
EST. GFR  (NO RACE VARIABLE): >60 ML/MIN/1.73 M^2
GLUCOSE SERPL-MCNC: 96 MG/DL (ref 70–110)
HCT VFR BLD AUTO: 35.9 % (ref 40–54)
HDLC SERPL-MCNC: 31 MG/DL (ref 40–75)
HDLC SERPL: 32.3 % (ref 20–50)
HGB BLD-MCNC: 11.5 G/DL (ref 14–18)
IMM GRANULOCYTES # BLD AUTO: 0.03 K/UL (ref 0–0.04)
IMM GRANULOCYTES NFR BLD AUTO: 0.3 % (ref 0–0.5)
LDLC SERPL CALC-MCNC: 42.4 MG/DL (ref 63–159)
LYMPHOCYTES # BLD AUTO: 2.1 K/UL (ref 1–4.8)
LYMPHOCYTES NFR BLD: 19.2 % (ref 18–48)
MAGNESIUM SERPL-MCNC: 1.8 MG/DL (ref 1.6–2.6)
MCH RBC QN AUTO: 28 PG (ref 27–31)
MCHC RBC AUTO-ENTMCNC: 32 G/DL (ref 32–36)
MCV RBC AUTO: 87 FL (ref 82–98)
MONOCYTES # BLD AUTO: 1.2 K/UL (ref 0.3–1)
MONOCYTES NFR BLD: 11.2 % (ref 4–15)
NEUTROPHILS # BLD AUTO: 7.3 K/UL (ref 1.8–7.7)
NEUTROPHILS NFR BLD: 68.7 % (ref 38–73)
NONHDLC SERPL-MCNC: 65 MG/DL
NRBC BLD-RTO: 0 /100 WBC
PLATELET # BLD AUTO: 144 K/UL (ref 150–450)
PMV BLD AUTO: 10.7 FL (ref 9.2–12.9)
POTASSIUM SERPL-SCNC: 4 MMOL/L (ref 3.5–5.1)
PROT SERPL-MCNC: 6.1 G/DL (ref 6–8.4)
RBC # BLD AUTO: 4.11 M/UL (ref 4.6–6.2)
SODIUM SERPL-SCNC: 138 MMOL/L (ref 136–145)
TRIGL SERPL-MCNC: 113 MG/DL (ref 30–150)
WBC # BLD AUTO: 10.65 K/UL (ref 3.9–12.7)

## 2023-06-28 PROCEDURE — 85025 COMPLETE CBC W/AUTO DIFF WBC: CPT

## 2023-06-28 PROCEDURE — 21400001 HC TELEMETRY ROOM

## 2023-06-28 PROCEDURE — 94660 CPAP INITIATION&MGMT: CPT

## 2023-06-28 PROCEDURE — 25000003 PHARM REV CODE 250: Performed by: STUDENT IN AN ORGANIZED HEALTH CARE EDUCATION/TRAINING PROGRAM

## 2023-06-28 PROCEDURE — 99233 PR SUBSEQUENT HOSPITAL CARE,LEVL III: ICD-10-PCS | Mod: ,,, | Performed by: HOSPITALIST

## 2023-06-28 PROCEDURE — 83735 ASSAY OF MAGNESIUM: CPT

## 2023-06-28 PROCEDURE — 27000221 HC OXYGEN, UP TO 24 HOURS

## 2023-06-28 PROCEDURE — 97535 SELF CARE MNGMENT TRAINING: CPT | Mod: CO

## 2023-06-28 PROCEDURE — 99233 SBSQ HOSP IP/OBS HIGH 50: CPT | Mod: ,,, | Performed by: HOSPITALIST

## 2023-06-28 PROCEDURE — 25000003 PHARM REV CODE 250: Performed by: INTERNAL MEDICINE

## 2023-06-28 PROCEDURE — 97530 THERAPEUTIC ACTIVITIES: CPT | Mod: CO

## 2023-06-28 PROCEDURE — 99900035 HC TECH TIME PER 15 MIN (STAT)

## 2023-06-28 PROCEDURE — 94761 N-INVAS EAR/PLS OXIMETRY MLT: CPT

## 2023-06-28 PROCEDURE — 25000003 PHARM REV CODE 250: Performed by: NURSE PRACTITIONER

## 2023-06-28 PROCEDURE — 97116 GAIT TRAINING THERAPY: CPT | Mod: CQ

## 2023-06-28 PROCEDURE — 25000003 PHARM REV CODE 250

## 2023-06-28 PROCEDURE — 80061 LIPID PANEL: CPT

## 2023-06-28 PROCEDURE — 80053 COMPREHEN METABOLIC PANEL: CPT

## 2023-06-28 PROCEDURE — 63600175 PHARM REV CODE 636 W HCPCS

## 2023-06-28 RX ORDER — METHOCARBAMOL 750 MG/1
750 TABLET, FILM COATED ORAL EVERY 8 HOURS
Status: DISCONTINUED | OUTPATIENT
Start: 2023-06-28 | End: 2023-06-29 | Stop reason: HOSPADM

## 2023-06-28 RX ORDER — HYDRALAZINE HYDROCHLORIDE 50 MG/1
50 TABLET, FILM COATED ORAL EVERY 8 HOURS PRN
Status: DISCONTINUED | OUTPATIENT
Start: 2023-06-28 | End: 2023-06-29 | Stop reason: HOSPADM

## 2023-06-28 RX ADMIN — METOPROLOL SUCCINATE 50 MG: 50 TABLET, EXTENDED RELEASE ORAL at 08:06

## 2023-06-28 RX ADMIN — AMLODIPINE BESYLATE 10 MG: 10 TABLET ORAL at 08:06

## 2023-06-28 RX ADMIN — ENOXAPARIN SODIUM 40 MG: 40 INJECTION SUBCUTANEOUS at 04:06

## 2023-06-28 RX ADMIN — HYDROCHLOROTHIAZIDE 25 MG: 25 TABLET ORAL at 08:06

## 2023-06-28 RX ADMIN — PRAVASTATIN SODIUM 40 MG: 40 TABLET ORAL at 09:06

## 2023-06-28 RX ADMIN — DOCUSATE SODIUM 100 MG: 50 CAPSULE, LIQUID FILLED ORAL at 09:06

## 2023-06-28 RX ADMIN — METHOCARBAMOL 750 MG: 750 TABLET ORAL at 09:06

## 2023-06-28 RX ADMIN — FAMOTIDINE 20 MG: 20 TABLET ORAL at 09:06

## 2023-06-28 RX ADMIN — DOCUSATE SODIUM 100 MG: 50 CAPSULE, LIQUID FILLED ORAL at 08:06

## 2023-06-28 RX ADMIN — FAMOTIDINE 20 MG: 20 TABLET ORAL at 08:06

## 2023-06-28 RX ADMIN — LOSARTAN POTASSIUM 100 MG: 50 TABLET, FILM COATED ORAL at 08:06

## 2023-06-28 RX ADMIN — ACETAMINOPHEN 1000 MG: 500 TABLET ORAL at 05:06

## 2023-06-28 RX ADMIN — PREGABALIN 75 MG: 75 CAPSULE ORAL at 09:06

## 2023-06-28 NOTE — ASSESSMENT & PLAN NOTE
Likely undiagnosed sleep apnea per pulmonary and has appt in sleep medicine in October but will need closer appt for sleep study either home vs in person as review of insurnace reportedly take home sleep studies as qualifiers as well for SACHA diagnosis  -pulm reports that can use the ABG from ICU to try to qualify for home cpap/bipap as his pco2 is over 45 at baseline now and his bm is over 35 and that this may qualify him now without a sleep study given it was taken once he was out of his acute hypercapneic resp failure and this is his chrnoic hypercapeneic resp failure baseline per pulmnary notes. EULALIO tamayo working on this

## 2023-06-28 NOTE — PT/OT/SLP PROGRESS
"Physical Therapy Treatment    Patient Name:  Zheng Talley Jr.   MRN:  [id number]    Recommendations:     Discharge Recommendations: other (see comments)  Discharge Equipment Recommendations: walker, rolling, bedside commode  Barriers to discharge: None    Assessment:     Zheng Talley Jr. is a 78 y.o. male admitted with a medical diagnosis of Acute hypercapnic respiratory failure.  He presents with the following impairments/functional limitations: impaired endurance, impaired functional mobility, gait instability, impaired balance, decreased ROM, orthopedic precautions.    Rehab Prognosis: Good; patient would benefit from acute skilled PT services to address these deficits and reach maximum level of function.    Recent Surgery: Procedure(s) (LRB):  ARTHROPLASTY, HIP, TOTAL, ANTERIOR APPROACH: RIGHT: HORACE AVENIR & G7: SAME DAY (Right) 2 Days Post-Op    Plan:     During this hospitalization, patient to be seen daily to address the identified rehab impairments via gait training, therapeutic activities and progress toward the following goals:    Plan of Care Expires:  07/25/23    Subjective     Chief Complaint: "I want to do whatever I need to."   Patient/Family Comments/goals: family motivating to pt to move  Pain/Comfort:  Pain Rating 1: 0/10      Objective:     Communicated with RN prior to session.  Patient found up in chair with telemetry upon PT entry to room.     General Precautions: Standard, fall  Orthopedic Precautions: RLE weight bearing as tolerated, RLE anterior precautions  Braces:    Respiratory Status: Room air     Functional Mobility:  Transfers:     Sit to Stand:  stand by assistance with rolling walker  Gait: 270 feet CGA for safety, with RW; decreased step length, flexed posture, weightbearing through AD. Verbal cues for gait sequencing and heel strike.       AM-PAC 6 CLICK MOBILITY  Turning over in bed (including adjusting bedclothes, sheets and blankets)?: 3  Sitting down on and standing up from a " chair with arms (e.g., wheelchair, bedside commode, etc.): 3  Moving from lying on back to sitting on the side of the bed?: 3  Moving to and from a bed to a chair (including a wheelchair)?: 3  Need to walk in hospital room?: 3  Climbing 3-5 steps with a railing?: 1  Basic Mobility Total Score: 16       Treatment & Education:  Education provided on AP and LAQ as part of HEP; pt verbalized and demonstrated understanding.     Patient left up in chair with all lines intact, call button in reach, and family present..    GOALS:   Multidisciplinary Problems       Physical Therapy Goals          Problem: Physical Therapy    Goal Priority Disciplines Outcome Goal Variances Interventions   Physical Therapy Goal     PT, PT/OT Ongoing, Progressing     Description: Goals to be met by: 23     Patient will increase functional independence with mobility by performin. Supine to sit with Stand-by Assistance  2. Sit to stand transfer with Stand-by Assistance with RW   3. Gait  x 150 feet with Stand-by Assistance using Rolling Walker WBAT RLE.                           Time Tracking:     PT Received On: 23  PT Start Time: 1000     PT Stop Time: 1014  PT Total Time (min): 14 min     Billable Minutes: Gait Training 12    Treatment Type: Treatment  PT/PTA: PTA     Number of PTA visits since last PT visit: 2023

## 2023-06-28 NOTE — PT/OT/SLP PROGRESS
Occupational Therapy   Treatment    Name: Zheng Talley Jr.  MRN: [id number]  Admitting Diagnosis:  Acute hypercapnic respiratory failure  2 Days Post-Op    Recommendations:     Discharge Recommendations: other (see comments)  Discharge Equipment Recommendations:  walker, rolling, bedside commode  Barriers to discharge:       Assessment:     Zheng Talley Jr. is a 78 y.o. male with a medical diagnosis of Acute hypercapnic respiratory failure.  He presents with the following performance deficits affecting function weakness, impaired functional mobility, gait instability, decreased lower extremity function, decreased safety awareness.     Pt agreeable to therapy and tolerated session well. Pt motivated to get out of bed and move. Pt requiring light assistance for transfers and ambulation. No c/o pain or SOB noted. While ambulating, no LOB noted.    Rehab Prognosis:  Good; patient would benefit from acute skilled OT services to address these deficits and reach maximum level of function.       Plan:     Patient to be seen daily to address the above listed problems via self-care/home management, therapeutic activities, therapeutic exercises  Plan of Care Expires:    Plan of Care Reviewed with: patient, daughter    Subjective     Chief Complaint: n/a  Patient/Family Comments/goals: pt ready to go home  Pain/Comfort:  Pain Rating 1: 0/10 (did not rate)  Pain Rating Post-Intervention 1: 0/10    Objective:     Communicated with: RN prior to session.  Patient found HOB elevated with telemetry, FCD upon OT entry to room.  A client care conference was completed by the OTR and the VICK prior to treatment by the VICK to discuss the patient's POC and current status.    General Precautions: Standard, fall    Orthopedic Precautions:RLE anterior precautions, RLE weight bearing as tolerated  Braces: N/A  Respiratory Status: Room air     Occupational Performance:     Bed Mobility:    Patient completed Scooting/Bridging with stand by  assistance  Patient completed Supine to Sit with stand by assistance     Functional Mobility/Transfers:  Patient completed Sit <> Stand Transfer with contact guard assistance  with  rolling walker   Patient completed Bed <> Chair Transfer using Step Transfer technique with contact guard assistance with rolling walker  Functional Mobility: PT Ambulated ~6ft x 2 trials with CGA using RW. No LOB or SOB noted    Activities of Daily Living:  Grooming: stand by assistance at sink for facial care and oral hygiene      [facility] 6 Click ADL: 21    Treatment & Education:  -Pt educated to dress surgical site first and further dressing techniques s/p surgery with AE demonstration  -Pt educated on hand placement for transfers  -Pt educated on RW placement for ADLs  -Pt educated on proper foot wear s/p surgery  -Pt educated on weightbearing and surgical precautions with pt verbalizing 2/3 correctly  -Pt educated to call for assistance and to transfer with hospital staff only  -Pt educated on role of OT and plan of care     Patient left up in chair with all lines intact, call button in reach, and daughter present    GOALS:   Multidisciplinary Problems       Occupational Therapy Goals          Problem: Occupational Therapy    Goal Priority Disciplines Outcome Interventions   Occupational Therapy Goal     OT, PT/OT Ongoing, Progressing    Description: Goals to be met by: 7 days  7/4/23     Patient will increase functional independence with ADLs by performing:    Pt to complete standing g/h skills with Supervision.   Pt to complete UE dressing with set-up  Pt to complete LE dressing with set-up with AE as needed.  Pt to complete toileting with SBA  Pt to complete t/f bed, chair and commode with supervision.                        Time Tracking:     OT Date of Treatment: 06/28/23  OT Start Time: 0855  OT Stop Time: 0918  OT Total Time (min): 23 min    Billable Minutes:Self Care/Home Management 15  Therapeutic Activity 8    OT/JAIME: JAIME      Number of JAIME visits since last OT visit: 1    6/28/2023

## 2023-06-28 NOTE — ANESTHESIA POST-OP PAIN MANAGEMENT
Acute Pain Service Progress Note    Zheng Talley Jr. is a 78 y.o., male, [id number].    Surgery:  Right Total Hip Arthroplasty, Anterior Approach,    Post Op Day #: 2    Problem List:    Active Hospital Problems    Diagnosis  POA    *Acute hypercapnic respiratory failure [J96.02]  Unknown    Snoring [R06.83]  Yes    Gastroesophageal reflux disease [K21.9]  Yes    Primary osteoarthritis of right hip [M16.11]  Yes    Obesity, Class II, BMI 35-39.9 [E66.9]  Yes    Hyperlipidemia LDL goal <130 [E78.5]  Yes    Hypertension [I10]  Yes      Resolved Hospital Problems   No resolved problems to display.       Subjective:     General appearance of alert, oriented, no complaints   Pain with rest: 2    Numbers   Pain with movement:3   Side Effects    1. Pruritis No    2. Nausea No    3. Motor Blockade No, 0=Ability to raise lower extremities off bed    4. Sedation No, 1=awake and alert    Objective:     Vitals   Vitals:    06/28/23 1044   BP:    Pulse: 81   Resp:    Temp:         Labs    No results displayed because visit has over 200 results.           Meds   Current Facility-Administered Medications   Medication Dose Route Frequency Provider Last Rate Last Admin    acetaminophen tablet 1,000 mg  1,000 mg Oral Q6H Avelina Rivera, CHALO   1,000 mg at 06/28/23 0538    amLODIPine tablet 10 mg  10 mg Oral Daily Olaf Umana MD   10 mg at 06/28/23 0831    docusate sodium capsule 100 mg  100 mg Oral BID Olaf Umana MD   100 mg at 06/28/23 0831    enoxaparin injection 40 mg  40 mg Subcutaneous Daily Olaf Umana MD   40 mg at 06/27/23 1700    famotidine tablet 20 mg  20 mg Oral BID Keith Mott MD   20 mg at 06/28/23 0831    hydrALAZINE tablet 50 mg  50 mg Oral Q8H PRN Farhana Coe MD        hydroCHLOROthiazide tablet 25 mg  25 mg Oral Daily Keith Mott MD   25 mg at 06/28/23 0831    losartan tablet 100 mg  100 mg Oral Daily Keith Mott MD   100 mg at 06/28/23 0830     methocarbamoL tablet 750 mg  750 mg Oral Q8H Genesis Hi MD        metoprolol succinate (TOPROL-XL) 24 hr tablet 50 mg  50 mg Oral Daily Olaf Umana MD   50 mg at 06/28/23 0831    ondansetron injection 4 mg  4 mg Intravenous Q8H PRN Avelina Rivera NP        polyethylene glycol packet 17 g  17 g Oral Daily PRN Avelina Rivera NP        pravastatin tablet 40 mg  40 mg Oral QHS Olaf Umana MD   40 mg at 06/27/23 2043    pregabalin capsule 75 mg  75 mg Oral QHS Avelina Rivera NP   75 mg at 06/26/23 2024    sodium chloride 0.9% flush 10 mL  10 mL Intravenous PRN Olaf Umana MD            Anticoagulant dose enoxaparin at 40 mg.    Assessment:  Pt POD 2 from right total hip with Dr. Rodrigues at [facility]. Pt doing much better today after being stepped down to the floor. He reports adequate control of his pain.   Primary team has been working to get him home with the appropriate BiPAP for home use. Sleep study appointment is pending.     Plan:  - Continue multimodals: tylenol, robaxin, lyrica  - Possible discharge today pending further evaluation with PT      Genesis Hi PGY4   Acute Pain and Regional Anesthesia

## 2023-06-28 NOTE — ASSESSMENT & PLAN NOTE
Admitted to [facility] for elective VITO with dr jain, recs for WBAT and RW BSC for home  -rec for ASA BID for 1 month on dicsharge  - recommended pain controlled and drain removed now  -orth ofollowing

## 2023-06-28 NOTE — ASSESSMENT & PLAN NOTE
Zheng Mayank Martinez is a 78 y.o. male s/p R VITO 6/26/23. Transferred to [facility] on 6/26/23 for continuous BiPAP in the ICU after surgery. Stepped down on 6/27/23. Doing well this morning,    Plan:  - Weight bearing status: WBAT RLE  - Pain control: multimodal  - Antibiotics: Ancef  - DVT Prophylaxis: minimum of ASA 81 mg BID  - Drain: removed 6/28    Dispo: okay for discharge home with home health PT. Will need outpatient sleep study.

## 2023-06-28 NOTE — PROGRESS NOTES
"[facility]  Orthopedics  Progress Note    Patient Name: Zheng Talley Jr.  MRN: [id number]  Admission Date: 6/26/2023  Hospital Length of Stay: 2 days  Attending Provider: Farhana Coe MD  Primary Care Provider: Gio Rizo MD  Follow-up For: Procedure(s) (LRB):  ARTHROPLASTY, HIP, TOTAL, ANTERIOR APPROACH: RIGHT: HORACE AVENIR & G7: SAME DAY (Right)    Post-Operative Day: 2 Days Post-Op  Subjective:     Principal Problem:Acute hypercapnic respiratory failure    Principal Orthopedic Problem: s/p right total hip arthroplasty on 6/26    Interval History: Improved yesterday, taken off continuous BiPAP, and transferred to the floor. Ambulated 18 feet with PT. Feeling well this morning. Hip drain removed.    Review of patient's allergies indicates:   Allergen Reactions    Iodine and iodide containing products Other (See Comments)     Blood in urine in the 1970s       Current Facility-Administered Medications   Medication    acetaminophen tablet 1,000 mg    amLODIPine tablet 10 mg    docusate sodium capsule 100 mg    enoxaparin injection 40 mg    famotidine tablet 20 mg    hydrALAZINE tablet 50 mg    hydroCHLOROthiazide tablet 25 mg    losartan tablet 100 mg    methocarbamoL tablet 750 mg    metoprolol succinate (TOPROL-XL) 24 hr tablet 50 mg    ondansetron injection 4 mg    polyethylene glycol packet 17 g    pravastatin tablet 40 mg    pregabalin capsule 75 mg    sodium chloride 0.9% flush 10 mL     Objective:     Vital Signs (Most Recent):  Temp: 98.4 °F (36.9 °C) (06/28/23 0724)  Pulse: 83 (06/28/23 0736)  Resp: 19 (06/28/23 0724)  BP: (!) 147/69 (06/28/23 0724)  SpO2: (!) 93 % (06/28/23 0724) Vital Signs (24h Range):  Temp:  [97.9 °F (36.6 °C)-99.5 °F (37.5 °C)] 98.4 °F (36.9 °C)  Pulse:  [] 83  Resp:  [11-32] 19  SpO2:  [85 %-98 %] 93 %  BP: (142-174)/(66-92) 147/69     Weight: 111.1 kg (245 lb)  Height: 5' 10" (177.8 cm)  Body mass index is 35.15 kg/m².      Intake/Output " Summary (Last 24 hours) at 6/28/2023 1035  Last data filed at 6/28/2023 0837  Gross per 24 hour   Intake 240 ml   Output 1650 ml   Net -1410 ml         Ortho/SPM Exam  Gen: NAD, sitting comfortably in bed  CV: regular rate  Resp: on continuous BiPAP    Right Lower Extremity Exam  - Dressing clean, dry, and intact  - Quad and hip flexor intact  - Compartments soft and compressible  - TA/EHL/Gastroc/FHL assessed in isolation without deficit  - SILT throughout  - DP and PT 2+     Significant Labs: All pertinent labs within the past 24 hours have been reviewed.    Significant Imaging: I have reviewed all pertinent imaging results/findings.    Assessment/Plan:     Primary osteoarthritis of right hip  Zheng Talley Jr. is a 78 y.o. male s/p R VITO 6/26/23. [place] [facility] [place] continuous BiPAP in the ICU after surgery. Stepped down on 6/27/23. Doing well this morning,    Plan:  - Weight bearing status: WBAT RLE  - Pain control: multimodal  - Antibiotics: Ancef  - DVT Prophylaxis: minimum of ASA 81 mg BID  - Drain: removed 6/28    Dispo: okay for discharge home with home health PT. Will need outpatient sleep study.            Bhanu Watson MD  Orthopedics  [facility] - Surgery

## 2023-06-28 NOTE — ASSESSMENT & PLAN NOTE
Home bp meds resumed after hypotension post op to 80s with BP to 160s this morning so now back on full home regimen

## 2023-06-28 NOTE — SUBJECTIVE & OBJECTIVE
"Principal Problem:Acute hypercapnic respiratory failure    Principal Orthopedic Problem: s/p right total hip arthroplasty on 6/26    Interval History: Improved yesterday, taken off continuous BiPAP, and transferred to the floor. Ambulated 18 feet with PT. Feeling well this morning. Hip drain removed.    Review of patient's allergies indicates:   Allergen Reactions    Iodine and iodide containing products Other (See Comments)     Blood in urine in the 1970s       Current Facility-Administered Medications   Medication    acetaminophen tablet 1,000 mg    amLODIPine tablet 10 mg    docusate sodium capsule 100 mg    enoxaparin injection 40 mg    famotidine tablet 20 mg    hydrALAZINE tablet 50 mg    hydroCHLOROthiazide tablet 25 mg    losartan tablet 100 mg    methocarbamoL tablet 750 mg    metoprolol succinate (TOPROL-XL) 24 hr tablet 50 mg    ondansetron injection 4 mg    polyethylene glycol packet 17 g    pravastatin tablet 40 mg    pregabalin capsule 75 mg    sodium chloride 0.9% flush 10 mL     Objective:     Vital Signs (Most Recent):  Temp: 98.4 °F (36.9 °C) (06/28/23 0724)  Pulse: 83 (06/28/23 0736)  Resp: 19 (06/28/23 0724)  BP: (!) 147/69 (06/28/23 0724)  SpO2: (!) 93 % (06/28/23 0724) Vital Signs (24h Range):  Temp:  [97.9 °F (36.6 °C)-99.5 °F (37.5 °C)] 98.4 °F (36.9 °C)  Pulse:  [] 83  Resp:  [11-32] 19  SpO2:  [85 %-98 %] 93 %  BP: (142-174)/(66-92) 147/69     Weight: 111.1 kg (245 lb)  Height: 5' 10" (177.8 cm)  Body mass index is 35.15 kg/m².      Intake/Output Summary (Last 24 hours) at 6/28/2023 1035  Last data filed at 6/28/2023 0837  Gross per 24 hour   Intake 240 ml   Output 1650 ml   Net -1410 ml          Ortho/SPM Exam  Gen: NAD, sitting comfortably in bed  CV: regular rate  Resp: on continuous BiPAP    Right Lower Extremity Exam  - Dressing clean, dry, and intact  - Quad and hip flexor intact  - Compartments soft and compressible  - TA/EHL/Gastroc/FHL assessed in isolation without deficit  - " SILT throughout  - DP and PT 2+     Significant Labs: All pertinent labs within the past 24 hours have been reviewed.    Significant Imaging: I have reviewed all pertinent imaging results/findings.

## 2023-06-28 NOTE — PLAN OF CARE
Pt is AAOX4,VSS. Pt is progressing towards plan of care goals. Pain management has been assessed. Pt does not report any pain. Pain reassessed throughout shift. SCDs in place with frequent checks for skin breakdown. Up in chair. Worked well with therapy.Fall precautions in place, no report of falls. Safety measures are in place bed in lowest position, wheels locked, call light within reach.

## 2023-06-28 NOTE — ASSESSMENT & PLAN NOTE
Patient with Hypercapnic Respiratory failure which is Acute on chronic.  he is not on home oxygen. Supplemental oxygen was provided and noted- Oxygen Concentration (%):  [21] 21    .   Signs/symptoms of respiratory failure include- increased work of breathing. Contributing diagnoses includes - SACHA Labs and images were reviewed. Patient Has recent ABG, which has been reviewed. Will treat underlying causes and adjust management of respiratory failure as follows- see snoring  ABG with PCO2 over 45 at baseline as possible qualifer for cpap/bipap per pulmonary, CM working on this

## 2023-06-28 NOTE — PROGRESS NOTES
[facility] Medicine  Progress Note    Patient Name: Zheng Talley Jr.  MRN: [id number]  Patient Class: IP- Inpatient   Admission Date: 6/26/2023  Length of Stay: 2 days  Attending Physician: Farhana Coe MD  Primary Care Provider: Gio Rizo MD        Subjective:     Principal Problem:Acute on chronic respiratory failure with hypercapnia        HPI:  No notes on file    Overview/Hospital Course:  No notes on file    Interval History/Significant Events: seen this morning with family at bedside. Sitting in chair and feeling well this morning. He reports pain is well controlled now and orthopedics removed drain for him this morning. He reports tht he does have easily awakenings at home or when traveling that he [place] wake up and have a quick jolt awake and hear himself snore briefly that will wake him up his wife rports he does snore at night and have night time awakenings that she has noted. He has not known about sleep apnea in the past but pulmonary ICU doctors feel that he definitely has sleep apnea based on his ABG in the ICU where his initial ABG was showing a pH of 7.19 and a PCO2 of 76 when he had acute hypercapneic respiratory failure immediately post op and that his repeat ABG when he was in his normal state after he had improved and was doing well right before step down was with a resting PCO2 of 47 which is indicative of chronic hypercapneic respiraotry failure. They report that based on his resting steady state ABG in addtion to his BMI >35 that these 2 data points in combination may qualify him for CPAP/BiPAP without a sleep study and to have CM send referrals with this result now to see if this would qualify him for a cpap or bipap now and CM to work on this now to see if this is a qualifier diagnosis. Ortho dr jain updated on this as well and the attempts to be made to try to get this machine for him sooner if we can based on criteria without sleep study per pulm notes.  I explained to family in the past has been very hard to get this at times for patients without a sleep study but will try now, they have one lined up for October which is awhile away and CM updated that so hopefully they can help possibly make earlier or look into home sleeep study options as on review of patients insuranc it appears that they do accept home sleep studie as well to qualify for home cpap/bipap      Review of Systems   Constitutional:  Positive for fatigue. Negative for chills and fever.   HENT:  Positive for sore throat. Negative for congestion.    Respiratory:  Positive for cough. Negative for shortness of breath.    Cardiovascular:  Negative for chest pain and palpitations.   Gastrointestinal:  Negative for abdominal pain, nausea and vomiting.   Genitourinary:  Negative for dysuria and frequency.   Musculoskeletal:  Negative for arthralgias and back pain.   Skin:  Negative for rash and wound.   Neurological:  Negative for dizziness and headaches.   Psychiatric/Behavioral:  Negative for agitation and confusion.    Objective:     Vital Signs (Most Recent):  Temp: 98.1 °F (36.7 °C) (06/28/23 1122)  Pulse: 72 (06/28/23 1122)  Resp: 18 (06/28/23 1122)  BP: 125/62 (06/28/23 1122)  SpO2: 95 % (06/28/23 1122) Vital Signs (24h Range):  Temp:  [97.9 °F (36.6 °C)-99.5 °F (37.5 °C)] 98.1 °F (36.7 °C)  Pulse:  [] 72  Resp:  [11-27] 18  SpO2:  [85 %-98 %] 95 %  BP: (125-174)/(62-92) 125/62   Weight: 111.1 kg (245 lb)  Body mass index is 35.15 kg/m².      Intake/Output Summary (Last 24 hours) at 6/28/2023 1345  Last data filed at 6/28/2023 0837  Gross per 24 hour   Intake 240 ml   Output 1150 ml   Net -910 ml            Physical Exam  Vitals reviewed.   Constitutional:       General: He is not in acute distress.     Appearance: He is obese. He is not ill-appearing, toxic-appearing or diaphoretic.      Comments: Sitting up in chair w family   HENT:      Head: Normocephalic and atraumatic.      Mouth/Throat:       Mouth: Mucous membranes are moist.   Eyes:      Extraocular Movements: Extraocular movements intact.      Pupils: Pupils are equal, round, and reactive to light.   Cardiovascular:      Rate and Rhythm: Normal rate and regular rhythm.      Pulses: Normal pulses.      Heart sounds: Normal heart sounds. No murmur heard.    No friction rub. No gallop.   Pulmonary:      Effort: No respiratory distress.      Comments: On bipap  Abdominal:      General: Bowel sounds are normal.      Palpations: Abdomen is soft.      Tenderness: There is no abdominal tenderness.   Musculoskeletal:      Cervical back: Normal range of motion and neck supple.      Right lower leg: No edema.      Left lower leg: No edema.      Comments: Bandages to rle. Drain removed   Skin:     General: Skin is warm and dry.   Neurological:      General: No focal deficit present.      Mental Status: He is alert and oriented to person, place, and time.      Comments: Awake and alert   Psychiatric:         Mood and Affect: Mood normal.         Behavior: Behavior normal.          Vents:  Oxygen Concentration (%): 21 (06/28/23 0348)  Lines/Drains/Airways       Peripheral Intravenous Line  Duration                  Peripheral IV - Single Lumen 06/27/23 0009 20 G Right Antecubital 1 day                  Significant Labs:    CBC/Anemia Profile:  Recent Labs   Lab 06/27/23  0252 06/28/23  0506   WBC 13.15* 10.65   HGB 11.9* 11.5*   HCT 37.1* 35.9*   * 144*   MCV 88 87   RDW 14.1 14.2          Chemistries:  Recent Labs   Lab 06/27/23  0252 06/28/23  0506    138   K 4.7 4.0    101   CO2 23 28   BUN 24* 20   CREATININE 1.4 1.1   CALCIUM 8.7 9.2   ALBUMIN 3.4* 3.4*   PROT 6.0 6.1   BILITOT 0.4 0.5   ALKPHOS 44* 46*   ALT 13 9*   AST 18 17   MG 1.8 1.8         Recent Lab Results         06/28/23  0506   06/27/23  1836        Albumin 3.4         Alkaline Phosphatase 46         Allens Test   N/A       ALT 9         Anion Gap 9         AST 17          Baso # 0.04         Basophil % 0.4         BILIRUBIN TOTAL 0.5  Comment: For infants and newborns, interpretation of results should be based  on gestational age, weight and in agreement with clinical  observations.    Premature Infant recommended reference ranges:  Up to 24 hours.............<8.0 mg/dL  Up to 48 hours............<12.0 mg/dL  3-5 days..................<15.0 mg/dL  6-29 days.................<15.0 mg/dL           Site   Other       BUN 20         Calcium 9.2         Chloride 101         Cholesterol 96  Comment: The National Cholesterol Education Program (NCEP) has set the  following guidelines (reference ranges) for Cholesterol:  Optimal.....................<200 mg/dL  Borderline High.............200-239 mg/dL  High........................> or = 240 mg/dL           CO2 28         Creatinine 1.1         DelSys   Room Air       Differential Method Automated         eGFR >60.0         Eos # 0.0         Eosinophil % 0.2         Glucose 96         Gran # (ANC) 7.3         Gran % 68.7         HDL 31  Comment: The National Cholesterol Education Program (NCEP) has set the  following guidelines (reference values) for HDL Cholesterol:  Low...............<40 mg/dL  Optimal...........>60 mg/dL           HDL/Cholesterol Ratio 32.3         Hematocrit 35.9         Hemoglobin 11.5         Immature Grans (Abs) 0.03  Comment: Mild elevation in immature granulocytes is non specific and   can be seen in a variety of conditions including stress response,   acute inflammation, trauma and pregnancy. Correlation with other   laboratory and clinical findings is essential.           Immature Granulocytes 0.3         LDL Cholesterol External 42.4  Comment: The National Cholesterol Education Program (NCEP) has set the  following guidelines (reference values) for LDL Cholesterol:  Optimal.......................<130 mg/dL  Borderline High...............130-159 mg/dL  High..........................160-189 mg/dL  Very  High.....................>190 mg/dL           Lymph # 2.1         Lymph % 19.2         Magnesium 1.8         MCH 28.0         MCHC 32.0         MCV 87         Mono # 1.2         Mono % 11.2         MPV 10.7         Non-HDL Cholesterol 65  Comment: Risk category and Non-HDL cholesterol goals:  Coronary heart disease (CHD)or equivalent (10-year risk of CHD >20%):  Non-HDL cholesterol goal     <130 mg/dL  Two or more CHD risk factors and 10-year risk of CHD <= 20%:  Non-HDL cholesterol goal     <160 mg/dL  0 to 1 CHD risk factor:  Non-HDL cholesterol goal     <190 mg/dL           nRBC 0         Platelets 144         POC BE   6       POC HCO3   30.4       POC PCO2   47.7       POC PH   7.412       POC PO2   55       POC SATURATED O2   88       POC TCO2   32       Potassium 4.0         PROTEIN TOTAL 6.1         RBC 4.11         RDW 14.2         Sample   VENOUS       Sodium 138         Total Cholesterol/HDL Ratio 3.1         Triglycerides 113  Comment: The National Cholesterol Education Program (NCEP) has set the  following guidelines (reference values) for triglycerides:  Normal......................<150 mg/dL  Borderline High.............150-199 mg/dL  High........................200-499 mg/dL           WBC 10.65                 Significant Imaging:  I have reviewed all pertinent imaging results/findings within the past 24 hours.      Assessment/Plan:      * Acute on chronic respiratory failure with hypercapnia  Patient with Hypercapnic Respiratory failure which is Acute on chronic.  he is not on home oxygen. Supplemental oxygen was provided and noted- Oxygen Concentration (%):  [21] 21    .   Signs/symptoms of respiratory failure include- increased work of breathing. Contributing diagnoses includes - SACHA Labs and images were reviewed. Patient Has recent ABG, which has been reviewed. Will treat underlying causes and adjust management of respiratory failure as follows- see snoring  ABG with PCO2 over 45 at baseline as  possible qualifer for cpap/bipap per pulmonary, CM working on this    Snoring  Likely undiagnosed sleep apnea per pulmonary and has appt in sleep medicine in October but will need closer appt for sleep study either home vs in person as review of insurnace reportedly take home sleep studies as qualifiers as well for SACHA diagnosis  -pulm reports that can use the ABG from ICU to try to qualify for home cpap/bipap as his pco2 is over 45 at baseline now and his bm is over 35 and that this may qualify him now without a sleep study given it was taken once he was out of his acute hypercapneic resp failure and this is his chrnoic hypercapeneic resp failure baseline per pulmnary notes. CM belkis working on this      Gastroesophageal reflux disease  Cont pepcid      Primary osteoarthritis of right hip  Admitted to [facility] for elective VITO with dr jain, recs for WBAT and RW BSC for home  -rec for ASA BID for 1 month on dicsharge  -HH recommended pain controlled and drain removed now  -orth ofollowing      Obesity, Class II, BMI 35-39.9  counselled on diet and lifestyle  BMI over 35 a qualifer + his ABG for bipap per pulm      Hyperlipidemia LDL goal <130  Cont home statin      Hypertension  Home bp meds resumed after hypotension post op to 80s with BP to 160s this morning so now back on full home regimen        VTE Risk Mitigation (From admission, onward)         Ordered     enoxaparin injection 40 mg  Daily         06/26/23 1811     IP VTE HIGH RISK PATIENT  Once         06/26/23 1811     Place sequential compression device  Until discontinued         06/26/23 1811                Discharge Planning   TALIA: 6/29/2023     Code Status: Full Code   Is the patient medically ready for discharge?: No    Reason for patient still in hospital (select all that apply): Patient trending condition  Discharge Plan A: Home Health                  Farhana Coe MD  Department of Hospital Medicine   [facility] - Surgery

## 2023-06-28 NOTE — SUBJECTIVE & OBJECTIVE
Interval History/Significant Events: seen this morning with family at bedside. Sitting in chair and feeling well this morning. He reports pain is well controlled now and orthopedics removed drain for him this morning. He reports tht he does have easily awakenings at home or when traveling that he [place] wake up and have a quick jolt awake and hear himself snore briefly that will wake him up his wife rports he does snore at night and have night time awakenings that she has noted. He has not known about sleep apnea in the past but pulmonary ICU doctors feel that he definitely has sleep apnea based on his ABG in the ICU where his initial ABG was showing a pH of 7.19 and a PCO2 of 76 when he had acute hypercapneic respiratory failure immediately post op and that his repeat ABG when he was in his normal state after he had improved and was doing well right before step down was with a resting PCO2 of 47 which is indicative of chronic hypercapneic respiraotry failure. They report that based on his resting steady state ABG in addtion to his BMI >35 that these 2 data points in combination may qualify him for CPAP/BiPAP without a sleep study and to have CM send referrals with this result now to see if this would qualify him for a cpap or bipap now and CM to work on this now to see if this is a qualifier diagnosis. Ortho dr jian updated on this as well and the attempts to be made to try to get this machine for him sooner if we can based on criteria without sleep study per pulm notes. I explained to family in the past has been very hard to get this at times for patients without a sleep study but will try now, they have one lined up for October which is awhile away and CM updated that so hopefully they can help possibly make earlier or look into home sleeep study options as on review of patients insuranc it appears that they do accept home sleep studie as well to qualify for home cpap/bipap      Review of Systems   Constitutional:   Positive for fatigue. Negative for chills and fever.   HENT:  Positive for sore throat. Negative for congestion.    Respiratory:  Positive for cough. Negative for shortness of breath.    Cardiovascular:  Negative for chest pain and palpitations.   Gastrointestinal:  Negative for abdominal pain, nausea and vomiting.   Genitourinary:  Negative for dysuria and frequency.   Musculoskeletal:  Negative for arthralgias and back pain.   Skin:  Negative for rash and wound.   Neurological:  Negative for dizziness and headaches.   Psychiatric/Behavioral:  Negative for agitation and confusion.    Objective:     Vital Signs (Most Recent):  Temp: 98.1 °F (36.7 °C) (06/28/23 1122)  Pulse: 72 (06/28/23 1122)  Resp: 18 (06/28/23 1122)  BP: 125/62 (06/28/23 1122)  SpO2: 95 % (06/28/23 1122) Vital Signs (24h Range):  Temp:  [97.9 °F (36.6 °C)-99.5 °F (37.5 °C)] 98.1 °F (36.7 °C)  Pulse:  [] 72  Resp:  [11-27] 18  SpO2:  [85 %-98 %] 95 %  BP: (125-174)/(62-92) 125/62   Weight: 111.1 kg (245 lb)  Body mass index is 35.15 kg/m².      Intake/Output Summary (Last 24 hours) at 6/28/2023 1345  Last data filed at 6/28/2023 0837  Gross per 24 hour   Intake 240 ml   Output 1150 ml   Net -910 ml            Physical Exam  Vitals reviewed.   Constitutional:       General: He is not in acute distress.     Appearance: He is obese. He is not ill-appearing, toxic-appearing or diaphoretic.      Comments: Sitting up in chair w family   HENT:      Head: Normocephalic and atraumatic.      Mouth/Throat:      Mouth: Mucous membranes are moist.   Eyes:      Extraocular Movements: Extraocular movements intact.      Pupils: Pupils are equal, round, and reactive to light.   Cardiovascular:      Rate and Rhythm: Normal rate and regular rhythm.      Pulses: Normal pulses.      Heart sounds: Normal heart sounds. No murmur heard.    No friction rub. No gallop.   Pulmonary:      Effort: No respiratory distress.      Comments: On bipap  Abdominal:      General:  Bowel sounds are normal.      Palpations: Abdomen is soft.      Tenderness: There is no abdominal tenderness.   Musculoskeletal:      Cervical back: Normal range of motion and neck supple.      Right lower leg: No edema.      Left lower leg: No edema.      Comments: Bandages to rle. Drain removed   Skin:     General: Skin is warm and dry.   Neurological:      General: No focal deficit present.      Mental Status: He is alert and oriented to person, place, and time.      Comments: Awake and alert   Psychiatric:         Mood and Affect: Mood normal.         Behavior: Behavior normal.          Vents:  Oxygen Concentration (%): 21 (06/28/23 0348)  Lines/Drains/Airways       Peripheral Intravenous Line  Duration                  Peripheral IV - Single Lumen 06/27/23 0009 20 G Right Antecubital 1 day                  Significant Labs:    CBC/Anemia Profile:  Recent Labs   Lab 06/27/23  0252 06/28/23  0506   WBC 13.15* 10.65   HGB 11.9* 11.5*   HCT 37.1* 35.9*   * 144*   MCV 88 87   RDW 14.1 14.2          Chemistries:  Recent Labs   Lab 06/27/23  0252 06/28/23  0506    138   K 4.7 4.0    101   CO2 23 28   BUN 24* 20   CREATININE 1.4 1.1   CALCIUM 8.7 9.2   ALBUMIN 3.4* 3.4*   PROT 6.0 6.1   BILITOT 0.4 0.5   ALKPHOS 44* 46*   ALT 13 9*   AST 18 17   MG 1.8 1.8         Recent Lab Results         06/28/23  0506   06/27/23  1836        Albumin 3.4         Alkaline Phosphatase 46         Allens Test   N/A       ALT 9         Anion Gap 9         AST 17         Baso # 0.04         Basophil % 0.4         BILIRUBIN TOTAL 0.5  Comment: For infants and newborns, interpretation of results should be based  on gestational age, weight and in agreement with clinical  observations.    Premature Infant recommended reference ranges:  Up to 24 hours.............<8.0 mg/dL  Up to 48 hours............<12.0 mg/dL  3-5 days..................<15.0 mg/dL  6-29 days.................<15.0 mg/dL           Site   Other       BUN  20         Calcium 9.2         Chloride 101         Cholesterol 96  Comment: The National Cholesterol Education Program (NCEP) has set the  following guidelines (reference ranges) for Cholesterol:  Optimal.....................<200 mg/dL  Borderline High.............200-239 mg/dL  High........................> or = 240 mg/dL           CO2 28         Creatinine 1.1         DelSys   Room Air       Differential Method Automated         eGFR >60.0         Eos # 0.0         Eosinophil % 0.2         Glucose 96         Gran # (ANC) 7.3         Gran % 68.7         HDL 31  Comment: The National Cholesterol Education Program (NCEP) has set the  following guidelines (reference values) for HDL Cholesterol:  Low...............<40 mg/dL  Optimal...........>60 mg/dL           HDL/Cholesterol Ratio 32.3         Hematocrit 35.9         Hemoglobin 11.5         Immature Grans (Abs) 0.03  Comment: Mild elevation in immature granulocytes is non specific and   can be seen in a variety of conditions including stress response,   acute inflammation, trauma and pregnancy. Correlation with other   laboratory and clinical findings is essential.           Immature Granulocytes 0.3         LDL Cholesterol External 42.4  Comment: The National Cholesterol Education Program (NCEP) has set the  following guidelines (reference values) for LDL Cholesterol:  Optimal.......................<130 mg/dL  Borderline High...............130-159 mg/dL  High..........................160-189 mg/dL  Very High.....................>190 mg/dL           Lymph # 2.1         Lymph % 19.2         Magnesium 1.8         MCH 28.0         MCHC 32.0         MCV 87         Mono # 1.2         Mono % 11.2         MPV 10.7         Non-HDL Cholesterol 65  Comment: Risk category and Non-HDL cholesterol goals:  Coronary heart disease (CHD)or equivalent (10-year risk of CHD >20%):  Non-HDL cholesterol goal     <130 mg/dL  Two or more CHD risk factors and 10-year risk of CHD <=  20%:  Non-HDL cholesterol goal     <160 mg/dL  0 to 1 CHD risk factor:  Non-HDL cholesterol goal     <190 mg/dL           nRBC 0         Platelets 144         POC BE   6       POC HCO3   30.4       POC PCO2   47.7       POC PH   7.412       POC PO2   55       POC SATURATED O2   88       POC TCO2   32       Potassium 4.0         PROTEIN TOTAL 6.1         RBC 4.11         RDW 14.2         Sample   VENOUS       Sodium 138         Total Cholesterol/HDL Ratio 3.1         Triglycerides 113  Comment: The National Cholesterol Education Program (NCEP) has set the  following guidelines (reference values) for triglycerides:  Normal......................<150 mg/dL  Borderline High.............150-199 mg/dL  High........................200-499 mg/dL           WBC 10.65                 Significant Imaging:  I have reviewed all pertinent imaging results/findings within the past 24 hours.

## 2023-06-29 ENCOUNTER — TELEPHONE (OUTPATIENT)
Dept: INTERNAL MEDICINE | Facility: CLINIC | Age: 78
End: 2023-06-29
Payer: MEDICARE

## 2023-06-29 VITALS
HEART RATE: 73 BPM | WEIGHT: 245 LBS | TEMPERATURE: 99 F | HEIGHT: 70 IN | SYSTOLIC BLOOD PRESSURE: 135 MMHG | OXYGEN SATURATION: 95 % | BODY MASS INDEX: 35.07 KG/M2 | DIASTOLIC BLOOD PRESSURE: 66 MMHG | RESPIRATION RATE: 17 BRPM

## 2023-06-29 LAB
ALBUMIN SERPL BCP-MCNC: 3.2 G/DL (ref 3.5–5.2)
ALP SERPL-CCNC: 47 U/L (ref 55–135)
ALT SERPL W/O P-5'-P-CCNC: 13 U/L (ref 10–44)
ANION GAP SERPL CALC-SCNC: 9 MMOL/L (ref 8–16)
AST SERPL-CCNC: 23 U/L (ref 10–40)
BASOPHILS # BLD AUTO: 0.05 K/UL (ref 0–0.2)
BASOPHILS NFR BLD: 0.7 % (ref 0–1.9)
BILIRUB SERPL-MCNC: 0.4 MG/DL (ref 0.1–1)
BUN SERPL-MCNC: 16 MG/DL (ref 8–23)
BUN SERPL-MCNC: 19 MG/DL (ref 6–30)
CALCIUM SERPL-MCNC: 8.9 MG/DL (ref 8.7–10.5)
CHLORIDE SERPL-SCNC: 100 MMOL/L (ref 95–110)
CHLORIDE SERPL-SCNC: 97 MMOL/L (ref 95–110)
CO2 SERPL-SCNC: 29 MMOL/L (ref 23–29)
CREAT SERPL-MCNC: 1 MG/DL (ref 0.5–1.4)
CREAT SERPL-MCNC: 1.4 MG/DL (ref 0.5–1.4)
DIFFERENTIAL METHOD: ABNORMAL
EOSINOPHIL # BLD AUTO: 0.1 K/UL (ref 0–0.5)
EOSINOPHIL NFR BLD: 1.7 % (ref 0–8)
ERYTHROCYTE [DISTWIDTH] IN BLOOD BY AUTOMATED COUNT: 14.6 % (ref 11.5–14.5)
EST. GFR  (NO RACE VARIABLE): >60 ML/MIN/1.73 M^2
GLUCOSE SERPL-MCNC: 113 MG/DL (ref 70–110)
GLUCOSE SERPL-MCNC: 222 MG/DL (ref 70–110)
HCT VFR BLD AUTO: 32.4 % (ref 40–54)
HCT VFR BLD CALC: 44 %PCV (ref 36–54)
HGB BLD-MCNC: 10.6 G/DL (ref 14–18)
IMM GRANULOCYTES # BLD AUTO: 0.02 K/UL (ref 0–0.04)
IMM GRANULOCYTES NFR BLD AUTO: 0.3 % (ref 0–0.5)
LYMPHOCYTES # BLD AUTO: 1.8 K/UL (ref 1–4.8)
LYMPHOCYTES NFR BLD: 23.4 % (ref 18–48)
MAGNESIUM SERPL-MCNC: 1.9 MG/DL (ref 1.6–2.6)
MCH RBC QN AUTO: 28.9 PG (ref 27–31)
MCHC RBC AUTO-ENTMCNC: 32.7 G/DL (ref 32–36)
MCV RBC AUTO: 88 FL (ref 82–98)
MONOCYTES # BLD AUTO: 0.9 K/UL (ref 0.3–1)
MONOCYTES NFR BLD: 11.4 % (ref 4–15)
NEUTROPHILS # BLD AUTO: 4.7 K/UL (ref 1.8–7.7)
NEUTROPHILS NFR BLD: 62.5 % (ref 38–73)
NRBC BLD-RTO: 0 /100 WBC
PLATELET # BLD AUTO: 142 K/UL (ref 150–450)
PMV BLD AUTO: 11.3 FL (ref 9.2–12.9)
POC IONIZED CALCIUM: 1.27 MMOL/L (ref 1.06–1.42)
POC TCO2 (MEASURED): ABNORMAL MMOL/L
POTASSIUM BLD-SCNC: 5.2 MMOL/L (ref 3.5–5.1)
POTASSIUM SERPL-SCNC: 3.5 MMOL/L (ref 3.5–5.1)
PROT SERPL-MCNC: 5.8 G/DL (ref 6–8.4)
RBC # BLD AUTO: 3.67 M/UL (ref 4.6–6.2)
SAMPLE: ABNORMAL
SODIUM BLD-SCNC: 140 MMOL/L (ref 136–145)
SODIUM SERPL-SCNC: 138 MMOL/L (ref 136–145)
WBC # BLD AUTO: 7.56 K/UL (ref 3.9–12.7)

## 2023-06-29 PROCEDURE — 93010 EKG 12-LEAD: ICD-10-PCS | Mod: ,,, | Performed by: INTERNAL MEDICINE

## 2023-06-29 PROCEDURE — 85025 COMPLETE CBC W/AUTO DIFF WBC: CPT

## 2023-06-29 PROCEDURE — 25000003 PHARM REV CODE 250

## 2023-06-29 PROCEDURE — 93010 ELECTROCARDIOGRAM REPORT: CPT | Mod: ,,, | Performed by: INTERNAL MEDICINE

## 2023-06-29 PROCEDURE — 93005 ELECTROCARDIOGRAM TRACING: CPT

## 2023-06-29 PROCEDURE — 99239 HOSP IP/OBS DSCHRG MGMT >30: CPT | Mod: ,,, | Performed by: INTERNAL MEDICINE

## 2023-06-29 PROCEDURE — 63600175 PHARM REV CODE 636 W HCPCS

## 2023-06-29 PROCEDURE — 36415 COLL VENOUS BLD VENIPUNCTURE: CPT

## 2023-06-29 PROCEDURE — 25000003 PHARM REV CODE 250: Performed by: INTERNAL MEDICINE

## 2023-06-29 PROCEDURE — 99239 PR HOSPITAL DISCHARGE DAY,>30 MIN: ICD-10-PCS | Mod: ,,, | Performed by: INTERNAL MEDICINE

## 2023-06-29 PROCEDURE — 83735 ASSAY OF MAGNESIUM: CPT

## 2023-06-29 PROCEDURE — 97535 SELF CARE MNGMENT TRAINING: CPT

## 2023-06-29 PROCEDURE — 97530 THERAPEUTIC ACTIVITIES: CPT

## 2023-06-29 PROCEDURE — 80053 COMPREHEN METABOLIC PANEL: CPT

## 2023-06-29 RX ORDER — MAGNESIUM SULFATE 1 G/100ML
1 INJECTION INTRAVENOUS ONCE
Status: COMPLETED | OUTPATIENT
Start: 2023-06-29 | End: 2023-06-29

## 2023-06-29 RX ADMIN — AMLODIPINE BESYLATE 10 MG: 10 TABLET ORAL at 09:06

## 2023-06-29 RX ADMIN — DOCUSATE SODIUM 100 MG: 50 CAPSULE, LIQUID FILLED ORAL at 09:06

## 2023-06-29 RX ADMIN — FAMOTIDINE 20 MG: 20 TABLET ORAL at 09:06

## 2023-06-29 RX ADMIN — HYDROCHLOROTHIAZIDE 25 MG: 25 TABLET ORAL at 09:06

## 2023-06-29 RX ADMIN — METOPROLOL SUCCINATE 50 MG: 50 TABLET, EXTENDED RELEASE ORAL at 09:06

## 2023-06-29 RX ADMIN — MAGNESIUM SULFATE HEPTAHYDRATE 1 G: 500 INJECTION, SOLUTION INTRAMUSCULAR; INTRAVENOUS at 08:06

## 2023-06-29 RX ADMIN — LOSARTAN POTASSIUM 100 MG: 50 TABLET, FILM COATED ORAL at 09:06

## 2023-06-29 NOTE — TELEPHONE ENCOUNTER
Spoke with the pt and informed him he needs to schedule his hospital follow up with his surgeon that did his hip replacement. Pt verbalized understanding.

## 2023-06-29 NOTE — PT/OT/SLP PROGRESS
"Occupational Therapy   Treatment    Name: Zheng Talley Jr.  MRN: [id number]  Admitting Diagnosis:  Primary osteoarthritis of right hip  3 Days Post-Op    Recommendations:     Discharge Recommendations: other (see comments)  Discharge Equipment Recommendations:  bedside commode, walker, rolling  Barriers to discharge:  None    Assessment:     Zheng Talley Jr. is a 78 y.o. male with a medical diagnosis of Primary osteoarthritis of right hip.  He presents with the following performance deficits affecting function are impaired endurance, impaired self care skills, impaired functional mobility, gait instability, impaired balance, decreased ROM, orthopedic precautions. Pt tolerated session well with good participation and motivation to progress. He was able to recall 3/3 anterior hip precautions with good carryover noted during LBD. Pt would continue to benefit from skilled OT services to maximize functional independence with ADLs and functional mobility, reduce caregiver burden, and facilitate safe discharge in the least restrictive environment. OT recommending HH once medically appropriate for discharge.      Rehab Prognosis:  Good; patient would benefit from acute skilled OT services to address these deficits and reach maximum level of function.       Plan:     Patient to be seen 3 x/week to address the above listed problems via self-care/home management, therapeutic activities, therapeutic exercises  Plan of Care Expires: 07/25/23  Plan of Care Reviewed with: patient, daughter    Subjective   "It's only sore today"  Chief Complaint: none  Patient/Family Comments/goals: to d/c home  Pain/Comfort:  Pain Rating 1: 0/10  Pain Rating Post-Intervention 1: 0/10    Objective:     Communicated with: RN prior to session.  Patient found up in chair with telemetry upon OT entry to room.    General Precautions: Standard, fall    Orthopedic Precautions:RLE weight bearing as tolerated, RLE anterior precautions  Braces: " N/A  Respiratory Status: Room air     Occupational Performance:     Bed Mobility:    Not assessed- pt sitting in chair and returned to chair end of session    Functional Mobility/Transfers:  Patient completed Sit <> Stand Transfer with supervision  with  rolling walker   Patient completed Toilet Transfer (BSC over toilet) Step Transfer technique with stand by assistance with  rolling walker  Functional Mobility: Pt engaged in functional mobility to simulate household/community distances 12 ft + 200 ft in room and out in hallway with SBA and RW in order to maximize functional endurance and standing balance required for engagement in occupations of choice   No LOB or SOB noted  Cues for sequencing    Activities of Daily Living:  Upper Body Dressing: modified independence to don overhead shirt siting on chair  Lower Body Dressing: supervision to thread b/l feet through pants and manage above hips and rear with RW; minimum (A) to don (R) shoe at heel however pt able to don/doff socks and don (L) shoe. Hip kit utilized       [facility] 6 Click ADL: 23    Treatment & Education:  -Pt educated to dress surgical site first and further dressing techniques s/p surgery  -Pt educated on hand placement for transfers  -Pt educated on RW placement for ADLs  -Pt educated on proper foot wear s/p surgery  -Pt educated on positioning of sx LE during performance of functional activities vs rest/sleep  -Pt educated on weightbearing and surgical precautions with pt verbalizing 3/3 correctly  -Provided education regarding role of OT, POC, & discharge recommendations with pt & daughter verbalizing understanding.  Pt had no further questions & when asked whether there were any concerns pt reported none.     Patient left up in chair with all lines intact, call button in reach, RN notified, and daughter present    GOALS:   Multidisciplinary Problems       Occupational Therapy Goals          Problem: Occupational Therapy    Goal Priority Disciplines  Outcome Interventions   Occupational Therapy Goal     OT, PT/OT Ongoing, Progressing    Description: Goals to be met by: 7 days  7/4/23     Patient will increase functional independence with ADLs by performing:    Pt to complete standing g/h skills with Supervision.   Pt to complete UE dressing with set-up  Pt to complete LE dressing with set-up with AE as needed.  Pt to complete toileting with SBA  Pt to complete t/f bed, chair and commode with supervision.                        Time Tracking:     OT Date of Treatment: 06/29/23  OT Start Time: 1047  OT Stop Time: 1118  OT Total Time (min): 31 min    Billable Minutes:Self Care/Home Management 15  Therapeutic Activity 16    OT/JAIME: OT     Number of JAIME visits since last OT visit: 1 6/29/2023

## 2023-06-29 NOTE — ASSESSMENT & PLAN NOTE
Zheng Mayank Martinez is a 78 y.o. male s/p R VITO 6/26/23. Transferred to [facility] on 6/26/23 for continuous BiPAP in the ICU after surgery. Stepped down on 6/27/23. Doing well this morning.    Plan:  - Weight bearing status: WBAT RLE  - Pain control: multimodal  - Antibiotics: Ancef complete  - DVT Prophylaxis: ASA 81 mg BID on discharge  - Drain: removed 6/28    Dispo: discharge home

## 2023-06-29 NOTE — PROGRESS NOTES
"[facility] - Surgery  Orthopedics  Progress Note    Patient Name: Zheng Talley Jr.  MRN: [id number]  Admission Date: 6/26/2023  Hospital Length of Stay: 3 days  Attending Provider: Vita Mckeon MD  Primary Care Provider: Gio Rizo MD  Follow-up For: Procedure(s) (LRB):  ARTHROPLASTY, HIP, TOTAL, ANTERIOR APPROACH: RIGHT: HORACE AVENIR & G7: SAME DAY (Right)    Post-Operative Day: 3 Days Post-Op  Subjective:     Principal Problem:Primary osteoarthritis of right hip    Principal Orthopedic Problem: s/p right total hip arthroplasty on 6/26    Interval History: No acute events overnight.  Vitals within normal limits.  Pain well controlled.  Ambulating well with PT. Acute respiratory issues resolved.    Review of patient's allergies indicates:   Allergen Reactions    Iodine and iodide containing products Other (See Comments)     Blood in urine in the 1970s       Current Facility-Administered Medications   Medication    acetaminophen tablet 1,000 mg    amLODIPine tablet 10 mg    docusate sodium capsule 100 mg    enoxaparin injection 40 mg    famotidine tablet 20 mg    hydrALAZINE tablet 50 mg    hydroCHLOROthiazide tablet 25 mg    losartan tablet 100 mg    methocarbamoL tablet 750 mg    metoprolol succinate (TOPROL-XL) 24 hr tablet 50 mg    ondansetron injection 4 mg    polyethylene glycol packet 17 g    pravastatin tablet 40 mg    pregabalin capsule 75 mg    sodium chloride 0.9% flush 10 mL     Objective:     Vital Signs (Most Recent):  Temp: 98.6 °F (37 °C) (06/29/23 1149)  Pulse: 73 (06/29/23 1149)  Resp: 17 (06/29/23 1149)  BP: 135/66 (06/29/23 1149)  SpO2: 95 % (06/29/23 1149) Vital Signs (24h Range):  Temp:  [97.5 °F (36.4 °C)-99 °F (37.2 °C)] 98.6 °F (37 °C)  Pulse:  [69-79] 73  Resp:  [16-18] 17  SpO2:  [93 %-97 %] 95 %  BP: (115-141)/(61-73) 135/66     Weight: 111.1 kg (245 lb)  Height: 5' 10" (177.8 cm)  Body mass index is 35.15 kg/m².      Intake/Output Summary (Last 24 hours) at " 6/29/2023 1351  Last data filed at 6/29/2023 0935  Gross per 24 hour   Intake 720 ml   Output --   Net 720 ml         Ortho/SPM Exam  Gen: NAD, sitting comfortably in bed  CV: regular rate  Resp: non-labored respirations on room air    Right Lower Extremity Exam  - Dressing clean, dry, and intact  - Quad and hip flexor intact  - Compartments soft and compressible  - TA/EHL/Gastroc/FHL assessed in isolation without deficit  - SILT throughout  - DP and PT 2+     Significant Labs: All pertinent labs within the past 24 hours have been reviewed.    Significant Imaging: I have reviewed all pertinent imaging results/findings.    Assessment/Plan:     * Primary osteoarthritis of right hip s/p right VITO on 6/26/2023  Zheng Talley JrManny is a 78 y.o. male s/p R VITO 6/26/23. Transferred to [facility] on 6/26/23 for continuous BiPAP in the ICU after surgery. Stepped down on 6/27/23. Doing well this morning.    Plan:  - Weight bearing status: WBAT RLE  - Pain control: multimodal  - Antibiotics: Ancef complete  - DVT Prophylaxis: ASA 81 mg BID on discharge  - Drain: removed 6/28    Dispo: discharge home            Bhanu Watson MD  Orthopedics  [facility] - Surgery

## 2023-06-29 NOTE — TELEPHONE ENCOUNTER
----- Message from Bianka Baker MA sent at 6/29/2023  1:33 PM CDT -----  Name of Who is Calling: RAYSHAWN ROBBINS JR. [[id number]]                What is the request in detail: Pt needs hosp f/u getting discharged today. Please assist                Can the clinic reply by [facility]: No                What Number to Call Back if not in [facility]: [phone]

## 2023-06-29 NOTE — PROGRESS NOTES
Pt ready for discharge. Discharge instructions given and explained to pt. Pt verbalizes understanding. PIV removed. No further questions from pt at this time. Bipap and walker at bedside. Pt to be discharged via wheelchair once meds are delivered to bedside. Will cont to monitor until discharge.

## 2023-06-29 NOTE — PLAN OF CARE
CHW met with patient/family at bedside. Patient experience rounding completed and reviewed the following.     Do you know your discharge plan? Yes       If yes, what is the plan? Home    If you are discharging home, do you have help at home? Yes     Do you think you will need help at home at discharge? No     Have you discussed your needs and preferences with your SW/CM? Yes    Assigned SW/CM notified of any patient/family needs or concerns.

## 2023-06-29 NOTE — HPI
Mr Talley is a 77 yo male w/ hx of Obesity, HTN , HLD, osteoarthritis of both hips and knees, prostate cancer s/p prostatectomy who presents from the ED after undergoing right total hip arthroplasty this morning at [facility] on 6/26/2023 by Dr. Teofilo Rodrigues. Per chart review patient was not arousable following post op. VBG was obtained and he was acidotic with a pH of 7.0 and a pCO2 of 100. Patient was placed on BiPAP and transferred over to the [facility] emergency department due to concerns of worsening respiratory status.  Upon arrival patient is easily arousable alert and oriented. Patient was also hypotensive with SBP in the 80s, only responsive to pain and apneic. He later received Narcan and became agitated and subsequently given Haldol 5mg. In the ED, somnolent but arousable. WBC of 14, plts 148, HGB 12 and phos 6.5, glucose of 179. Patient was admitted to MICU.   Recent Labs     06/27/23  0635 06/27/23  1836   PH 7.334* 7.412   PCO2 53.8* 47.7*   PO2 71* 55   HCO3 28.6* 30.4*   BE 3 6   POCSATURATED 92* 88*

## 2023-06-29 NOTE — HOSPITAL COURSE
Patient was admitted to medical ICU on 6/26 and placed on continuous BiPAP. Patient improved on BiPAP and able to be weaned off continuous BiPAP. MICU team felt acute hypercapnic respiratory failure related to undiagnosed SACHA in combination with Anesthesia. As patient became mpre awake and alert then PT/OT consulted for patient's new right hip replacement doen on 6/26. Post-op, patient weight bearing as tolerated with anterior hip precautions. Patient placed Lovenox post-op for DVT prophylaxis with plan to discharge on Aspirin 81 mg po BID for 30 days on discharge for long term DVT prophylaxis after hip replacement surgery. Patuient improved and transferred to floor for continued  care on 6/27. Patient did well after transfer to floor an therapy recommended HH PT/OT on discharge. DME also contacted concerning eligibility to discharge on BiPAP to use at home on discharge and patient approved with plan for outpatient sleep study and Pulmonary follow-up as outpatient. Surgical bandage to remain in place to right hip until Orthopedic clinic follow-up. BiPAP delivered to bedside prior to discharge and outpatient Pulmonary clinic follow-up arranged on discharge. Patient discharged on HH PT/OT. Weight bear as tolerated to right lower leg on discharge. Surgical bandage to remain in place to right hip on discharge until Orthopedic clinic follow-up. Pain well controlled on discharge to right hip.

## 2023-06-29 NOTE — SUBJECTIVE & OBJECTIVE
"Principal Problem:Primary osteoarthritis of right hip    Principal Orthopedic Problem: s/p right total hip arthroplasty on 6/26    Interval History: No acute events overnight.  Vitals within normal limits.  Pain well controlled.  Ambulating well with PT. Acute respiratory issues resolved.    Review of patient's allergies indicates:   Allergen Reactions    Iodine and iodide containing products Other (See Comments)     Blood in urine in the 1970s       Current Facility-Administered Medications   Medication    acetaminophen tablet 1,000 mg    amLODIPine tablet 10 mg    docusate sodium capsule 100 mg    enoxaparin injection 40 mg    famotidine tablet 20 mg    hydrALAZINE tablet 50 mg    hydroCHLOROthiazide tablet 25 mg    losartan tablet 100 mg    methocarbamoL tablet 750 mg    metoprolol succinate (TOPROL-XL) 24 hr tablet 50 mg    ondansetron injection 4 mg    polyethylene glycol packet 17 g    pravastatin tablet 40 mg    pregabalin capsule 75 mg    sodium chloride 0.9% flush 10 mL     Objective:     Vital Signs (Most Recent):  Temp: 98.6 °F (37 °C) (06/29/23 1149)  Pulse: 73 (06/29/23 1149)  Resp: 17 (06/29/23 1149)  BP: 135/66 (06/29/23 1149)  SpO2: 95 % (06/29/23 1149) Vital Signs (24h Range):  Temp:  [97.5 °F (36.4 °C)-99 °F (37.2 °C)] 98.6 °F (37 °C)  Pulse:  [69-79] 73  Resp:  [16-18] 17  SpO2:  [93 %-97 %] 95 %  BP: (115-141)/(61-73) 135/66     Weight: 111.1 kg (245 lb)  Height: 5' 10" (177.8 cm)  Body mass index is 35.15 kg/m².      Intake/Output Summary (Last 24 hours) at 6/29/2023 1351  Last data filed at 6/29/2023 0935  Gross per 24 hour   Intake 720 ml   Output --   Net 720 ml          Ortho/SPM Exam  Gen: NAD, sitting comfortably in bed  CV: regular rate  Resp: non-labored respirations on room air    Right Lower Extremity Exam  - Dressing clean, dry, and intact  - Quad and hip flexor intact  - Compartments soft and compressible  - TA/EHL/Gastroc/FHL assessed in isolation without deficit  - SILT " throughout  - DP and PT 2+     Significant Labs: All pertinent labs within the past 24 hours have been reviewed.    Significant Imaging: I have reviewed all pertinent imaging results/findings.

## 2023-06-30 ENCOUNTER — CLINICAL SUPPORT (OUTPATIENT)
Dept: ORTHOPEDICS | Facility: CLINIC | Age: 78
End: 2023-06-30
Payer: MEDICARE

## 2023-06-30 ENCOUNTER — PATIENT OUTREACH (OUTPATIENT)
Dept: ADMINISTRATIVE | Facility: CLINIC | Age: 78
End: 2023-06-30
Payer: MEDICARE

## 2023-06-30 DIAGNOSIS — Z96.649 STATUS POST HIP REPLACEMENT, UNSPECIFIED LATERALITY: Primary | ICD-10-CM

## 2023-06-30 PROCEDURE — G0180 MD CERTIFICATION HHA PATIENT: HCPCS | Mod: ,,, | Performed by: ORTHOPAEDIC SURGERY

## 2023-06-30 PROCEDURE — 99499 NO LOS: ICD-10-PCS | Mod: 95,,, | Performed by: ORTHOPAEDIC SURGERY

## 2023-06-30 PROCEDURE — 99499 UNLISTED E&M SERVICE: CPT | Mod: 95,,, | Performed by: ORTHOPAEDIC SURGERY

## 2023-06-30 PROCEDURE — G0180 PR HOME HEALTH MD CERTIFICATION: ICD-10-PCS | Mod: ,,, | Performed by: ORTHOPAEDIC SURGERY

## 2023-06-30 NOTE — ASSESSMENT & PLAN NOTE
Patient with Hypercapnic Respiratory failure which is Acute on chronic. He is not on home oxygen.   .   Signs/symptoms of respiratory failure include- increased work of breathing now resolved. 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 Labs and images were reviewed. Patient Has recent ABG, which has been reviewed. Will treat underlying causes and adjust management of respiratory failure as follows:   ABG with PCO2 over 45 at baseline as possible qualifer for home BiPAP per Pulmonary, CM working on this.  Patient discharge home with home BiPAP. Patient referred to Pulmonary clinic outpatient for further evaluation of chronic hypercapnia likely related to SACHA/obesity hypoventilation syndrome. Patient likely will need outpatient sleep study as well as outpatient PFT's.

## 2023-06-30 NOTE — ASSESSMENT & PLAN NOTE
· Patient underwent elective right VITO at [facility] for osteoarthritis by Dr. Rodrigues on 6/26. Post-op, patient lethargic and difficult to arouse and ABG showed significant hypercapnia so transferred to [facility] and admitted to SICU post-op for continuous BiPAP. Patient improved on BiPAP.  · PT/OT consulted and worked with patient in hospital and patient progressed well with therapy in hospital who recommended DME and  PT/OT on discharge and arranged prior to discharge.  · Patient to maintain weight bear as tolerated to right lower extremity with anterior hip precautions on discharge.  · Surgical bandage to remain in place to right hip on discharge until Orthopedic clinic follow-up and given discharge instructions by Orthopedics.   · Pain well controlled on discharge.  · Patient placed on Aspirin 81 mg po BID for DVT prophylaxis in hospital and to continue on hospital discharge.

## 2023-06-30 NOTE — ASSESSMENT & PLAN NOTE
· Likely undiagnosed sleep apnea per Pulmonary and has appointment in Sleep medicine in October but will need closer appoinment for sleep study either home vs. in person as review of insurance reportedly take home sleep studies as qualifiers as well for SACHA diagnosis.  · Pulmonary reports that can use the ABG from ICU to try to qualify for home BiPAP as his PCO2 is over 45 at baseline now and his BMI is over 35 and will qualify him now without a sleep study given it was taken once he was out of his acute hypercapneic resp failure and this is his chronic hypercapnic respiratory failure baseline per Pulmonary notes. Patient did qualify and home BiPAP arranged on discharge and machine delivered to hospital prior to discharge.

## 2023-06-30 NOTE — ASSESSMENT & PLAN NOTE
Chronic and controlled. Patient to discharge home on Norvasc 10 mg po daily + Losartan/HCTZ 100/25 1 tablet po daily + Toprol XL 50 mg po daily as taking prior to this admit with no changes.

## 2023-06-30 NOTE — DISCHARGE SUMMARY
[facility] Medicine  Discharge Summary      Patient Name: Zheng Talley Jr.  MRN: [id number]  SANJUANA: [id number]  Patient Class: IP- Inpatient  Admission Date: 6/26/2023  Hospital Length of Stay: 3 days  Discharge Date and Time: 6/29/2023  2:25 PM  Attending Physician: Vita Mckeon MD   Discharging Provider: Vita Mckeon MD  Primary Care Provider: Gio Rizo MD  [facility] Medicine Team: [facility]OSP MED  Vita Mckeon MD  Primary Care Team: [facility] HOSP MED     HPI:   Mr Talley is a 79 yo male w/ hx of Obesity, HTN , HLD, osteoarthritis of both hips and knees, prostate cancer s/p prostatectomy who presents from the ED after undergoing right total hip arthroplasty this morning at [facility] on 6/26/2023 by Dr. Teofilo Rodrigues. Per chart review patient was not arousable following post op. VBG was obtained and he was acidotic with a pH of 7.0 and a pCO2 of 100. Patient was placed on BiPAP and transferred over to the [facility] emergency department due to concerns of worsening respiratory status.  Upon arrival patient is easily arousable alert and oriented. Patient was also hypotensive with SBP in the 80s, only responsive to pain and apneic. He later received Narcan and became agitated and subsequently given Haldol 5mg. In the ED, somnolent but arousable. WBC of 14, plts 148, HGB 12 and phos 6.5, glucose of 179. Patient was admitted to MICU.   Recent Labs     06/27/23  0635 06/27/23  1836   PH 7.334* 7.412   PCO2 53.8* 47.7*   PO2 71* 55   HCO3 28.6* 30.4*   BE 3 6   POCSATURATED 92* 88*       6/26/2023  Procedure(s) (LRB):  ARTHROPLASTY, HIP, TOTAL, ANTERIOR APPROACH: RIGHT: HORACE AVENIR & G7: SAME DAY (Right)    Surgeon(s):  Teofilo Rodrigues MD      Hospital Course:   Patient was admitted to medical ICU on 6/26 and placed on continuous BiPAP. Patient improved on BiPAP and able to be weaned off continuous BiPAP. MICU team felt acute hypercapnic respiratory failure related to  undiagnosed SACHA in combination with Anesthesia. As patient became mpre awake and alert then PT/OT consulted for patient's new right hip replacement doen on 6/26. Post-op, patient weight bearing as tolerated with anterior hip precautions. Patient placed Lovenox post-op for DVT prophylaxis with plan to discharge on Aspirin 81 mg po BID for 30 days on discharge for long term DVT prophylaxis after hip replacement surgery. Patuient improved and transferred to floor for continued  care on 6/27. Patient did well after transfer to floor an therapy recommended HH PT/OT on discharge. DME also contacted concerning eligibility to discharge on BiPAP to use at home on discharge and patient approved with plan for outpatient sleep study and Pulmonary follow-up as outpatient. Surgical bandage to remain in place to right hip until Orthopedic clinic follow-up. BiPAP delivered to bedside prior to discharge and outpatient Pulmonary clinic follow-up arranged on discharge. Patient discharged on HH PT/OT. Weight bear as tolerated to right lower leg on discharge. Surgical bandage to remain in place to right hip on discharge until Orthopedic clinic follow-up. Pain well controlled on discharge to right hip.        Goals of Care Treatment Preferences:  Code Status: Full Code      Consults:     Pulmonary  Acute on chronic respiratory failure with hypercapnia  Patient with Hypercapnic Respiratory failure which is Acute on chronic. He is not on home oxygen.   .   Signs/symptoms of respiratory failure include- increased work of breathing now resolved. 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 Labs and images were reviewed. Patient Has recent ABG, which has been reviewed. Will treat underlying causes and adjust management of respiratory failure as follows:   ABG with PCO2 over 45 at baseline as possible qualifer for home BiPAP per Pulmonary, CM working on this.  Patient discharge home with home BiPAP. Patient referred to Pulmonary clinic  outpatient for further evaluation of chronic hypercapnia likely related to SACHA/obesity hypoventilation syndrome. Patient likely will need outpatient sleep study as well as outpatient PFT's.     Cardiac/Vascular  Hyperlipidemia LDL goal <130  Chronic and controlled. Continue home Pravachol 40 mg po daily to treat on discharge.       Primary hypertension  Chronic and controlled. Patient to discharge home on Norvasc 10 mg po daily + Losartan/HCTZ 100/25 1 tablet po daily + Toprol XL 50 mg po daily as taking prior to this admit with no changes.       Endocrine  Obesity, Class II, BMI 35-39.9  Patient's Body mass index is 35.15 kg/m². on admit. Patient counseled on need for weight loss. Patient counseled on risks of increased mortality related to obesity and increased risk of diabetes, hypertension, pulmonary and breathing problems, arthritis from degeneration of joints, skin ulcers and infections and heart disease if does not have significant weight loss. Discussed with patient need for outpatient follow-up with primary care physician to assess best strategy for patient to assist in weight loss.      GI  Gastroesophageal reflux disease without esophagitis  Chronic and controlled.       Orthopedic  * Primary osteoarthritis of right hip s/p right VITO on 6/26/2023  Patient underwent elective right VITO at [facility] for osteoarthritis by Dr. Rodrigues on 6/26. Post-op, patient lethargic and difficult to arouse and ABG showed significant hypercapnia so transferred to [facility] and admitted to SICU post-op for continuous BiPAP. Patient improved on BiPAP.  PT/OT consulted and worked with patient in hospital and patient progressed well with therapy in hospital who recommended DME and  PT/OT on discharge and arranged prior to discharge.  Patient to maintain weight bear as tolerated to right lower extremity with anterior hip precautions on discharge.  Surgical bandage to remain in place to right hip on discharge until Orthopedic  clinic follow-up and given discharge instructions by Orthopedics.   Pain well controlled on discharge.  Patient placed on Aspirin 81 mg po BID for DVT prophylaxis in hospital and to continue on hospital discharge.       Other  Snoring  Likely undiagnosed sleep apnea per Pulmonary and has appointment in Sleep medicine in October but will need closer appoinment for sleep study either home vs. in person as review of insurance reportedly take home sleep studies as qualifiers as well for SACHA diagnosis.  Pulmonary reports that can use the ABG from ICU to try to qualify for home BiPAP as his PCO2 is over 45 at baseline now and his BMI is over 35 and will qualify him now without a sleep study given it was taken once he was out of his acute hypercapneic resp failure and this is his chronic hypercapnic respiratory failure baseline per Pulmonary notes. Patient did qualify and home BiPAP arranged on discharge and machine delivered to hospital prior to discharge.         Final Active Diagnoses:    Diagnosis Date Noted POA    PRINCIPAL PROBLEM:  Primary osteoarthritis of right hip s/p right VITO on 6/26/2023 [M16.11] 05/22/2023 Yes    Acute on chronic respiratory failure with hypercapnia [J96.22] 06/26/2023 Yes    Snoring [R06.83] 06/08/2023 Yes    Obesity, Class II, BMI 35-39.9 [E66.9] 12/15/2014 Yes    Primary hypertension [I10]  Yes    Hyperlipidemia LDL goal <130 [E78.5] 06/24/2013 Yes    Gastroesophageal reflux disease without esophagitis [K21.9] 06/07/2023 Yes      Problems Resolved During this Admission:       Discharged Condition: good    Disposition: Home-Health Care [facility]    Follow Up:     Future Appointments   Date Time Provider Department Center   7/10/2023  5:30 PM Korey Escalante PA-C [facility] SLEEP [facility]   7/11/2023 10:20 AM Avelina Rivera NP [facility] ORTHO [address]   9/18/2023 10:00 AM LAB, [facility] LABDRAW [facility]   9/18/2023 11:00 AM Greg Kearns Jr., MD [facility] RAD ONC [facility]   9/22/2023  10:45 AM Derrick Wu MD [facility] UROLOGY [facility]       Patient Instructions:      BIPAP FOR HOME USE     Order Specific Question Answer Comments   Length of need (1-99 months): 99    Type:  BiPap    BiPap setting (cmH20) Inspiratory: 14    BiPap setting (cmH20) Expiratory: 5    Humidification (): Heated    Choose ONE mask type and its corresponding cushions and/or pillows:  Full Face Mask, 1 per 90 days:  Full Face Cushion, (3 per 90 days)    Choose EITHER Heated or Non-Heated Tubjing  Non-Heated Tubing, 1 per 90 days    All other supplies as needed as listed below:  Headgear, 1 per 180 days    All other supplies as needed as listed below:  Chin Strap, 1 per 180 days    All other supplies as needed as listed below:  Non-Disposable Filter, 1 per 180 days    All other supplies as needed as listed below:  Disposable Filter, 6 per 90 days    All other supplies as needed as listed below:  Humidifier Chamber, 1 per 180 days    All other supplies as needed as listed below:  Exhalation Port, contact payer for quantity/frequency      Ambulatory referral/consult to Pulmonary Disease Management w/ Respiratory Therapist   Standing Status: Future   Referral Priority: Routine Referral Type: Consultation   Referral Reason: Specialty Services Required   Requested Specialty: Pulmonary Disease   Number of Visits Requested: 1     Diet Adult Regular     Activity as tolerated     Lifting restrictions   Order Comments: No strenuous exercise or lifting of > 10 lbs     Weight bearing as tolerated     No driving, operating heavy equipment or signing legal documents while taking pain medication     Leave dressing on - Keep it clean, dry, and intact until clinic visit   Order Comments: Do not remove surgical dressing for 2 weeks post-op. This will be done only by MD at initial post-op visit. If dressing is completely saturated, replace with identical dressing - silver-impregnated  hydrocolloid dressing.    Do not get dressings wet. Do not shower.    If dressing continues to be saturated or there are signs of infection, please call [facility] [phone] for further instructions and to make appt to be seen.     Call MD for: fluid/liquid/drainage on dressing     Call MD for:  temperature >100.4     Call MD for:  persistent nausea and vomiting     Call MD for:  severe uncontrolled pain     Call MD for:  difficulty breathing, headache or visual disturbances     Call MD for:  redness, tenderness, or signs of infection (pain, swelling, redness, odor or green/yellow discharge around incision site)     Call MD for:  hives     Call MD for:  persistent dizziness or light-headedness     Call MD for:  extreme fatigue     Notify your health care provider if you experience any of the following:  temperature >100.4     Notify your health care provider if you experience any of the following:  persistent nausea and vomiting or diarrhea     Notify your health care provider if you experience any of the following:  severe uncontrolled pain     Notify your health care provider if you experience any of the following:  redness, tenderness, or signs of infection (pain, swelling, redness, odor or green/yellow discharge around incision site)     Notify your health care provider if you experience any of the following:  difficulty breathing or increased cough     Notify your health care provider if you experience any of the following:  severe persistent headache     Notify your health care provider if you experience any of the following:  worsening rash     Notify your health care provider if you experience any of the following:  persistent dizziness, light-headedness, or visual disturbances     Notify your health care provider if you experience any of the following:  increased confusion or weakness     Leave dressing on - Keep it clean, dry, and intact until clinic visit     Ok to shower at home, running water only,  towel dry, use shower chair for safety, no soaking in a tub or pool for one month.     Activity as tolerated     Weight bearing restrictions (specify):   Order Comments: Weight bear as tolerated with anterior hip precautions       Significant Diagnostic Studies:  Hemoglobin   Date Value Ref Range Status   06/29/2023 10.6 (L) 14.0 - 18.0 g/dL Final   06/28/2023 11.5 (L) 14.0 - 18.0 g/dL Final   06/27/2023 11.9 (L) 14.0 - 18.0 g/dL Final   06/26/2023 12.2 (L) 14.0 - 18.0 g/dL Final   06/08/2023 16.0 14.0 - 18.0 g/dL Final     POC Hematocrit   Date Value Ref Range Status   06/26/2023 40 36 - 54 %PCV Final   06/26/2023 44 36 - 54 %PCV Final     Hematocrit   Date Value Ref Range Status   06/29/2023 32.4 (L) 40.0 - 54.0 % Final   06/28/2023 35.9 (L) 40.0 - 54.0 % Final   06/27/2023 37.1 (L) 40.0 - 54.0 % Final   06/26/2023 39.0 (L) 40.0 - 54.0 % Final   06/08/2023 49.9 40.0 - 54.0 % Final       Pending Diagnostic Studies:       None           Medications:  Reconciled Home Medications:      Medication List        CONTINUE taking these medications      acetaminophen 650 MG Tbsr  Commonly known as: TYLENOL  Take 1 tablet (650 mg total) by mouth every 8 (eight) hours.     amLODIPine 10 MG tablet  Commonly known as: NORVASC  TAKE 1 TABLET ONCE DAILY     aspirin 81 MG EC tablet  Commonly known as: ECOTRIN  Take 1 tablet (81 mg total) by mouth 2 (two) times a day.     docusate sodium 100 MG capsule  Commonly known as: COLACE  Take 1 capsule (100 mg total) by mouth 2 (two) times daily.     losartan-hydrochlorothiazide 100-25 mg 100-25 mg per tablet  Commonly known as: HYZAAR  TAKE 1 TABLET ONCE DAILY     metoprolol succinate 50 MG 24 hr tablet  Commonly known as: TOPROL-XL  TAKE 1 TABLET ONCE DAILY     oxyCODONE 5 MG immediate release tablet  Commonly known as: ROXICODONE  Take 1 tablet every 4-6 hours as needed     pravastatin 40 MG tablet  Commonly known as: PRAVACHOL  Take 1 tablet (40 mg total) by mouth every evening.      tadalafiL 20 MG Tab  Commonly known as: CIALIS  Take 1 tablet (20 mg total) by mouth daily as needed.     valACYclovir 1000 MG tablet  Commonly known as: VALTREX  Take 1 tablet (1,000 mg total) by mouth once daily.            STOP taking these medications      methocarbamoL 750 MG Tab  Commonly known as: ROBAXIN              Indwelling Lines/Drains at time of discharge:   Lines/Drains/Airways       None                   Time spent on the discharge of patient: 31 minutes         Vita Mckeon MD  Department of Hospital Medicine  [facility] - Surgery

## 2023-06-30 NOTE — ASSESSMENT & PLAN NOTE
Patient's Body mass index is 35.15 kg/m². on admit. Patient counseled on need for weight loss. Patient counseled on risks of increased mortality related to obesity and increased risk of diabetes, hypertension, pulmonary and breathing problems, arthritis from degeneration of joints, skin ulcers and infections and heart disease if does not have significant weight loss. Discussed with patient need for outpatient follow-up with primary care physician to assess best strategy for patient to assist in weight loss.

## 2023-06-30 NOTE — PROGRESS NOTES
I called the patient today regarding his surgery with Dr. Teofilo Rodrigues . The patient had a right anterior VITO on 6/26.     Pain Scale: 0 / 10    Any issues with Fever: No.    Any issues with medications (specifically DVT prophylaxis): No. ASA 81 BID    Any issues with bowel movements:  Passing sukhwinder: No.                                                                 Urination: No.                                                                 Constipation: No. Discussed OTC laxatives    Completing at home exercises: Yes:     Any concerns regarding their dressing/bandage:  No.    Patient confirmed first HH-PT appointment:  HHPT on  6/30 at am.     Any other concerns: No.        The patient was informed that if they have any urgent issues with their bandage, medications or any other health concerns regarding their surgery to call the 24/7 Orthopedic Post-op Hot Line at [phone]. The patient was reminded that if they have any chest pain or shortness of breath to call 911 or go to the ER.    The patient verbalized understanding and does not have any other questions

## 2023-06-30 NOTE — PLAN OF CARE
[facility] - [facility]  Discharge Final Note    Primary Care Provider: Gio Rizo MD    Expected Discharge Date: 6/29/2023    Final Discharge Note (most recent)       Final Note - 06/29/23 1425          Final Note    Assessment Type Final Discharge Note     Anticipated Discharge Disposition Home-Health Care [facility]   [facility]    Hospital Resources/Appts/Education Provided Provided patient/caregiver with written discharge plan information;Appointments scheduled by Navigator/Coordinator                   Future Appointments   Date Time Provider Department Center   7/10/2023  5:30 PM Korey Escalante PA-C [facility] SLEEP [facility]   7/11/2023 10:20 AM Avelina Rivera NP [facility] ORTHO [facility]   9/18/2023 10:00 AM LAB, [facility] LABDRAW [facility]   9/18/2023 11:00 AM Greg Kearns Jr., MD [facility] RAD ONC [facility]   9/22/2023 10:45 AM Derrick Wu MD [facility] UROLOGY [facility]     Patient discharged with [facility] and Bipap/RW from ODME    Important Message from Medicare  Important Message from Medicare regarding Discharge Appeal Rights: Explained to patient/caregiver, Signed/date by patient/caregiver, Given to patient/caregiver     Date IMM was signed: 06/29/23  Time IMM was signed: [id number]

## 2023-07-01 DIAGNOSIS — I10 ESSENTIAL HYPERTENSION: ICD-10-CM

## 2023-07-03 RX ORDER — VALACYCLOVIR HYDROCHLORIDE 1 G/1
1000 TABLET, FILM COATED ORAL DAILY
Qty: 90 TABLET | Refills: 3 | Status: SHIPPED | OUTPATIENT
Start: 2023-07-03

## 2023-07-08 ENCOUNTER — PATIENT MESSAGE (OUTPATIENT)
Dept: ADMINISTRATIVE | Facility: OTHER | Age: 78
End: 2023-07-08
Payer: MEDICARE

## 2023-07-10 ENCOUNTER — OFFICE VISIT (OUTPATIENT)
Dept: SLEEP MEDICINE | Facility: CLINIC | Age: 78
End: 2023-07-10
Attending: FAMILY MEDICINE
Payer: MEDICARE

## 2023-07-10 DIAGNOSIS — R06.83 SNORING: ICD-10-CM

## 2023-07-10 DIAGNOSIS — J96.22 ACUTE ON CHRONIC RESPIRATORY FAILURE WITH HYPERCAPNIA: ICD-10-CM

## 2023-07-10 DIAGNOSIS — G47.33 OSA (OBSTRUCTIVE SLEEP APNEA): Primary | ICD-10-CM

## 2023-07-10 PROCEDURE — 99214 OFFICE O/P EST MOD 30 MIN: CPT | Mod: 95,,, | Performed by: PHYSICIAN ASSISTANT

## 2023-07-10 PROCEDURE — 99214 PR OFFICE/OUTPT VISIT, EST, LEVL IV, 30-39 MIN: ICD-10-PCS | Mod: 95,,, | Performed by: PHYSICIAN ASSISTANT

## 2023-07-10 NOTE — PROGRESS NOTES
The chief complaint leading to consultation is: sleep problems    Visit type: audiovisual     The patient location is: [place]    19 minutes of total time spent on the encounter, which includes face to face time and non-face to face time preparing to see the patient (eg, review of tests), Obtaining and/or reviewing separately obtained history, Documenting clinical information in the electronic or other health record, Independently interpreting results (not separately reported) and communicating results to the patient/family/caregiver, or Care coordination (not separately reported).     Each patient to whom he or she provides medical services by telemedicine is:  (1) informed of the relationship between the physician and patient and the respective role of any other health care provider with respect to management of the patient; and (2) notified that he or she may decline to receive medical services by telemedicine and may withdraw from such care at any time.          Referred by Gio Rizo*     NEW PATIENT VISIT    Zheng Talley Jr.  is a pleasant 78 y.o. male  with PMH significant for HTN, HLD, acute on chronic respiratory failure with hypercapnia, chronic pain disorder, ED, prostate Ca, HSV, GERD, BMI 35+ who presents for evaluation of snoring.      Went in for hip surgery and was told he had sleep apnea during the procedure. Received BiPAP machine from hospital on 6/29/23, but states he never had dx study completed. Here today to establish care with sleep medicine.       PAP history   Problems    Mask    Pressure 14/5   DME HME   Machine age AiCurve 10 Vauto 6/29/23   Download 7/11/23: 10/10 x 7hrs 27mins, IPAP 14, EPAP 5, leak (1, 8.8/55.7), AHI 41.2, c2.2       Sleep Clinic New Patient 7/4/2023   What time do you go to bed on a week day? (Give a range) Late after 12 midnight   What time do you go to bed on a day off? (Give a range) Afte 12 midnight   How long does it take you to fall asleep? (Give a  range) 5 to 10 minutes   On average, how many times per night do you wake up? 2 to 3 times   How long does it take you to fall back into sleep? (Give a range) 5 minutes, based on circumstances.   What time do you wake up to start your day on a week day? (Give a range) between 5 and 6 AM   What time do you wake up to start your day on a day off? (Give a range) between 6 and 7AM   What time do you get out of bed? (Give a range) It varies, sometimes 7am, 8 am   On average, how many hours do you sleep? 4 to 5 hours per night   On average, how many naps do you take per day? none   Rate your sleep quality from 0 to 5 (0-poor, 5-great). 3   Have you experienced:  N/a   How much weight have you lost or gained (in lbs.) in the last year? lost 10 lbs   On average, how many times per night do you go to the bathroom?  2 to 3 times   Have you ever had a sleep study/CPAP machine/surgery for sleep apnea? No   Have you ever had a CPAP machine for sleep apnea? No   Have you ever had surgery for sleep apnea? No     Sleep Clinic ROS  7/4/2023   Difficulty breathing through the nose?  No   Sore throat or dry mouth in the morning? No   Irregular or very fast heart beat?  No   Shortness of breath?  No   Acid reflux? No   Body aches and pains?  No   Morning headaches? No   Dizziness? No   Mood changes?  No   Do you exercise?  Sometimes   Do you feel like moving your legs a lot?  Yes       Past Medical History:   Diagnosis Date    Arthritis     BPH (benign prostatic hyperplasia)     Cancer     prostate    Cataract     Groin pain     History of gastroesophageal reflux (GERD)     Hyperlipidemia     Hypertension     Nuclear sclerosis - Both Eyes 02/18/2014    Renal insufficiency 6/9/2023     Patient Active Problem List   Diagnosis    Primary hypertension    Colon polyps    External hemorrhoids    Trigger finger    HSV infection    Knee pain    Erectile dysfunction    Hyperlipidemia LDL goal <130    Nuclear sclerosis - Both Eyes    Nocturia     Severe obesity with body mass index (BMI) of 36.0 to 36.9 with serious comorbidity    Obesity, Class II, BMI 35-39.9    Prostate cancer    Open displaced fracture of distal phalanx of right thumb    Laceration of finger nail bed, initial encounter    Adrenal nodule    Screening for colon cancer    Right knee DJD    Spondylosis of lumbar spine    Chronic pain disorder    Primary osteoarthritis of right knee    Carpal tunnel syndrome of left wrist    Wrist stiffness, left    Primary osteoarthritis of both hips    Primary osteoarthritis of right hip s/p right VITO on 6/26/2023    Gastroesophageal reflux disease without esophagitis    Snoring    Renal insufficiency    Acute on chronic respiratory failure with hypercapnia       Current Outpatient Medications:     acetaminophen (TYLENOL) 650 MG TbSR, Take 1 tablet (650 mg total) by mouth every 8 (eight) hours., Disp: 120 tablet, Rfl: 0    amLODIPine (NORVASC) 10 MG tablet, TAKE 1 TABLET ONCE DAILY, Disp: 90 tablet, Rfl: 0    aspirin (ECOTRIN) 81 MG EC tablet, Take 1 tablet (81 mg total) by mouth 2 (two) times a day., Disp: 60 tablet, Rfl: 0    docusate sodium (COLACE) 100 MG capsule, Take 1 capsule (100 mg total) by mouth 2 (two) times daily., Disp: 60 capsule, Rfl: 0    losartan-hydrochlorothiazide 100-25 mg (HYZAAR) 100-25 mg per tablet, TAKE 1 TABLET ONCE DAILY, Disp: 90 tablet, Rfl: 0    metoprolol succinate (TOPROL-XL) 50 MG 24 hr tablet, TAKE 1 TABLET ONCE DAILY, Disp: 90 tablet, Rfl: 0    oxyCODONE (ROXICODONE) 5 MG immediate release tablet, Take 1 tablet every 4-6 hours as needed, Disp: 30 tablet, Rfl: 0    pravastatin (PRAVACHOL) 40 MG tablet, Take 1 tablet (40 mg total) by mouth every evening., Disp: 90 tablet, Rfl: 3    tadalafiL (CIALIS) 20 MG Tab, Take 1 tablet (20 mg total) by mouth daily as needed., Disp: 12 tablet, Rfl: 11    valACYclovir (VALTREX) 1000 MG tablet, Take 1 tablet (1,000 mg total) by mouth once daily., Disp: 90 tablet, Rfl: 3     There were no  vitals filed for this visit.  Physical Exam:    GEN:   Well-appearing  Psych:  Appropriate affect, demonstrates insight  SKIN:  No rash on the face or bridge of the nose      LABS:   Lab Results   Component Value Date    HGB 10.6 (L) 06/29/2023    CO2 29 06/29/2023       RECORDS REVIEWED PREVIOUSLY:    No prior sleep testing.    ASSESSMENT    EPWORTH SLEEPINESS SCALE 7/4/2023   Sitting and reading 0   Watching TV 1   Sitting, inactive in a public place (e.g. a theatre or a meeting) 0   As a passenger in a car for an hour without a break 1   Lying down to rest in the afternoon when circumstances permit 1   Sitting and talking to someone 0   Sitting quietly after a lunch without alcohol 0   In a car, while stopped for a few minutes in traffic 0   Total score 3     PROBLEM DESCRIPTION/ Sx on Presentation  STATUS   sx SACHA   + snoring, + arousals, + witnessed apneas     New   Daytime Sx   Occasional sleepiness when inactive   ESS 3/24 on intake  New   Insomnia   Trouble falling asleep: no  Maintenance:         wakes frequently, not usually difficult to return to sleep  Prior hypnotics:        Current hypnotics:     New   Nocturia   x 2-3 per sleep period  New   Other issues:     PLAN     -recommend sleep testing   -PSG ordered  -discussed trial therapy if SACHA present and the patient is open to a trial of CPAP/BiPAP therapy  -discussed SACHA and CPAP/BiPAP with patient in detail, including possible complications of untreated SACHA like heart attack/stroke  -advised on strict driving precautions; advised never to drive drowsy    Advised on plan of care. Answered all patient questions. Patient verbalized understanding and voiced agreement with plan of care.       RTC if dx of SACHA made and CPAP ordered, will need follow up 31-90 days after receiving machine for compliance      The patient was given open opportunity to ask questions and/or express concerns about treatment plan. All questions/concerns were discussed.     Two  patient identifiers used prior to evaluation.

## 2023-07-11 ENCOUNTER — OFFICE VISIT (OUTPATIENT)
Dept: ORTHOPEDICS | Facility: CLINIC | Age: 78
End: 2023-07-11
Payer: MEDICARE

## 2023-07-11 VITALS — WEIGHT: 256.38 LBS | BODY MASS INDEX: 36.7 KG/M2 | HEIGHT: 70 IN

## 2023-07-11 DIAGNOSIS — Z96.641 STATUS POST RIGHT HIP REPLACEMENT: Primary | ICD-10-CM

## 2023-07-11 PROCEDURE — 99999 PR PBB SHADOW E&M-EST. PATIENT-LVL III: CPT | Mod: PBBFAC,,, | Performed by: NURSE PRACTITIONER

## 2023-07-11 PROCEDURE — 99213 OFFICE O/P EST LOW 20 MIN: CPT | Mod: PBBFAC | Performed by: NURSE PRACTITIONER

## 2023-07-11 PROCEDURE — 99999 PR PBB SHADOW E&M-EST. PATIENT-LVL III: ICD-10-PCS | Mod: PBBFAC,,, | Performed by: NURSE PRACTITIONER

## 2023-07-11 PROCEDURE — 99024 POSTOP FOLLOW-UP VISIT: CPT | Mod: POP,,, | Performed by: NURSE PRACTITIONER

## 2023-07-11 PROCEDURE — 99024 PR POST-OP FOLLOW-UP VISIT: ICD-10-PCS | Mod: POP,,, | Performed by: NURSE PRACTITIONER

## 2023-07-11 NOTE — PROGRESS NOTES
"Zheng Talley Jr. presents for initial post-operative visit following a right total hip arthroplasty performed by Dr. Rodrigues on 6/26/2023    Exam:  Height 5' 10" (1.778 m), weight 116.3 kg (256 lb 6.3 oz).   Patient is ambulating well with cane for long distances   Incision is clean and dry without drainage or erythema.      Initial post-operative radiographs reviewed today revealing satisfactory position of the prosthesis.    A/P  2 weeks s/p right total hip replacement  - Patient advised to keep the incision clean and dry for the next 24 hours after which he  may wash the area with antibacterial soap in the shower, but will not submerge incision until one month post op.  - Antibiotic prophylaxis reviewed  - Continue aspirin for 1 month post op  - Pain medication refill not needed. Pt denies pain since surgery- only some soreness over the incision.  - Follow up in 4 weeks with surgeon. Pt will call clinic immediately with problems/concerns.      "

## 2023-07-12 ENCOUNTER — TELEPHONE (OUTPATIENT)
Dept: SLEEP MEDICINE | Facility: OTHER | Age: 78
End: 2023-07-12
Payer: MEDICARE

## 2023-07-14 ENCOUNTER — TELEPHONE (OUTPATIENT)
Dept: ORTHOPEDICS | Facility: CLINIC | Age: 78
End: 2023-07-14
Payer: MEDICARE

## 2023-07-14 NOTE — TELEPHONE ENCOUNTER
Called and spoke to pt regarding post op appts and OT. Pt was pleasant and verbalized understandings.

## 2023-07-19 ENCOUNTER — PATIENT MESSAGE (OUTPATIENT)
Dept: ORTHOPEDICS | Facility: CLINIC | Age: 78
End: 2023-07-19
Payer: MEDICARE

## 2023-07-20 ENCOUNTER — PATIENT MESSAGE (OUTPATIENT)
Dept: INTERNAL MEDICINE | Facility: CLINIC | Age: 78
End: 2023-07-20
Payer: MEDICARE

## 2023-07-20 DIAGNOSIS — I10 ESSENTIAL HYPERTENSION: ICD-10-CM

## 2023-07-20 DIAGNOSIS — E78.5 HYPERLIPIDEMIA LDL GOAL <130: ICD-10-CM

## 2023-07-21 DIAGNOSIS — E78.5 HYPERLIPIDEMIA LDL GOAL <130: ICD-10-CM

## 2023-07-21 RX ORDER — LOSARTAN POTASSIUM AND HYDROCHLOROTHIAZIDE 25; 100 MG/1; MG/1
1 TABLET ORAL DAILY
Qty: 30 TABLET | Refills: 0 | Status: SHIPPED | OUTPATIENT
Start: 2023-07-21 | End: 2023-07-24 | Stop reason: SDUPTHER

## 2023-07-21 RX ORDER — PRAVASTATIN SODIUM 40 MG/1
40 TABLET ORAL NIGHTLY
Qty: 30 TABLET | Refills: 0 | Status: SHIPPED | OUTPATIENT
Start: 2023-07-21 | End: 2023-09-18 | Stop reason: SDUPTHER

## 2023-07-21 RX ORDER — PRAVASTATIN SODIUM 40 MG/1
TABLET ORAL
Qty: 90 TABLET | Refills: 0 | Status: SHIPPED | OUTPATIENT
Start: 2023-07-21 | End: 2023-07-24 | Stop reason: SDUPTHER

## 2023-07-21 RX ORDER — METOPROLOL SUCCINATE 50 MG/1
50 TABLET, EXTENDED RELEASE ORAL DAILY
Qty: 30 TABLET | Refills: 0 | Status: SHIPPED | OUTPATIENT
Start: 2023-07-21 | End: 2023-07-24 | Stop reason: SDUPTHER

## 2023-07-21 RX ORDER — AMLODIPINE BESYLATE 10 MG/1
10 TABLET ORAL DAILY
Qty: 30 TABLET | Refills: 0 | Status: SHIPPED | OUTPATIENT
Start: 2023-07-21 | End: 2023-07-24 | Stop reason: SDUPTHER

## 2023-07-21 NOTE — TELEPHONE ENCOUNTER
Refill Routing Note   Medication(s) are not appropriate for processing by [facility] for the following reason(s):      Patient not seen by provider within 15 months  Patient seen in ED/Hospital since LOV with provider    [facility] action(s):  Defer Care Due:  None identified            Appointments  past 12m or future 3m with PCP    Date Provider   Last Visit   4/7/2022 Gio Rizo MD   Next Visit   Visit date not found Gio Rizo MD   ED visits in past 90 days: 0        Note composed:8:13 AM 07/21/2023

## 2023-07-21 NOTE — TELEPHONE ENCOUNTER
No care due was identified.  [facility] Embedded Care Due Messages. Reference number: [id number].   7/21/2023 7:35:16 AM CDT

## 2023-07-21 NOTE — TELEPHONE ENCOUNTER
No care due was identified.  [facility] Embedded Care Due Messages. Reference number: [id number].   7/21/2023 12:18:46 AM CDT

## 2023-07-22 ENCOUNTER — PATIENT MESSAGE (OUTPATIENT)
Dept: INTERNAL MEDICINE | Facility: CLINIC | Age: 78
End: 2023-07-22
Payer: MEDICARE

## 2023-07-22 DIAGNOSIS — I10 ESSENTIAL HYPERTENSION: ICD-10-CM

## 2023-07-22 DIAGNOSIS — E78.5 HYPERLIPIDEMIA LDL GOAL <130: ICD-10-CM

## 2023-07-24 RX ORDER — AMLODIPINE BESYLATE 10 MG/1
10 TABLET ORAL DAILY
Qty: 90 TABLET | Refills: 0 | Status: SHIPPED | OUTPATIENT
Start: 2023-07-24 | End: 2023-08-08

## 2023-07-24 RX ORDER — LOSARTAN POTASSIUM AND HYDROCHLOROTHIAZIDE 25; 100 MG/1; MG/1
1 TABLET ORAL DAILY
Qty: 90 TABLET | Refills: 0 | Status: SHIPPED | OUTPATIENT
Start: 2023-07-24 | End: 2023-08-08

## 2023-07-24 RX ORDER — PRAVASTATIN SODIUM 40 MG/1
40 TABLET ORAL NIGHTLY
Qty: 90 TABLET | Refills: 0 | Status: SHIPPED | OUTPATIENT
Start: 2023-07-24 | End: 2023-08-08

## 2023-07-24 RX ORDER — METOPROLOL SUCCINATE 50 MG/1
50 TABLET, EXTENDED RELEASE ORAL DAILY
Qty: 30 TABLET | Refills: 0 | Status: SHIPPED | OUTPATIENT
Start: 2023-07-24 | End: 2023-08-08

## 2023-07-24 NOTE — TELEPHONE ENCOUNTER
Refill Encounter lov-4/7/22    PCP Visits: Recent Visits  No visits were found meeting these conditions.  Showing recent visits within past 360 days and meeting all other requirements  Future Appointments  Date Type Provider Dept   08/08/23 Appointment Gio Rizo MD [facility] Internal Medicine   Showing future appointments within next 720 days and meeting all other requirements     Last 3 Blood Pressure:   BP Readings from Last 3 Encounters:   06/29/23 135/66   06/09/23 137/65   06/08/23 134/66     Preferred Pharmacy:   [facility] - [place] - [facility] AT Portal to Registered [facility]  [place] [id number]  Phone: [phone] Fax: [phone]      Requested RX:  Requested Prescriptions     Pending Prescriptions Disp Refills    pravastatin (PRAVACHOL) 40 MG tablet 90 tablet 0     Sig: Take 1 tablet (40 mg total) by mouth every evening.    losartan-hydrochlorothiazide 100-25 mg (HYZAAR) 100-25 mg per tablet 90 tablet 0     Sig: Take 1 tablet by mouth once daily.    amLODIPine (NORVASC) 10 MG tablet 90 tablet 0     Sig: Take 1 tablet (10 mg total) by mouth once daily.    metoprolol succinate (TOPROL-XL) 50 MG 24 hr tablet 30 tablet 0     Sig: Take 1 tablet (50 mg total) by mouth once daily.      RX Route: Normal

## 2023-07-24 NOTE — TELEPHONE ENCOUNTER
No care due was identified.  [facility] Embedded Care Due Messages. Reference number: [id number].   7/24/2023 7:58:44 AM CDT

## 2023-07-25 ENCOUNTER — PATIENT OUTREACH (OUTPATIENT)
Dept: ADMINISTRATIVE | Facility: HOSPITAL | Age: 78
End: 2023-07-25
Payer: MEDICARE

## 2023-07-26 ENCOUNTER — PATIENT MESSAGE (OUTPATIENT)
Dept: ADMINISTRATIVE | Facility: OTHER | Age: 78
End: 2023-07-26
Payer: MEDICARE

## 2023-07-27 ENCOUNTER — TELEPHONE (OUTPATIENT)
Dept: INTERNAL MEDICINE | Facility: CLINIC | Age: 78
End: 2023-07-27
Payer: MEDICARE

## 2023-07-27 ENCOUNTER — IMMUNIZATION (OUTPATIENT)
Dept: PHARMACY | Facility: CLINIC | Age: 78
End: 2023-07-27
Payer: MEDICARE

## 2023-07-27 DIAGNOSIS — Z23 NEED FOR VACCINATION: Primary | ICD-10-CM

## 2023-07-27 NOTE — TELEPHONE ENCOUNTER
----- Message from Bianka Baker MA sent at 7/27/2023 11:41 AM CDT -----  Name of Who is Calling:RAYSHAWN ROBBINS JR. [[id number]]                What is the request in detail: Pt is requesting a call back to discuss the response for his refill of his medications. Please assist.                Can the clinic reply by [facility]: No                [facility] [phone]

## 2023-07-31 ENCOUNTER — HOSPITAL ENCOUNTER (OUTPATIENT)
Dept: SLEEP MEDICINE | Facility: OTHER | Age: 78
Discharge: HOME | End: 2023-07-31
Attending: PHYSICIAN ASSISTANT
Payer: MEDICARE

## 2023-07-31 DIAGNOSIS — G47.33 OSA (OBSTRUCTIVE SLEEP APNEA): ICD-10-CM

## 2023-07-31 DIAGNOSIS — R06.83 SNORING: ICD-10-CM

## 2023-07-31 DIAGNOSIS — J96.22 ACUTE ON CHRONIC RESPIRATORY FAILURE WITH HYPERCAPNIA: ICD-10-CM

## 2023-07-31 PROCEDURE — 95811 POLYSOM 6/>YRS CPAP 4/> PARM: CPT

## 2023-08-01 NOTE — PROGRESS NOTES
A baseline PSG study was performed on Zheng Talley.The following was explained to the pt prior to the study: the time to bed and wake time, the set-up process timeframe and the purpose of each sensor, the reason (if sensors fall off) the technician will need to enter the room during the night, the possibility of the tech fitting a PAP mask on pt for treatment in the middle of the night, and how to call out for assistance during the night. A post-study letter was handed to the pt in the morning.

## 2023-08-02 ENCOUNTER — PES CALL (OUTPATIENT)
Dept: ADMINISTRATIVE | Facility: CLINIC | Age: 78
End: 2023-08-02
Payer: MEDICARE

## 2023-08-02 PROCEDURE — 95811 PR POLYSOMNOGRAPHY W/CPAP: ICD-10-PCS | Mod: 26,,, | Performed by: INTERNAL MEDICINE

## 2023-08-02 PROCEDURE — 95811 POLYSOM 6/>YRS CPAP 4/> PARM: CPT | Mod: 26,,, | Performed by: INTERNAL MEDICINE

## 2023-08-02 NOTE — PROCEDURES
"[facility]    Split-Night Study Interpretation Report    Patient Name:  Zheng Talley  MRN#:  [id number]  :  1945  Study Date:  2023  Referring Provider:  Korey Escalante    Indications for Polysomnography:  The patient is a 78 year old Male who is 5' 10" and weighs 245.0 lbs.  His BMI equals 35.5.  [place] was - and Neck Circumference was -.  A diagnostic polysomnogram was performed to evaluate for -.  After 79.5 minutes of sleep time the patient exhibited sufficient respiratory events qualifying him for a PAP trial which was then initiated.     Polysomnogram Data:  A full night polysomnogram was performed recording the standard physiologic parameters including EEG, EOG, EMG, EKG, nasal and oral airflow.  Respiratory parameters of chest and abdominal movements are recorded with Peizo-Crystal motion transducers.  Oxygen saturation was recorded by pulse oximetry.    Sleep Architecture:  The total recording time of the diagnostic portion of the study was 134.9 minutes.  The total sleep time was 79.5 minutes.  The patient spent 71.1% of total sleep time in Stage N1, 20.8% in Stage N2, 0.0% in Stages N3, and 8.2% in REM.  Sleep latency was 14.9 minutes.  REM latency was 52.0 minutes.  Sleep Efficiency was 58.9%.  Total wake time was 55.5 minutes for a total wake percentage of 28.9%.  Wake after Sleep Onset was 40.5 minutes.     At 12:10:04 AM the patient was placed on PAP treatment and was titrated at pressures ranging from 5* cm/H20 up to 22/18/0** cm/H20.  The total recording time of the treatment portion of the study was 284.9 minutes.  The total sleep time was 189.0 minutes.  During the treatment portion of the study, the patient spent 39.9% of total sleep time in Stage N1, 32.5% in Stage N2, 0.0% in Stages N3, and 27.5% in REM.  Sleep latency was 2.0 minutes.  REM latency was 13.5 minutes.  Sleep Efficiency was 66.3%.  Total wake time was 96.0 minutes for a total wake percentage of " 33.7%.  Wake after Sleep Onset was 93.5 minutes.     Respiratory Summary:  During the diagnostic portion of the study, the polysomnogram revealed a presence of 72 obstructive, 11 central, and - mixed apneas resulting in an Apnea index of 62.6 events per hour.  There were 18 hypopneas resulting in a Hypopnea index of 13.6 events per hour.  The combined Apnea/Hypopnea index was 76.2 events per hour.  There were a total of - RERA events resulting in a Respiratory Disturbance Index (RDI) of 76.2 events per hour.  Lowest oxygen saturation was 68.0% and time spent ?88% oxygen saturation was 45.3 minutes (34.8%).    During diagnostic portion End Tidal CO2 during sleep ranged from - to - mmHg, was greater than 50 mmHg for - minutes and greater than 55 mmHg for - minutes.  Transcutaneous CO2 during sleep ranged from - to - mmHg, was greater than 50 mmHg for - minutes and greater than 55 mmHg for - minutes.    During the treatment portion of the study, the polysomnogram revealed a presence of 22 obstructive, 21 central, and 3 mixed apneas resulting in an Apnea index of 14.6 events per hour.  There were 14 hypopneas resulting in a Hypopnea index of 4.4 events per hour.  The combined Apnea/Hypopnea index was 19.0 events per hour.  There were a total of - RERA events resulting in a Respiratory Disturbance Index (RDI) of 19.0 events per hour.  Lowest oxygen saturation was 83.0% and time spent ?88% oxygen saturation was 9.6 minutes (3.4%).    During treatment portion Transcutaneous CO2 during sleep ranged from - to - mmHg, was greater than 50 mmHg for - minutes and greater than 55 mmHg for - minutes.    Limb Movement Activity:  During the diagnostic portion of the study, there were - limb movements recorded.      Cardiac: single lead EKG revealed normal sinus rhythm     PAP titration:    Mask used in the study: Medium Simplus FFM  PAP = 9 cwp was partialy effective in supine REM sleep.  The patient was then titrated on BiPAP due  "to residual AHI = 120 at CPAP =15cwp.  PAP = 18/14 cwp was partially effective in supine REM sleep.  PAP = 19/15 cwp was largely effective in supine N2 sleep.    LIMITATIONS: central appearing events were seen at sleep onset.  These improved at higher pressures    Oxygenation:  During the diagnostic portion of the study, hypoxemia was only observed in relation to obstructive events.    Impression:  -obstructive sleep apnea     Recommendations:      -initial BiPAP auto settings EPAP min = 6 cwp, IPAP max = 18 cwp, and PS = 4 cwp.  -EPAP max should be increased to 15 cwp or higher depending on patient tolerance.  -the patient has follow up with Sleep Medicine        Teofilo Conte MD    (This Sleep Study was interpreted by a Board Certified Sleep Specialist who conducted an epoch-by-epoch review of the entire raw data recording.)  (The indication for this sleep study was reviewed and deemed appropriate by AASM Practice Parameters or other reasons by a Board Certified Sleep Specialist.)    [facility]    Split-Night Report    Patient Name: Zheng Talley Study Date: 7/31/2023   YOB: 1945 MRN #: [id number]   Age: 78 year [place] #: [id number]   Sex: Male Referring Provider: Korey Escalante   Height: 5' 10" Recording Tech: Miguel A Watson RPSGT   Weight: 245.0 lbs Scoring Tech: Michael Snyder RRT RPSGT   BMI: 35.5 Interpreting Physician: -   ESS: - Neck Circumference: -   Study Overview    DIAGNOSTIC TREATMENT   Lights Off: 09:55:04 PM Lights Off: 12:09:57 AM   Lights On: 12:09:57 AM Lights On: 04:54:50 AM   Time in Bed: 134.9 min. Time in Bed: 284.9 min.   Total Sleep Time: 79.5 min. Total Sleep Time: 189.0 min.   Sleep Efficiency: 58.9% Sleep Efficiency: 66.3%   Sleep Latency: 14.9 min. Sleep Latency: 2.0 min.   REM Latency from Sleep Onset: 52.0 min. REM Latency from Sleep Onset: 13.5 min.   Wake After Sleep Onset: 40.5 min. Wake After Sleep Onset: 93.5 min.     DIAGNOSTIC TREATMENT    " Count Index  Count Index   Awakenings: 55 41.5 Awakenings: 57 18.1   Arousals: 58 43.8 Arousals: 45 14.3   Apneas & Hypopneas: 101 76.2 Apneas & Hypopneas: 60 19.0    Limb Movements: - - Limb Movements: - -   Snores: - - Snores: - -   Desaturations: 102 77.0 Desaturations: 56 17.8   Minimum SpO2 TST: 68.0% Minimum SpO2 TST: 83.0%        Sleep Architecture     DIAGNOSTIC TREATMENT ENTIRE NIGHT   Stages Time (min) % TST Time (min) % TST Time (min) % TST   WAKE 55.5  96.0  151.5    Stage N1 56.5 71.1% 75.5 39.9% 132.0 49.2%   Stage N2 16.5 20.8% 61.5 32.5% 78.0 29.1%   Stage N3 0.0 0.0% 0.0 0.0% 0.0 0.0%   REM 6.5 8.2% 52.0 27.5% 58.5 21.8%       Arousal Summary     DIAGNOSTIC TREATMENT    NREM REM TST Index NREM REM TST Index   Respiratory Arousals 16 2 18 13.6 10 1 11 3.5   PLM Arousals - - - - - - - -   Isolated LM Arousals - - - - - - - -   Spontaneous Arousals 36 4 40 30.2 32 2 34 10.8   Total 52 6 58 43.8 42 3 45 14.3   Respiratory Summary    DIAGNOSTIC By Sleep Stage By Body Position Total    NREM REM Supine Non-Supine    Time (min) 73.0 6.5 79.5 - 79.5           Obstructive Apnea 64 8 72 - 72   Mixed Apnea - - - - -   Central Apnea 11 - 11 - 11   Central Apnea Index 9.0 - 9.0 - 9.0   Total Apneas 75 8 83 - 83   Total Apnea Index 61.6 73.8 62.6 - 62.6           Total Hypopnea 18 - 18 - 18   Total Hypopnea Index 14.8 - 13.6 - 13.6           Apnea & Hypopnea 93 8 101 - 101   Apnea & Hypopnea Index 76.4 73.8 76.2 - 76.2           RERAs - - - - -   RERA Index - - - - -           RDI 76.4 73.8 76.2 - 76.2     TREATMENT By Sleep Stage By Body Position Total    NREM REM Supine Non-Supine    Time (min) 137.0 52.0 189.0 - 189.0           Obstructive Apnea 17 5 22 - 22   Mixed Apnea 3 - 3 - 3   Central Apnea 21 - 21 - 21   Central Apnea Index 9.2 - 9.2 - 9.2   Total Apneas 41 5 46 - 46   Total Apnea Index 18.0 5.8 14.6 - 14.6           Total Hypopnea 14 - 14 - 14   Total Hypopnea Index 6.1 - 4.4 - 4.4           Apnea &  Hypopnea 55 5 60 - 60   Apnea & Hypopnea Index 24.1 5.8 19.0 - 19.0           RERAs - - - - -   RERA Index - - - - -           RDI 24.1 5.8 19.0 - 19.0     Scoring Criteria: Hypopneas scored at 4% desaturation criteria.    Respiratory Event Durations     DIAGNOSTIC TREATMENT   Apnea NREM REM NREM REM   Average (seconds) 24.8 26.4 22.7 28.0   Maximum (seconds) 50.2 40.1 51.2 42.3   Hypopnea       Average (seconds) 16.4 - 21.1 -   Maximum (seconds) 26.3 - 31.0 -   Oxygen Saturation Summary     DIAGNOSTIC TREATMENT    Wake NREM REM TST Wake NREM REM TST   Average SpO2 89.1% 90.4% 86.5% 90.1% 94.9% 95.0% 95.2% 95.1%   Minimum SpO2 65.0% 70.0% 68.0% 68.0%  79.0% 83.0% 83.0% 83.0%    Maximum SpO2 98.0% 98.0% 97.0% 98.0%  100.0% 99.0% 98.0% 99.0%     DIAGNOSTIC   Oxygen Saturation Distribution    Range (%) Time in range (min) Time in range (%)   90.0 - 100.0 42.8 53.8%   80.0 - 90.0 29.2 36.8%   70.0 - 80.0 7.2 9.1%   60.0 - 70.0 0.3 0.3%   50.0 - 60.0 - -   0.0 - 50.0 - -   Time Spent ?88% SpO2    Range (%) Time in range (min) Time in range (%)   0.0 - 88.0 27.6 34.8%          Count Index   Desaturations 102 77.0      TREATMENT   Oxygen Saturation Distribution    Range (%) Time in range (min) Time in range (%)   90.0 - 100.0 265.1 94.1%   80.0 - 90.0 16.4 5.8%   70.0 - 80.0 0.1 0.0%   60.0 - 70.0 - -   50.0 - 60.0 - -   0.0 - 50.0 - -   Time Spent ?88% SpO2    Range (%) Time in range (min) Time in range (%)   0.0 - 88.0 9.6 3.4%      Count Index   Desaturations 56 17.8      Limb Movement Summary     DIAGNOSTIC TREATMENT    Count Index Count Index   Isolated Limb Movements - - - -   Periodic Limb Movements (PLMs) - - - -   Total Limb Movements - - - -     Cardiac Summary     DIAGNOSTIC TREATMENT    Wake NREM REM Total Wake NREM REM Total   Average GA (BPM) 64.5 62.0 65.1 63.1 61.2 60.9 59.8 60.8   Minimum GA (BPM) 54.0 34.0 55.0 34.0 40.0 57.0 57.0 40.0   Maximum GA (BPM) 90.0 79.0 82.0 90.0 80.0 68.0 72.0 80.0          Diagnostic EtCO2    Stage Min (mmHg) Average (mmHg) Max (mmHg)   Wake - - -   NREM(1+2+3) - - -   REM - - -       Range (mmHg) Time in range (min) Time in range (%)   20.0 - 40.0 - -   40.0 - 50.0 - -   50.0 - 55.0 - -   55.0 - 100.0 - -   Excluded data <20.0 & >65.0 135.0 100.0%       TcCO2 Summary    DIAGNOSTIC    Stage Min (mmHg) Average (mmHg) Max (mmHg)   Wake - - -   NREM(1+2+3) - - -   REM - - -       Range (mmHg) Time in range (min) Time in range (%)   20.0 - 40.0 - -   40.0 - 50.0 - -   50.0 - 55.0 - -   55.0 - 100.0 - -   Excluded data <20.0 & >65.0 135.0 100.0%       TREATMENT    Stage Min (mmHg) Average (mmHg) Max (mmHg)   Wake - - -   NREM(1+2+3) - - -   REM - - -       Range (mmHg) Time in range (min) Time in range (%)   20.0 - 40.0 - -   40.0 - 50.0 - -   50.0 - 55.0 - -   55.0 - 100.0 - -   Excluded data <20.0 & >65.0 285.0 100.0%       Comments    -      Titration Summary    PAP Device PAP Level O2 Level Time (min) TST (min) NREM (min) REM (min) Wake (min) Sleep Eff% OA# CA# MA# Hyp# AHI RERA RDI Min SpO2 SpO2 ?88% (min) Ar. Index   - Off - 135.0 79.5 73.0 6.5 55.5 58.9% 72 11 - 18 76.2 - 76.2 68.0  27.6 43.8   CPAP 5 - 1.0 0.0 0.0 0.0 1.0 0.0%             CPAP 6 - 2.0 0.5 0.5 0.0 1.5 25.0% - - - 1 120.0 - 120.0 90.0  0.0 -   CPAP 7 - 2.0 1.5 1.5 0.0 0.5 75.0% - 1 - - 40.0 - 40.0 90.0  0.0 120.0   CPAP 8 - 5.5 3.5 3.5 0.0 2.0 63.6% 2 1 - 1 68.6 - 68.6 83.0  0.1 34.3   CPAP 9 - 6.0 5.5 4.5 1.0 0.5 91.7% - - - 1 10.9 - 10.9 87.0  0.1 10.9   CPAP 10 - 10.5 10.5 0.0 10.5 0.0 100.0% 2 - - - 11.4 - 11.4 87.0  0.1 5.7   CPAP 11 - 3.5 3.5 0.0 3.5 0.0 100.0% 1 - - - 17.1 - 17.1 85.0  0.2 -   CPAP 12 - 1.5 1.0 1.0 0.0 0.5 66.7% - - - - - - - 88.0  0.1 -   CPAP 13 - 31.5 28.0 28.0 0.0 3.5 88.9% 1 1 - 1 6.4 - 6.4 90.0  0.0 8.6   CPAP 14 - 6.5 2.0 2.0 0.0 4.5 30.8% 1 - - 2 90.0 - 90.0 91.0  0.0 60.0   CPAP 15 - 4.5 2.0 2.0 0.0 2.5 44.4% 2 2 - - 120.0 - 120.0 91.0  0.0 60.0   CPAP 16 - 6.0 3.0 3.0 0.0 3.0  50.0% - 3 - - 60.0 - 60.0 87.0  0.1 40.0   BiLevel 16/12/0 - 2.5 0.5 0.5 0.0 2.0 20.0% - 1 - - 120.0 - 120.0 93.0  0.0 120.0   CPAP 17 - 4.0 1.5 1.5 0.0 2.5 37.5% 1 2 - - 120.0 - 120.0 89.0  0.0 40.0   BiLevel 17/13/0 - 15.5 4.0 4.0 0.0 11.5 25.8% - 6 - - 90.0 - 90.0 88.0  0.0 45.0   CPAP 18 - 5.0 0.5 0.5 0.0 4.5 10.0% 1 - - - 120.0 - 120.0 87.0  0.1 120.0   BiLevel 18/14/0 - 93.0 83.5 46.5 37.0 9.5 89.8% 3 4 - 4 7.9 - 7.9 83.0  0.5 7.2   CPAP 19 - 0.5 0.5 0.5 0.0 0.0 100.0% - - 1 - 120.0 - 120.0 95.0  0.0 -   BiLevel 19/15/0 - 54.5 32.5 32.5 0.0 22.0 59.6% 2 - - 4 11.1 - 11.1 89.0  0.0 14.8   CPAP 20 - 4.0 1.5 1.5 0.0 2.5 37.5% 1 - 1 - 80.0 - 80.0 84.0  0.3 40.0   BiLevel 20/16/0 - 6.0 1.0 1.0 0.0 5.0 16.7% 2 - - - 120.0 - 120.0 94.0  0.0 -   BiLevel 21/17/0 - 14.5 2.5 2.5 0.0 12.0 17.2% 3 - 1 - 96.0 - 96.0 92.0  0.0 72.0   BiLevel 22/18/0 - 5.0 0.0 0.0 0.0 5.0 0.0%

## 2023-08-04 DIAGNOSIS — Z96.641 STATUS POST RIGHT HIP REPLACEMENT: Primary | ICD-10-CM

## 2023-08-08 ENCOUNTER — HOSPITAL ENCOUNTER (OUTPATIENT)
Dept: RADIOLOGY | Facility: HOSPITAL | Age: 78
Discharge: HOME | End: 2023-08-08
Attending: ORTHOPAEDIC SURGERY
Payer: MEDICARE

## 2023-08-08 ENCOUNTER — OFFICE VISIT (OUTPATIENT)
Dept: ORTHOPEDICS | Facility: CLINIC | Age: 78
End: 2023-08-08
Payer: MEDICARE

## 2023-08-08 ENCOUNTER — OFFICE VISIT (OUTPATIENT)
Dept: INTERNAL MEDICINE | Facility: CLINIC | Age: 78
End: 2023-08-08
Attending: FAMILY MEDICINE
Payer: MEDICARE

## 2023-08-08 VITALS — BODY MASS INDEX: 34.86 KG/M2 | WEIGHT: 243.5 LBS | HEIGHT: 70 IN

## 2023-08-08 VITALS
DIASTOLIC BLOOD PRESSURE: 64 MMHG | BODY MASS INDEX: 35.07 KG/M2 | SYSTOLIC BLOOD PRESSURE: 122 MMHG | WEIGHT: 244.94 LBS | OXYGEN SATURATION: 95 % | HEART RATE: 67 BPM | HEIGHT: 70 IN

## 2023-08-08 DIAGNOSIS — M16.11 PRIMARY OSTEOARTHRITIS OF RIGHT HIP: Primary | ICD-10-CM

## 2023-08-08 DIAGNOSIS — D69.6 THROMBOCYTOPENIA, UNSPECIFIED: ICD-10-CM

## 2023-08-08 DIAGNOSIS — Z96.641 STATUS POST RIGHT HIP REPLACEMENT: ICD-10-CM

## 2023-08-08 DIAGNOSIS — E66.01 SEVERE OBESITY WITH BODY MASS INDEX (BMI) OF 36.0 TO 36.9 WITH SERIOUS COMORBIDITY: ICD-10-CM

## 2023-08-08 DIAGNOSIS — I10 ESSENTIAL HYPERTENSION: Primary | ICD-10-CM

## 2023-08-08 DIAGNOSIS — E78.5 HYPERLIPIDEMIA LDL GOAL <130: ICD-10-CM

## 2023-08-08 PROCEDURE — 99999 PR PBB SHADOW E&M-EST. PATIENT-LVL III: CPT | Mod: PBBFAC,,, | Performed by: ORTHOPAEDIC SURGERY

## 2023-08-08 PROCEDURE — 99024 PR POST-OP FOLLOW-UP VISIT: ICD-10-PCS | Mod: POP,,, | Performed by: ORTHOPAEDIC SURGERY

## 2023-08-08 PROCEDURE — 99999 PR PBB SHADOW E&M-EST. PATIENT-LVL III: CPT | Mod: PBBFAC,,, | Performed by: FAMILY MEDICINE

## 2023-08-08 PROCEDURE — 99999 PR PBB SHADOW E&M-EST. PATIENT-LVL III: ICD-10-PCS | Mod: PBBFAC,,, | Performed by: FAMILY MEDICINE

## 2023-08-08 PROCEDURE — 99999 PR PBB SHADOW E&M-EST. PATIENT-LVL III: ICD-10-PCS | Mod: PBBFAC,,, | Performed by: ORTHOPAEDIC SURGERY

## 2023-08-08 PROCEDURE — 99024 POSTOP FOLLOW-UP VISIT: CPT | Mod: POP,,, | Performed by: ORTHOPAEDIC SURGERY

## 2023-08-08 PROCEDURE — 99213 OFFICE O/P EST LOW 20 MIN: CPT | Mod: PBBFAC | Performed by: FAMILY MEDICINE

## 2023-08-08 PROCEDURE — 73502 X-RAY EXAM HIP UNI 2-3 VIEWS: CPT | Mod: 26,RT,, | Performed by: RADIOLOGY

## 2023-08-08 PROCEDURE — 73502 XR HIP WITH PELVIS WHEN PERFORMED, 2 OR 3  VIEWS RIGHT: ICD-10-PCS | Mod: 26,RT,, | Performed by: RADIOLOGY

## 2023-08-08 PROCEDURE — 73502 X-RAY EXAM HIP UNI 2-3 VIEWS: CPT | Mod: TC,RT

## 2023-08-08 PROCEDURE — 99214 OFFICE O/P EST MOD 30 MIN: CPT | Mod: S$PBB,,, | Performed by: FAMILY MEDICINE

## 2023-08-08 PROCEDURE — 99213 OFFICE O/P EST LOW 20 MIN: CPT | Mod: PBBFAC,27 | Performed by: ORTHOPAEDIC SURGERY

## 2023-08-08 PROCEDURE — 99214 PR OFFICE/OUTPT VISIT, EST, LEVL IV, 30-39 MIN: ICD-10-PCS | Mod: S$PBB,,, | Performed by: FAMILY MEDICINE

## 2023-08-08 RX ORDER — FLUTICASONE PROPIONATE 50 MCG
1 SPRAY, SUSPENSION (ML) NASAL DAILY
Qty: 16 G | Refills: 5 | Status: SHIPPED | OUTPATIENT
Start: 2023-08-08

## 2023-08-08 RX ORDER — AMLODIPINE BESYLATE 10 MG/1
10 TABLET ORAL DAILY
Qty: 90 TABLET | Refills: 3 | Status: SHIPPED | OUTPATIENT
Start: 2023-08-08 | End: 2024-08-07

## 2023-08-08 RX ORDER — METOPROLOL SUCCINATE 50 MG/1
50 TABLET, EXTENDED RELEASE ORAL DAILY
Qty: 90 TABLET | Refills: 3 | Status: SHIPPED | OUTPATIENT
Start: 2023-08-08 | End: 2024-08-07

## 2023-08-08 RX ORDER — AMOXICILLIN 500 MG/1
2000 CAPSULE ORAL ONCE AS NEEDED
Qty: 4 CAPSULE | Refills: 2 | Status: SHIPPED | OUTPATIENT
Start: 2023-08-08 | End: 2023-08-08

## 2023-08-08 RX ORDER — LOSARTAN POTASSIUM AND HYDROCHLOROTHIAZIDE 25; 100 MG/1; MG/1
1 TABLET ORAL DAILY
Qty: 90 TABLET | Refills: 3 | Status: SHIPPED | OUTPATIENT
Start: 2023-08-08 | End: 2024-08-07

## 2023-08-08 RX ORDER — PRAVASTATIN SODIUM 40 MG/1
40 TABLET ORAL NIGHTLY
Qty: 90 TABLET | Refills: 3 | Status: SHIPPED | OUTPATIENT
Start: 2023-08-08 | End: 2023-11-24

## 2023-08-10 ENCOUNTER — PATIENT MESSAGE (OUTPATIENT)
Dept: ORTHOPEDICS | Facility: CLINIC | Age: 78
End: 2023-08-10
Payer: MEDICARE

## 2023-08-10 NOTE — PROGRESS NOTES
Subjective:      Patient ID: Zheng Talley Jr. is a 78 y.o. male.    Chief Complaint:   Post-op Evaluation of the Right Hip    HPI  He returns about 6 weeks out from right anterior VITO.  He has no significant pain, and he is very pleased with his progress thus far.  No new symptoms to report.      Objective:        Ortho/SPM Exam    On exam the wound is well healed without redness or tenderness.  No fluctuance.  Negative Stinchfield sign.  No pain with passive flexion and rotation of the hip.  The patient walks with a steady level gait without supports.  Distal neurovascular intact.  Calves soft and nontender.        IMAGING:  X-rays reveal well-fixed and positioned total hip arthroplasty components without signs of loosening or other complications  Assessment:   Progressing well status post right VITO      Plan:   Return in 6 weeks for 12 week visit    The patient's pathophysiology was explained in detail with reference to x-rays, models, other visual aids as appropriate.  Treatment options were discussed in detail.  Questions were invited and answered to the patient's satisfaction.      Teofilo Rodrigues MD  Orthopedic Surgery

## 2023-08-11 ENCOUNTER — EXTERNAL HOME HEALTH (OUTPATIENT)
Dept: HOME HEALTH SERVICES | Facility: HOSPITAL | Age: 78
End: 2023-08-11
Payer: MEDICARE

## 2023-08-16 ENCOUNTER — PATIENT MESSAGE (OUTPATIENT)
Dept: SLEEP MEDICINE | Facility: CLINIC | Age: 78
End: 2023-08-16
Payer: MEDICARE

## 2023-08-16 DIAGNOSIS — G47.33 OSA (OBSTRUCTIVE SLEEP APNEA): Primary | ICD-10-CM

## 2023-08-17 ENCOUNTER — PATIENT MESSAGE (OUTPATIENT)
Dept: ADMINISTRATIVE | Facility: OTHER | Age: 78
End: 2023-08-17
Payer: MEDICARE

## 2023-08-18 ENCOUNTER — PATIENT MESSAGE (OUTPATIENT)
Dept: ORTHOPEDICS | Facility: CLINIC | Age: 78
End: 2023-08-18
Payer: MEDICARE

## 2023-08-21 ENCOUNTER — PATIENT MESSAGE (OUTPATIENT)
Dept: SLEEP MEDICINE | Facility: CLINIC | Age: 78
End: 2023-08-21
Payer: MEDICARE

## 2023-08-26 ENCOUNTER — PATIENT MESSAGE (OUTPATIENT)
Dept: ADMINISTRATIVE | Facility: OTHER | Age: 78
End: 2023-08-26
Payer: MEDICARE

## 2023-09-12 ENCOUNTER — PATIENT MESSAGE (OUTPATIENT)
Dept: ADMINISTRATIVE | Facility: OTHER | Age: 78
End: 2023-09-12
Payer: MEDICARE

## 2023-09-12 ENCOUNTER — OFFICE VISIT (OUTPATIENT)
Dept: ORTHOPEDICS | Facility: CLINIC | Age: 78
End: 2023-09-12
Payer: MEDICARE

## 2023-09-12 VITALS — HEIGHT: 70 IN | BODY MASS INDEX: 34.67 KG/M2 | WEIGHT: 242.19 LBS

## 2023-09-12 DIAGNOSIS — M16.11 PRIMARY OSTEOARTHRITIS OF RIGHT HIP: Primary | ICD-10-CM

## 2023-09-12 PROCEDURE — 99024 PR POST-OP FOLLOW-UP VISIT: ICD-10-PCS | Mod: POP,,, | Performed by: ORTHOPAEDIC SURGERY

## 2023-09-12 PROCEDURE — 99213 OFFICE O/P EST LOW 20 MIN: CPT | Mod: PBBFAC | Performed by: ORTHOPAEDIC SURGERY

## 2023-09-12 PROCEDURE — 99999 PR PBB SHADOW E&M-EST. PATIENT-LVL III: ICD-10-PCS | Mod: PBBFAC,,, | Performed by: ORTHOPAEDIC SURGERY

## 2023-09-12 PROCEDURE — 99999 PR PBB SHADOW E&M-EST. PATIENT-LVL III: CPT | Mod: PBBFAC,,, | Performed by: ORTHOPAEDIC SURGERY

## 2023-09-12 PROCEDURE — 99024 POSTOP FOLLOW-UP VISIT: CPT | Mod: POP,,, | Performed by: ORTHOPAEDIC SURGERY

## 2023-09-18 ENCOUNTER — LAB VISIT (OUTPATIENT)
Dept: LAB | Facility: OTHER | Age: 78
End: 2023-09-18
Attending: FAMILY MEDICINE
Payer: MEDICARE

## 2023-09-18 ENCOUNTER — OFFICE VISIT (OUTPATIENT)
Dept: RADIATION ONCOLOGY | Facility: CLINIC | Age: 78
End: 2023-09-18
Attending: RADIOLOGY
Payer: MEDICARE

## 2023-09-18 VITALS
HEART RATE: 56 BPM | DIASTOLIC BLOOD PRESSURE: 69 MMHG | RESPIRATION RATE: 18 BRPM | BODY MASS INDEX: 34.86 KG/M2 | WEIGHT: 243.5 LBS | HEIGHT: 70 IN | SYSTOLIC BLOOD PRESSURE: 140 MMHG

## 2023-09-18 DIAGNOSIS — N52.9 ERECTILE DYSFUNCTION, UNSPECIFIED ERECTILE DYSFUNCTION TYPE: ICD-10-CM

## 2023-09-18 DIAGNOSIS — R10.31 INGUINAL PAIN, RIGHT: ICD-10-CM

## 2023-09-18 DIAGNOSIS — C61 PROSTATE CANCER: Primary | ICD-10-CM

## 2023-09-18 DIAGNOSIS — C61 PROSTATE CANCER: ICD-10-CM

## 2023-09-18 LAB — COMPLEXED PSA SERPL-MCNC: 0.06 NG/ML (ref 0–4)

## 2023-09-18 PROCEDURE — 84153 ASSAY OF PSA TOTAL: CPT | Performed by: UROLOGY

## 2023-09-18 PROCEDURE — 99212 OFFICE O/P EST SF 10 MIN: CPT | Mod: S$PBB,,, | Performed by: RADIOLOGY

## 2023-09-18 PROCEDURE — 99999 PR PBB SHADOW E&M-EST. PATIENT-LVL III: ICD-10-PCS | Mod: PBBFAC,,, | Performed by: RADIOLOGY

## 2023-09-18 PROCEDURE — 99213 OFFICE O/P EST LOW 20 MIN: CPT | Mod: PBBFAC | Performed by: RADIOLOGY

## 2023-09-18 PROCEDURE — 99212 PR OFFICE/OUTPT VISIT, EST, LEVL II, 10-19 MIN: ICD-10-PCS | Mod: S$PBB,,, | Performed by: RADIOLOGY

## 2023-09-18 PROCEDURE — 99999 PR PBB SHADOW E&M-EST. PATIENT-LVL III: CPT | Mod: PBBFAC,,, | Performed by: RADIOLOGY

## 2023-09-18 PROCEDURE — 36415 COLL VENOUS BLD VENIPUNCTURE: CPT | Performed by: UROLOGY

## 2023-09-18 RX ORDER — AMOXICILLIN 500 MG/1
CAPSULE ORAL
COMMUNITY
Start: 2023-08-08 | End: 2023-10-25

## 2023-09-18 NOTE — PROGRESS NOTES
[facility]    Patient ID: Zheng Talley Jr. is a 78 y.o. male.    Chief Complaint: Prostate Cancer (F/u w/psa)    Mr. Talley has a history of pathologic stage II (T2, N0, M0, R0, PSA < 10, GG5) prostate cancer.  He presented in 2015 with an elevated PSA of 4.2 ng/ml.  Biopsies from the Rt. apex, Lt. base, Lt. mid gland and Lt. apex returned positive for adenocarcinoma.  The Cisco score was 8 (3 +5) in biopsies from the Rt. apex, 7 (3+4) at the Lt. apex and Lt. mid gland and 6 (3+3) at the Lt. base.  He underwent prostatectomy in July of 2015.  Pathology revealed Boscobel 9 (4+5) adenocarcinoma involving 10% of both lobes of the prostate.  There was no extraprostatic extension or seminal vesicle invasion. The margins of resection and 8 (4 Rt, 4 Lt.) pelvic nodes were negative for tumor involvement.  Postoperatively the patient recovered well  His PSA remained < 0.01 ng/ml until February of 2020.  PSA has continued to increase since that time.  Today the patient states he feels well. Recovering from recent hip surgery .       Review of Systems   Constitutional:  Negative for activity change, appetite change, chills, diaphoresis and fatigue.   Respiratory:  Negative for cough and shortness of breath.    Gastrointestinal:  Negative for abdominal pain, constipation, diarrhea and fecal incontinence.   Genitourinary:  Positive for erectile dysfunction. Negative for bladder incontinence, difficulty urinating, dysuria, frequency and hematuria.      Physical Exam  Constitutional:       General: He is not in acute distress.     Appearance: Normal appearance.   Pulmonary:      Effort: Pulmonary effort is normal. No respiratory distress.   Abdominal:      General: Abdomen is flat. There is no distension.   Neurological:      Mental Status: He is alert and oriented to person, place, and time.   Psychiatric:         Mood and Affect: Mood normal.         Judgment: Judgment normal.     PSA - pending         Assessment and Plan     1. Prostate cancer      History of stage II GG5 prostate cancer status post RALP with increasing PSA.  Discussed the role of salvage irradiation with 6 months of hormonal deprivation therapy in this setting.  Discussed the rational for treatment.  Reviewed the procedures, risks and benefits.  Await his PSA results.  Will call to discuss further tomorrow.

## 2023-09-19 NOTE — PROGRESS NOTES
Subjective:      Patient ID: Zheng Talley Jr. is a 78 y.o. male.    Chief Complaint:   Post-op Evaluation of the Right Hip    HPI  He returns about 12 weeks out from right anterior approach hip arthroplasty.  Has no significant pain, except for a little soreness at night in the groin area when he lies down.  He is very pleased with his progress, and has no new symptoms to report.      Objective:        Ortho/SPM Exam  On exam the wound is well healed without redness or tenderness.  No fluctuance.  Negative Stinchfield sign.  No pain with passive flexion and rotation of the hip.  The patient walks with a steady level gait without supports.  Distal neurovascular intact.  Calves soft and nontender.    IMAGING:  None today  Assessment:   Progressing well status post right VITO      Plan:   Anniversary follow-up with x-rays    The patient's pathophysiology was explained in detail with reference to x-rays, models, other visual aids as appropriate.  Treatment options were discussed in detail.  Questions were invited and answered to the patient's satisfaction.      Teofilo Rodrigues MD  Orthopedic Surgery

## 2023-09-26 ENCOUNTER — OFFICE VISIT (OUTPATIENT)
Dept: SLEEP MEDICINE | Facility: CLINIC | Age: 78
End: 2023-09-26
Payer: MEDICARE

## 2023-09-26 DIAGNOSIS — G47.33 OSA (OBSTRUCTIVE SLEEP APNEA): Primary | ICD-10-CM

## 2023-09-26 PROCEDURE — 99214 PR OFFICE/OUTPT VISIT, EST, LEVL IV, 30-39 MIN: ICD-10-PCS | Mod: S$PBB,,, | Performed by: PHYSICIAN ASSISTANT

## 2023-09-26 PROCEDURE — 99999 PR PBB SHADOW E&M-EST. PATIENT-LVL II: CPT | Mod: PBBFAC,,, | Performed by: PHYSICIAN ASSISTANT

## 2023-09-26 PROCEDURE — 99214 OFFICE O/P EST MOD 30 MIN: CPT | Mod: S$PBB,,, | Performed by: PHYSICIAN ASSISTANT

## 2023-09-26 PROCEDURE — 99999 PR PBB SHADOW E&M-EST. PATIENT-LVL II: ICD-10-PCS | Mod: PBBFAC,,, | Performed by: PHYSICIAN ASSISTANT

## 2023-09-26 PROCEDURE — 99212 OFFICE O/P EST SF 10 MIN: CPT | Mod: PBBFAC | Performed by: PHYSICIAN ASSISTANT

## 2023-09-26 NOTE — PROGRESS NOTES
Referred by No ref. provider found     ESTABLISHED PATIENT VISIT    Zheng Talley Jr.  is a pleasant 78 y.o. male  with PMH significant for HTN, HLD, acute on chronic respiratory failure with hypercapnia, chronic pain disorder, ED, prostate Ca, HSV, GERD, BMI 35+, SACHA      Here today for: CPAP follow-up / compliance     PLAN last visit 7/10/23:   -recommend sleep testing   -PSG ordered  -discussed trial therapy if SACHA present and the patient is open to a trial of CPAP/BiPAP therapy  -discussed SACHA and CPAP/BiPAP with patient in detail, including possible complications of untreated SACHA like heart attack/stroke  -advised on strict driving precautions; advised never to drive drowsy      Since last visit:   Using nightly. Feels improvement since pressure increase in August. Feels sleep quality and sleepiness have improved on PAP.      PAP history   Problems    Mask Hybrid   Pressure 18/9 PS 4   DME HME   Machine age AirCurve 10 VAuto 6/29/23   Download 9/26/23: 30/30 x 7hrs 18mins, 18/9 PS 4, leak (1.9/18.3/36), AHI 9.5, c2.7         Past Medical History:   Diagnosis Date    Arthritis     BPH (benign prostatic hyperplasia)     Cancer     prostate    Cataract     Groin pain     History of gastroesophageal reflux (GERD)     Hyperlipidemia     Hypertension     Nuclear sclerosis - Both Eyes 02/18/2014    Renal insufficiency 6/9/2023     Patient Active Problem List   Diagnosis    Primary hypertension    Colon polyps    External hemorrhoids    Trigger finger    HSV infection    Knee pain    Erectile dysfunction    Hyperlipidemia LDL goal <130    Nuclear sclerosis - Both Eyes    Nocturia    Severe obesity with body mass index (BMI) of 36.0 to 36.9 with serious comorbidity    Obesity, Class II, BMI 35-39.9    Prostate cancer    Open displaced fracture of distal phalanx of right thumb    Laceration of finger nail bed, initial encounter    Adrenal nodule    Screening for colon cancer    Right knee DJD    Spondylosis of lumbar  spine    Chronic pain disorder    Primary osteoarthritis of right knee    Carpal tunnel syndrome of left wrist    Wrist stiffness, left    Primary osteoarthritis of both hips    Primary osteoarthritis of right hip s/p right VITO on 2023    Gastroesophageal reflux disease without esophagitis    Snoring    Renal insufficiency    Acute on chronic respiratory failure with hypercapnia    Thrombocytopenia, unspecified       Current Outpatient Medications:     amLODIPine (NORVASC) 10 MG tablet, Take 1 tablet (10 mg total) by mouth once daily., Disp: 90 tablet, Rfl: 3    amoxicillin (AMOXIL) 500 MG capsule, SMARTSI Capsule(s) By Mouth Once PRN, Disp: , Rfl:     aspirin (ECOTRIN) 81 MG EC tablet, Take 1 tablet (81 mg total) by mouth 2 (two) times a day. (Patient taking differently: Take 81 mg by mouth once daily.), Disp: 60 tablet, Rfl: 0    fluticasone propionate (FLONASE) 50 mcg/actuation nasal spray, 1 spray (50 mcg total) by Each Nostril route once daily., Disp: 16 g, Rfl: 5    losartan-hydrochlorothiazide 100-25 mg (HYZAAR) 100-25 mg per tablet, Take 1 tablet by mouth once daily., Disp: 90 tablet, Rfl: 3    metoprolol succinate (TOPROL-XL) 50 MG 24 hr tablet, Take 1 tablet (50 mg total) by mouth once daily., Disp: 90 tablet, Rfl: 3    pravastatin (PRAVACHOL) 40 MG tablet, Take 1 tablet (40 mg total) by mouth every evening., Disp: 90 tablet, Rfl: 3    tadalafiL (CIALIS) 20 MG Tab, Take 1 tablet (20 mg total) by mouth daily as needed., Disp: 12 tablet, Rfl: 11    valACYclovir (VALTREX) 1000 MG tablet, Take 1 tablet (1,000 mg total) by mouth once daily., Disp: 90 tablet, Rfl: 3     There were no vitals filed for this visit.    Physical Exam:    GEN:   Well-appearing  Psych:  Appropriate affect, demonstrates insight  SKIN:  No rash on the face or bridge of the nose        LABS:   Lab Results   Component Value Date    HGB 10.6 (L) 2023    CO2 29 2023       RECORDS REVIEWED PREVIOUSLY:    Split 7.31.23: AHI  62.6, mask Medium Simplus FFM  9cwp -/+ s REM, 18/14cwp +/- s REM, 19/15cwp ,   Notes:+ central appearing events during transitional sleep, improved at highest pressures  RX= E [id number] , PS 4 push min to 15+ cwp, Ramp slow ok    ASSESSMENT    PROBLEM DESCRIPTION/ Sx on Presentation Interval Hx  STATUS   sx SACHA    + snoring, + arousals, + witnessed apneas      Good usage, residual AHI 9.5 Not yet controlled   Daytime Sx    Occasional sleepiness when inactive   ESS 3/24 on intake (reviewed from 7/10/23)  less sleepy on CPAP improved   Insomnia    Trouble falling asleep: no  Maintenance:         wakes frequently, not usually difficult to return to sleep  Prior hypnotics:        Current hypnotics:      not waking as frequently, sleeping for longer, feels more rested on PAP improved   Nocturia    x 2-3 per sleep period  x 1 nightly improved   Other issues:       PLAN     -using and benefiting from PAP therapy  -continue nightly PAP usage  -increased pressure 18/12 PS 4, ramp 6 x 25mins to see if this improves residual AHI  -PAP supplies ordered  -discussed SACHA with patient in detail, including possible complications of untreated SACHA like heart attack/stroke  -advised on strict driving precautions; advised never to drive drowsy    Advised on plan of care. Answered all patient questions. Patient verbalized understanding and voiced agreement with plan of care.       RTC 3 months or as needed       The patient was given open opportunity to ask questions and/or express concerns about treatment plan.  All questions/concerns were discussed.     Two patient identifiers used prior to evaluation.

## 2023-10-02 PROBLEM — J96.22 ACUTE ON CHRONIC RESPIRATORY FAILURE WITH HYPERCAPNIA: Status: RESOLVED | Noted: 2023-06-26 | Resolved: 2023-10-02

## 2023-10-03 ENCOUNTER — PATIENT MESSAGE (OUTPATIENT)
Dept: UROLOGY | Facility: CLINIC | Age: 78
End: 2023-10-03
Payer: MEDICARE

## 2023-10-04 ENCOUNTER — PATIENT MESSAGE (OUTPATIENT)
Dept: ADMINISTRATIVE | Facility: OTHER | Age: 78
End: 2023-10-04
Payer: MEDICARE

## 2023-10-04 ENCOUNTER — IMMUNIZATION (OUTPATIENT)
Dept: INTERNAL MEDICINE | Facility: CLINIC | Age: 78
End: 2023-10-04
Payer: MEDICARE

## 2023-10-04 PROCEDURE — 99999PBSHW FLU VACCINE - QUADRIVALENT - ADJUVANTED: Mod: PBBFAC,,,

## 2023-10-04 PROCEDURE — 99999PBSHW FLU VACCINE - QUADRIVALENT - ADJUVANTED: ICD-10-PCS | Mod: PBBFAC,,,

## 2023-10-04 PROCEDURE — G0008 ADMIN INFLUENZA VIRUS VAC: HCPCS | Mod: PBBFAC

## 2023-10-08 ENCOUNTER — PATIENT MESSAGE (OUTPATIENT)
Dept: INTERNAL MEDICINE | Facility: CLINIC | Age: 78
End: 2023-10-08
Payer: MEDICARE

## 2023-10-08 DIAGNOSIS — L75.0 ABNORMAL BODY ODOR: Primary | ICD-10-CM

## 2023-10-11 ENCOUNTER — TELEPHONE (OUTPATIENT)
Dept: INTERNAL MEDICINE | Facility: CLINIC | Age: 78
End: 2023-10-11
Payer: MEDICARE

## 2023-10-24 ENCOUNTER — TELEPHONE (OUTPATIENT)
Dept: ADMINISTRATIVE | Facility: CLINIC | Age: 78
End: 2023-10-24
Payer: MEDICARE

## 2023-10-25 ENCOUNTER — OFFICE VISIT (OUTPATIENT)
Dept: PRIMARY CARE CLINIC | Facility: CLINIC | Age: 78
End: 2023-10-25
Payer: MEDICARE

## 2023-10-25 VITALS
BODY MASS INDEX: 34.56 KG/M2 | SYSTOLIC BLOOD PRESSURE: 127 MMHG | WEIGHT: 241.38 LBS | OXYGEN SATURATION: 97 % | HEART RATE: 63 BPM | HEIGHT: 70 IN | DIASTOLIC BLOOD PRESSURE: 80 MMHG

## 2023-10-25 DIAGNOSIS — G89.4 CHRONIC PAIN DISORDER: ICD-10-CM

## 2023-10-25 DIAGNOSIS — G56.02 CARPAL TUNNEL SYNDROME OF LEFT WRIST: ICD-10-CM

## 2023-10-25 DIAGNOSIS — E78.5 HYPERLIPIDEMIA LDL GOAL <130: ICD-10-CM

## 2023-10-25 DIAGNOSIS — H61.23 BILATERAL IMPACTED CERUMEN: ICD-10-CM

## 2023-10-25 DIAGNOSIS — H25.13 NUCLEAR SCLEROSIS OF BOTH EYES: ICD-10-CM

## 2023-10-25 DIAGNOSIS — I10 PRIMARY HYPERTENSION: ICD-10-CM

## 2023-10-25 DIAGNOSIS — Z00.00 ENCOUNTER FOR PREVENTIVE HEALTH EXAMINATION: Primary | ICD-10-CM

## 2023-10-25 DIAGNOSIS — M47.816 SPONDYLOSIS OF LUMBAR SPINE: ICD-10-CM

## 2023-10-25 PROCEDURE — 99999 PR PBB SHADOW E&M-EST. PATIENT-LVL IV: ICD-10-PCS | Mod: PBBFAC,,, | Performed by: NURSE PRACTITIONER

## 2023-10-25 PROCEDURE — 99214 OFFICE O/P EST MOD 30 MIN: CPT | Mod: PBBFAC,PN | Performed by: NURSE PRACTITIONER

## 2023-10-25 PROCEDURE — G0439 PPPS, SUBSEQ VISIT: HCPCS | Mod: ,,, | Performed by: NURSE PRACTITIONER

## 2023-10-25 PROCEDURE — 99999 PR PBB SHADOW E&M-EST. PATIENT-LVL IV: CPT | Mod: PBBFAC,,, | Performed by: NURSE PRACTITIONER

## 2023-10-25 PROCEDURE — G0439 PR MEDICARE ANNUAL WELLNESS SUBSEQUENT VISIT: ICD-10-PCS | Mod: ,,, | Performed by: NURSE PRACTITIONER

## 2023-10-25 RX ORDER — TRIAZOLAM 0.25 MG/1
0.25 TABLET ORAL
COMMUNITY
Start: 2023-10-10 | End: 2023-10-25

## 2023-10-25 NOTE — PATIENT INSTRUCTIONS
Counseling and Referral of Other Preventative  (Italic type indicates deductible and co-insurance are waived)    Patient Name: Zheng Talley  Today's Date: 10/25/2023    Health Maintenance       Date Due Completion Date    COVID-19 Vaccine (6 - 2023-24 season) 10/25/2024 (Originally 9/21/2023) 7/27/2023    TETANUS VACCINE 03/08/2027 3/8/2017    Colonoscopy 09/19/2027 9/19/2022    Lipid Panel 06/28/2028 6/28/2023        No orders of the defined types were placed in this encounter.      The following information is provided to all patients.  This information is to help you find resources for any of the problems found today that may be affecting your health:                Living healthy guide: www.[facility].[place].gov      Understanding Diabetes: www.diabetes.org      Eating healthy: www.cdc.gov/healthyweight      [facility] home safety checklist: www.cdc.gov/steadi/patient.html      Agency on Aging: www.goea.[place].gov      Alcoholics anonymous (AA): www.aa.org      Physical Activity: www.silas.nih.gov/km9ydvh      Tobacco use: www.quitwithusla.org

## 2023-11-03 ENCOUNTER — TELEPHONE (OUTPATIENT)
Dept: OTOLARYNGOLOGY | Facility: CLINIC | Age: 78
End: 2023-11-03
Payer: MEDICARE

## 2023-11-06 ENCOUNTER — OFFICE VISIT (OUTPATIENT)
Dept: OTOLARYNGOLOGY | Facility: CLINIC | Age: 78
End: 2023-11-06
Payer: MEDICARE

## 2023-11-06 ENCOUNTER — PATIENT MESSAGE (OUTPATIENT)
Dept: OTOLARYNGOLOGY | Facility: CLINIC | Age: 78
End: 2023-11-06

## 2023-11-06 VITALS
HEART RATE: 68 BPM | SYSTOLIC BLOOD PRESSURE: 148 MMHG | BODY MASS INDEX: 34.45 KG/M2 | DIASTOLIC BLOOD PRESSURE: 74 MMHG | WEIGHT: 240.06 LBS

## 2023-11-06 DIAGNOSIS — L75.0 ABNORMAL BODY ODOR: ICD-10-CM

## 2023-11-06 DIAGNOSIS — R43.1 PAROSMIA: Primary | ICD-10-CM

## 2023-11-06 DIAGNOSIS — J34.3 HYPERTROPHY OF INFERIOR NASAL TURBINATE: ICD-10-CM

## 2023-11-06 PROCEDURE — 31231 NASAL/SINUS ENDOSCOPY: ICD-10-PCS | Mod: S$PBB,,, | Performed by: PHYSICIAN ASSISTANT

## 2023-11-06 PROCEDURE — 99213 PR OFFICE/OUTPT VISIT, EST, LEVL III, 20-29 MIN: ICD-10-PCS | Mod: 25,S$PBB,, | Performed by: PHYSICIAN ASSISTANT

## 2023-11-06 PROCEDURE — 99999 PR PBB SHADOW E&M-EST. PATIENT-LVL IV: CPT | Mod: PBBFAC,,, | Performed by: PHYSICIAN ASSISTANT

## 2023-11-06 PROCEDURE — 99999 PR PBB SHADOW E&M-EST. PATIENT-LVL IV: ICD-10-PCS | Mod: PBBFAC,,, | Performed by: PHYSICIAN ASSISTANT

## 2023-11-06 PROCEDURE — 31231 NASAL ENDOSCOPY DX: CPT | Mod: S$PBB,,, | Performed by: PHYSICIAN ASSISTANT

## 2023-11-06 PROCEDURE — 99213 OFFICE O/P EST LOW 20 MIN: CPT | Mod: 25,S$PBB,, | Performed by: PHYSICIAN ASSISTANT

## 2023-11-06 PROCEDURE — 31231 NASAL ENDOSCOPY DX: CPT | Mod: PBBFAC | Performed by: PHYSICIAN ASSISTANT

## 2023-11-06 PROCEDURE — 99214 OFFICE O/P EST MOD 30 MIN: CPT | Mod: PBBFAC | Performed by: PHYSICIAN ASSISTANT

## 2023-11-06 NOTE — PATIENT INSTRUCTIONS
[facility] Sinus rinse   Try purchasing this nasal rinse below.  Its over the counter and can use several times daily without any side effects, but please use at least 1-2 times daily.  Use it before applying your nasal spray medications.  This spray can help wash away mucus and crusting and allow for better absorption of the medications.  Please use the nasal saline spray about 15 minutes before applying your medicated nasal sprays. This bottle uses distilled water (NOT tap water).  The Instructions in the box are detailed so please read them before using.        Nasal Steroid Spray (Flonase)  You have been prescribed or instructed to take a nasal steroid spray (Flonase). Some symptoms will experience relief within a few days; however, it may take 2-3 weeks to begin to see improvement. This medication needs to be taken consistently to see results.    Use as directed and please be aware the Flonase takes 7-10 days of consistent use before becoming effective- so try to be patient :)    Helpful hints for maximizing nasal spray medication into the nose  - Use the opposite hand to spray the nostril (example: right hand for left nostril). This will help avoid spraying the medication onto the septum (the area that divides the left and right nasal cavity.)  - Tilt the bottle so that it is facing at a slight angle up or straight back, but avoid pointing the bottle straight up while spraying.   - Gently sniff (do not snort) a few seconds after spraying.

## 2023-11-06 NOTE — PROCEDURES
"Nasal/sinus endoscopy    Date/Time: 11/6/2023 1:30 PM    Time out: Immediately prior to procedure a "time out" was called to verify the correct patient, procedure, equipment, support staff and site/side marked as required.    Performed by: Yeison Sunshine PA-C  Authorized by: Yeison Sunshine PA-C    Consent Done?:  Yes (Verbal)  Anesthesia:     Local anesthetic:  Lidocaine 2% and Mg-Synephrine 1/2%    Type of Endoscope:  Flexible    Patient tolerance:  Patient tolerated the procedure well with no immediate complications  Nose:     Procedure Performed:  Nasal Endoscopy  External:      No external nasal deformity  Intranasal:      Mucosa no polyps     Mucosa ulcers not present     No mucosa lesions present     Enlarged turbinates     No septum gross deformity  Nasopharynx:      No mucosa lesions     Adenoids present     Posterior choanae patent     Eustachian tube patent    "

## 2023-11-06 NOTE — PROGRESS NOTES
Subjective:     HPI: Zhneg Talley Jr. is a 78 y.o. male with HTN, GERD, BPH, prostate CA who was self-referred for sinusitis.    Patient reports developing an abnormal smell in his nose daily about 5 months ago.  Patient is unable to describe the smell other than it smells like a medication.  Patient denies a malodor, facial pressure, nasal obstruction, or seasonal allergies.  Patient reports since then he has undergone hip surgery in been diagnosed with sleep apnea.  This smell seems to be most apparent at home but can also be present outside of the home.  Family members at home do not smell it.      Current sinonasal medications include flonase x2 days.    He does not recall previously having allergy testing.  He denies a history of asthma.  He denies a history of reflux symptoms.    He relates a diagnosis of obstructive sleep apnea with regular CPAP use.   He does not recall a prior history of nasal trauma.  He has not had sinonasal surgery.    He has not had a tonsillectomy.  He is not a tobacco smoker.     SNOT-22 score: : (P) 1  NOSE score:: (P) 0%  ETDQ-7 score:: (P) 1    Past Medical/Past Surgical History  Past Medical History:   Diagnosis Date    Arthritis     BPH (benign prostatic hyperplasia)     Cancer     prostate    Cataract     Groin pain     History of gastroesophageal reflux (GERD)     Hyperlipidemia     Hypertension     Nuclear sclerosis - Both Eyes 02/18/2014    Obesity     Prostate cancer     Renal insufficiency 06/09/2023    Sleep apnea     Trouble in sleeping      He has a past surgical history that includes Bunionectomy; Colonoscopy (2011); Hand surgery; lip surgery; dental implant; Repair of nail bed (Right, 11/02/2018); Prostate surgery; Colonoscopy (N/A, 02/25/2019); Carpal tunnel release (Left, 07/25/2022); injection (Right, 03/03/2023); Arthroplasty of hip by anterior approach (Right, 06/26/2023); and Joint replacement (06/26/2023).    Family History/Social History  His family history  includes Cancer in his father; Coronary artery disease in his mother; Diabetes in his father and sister; Hypertension in his mother and sister; No Known Problems in his brother, brother, brother, daughter, sister, and son; Osteoarthritis in his sister.  He reports that he quit smoking about 47 years ago. His smoking use included cigarettes. He has a 2.5 pack-year smoking history. He has never used smokeless tobacco. He reports current alcohol use of about 1.0 - 2.0 standard drink of alcohol per week. He reports that he does not use drugs.    Allergies/Immunizations  He is allergic to iodine and iodide containing products.  Immunization History   Administered Date(s) Administered    COVID-19, MRNA, LN-S, PF (MODERNA FULL 0.5 ML DOSE) 02/10/2021, 03/10/2021, 10/25/2021    COVID-19, mRNA, LNP-S, bivalent booster, PF (Moderna Omicron)12 + YEARS 01/06/2023, 07/27/2023    Influenza 10/06/2004, 01/03/2011    Influenza (FLUAD) - Quadrivalent - Adjuvanted - PF *Preferred* (65+) 12/03/2020, 10/21/2021, 01/06/2023, 10/04/2023    Influenza - High Dose - PF (65 years and older) 01/10/2013, 10/23/2014, 09/15/2015, 11/02/2018, 01/22/2020    Influenza - Quadrivalent - PF *Preferred* (6 months and older) 09/15/2009, 02/19/2014    Influenza - Trivalent (ADULT) 02/19/2014    Influenza Split 09/15/2009, 02/19/2014    Pneumococcal Conjugate - 13 Valent 07/27/2011    Pneumococcal Polysaccharide - 23 Valent 01/23/2019    Tdap 03/08/2017    Zoster Recombinant 01/20/2022, 09/09/2022        Medications   amLODIPine  aspirin  fluticasone propionate  losartan-hydrochlorothiazide 100-25 mg  metoprolol succinate  pravastatin  valACYclovir     Review of Systems     Constitutional: Negative for appetite change, chills, fatigue, fever and unexpected weight loss.      HENT: Negative for ear discharge, ear infection, ear pain, facial swelling, hearing loss, mouth sores, nosebleeds, postnasal drip, ringing in the ears, runny nose, sinus infection,  sinus pressure, sore throat, stuffy nose, tonsil infection, dental problems, trouble swallowing and voice change.      Eyes:  Negative for change in eyesight, eye drainage, eye itching and photophobia.     Respiratory:  Positive for sleep apnea.      Cardiovascular:  Negative for chest pain, foot swelling, irregular heartbeat and swollen veins.     Gastrointestinal:  Negative for abdominal pain, acid reflux, constipation, diarrhea, heartburn and vomiting.     Genitourinary: Negative for difficulty urinating, sexual problems and frequent urination.     Musc: Negative for aching joints, aching muscles, back pain and neck pain.     Skin: Negative for rash.     Allergy: Negative for food allergies and seasonal allergies.     Endocrine: Negative for cold intolerance and heat intolerance.      Neurological: Negative for dizziness, headaches, light-headedness, seizures and tremors.      Hematologic: Negative for bruises/bleeds easily and swollen glands.      Psychiatric: Negative for decreased concentration, depression, nervous/anxious and sleep disturbance.            Objective:     BP (!) 148/74 (BP Location: Right arm, Patient Position: Sitting, BP Method: Large (Automatic))   Pulse 68   Wt 108.9 kg (240 lb 1.3 oz)   BMI 34.45 kg/m²        Constitutional:   He appears well-developed and well-nourished. Normal speech.      Head:  Normocephalic and atraumatic. Facial strength is normal.      Ears:    Right Ear: No drainage or swelling. Tympanic membrane is not perforated and not erythematous. No middle ear effusion.   Left Ear: No drainage or swelling. Tympanic membrane is not perforated and not erythematous.  No middle ear effusion.     Nose:  No mucosal edema, rhinorrhea, septal deviation or polyps.  No foreign bodies. Turbinate hypertrophy.  Turbinates normal and no turbinate masses.  Right sinus exhibits no maxillary sinus tenderness and no frontal sinus tenderness. Left sinus exhibits no maxillary sinus tenderness  "and no frontal sinus tenderness.     Mouth/Throat  Oropharynx clear and moist without lesions or asymmetry, normal uvula midline and lips, teeth, and gums normal. No trismus or mucous membrane lesions. No oropharyngeal exudate, posterior oropharyngeal edema or posterior oropharyngeal erythema. Tonsils present.    MMP 4      Neck:  Phonation normal. No thyroid mass and no thyromegaly present.     He has no cervical adenopathy.     Pulmonary/Chest:   Effort normal.     Psychiatric:   He has a normal mood and affect. His speech is normal and behavior is normal.       Procedure    Nasal endoscopy performed.  See procedure note.                          Data Reviewed  I personally reviewed the chart, including any outside records, and pertinent data below:    I reviewed the following notes: Primary care    WBC (K/uL)   Date Value   06/29/2023 7.56     Eosinophil % (%)   Date Value   06/29/2023 1.7     Eos # (K/uL)   Date Value   06/29/2023 0.1     Platelets (K/uL)   Date Value   06/29/2023 142 (L)     Glucose (mg/dL)   Date Value   06/29/2023 113 (H)     No results found for: "IGE"    No sinus imaging available.    Assessment & Plan:     1. Parosmia  2. Hypertrophy of inferior nasal turbinate   - Unclear etiology of smell at this time   - malodor would be associated with ARFS vs bacteral sinusits but no clear signs on endoscopy   - will trial nasal saline rinse daily and flonase 2 SEN daily    - if no improvement will have patient undergo CT sinus    He will Follow up in about 3 weeks (around 11/27/2023), or if symptoms worsen or fail to improve.  I had a discussion with the patient regarding his condition and the further workup and management options.    All questions were answered, and the patient is in agreement with the above.     Disclaimer:  This note may have been prepared utilizing voice recognition software which may result in occasional typographical errors in the text such as sound alike words.   If further " clarification is needed, please contact the ENT department of [facility].

## 2023-11-12 NOTE — PROGRESS NOTES
Subjective:      Zheng Talley Jr. is a 78 y.o. male who returns today regarding his     No new complaints.    The following portions of the patient's history were reviewed and updated as appropriate: allergies, current medications, past family history, past medical history, past social history, past surgical history and problem list.    Review of Systems  Pertinent items are noted in HPI.  A comprehensive multipoint review of systems was negative except as otherwise stated in the HPI.    Past Medical History:   Diagnosis Date    Arthritis     BPH (benign prostatic hyperplasia)     Cancer     prostate    Cataract     Groin pain     History of gastroesophageal reflux (GERD)     Hyperlipidemia     Hypertension     Nuclear sclerosis - Both Eyes 02/18/2014    Obesity     Prostate cancer     Renal insufficiency 06/09/2023    Sleep apnea     Trouble in sleeping      Past Surgical History:   Procedure Laterality Date    ARTHROPLASTY OF HIP BY ANTERIOR APPROACH Right 06/26/2023    Procedure: ARTHROPLASTY, HIP, TOTAL, ANTERIOR APPROACH: RIGHT: HORACE AVENIR & G7: SAME DAY;  Surgeon: Teofilo Rodrigues MD;  Location: [facility];  Service: Orthopedics;  Laterality: Right;    BUNIONECTOMY      bilateral     CARPAL TUNNEL RELEASE Left 07/25/2022    Procedure: RELEASE, CARPAL TUNNEL,LEFT;  Surgeon: Elyssa Bradford MD;  Location: [facility] OR;  Service: Orthopedics;  Laterality: Left;    COLONOSCOPY  2011    Dr. Velez    COLONOSCOPY N/A 02/25/2019    Procedure: COLONOSCOPY;  Surgeon: JACQUELINE Lozano MD;  Location: [address]);  Service: Endoscopy;  Laterality: N/A;    dental implant      HAND SURGERY      INJECTION Right 03/03/2023    Procedure: INJECTION, RIGHT HIP-INTRA-ARTICULAR CONTRAST DIRECT REF;  Surgeon: Frankie Carrillo MD;  Location: [facility] PAIN [facility]GT;  Service: Pain Management;  Laterality: Right;    JOINT REPLACEMENT  06/26/2023    hip Anterior aprpoach    lip surgery      PROSTATE SURGERY      REPAIR OF NAIL BED  Right 11/02/2018    Procedure: REPAIR, NAIL BED right thumb with I&D;  Surgeon: Elyssa Bradford MD;  Location: [facility];  Service: Orthopedics;  Laterality: Right;  stretcher, supine, hand pan 1 and pan 2       Review of patient's allergies indicates:   Allergen Reactions    Iodine and iodide containing products Other (See Comments)     Blood in urine in the 1970s          Objective:   Vitals: There were no vitals taken for this visit.    Physical Exam   General: alert and oriented, no acute distress  Respiratory: Symmetric expansion, non-labored breathing  Cardiovascular: no peripheral edema  Abdomen: soft, non distended  Skin: normal coloration and turgor, no rashes, no suspicious skin lesions noted  Neuro: no gross deficits  Psych: normal judgment and insight, normal mood/affect, and non-anxious    Physical Exam    Lab Review   Urinalysis demonstrates pending    Lab Results   Component Value Date    WBC 7.56 06/29/2023    HGB 10.6 (L) 06/29/2023    HCT 32.4 (L) 06/29/2023    HCT 40 06/26/2023    MCV 88 06/29/2023     (L) 06/29/2023     Lab Results   Component Value Date    CREATININE 1.0 06/29/2023    BUN 16 06/29/2023     Lab Results   Component Value Date    PSA 3.4 02/24/2014    PSA 2.80 02/04/2013    PSA 3.13 07/11/2012    PSADIAG 0.06 09/18/2023    PSADIAG 0.06 06/08/2023    PSADIAG 0.03 12/19/2022         Imaging  -    Assessment and Plan:   Prostate cancer  didier 9 4+5 [id number] psa detectable but very low and stable      PSA today  If OK, RTC 6 months with psa to see Dr Wu     Erectile dysfunction, unspecified erectile dysfunction type  Cialis prn

## 2023-11-14 ENCOUNTER — OFFICE VISIT (OUTPATIENT)
Dept: UROLOGY | Facility: CLINIC | Age: 78
End: 2023-11-14
Attending: RADIOLOGY
Payer: MEDICARE

## 2023-11-14 VITALS
HEIGHT: 70 IN | DIASTOLIC BLOOD PRESSURE: 67 MMHG | HEART RATE: 65 BPM | WEIGHT: 243.81 LBS | BODY MASS INDEX: 34.9 KG/M2 | SYSTOLIC BLOOD PRESSURE: 139 MMHG

## 2023-11-14 DIAGNOSIS — N52.9 ERECTILE DYSFUNCTION, UNSPECIFIED ERECTILE DYSFUNCTION TYPE: ICD-10-CM

## 2023-11-14 DIAGNOSIS — C61 PROSTATE CANCER: Primary | ICD-10-CM

## 2023-11-14 DIAGNOSIS — R10.31 INGUINAL PAIN, RIGHT: ICD-10-CM

## 2023-11-14 PROCEDURE — 99214 OFFICE O/P EST MOD 30 MIN: CPT | Mod: S$GLB,,, | Performed by: UROLOGY

## 2023-11-14 PROCEDURE — 99214 PR OFFICE/OUTPT VISIT, EST, LEVL IV, 30-39 MIN: ICD-10-PCS | Mod: S$GLB,,, | Performed by: UROLOGY

## 2023-11-23 ENCOUNTER — PATIENT MESSAGE (OUTPATIENT)
Dept: INTERNAL MEDICINE | Facility: CLINIC | Age: 78
End: 2023-11-23
Payer: MEDICARE

## 2023-11-23 DIAGNOSIS — E78.5 HYPERLIPIDEMIA LDL GOAL <130: ICD-10-CM

## 2023-11-23 NOTE — TELEPHONE ENCOUNTER
No care due was identified.  [facility] Embedded Care Due Messages. Reference number: [id number].   11/23/2023 9:52:23 AM CST

## 2023-11-24 RX ORDER — PRAVASTATIN SODIUM 40 MG/1
40 TABLET ORAL NIGHTLY
Qty: 90 TABLET | Refills: 1 | Status: SHIPPED | OUTPATIENT
Start: 2023-11-24

## 2023-11-24 NOTE — TELEPHONE ENCOUNTER
Refill Decision Note   Zheng Talley  is requesting a refill authorization.  Brief Assessment and Rationale for Refill:  Approve     Medication Therapy Plan:         Comments:     Note composed:7:58 AM 11/24/2023

## 2023-11-27 ENCOUNTER — OFFICE VISIT (OUTPATIENT)
Dept: OTOLARYNGOLOGY | Facility: CLINIC | Age: 78
End: 2023-11-27
Payer: MEDICARE

## 2023-11-27 VITALS
WEIGHT: 244.69 LBS | HEART RATE: 62 BPM | BODY MASS INDEX: 35.11 KG/M2 | SYSTOLIC BLOOD PRESSURE: 134 MMHG | DIASTOLIC BLOOD PRESSURE: 77 MMHG

## 2023-11-27 DIAGNOSIS — J34.3 HYPERTROPHY OF INFERIOR NASAL TURBINATE: ICD-10-CM

## 2023-11-27 DIAGNOSIS — R43.1 PAROSMIA: Primary | ICD-10-CM

## 2023-11-27 DIAGNOSIS — H61.23 BILATERAL IMPACTED CERUMEN: ICD-10-CM

## 2023-11-27 PROCEDURE — 99999 PR PBB SHADOW E&M-EST. PATIENT-LVL III: CPT | Mod: PBBFAC,,, | Performed by: PHYSICIAN ASSISTANT

## 2023-11-27 PROCEDURE — 99213 PR OFFICE/OUTPT VISIT, EST, LEVL III, 20-29 MIN: ICD-10-PCS | Mod: S$PBB,,, | Performed by: PHYSICIAN ASSISTANT

## 2023-11-27 PROCEDURE — 99213 OFFICE O/P EST LOW 20 MIN: CPT | Mod: PBBFAC | Performed by: PHYSICIAN ASSISTANT

## 2023-11-27 PROCEDURE — 99999 PR PBB SHADOW E&M-EST. PATIENT-LVL III: ICD-10-PCS | Mod: PBBFAC,,, | Performed by: PHYSICIAN ASSISTANT

## 2023-11-27 PROCEDURE — 99213 OFFICE O/P EST LOW 20 MIN: CPT | Mod: S$PBB,,, | Performed by: PHYSICIAN ASSISTANT

## 2023-11-27 NOTE — PROGRESS NOTES
Subjective:      Zheng is a 78 y.o. male who comes for follow-up of rhinitis.  His last visit with me was on 11/6/2023.      Patient reports complete resolution of malodor starting today.  Patient has been compliant with Flonase 2 sprays once daily and saline rinse at least once daily.    Patient also reports a history of itchy ears and earwax buildup.  Patient wonders whether he has a cerumen impaction today.    Per last office visit 11/6/2023 :  Patient reports developing an abnormal smell in his nose daily about 5 months ago.  Patient is unable to describe the smell other than it smells like a medication.  Patient denies a malodor, facial pressure, nasal obstruction, or seasonal allergies.  Patient reports since then he has undergone hip surgery in been diagnosed with sleep apnea.  This smell seems to be most apparent at home but can also be present outside of the home.  Family members at home do not smell it.       Current sinonasal medications include flonase x2 days.    He does not recall previously having allergy testing.  He denies a history of asthma.  He denies a history of reflux symptoms.    He relates a diagnosis of obstructive sleep apnea with regular CPAP use.   He does not recall a prior history of nasal trauma.  He has not had sinonasal surgery.    He has not had a tonsillectomy.  He is not a tobacco smoker.     1. Parosmia  2. Hypertrophy of inferior nasal turbinate              - Unclear etiology of smell at this time              - malodor would be associated with ARFS vs bacteral sinusits but no clear signs on endoscopy              - will trial nasal saline rinse daily and flonase 2 SEN daily               - if no improvement will have patient undergo CT sinus    The patient's medications, allergies, past medical, surgical, social and family histories were reviewed and updated as appropriate.    A detailed review of systems was obtained with pertinent positives as per the above HPI, and  "otherwise negative.        Objective:     /77 (BP Location: Left arm, Patient Position: Sitting, BP Method: Large (Automatic))   Pulse 62   Wt 111 kg (244 lb 11.4 oz)   BMI 35.11 kg/m²        Constitutional:   He appears well-developed and well-nourished. He is active. Normal speech.      Head:  Normocephalic and atraumatic.     Ears:    Right Ear: No drainage, swelling or tenderness. Tympanic membrane is not erythematous. No middle ear effusion. No decreased hearing is noted.   Left Ear: No drainage, swelling or tenderness. Tympanic membrane is not erythematous.  No middle ear effusion. No decreased hearing is noted.     Nose:  No mucosal edema, rhinorrhea or septal deviation. Turbinate hypertrophy.  Right sinus exhibits no maxillary sinus tenderness and no frontal sinus tenderness. Left sinus exhibits no maxillary sinus tenderness and no frontal sinus tenderness.     Pulmonary/Chest:   Effort normal.     Psychiatric:   He has a normal mood and affect. His speech is normal and behavior is normal.        Procedure    None    Data Reviewed    WBC (K/uL)   Date Value   06/29/2023 7.56     Eosinophil % (%)   Date Value   06/29/2023 1.7     Eos # (K/uL)   Date Value   06/29/2023 0.1     Platelets (K/uL)   Date Value   06/29/2023 142 (L)     Glucose (mg/dL)   Date Value   06/29/2023 113 (H)     No results found for: "IGE"    Assessment:     1. Parosmia    2. Hypertrophy of inferior nasal turbinate         Plan:     Resolution of symptoms for only 1 day  If symptoms returned despite Flonase and saline rinse above, we will have patient undergo CT sinus  Discussed continuing Flonase and saline until symptoms have been resolved for at least 1-2 weeks    No evidence of cerumen impaction on exam today.  Discussed using oil drops in ear for pruritus in avoiding using Q-tips.    He will follow up as needed  I had a discussion with the patient regarding his condition and the further workup and management options.    All " questions were answered, and the patient is in agreement with the above.     Disclaimer:  This note may have been prepared utilizing voice recognition software which may result in occasional typographical errors in the text such as sound alike words.   If further clarification is needed, please contact the ENT department of [facility].

## 2024-03-09 ENCOUNTER — PATIENT MESSAGE (OUTPATIENT)
Dept: RADIATION ONCOLOGY | Facility: CLINIC | Age: 79
End: 2024-03-09
Payer: MEDICARE

## 2024-03-21 ENCOUNTER — PATIENT MESSAGE (OUTPATIENT)
Dept: ADMINISTRATIVE | Facility: OTHER | Age: 79
End: 2024-03-21
Payer: MEDICARE

## 2024-03-29 ENCOUNTER — PATIENT MESSAGE (OUTPATIENT)
Dept: ADMINISTRATIVE | Facility: OTHER | Age: 79
End: 2024-03-29
Payer: MEDICARE

## 2024-05-09 ENCOUNTER — LAB VISIT (OUTPATIENT)
Dept: LAB | Facility: OTHER | Age: 79
End: 2024-05-09
Attending: UROLOGY
Payer: MEDICARE

## 2024-05-09 DIAGNOSIS — C61 PROSTATE CANCER: ICD-10-CM

## 2024-05-09 LAB — COMPLEXED PSA SERPL-MCNC: 0.08 NG/ML (ref 0–4)

## 2024-05-09 PROCEDURE — 84153 ASSAY OF PSA TOTAL: CPT | Performed by: UROLOGY

## 2024-05-09 PROCEDURE — 36415 COLL VENOUS BLD VENIPUNCTURE: CPT | Performed by: UROLOGY

## 2024-05-11 NOTE — PROGRESS NOTES
Subjective:      Zheng Talley Jr. is a 79 y.o. male who returns today regarding his     No new complaints.    No leakage      Some erections  Not firm enough   Used cialis    The following portions of the patient's history were reviewed and updated as appropriate: allergies, current medications, past family history, past medical history, past social history, past surgical history and problem list.    Review of Systems  Pertinent items are noted in HPI.  A comprehensive multipoint review of systems was negative except as otherwise stated in the HPI.    Past Medical History:   Diagnosis Date    Arthritis     BPH (benign prostatic hyperplasia)     Cancer     prostate    Cataract     Groin pain     History of gastroesophageal reflux (GERD)     Hyperlipidemia     Hypertension     Nuclear sclerosis - Both Eyes 02/18/2014    Obesity     Prostate cancer     Renal insufficiency 06/09/2023    Sleep apnea     Trouble in sleeping      Past Surgical History:   Procedure Laterality Date    ARTHROPLASTY OF HIP BY ANTERIOR APPROACH Right 06/26/2023    Procedure: ARTHROPLASTY, HIP, TOTAL, ANTERIOR APPROACH: RIGHT: HORACE AVENIR & G7: SAME DAY;  Surgeon: Teofilo Rodrigues MD;  Location: [facility] OR;  Service: Orthopedics;  Laterality: Right;    BUNIONECTOMY      bilateral     CARPAL TUNNEL RELEASE Left 07/25/2022    Procedure: RELEASE, CARPAL TUNNEL,LEFT;  Surgeon: Elyssa Bradford MD;  Location: [facility] OR;  Service: Orthopedics;  Laterality: Left;    COLONOSCOPY  2011    Dr. Velez    COLONOSCOPY N/A 02/25/2019    Procedure: COLONOSCOPY;  Surgeon: JACQUELINE Lozano MD;  [facility]  Service: Endoscopy;  Laterality: N/A;    dental implant      HAND SURGERY      INJECTION Right 03/03/2023    Procedure: INJECTION, RIGHT HIP-INTRA-ARTICULAR CONTRAST DIRECT REF;  Surgeon: Frankie Carrillo MD;  Location: [facility] PAIN MGT;  Service: Pain Management;  Laterality: Right;    JOINT REPLACEMENT  06/26/2023    hip Anterior  aprpoach    lip surgery      PROSTATE SURGERY      REPAIR OF NAIL BED Right 11/02/2018    Procedure: REPAIR, NAIL BED right thumb with I&D;  Surgeon: Elyssa Bradford MD;  Location: [facility];  Service: Orthopedics;  Laterality: Right;  stretcher, supine, hand pan 1 and pan 2       Review of patient's allergies indicates:   Allergen Reactions    Iodine and iodide containing products Other (See Comments)     Blood in urine in the 1970s          Objective:   Vitals: There were no vitals taken for this visit.    Physical Exam   General: alert and oriented, no acute distress  Respiratory: Symmetric expansion, non-labored breathing  Cardiovascular: no peripheral edema  Abdomen: soft, non distended  Skin: normal coloration and turgor, no rashes, no suspicious skin lesions noted  Neuro: no gross deficits  Psych: normal judgment and insight, normal mood/affect, and non-anxious  BLANQUITA NPR    Physical Exam    Lab Review   Urinalysis demonstrates negative for all components  Lab Results   Component Value Date    WBC 7.56 06/29/2023    HGB 10.6 (L) 06/29/2023    HCT 32.4 (L) 06/29/2023    HCT 40 06/26/2023    MCV 88 06/29/2023     (L) 06/29/2023     Lab Results   Component Value Date    CREATININE 1.0 06/29/2023    BUN 16 06/29/2023     Lab Results   Component Value Date    PSA 3.4 02/24/2014    PSA 2.80 02/04/2013    PSA 3.13 07/11/2012    PSADIAG 0.08 05/09/2024    PSADIAG 0.05 11/14/2023    PSADIAG 0.06 09/18/2023         Imaging  -    Assessment and Plan:   Prostate cancer  didier 9 4+5 [id number] psa detectable but very low and stable; 9 years post op    See Dr Kearns 5/21 to discuss post op XRT vs observation    RTC 6 months with psa      Erectile dysfunction, unspecified erectile dysfunction type  Cialis prn

## 2024-05-14 ENCOUNTER — TELEPHONE (OUTPATIENT)
Dept: UROLOGY | Facility: CLINIC | Age: 79
End: 2024-05-14

## 2024-05-14 ENCOUNTER — OFFICE VISIT (OUTPATIENT)
Dept: UROLOGY | Facility: CLINIC | Age: 79
End: 2024-05-14
Payer: MEDICARE

## 2024-05-14 VITALS
SYSTOLIC BLOOD PRESSURE: 149 MMHG | BODY MASS INDEX: 33.46 KG/M2 | DIASTOLIC BLOOD PRESSURE: 69 MMHG | HEIGHT: 70 IN | WEIGHT: 233.69 LBS | OXYGEN SATURATION: 100 % | RESPIRATION RATE: 12 BRPM | HEART RATE: 65 BPM

## 2024-05-14 DIAGNOSIS — C61 PROSTATE CANCER: Primary | ICD-10-CM

## 2024-05-14 PROCEDURE — 99214 OFFICE O/P EST MOD 30 MIN: CPT | Mod: S$GLB,,, | Performed by: UROLOGY

## 2024-05-21 ENCOUNTER — OFFICE VISIT (OUTPATIENT)
Dept: RADIATION ONCOLOGY | Facility: CLINIC | Age: 79
End: 2024-05-21
Payer: MEDICARE

## 2024-05-21 VITALS
HEART RATE: 74 BPM | SYSTOLIC BLOOD PRESSURE: 137 MMHG | BODY MASS INDEX: 34.74 KG/M2 | WEIGHT: 242.63 LBS | DIASTOLIC BLOOD PRESSURE: 64 MMHG | HEIGHT: 70 IN | OXYGEN SATURATION: 96 %

## 2024-05-21 DIAGNOSIS — C61 PROSTATE CANCER: Primary | ICD-10-CM

## 2024-05-21 PROCEDURE — 99999 PR PBB SHADOW E&M-EST. PATIENT-LVL III: CPT | Mod: PBBFAC,,, | Performed by: RADIOLOGY

## 2024-05-21 PROCEDURE — 99212 OFFICE O/P EST SF 10 MIN: CPT | Mod: S$PBB,,, | Performed by: RADIOLOGY

## 2024-05-21 PROCEDURE — 99213 OFFICE O/P EST LOW 20 MIN: CPT | Mod: PBBFAC | Performed by: RADIOLOGY

## 2024-05-21 NOTE — PROGRESS NOTES
[facility] - Radiation Oncology     Patient ID: Zheng Talley Jr. is a 79 y.o. male.    Chief Complaint: Follow-up      Mr. Talley has a history of pathologic stage II (T2, N0, M0, R0, PSA < 10, GG5) prostate cancer.  He presented in 2015 with an elevated PSA of 4.2 ng/ml.  Biopsies from the Rt. apex, Lt. base, Lt. mid gland and Lt. apex returned positive for adenocarcinoma.  The Grant score was 8 (3 +5) in biopsies from the Rt. apex, 7 (3+4) at the Lt. apex and Lt. mid gland and 6 (3+3) at the Lt. base.  He underwent prostatectomy in July of 2015.  Pathology revealed Cisco 9 (4+5) adenocarcinoma involving 10% of both lobes of the prostate.  There was no extraprostatic extension or seminal vesicle invasion. The margins of resection and 8 (4 Rt, 4 Lt.) pelvic nodes were negative for tumor involvement.  Postoperatively the patient recovered well  His PSA remained < 0.01 ng/ml until February of 2020.  PSA has increased slowly since that time.  Today the patient states he feels well.  No complaints.       Review of Systems   Constitutional:  Negative for activity change, appetite change, chills and fatigue.   Gastrointestinal:  Negative for constipation, diarrhea and fecal incontinence.   Genitourinary:  Negative for bladder incontinence, difficulty urinating, dysuria, frequency and hematuria.       Physical Exam  Constitutional:       General: He is not in acute distress.     Appearance: Normal appearance.   Neurological:      Mental Status: He is alert and oriented to person, place, and time.   Psychiatric:         Mood and Affect: Mood normal.         Judgment: Judgment normal.        Latest Reference Range & Units 12/19/22 08:34 06/08/23 09:39 09/18/23 10:03 11/14/23 11:10 05/09/24 10:38   PSA Diagnostic 0.00 - 4.00 ng/mL 0.03 0.06 0.06 0.05 0.08           History of prostate cancer with slowly increasing PSA. Discussed the results.  Will plan repeat PSA in 4 months with phone review RT  in 8  months with PSA.

## 2024-05-30 ENCOUNTER — OFFICE VISIT (OUTPATIENT)
Dept: OPTOMETRY | Facility: CLINIC | Age: 79
End: 2024-05-30
Payer: MEDICARE

## 2024-05-30 DIAGNOSIS — H25.13 NUCLEAR SCLEROSIS, BILATERAL: Primary | ICD-10-CM

## 2024-05-30 DIAGNOSIS — H52.4 HYPEROPIA WITH PRESBYOPIA OF BOTH EYES: ICD-10-CM

## 2024-05-30 DIAGNOSIS — H52.03 HYPEROPIA WITH PRESBYOPIA OF BOTH EYES: ICD-10-CM

## 2024-05-30 DIAGNOSIS — Z13.5 SCREENING FOR GLAUCOMA: ICD-10-CM

## 2024-05-30 PROCEDURE — 99999 PR PBB SHADOW E&M-EST. PATIENT-LVL III: CPT | Mod: PBBFAC,,, | Performed by: OPTOMETRIST

## 2024-05-30 PROCEDURE — 92014 COMPRE OPH EXAM EST PT 1/>: CPT | Mod: S$PBB,,, | Performed by: OPTOMETRIST

## 2024-05-30 PROCEDURE — 99213 OFFICE O/P EST LOW 20 MIN: CPT | Mod: PBBFAC,PO | Performed by: OPTOMETRIST

## 2024-05-30 PROCEDURE — 92015 DETERMINE REFRACTIVE STATE: CPT | Mod: ,,, | Performed by: OPTOMETRIST

## 2024-05-30 NOTE — PROGRESS NOTES
HPI    78 Y/o male is here for routine eye exam with C/o pt say's he has been   seeing floaters in OD and flashes in OU throughout the day while watching   tv.  Pt denies pain and discomfort   Occ flashes and floaters     Eye med: otc at's ou prn   Last edited by Ken Maher MA on 5/30/2024 10:01 AM.            Assessment /Plan     For exam results, see Encounter Report.    Nuclear sclerosis, bilateral    Screening for glaucoma    Hyperopia with presbyopia of both eyes      Cat OS>OD--wrote optional new Rx  (has separate PAls and computer only spex)  Few Vitreous floaters/rare PVD photopsia--discussed Signs/Symptoms of Retinal Detachment.     PLAN:    Rtc 1 yr, or  Pt to rtc immediately if increased Floaters/Flashes occur

## 2024-06-27 ENCOUNTER — PATIENT MESSAGE (OUTPATIENT)
Dept: ADMINISTRATIVE | Facility: OTHER | Age: 79
End: 2024-06-27
Payer: MEDICARE

## 2024-07-01 ENCOUNTER — PATIENT MESSAGE (OUTPATIENT)
Dept: INTERNAL MEDICINE | Facility: CLINIC | Age: 79
End: 2024-07-01
Payer: MEDICARE

## 2024-07-01 DIAGNOSIS — I10 ESSENTIAL HYPERTENSION: ICD-10-CM

## 2024-07-01 DIAGNOSIS — E78.5 HYPERLIPIDEMIA LDL GOAL <130: ICD-10-CM

## 2024-07-02 RX ORDER — METOPROLOL SUCCINATE 50 MG/1
50 TABLET, EXTENDED RELEASE ORAL DAILY
Qty: 90 TABLET | Refills: 3 | Status: SHIPPED | OUTPATIENT
Start: 2024-07-02 | End: 2025-07-02

## 2024-07-02 RX ORDER — AMLODIPINE BESYLATE 10 MG/1
10 TABLET ORAL DAILY
Qty: 90 TABLET | Refills: 3 | Status: SHIPPED | OUTPATIENT
Start: 2024-07-02 | End: 2025-07-02

## 2024-07-02 RX ORDER — PRAVASTATIN SODIUM 40 MG/1
40 TABLET ORAL NIGHTLY
Qty: 90 TABLET | Refills: 3 | Status: SHIPPED | OUTPATIENT
Start: 2024-07-02

## 2024-07-02 RX ORDER — LOSARTAN POTASSIUM AND HYDROCHLOROTHIAZIDE 25; 100 MG/1; MG/1
1 TABLET ORAL DAILY
Qty: 90 TABLET | Refills: 3 | Status: SHIPPED | OUTPATIENT
Start: 2024-07-02 | End: 2025-07-02

## 2024-07-02 NOTE — TELEPHONE ENCOUNTER
Care Due:                  Date            Visit Type   Department     Provider  --------------------------------------------------------------------------------                                MYCHART                              ANNUAL                              CHECKUP/PHY  [facility] INTERNAL  Gio Calzada  Last Visit: 08-      [facility]  Next Visit: None Scheduled  None         None Found                                                            Last  Test          Frequency    Reason                     Performed    Due Date  --------------------------------------------------------------------------------    CBC.........  12 months..  valACYclovir.............  06- 06-    CMP.........  12 months..  losartan-hydrochlorothiaz  06- 06-                             meghann, pravastatin,                             valACYclovir.............    Lipid Panel.  12 months..  pravastatin..............  06- 06-    [employer] Embedded Care Due Messages. Reference number: [id number].   7/02/2024 7:33:13 AM CDT

## 2024-08-12 ENCOUNTER — OFFICE VISIT (OUTPATIENT)
Dept: INTERNAL MEDICINE | Facility: CLINIC | Age: 79
End: 2024-08-12
Attending: FAMILY MEDICINE
Payer: MEDICARE

## 2024-08-12 VITALS
HEART RATE: 57 BPM | BODY MASS INDEX: 34.63 KG/M2 | SYSTOLIC BLOOD PRESSURE: 124 MMHG | HEIGHT: 70 IN | DIASTOLIC BLOOD PRESSURE: 66 MMHG | WEIGHT: 241.88 LBS | OXYGEN SATURATION: 100 %

## 2024-08-12 DIAGNOSIS — C61 PROSTATE CANCER: ICD-10-CM

## 2024-08-12 DIAGNOSIS — I10 PRIMARY HYPERTENSION: Primary | ICD-10-CM

## 2024-08-12 DIAGNOSIS — I47.20 VENTRICULAR TACHYCARDIA, UNSPECIFIED: ICD-10-CM

## 2024-08-12 DIAGNOSIS — D69.6 THROMBOCYTOPENIA, UNSPECIFIED: ICD-10-CM

## 2024-08-12 DIAGNOSIS — E27.8 ADRENAL NODULE: ICD-10-CM

## 2024-08-12 PROCEDURE — 99214 OFFICE O/P EST MOD 30 MIN: CPT | Mod: S$PBB,,, | Performed by: FAMILY MEDICINE

## 2024-08-12 PROCEDURE — 99999 PR PBB SHADOW E&M-EST. PATIENT-LVL III: CPT | Mod: PBBFAC,,, | Performed by: FAMILY MEDICINE

## 2024-08-12 PROCEDURE — 99213 OFFICE O/P EST LOW 20 MIN: CPT | Mod: PBBFAC | Performed by: FAMILY MEDICINE

## 2024-08-12 NOTE — PROGRESS NOTES
"CHIEF COMPLAINT:  Annual and hypertension follow-up    HISTORY OF PRESENT ILLNESS: The patient is a 79 year-old male.  No problems recently    The patient has been treated with robotic prostatectomy.  He is closely following with urologic oncology.  All is going well there.      The patient has a history of stable hypertension on current medications.  Patient denies chest pain or shortness of breath today.    The patient has a history of stable hyperlipidemia on current medications.  The patient denies chest pain or shortness of breath today.  The patient denies muscle aches or myalgias suggestive of myositis.    REVIEW OF SYSTEMS:  GENERAL: No fever, chills, fatigability or weight loss.  SKIN: No rashes, itching or changes in color or texture of skin.  HEAD: No headaches or recent head trauma.  EYES: Visual acuity fine. No photophobia, ocular pain or diplopia.  EARS: Denies ear pain, discharge or vertigo.  NOSE: No loss of smell, no epistaxis or postnasal drip.  MOUTH & THROAT: No hoarseness or change in voice. No excessive gum bleeding.  NODES: Denies swollen glands.  CHEST: Denies CLIFFORD, cyanosis, wheezing, cough and sputum production.  CARDIOVASCULAR: Denies chest pain, PND, orthopnea or reduced exercise tolerance.  ABDOMEN: Appetite fine. No weight loss. Denies diarrhea, abdominal pain, hematemesis or blood in stool.  URINARY: No flank pain, dysuria or hematuria.  PERIPHERAL VASCULAR: No claudication or cyanosis.  MUSCULOSKELETAL: No joint stiffness or swelling. Denies back pain.  NEUROLOGIC: No history of seizures, paralysis, alteration of gait or coordination.    SOCIAL HISTORY: The patient does not smoke.  The patient consumes alcohol socially.  The patient is happily .    PHYSICAL EXAMINATION:   Blood pressure 124/66, pulse (!) 57, height 5' 10" (1.778 m), weight 109.7 kg (241 lb 13.5 oz), SpO2 100%.    APPEARANCE: Well nourished, well developed, in no acute distress.    HEAD: Normocephalic, " atraumatic.  EYES: PERRL. EOMI.  Conjunctivae without injection and  anicteric  NOSE: Mucosa pink. Airway clear.  MOUTH & THROAT: No tonsillar enlargement. No pharyngeal erythema or exudate. No stridor.  NECK: Supple.   NODES: No cervical, axillary or inguinal lymph node enlargement.  CHEST: Lungs clear to auscultation.  No retractions are noted.  No rales or rhonchi are present.  CARDIOVASCULAR: Normal S1, S2. No rubs, murmurs or gallops.  ABDOMEN: Bowel sounds normal. Not distended. Soft. No tenderness or masses.  No ascites is noted.  MUSCULOSKELETAL:  There is no clubbing, cyanosis, or edema of the extremities x4.  There is full range of motion of the lumbar spine.  There is full range of motion of the extremities x4.  There is no deformity noted.    NEUROLOGIC:       Normal speech development.      Hearing normal.      Normal gait.      Motor and sensory exams grossly normal.  PSYCHIATRIC: Patient is alert and oriented x3.  Thought processes are all normal.  There is no homicidality.  There is no suicidality.  There is no evidence of psychosis.    LABORATORY/RADIOLOGY:   Chart reviewed.  We reviewed blood work today.    ASSESSMENT:   Annual  Prostate cancer, believed to be cured  Hypertension  Rt THR 6/23    PLAN:  I discussed his case with his urologist     Return to clinic in one year.

## 2024-09-01 ENCOUNTER — PATIENT MESSAGE (OUTPATIENT)
Dept: INTERNAL MEDICINE | Facility: CLINIC | Age: 79
End: 2024-09-01
Payer: MEDICARE

## 2024-09-01 DIAGNOSIS — R09.89 RUNNY NOSE: Primary | ICD-10-CM

## 2024-09-09 NOTE — PROGRESS NOTES
Subjective:      Zheng is a 79 y.o. male who comes for acute evaluation of runny nose.  His last visit with me was on 11/27/2023.      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  He reports the smell not foul but smelling of a clinic or chemicals.     He also reports a recurrent clear rhinorrhea without sneezing, cough, or postnasal drip.  He reports this resolves on its own and usually associated with eating.  Not exacerbated with change in ambient temperature.    Per last office visit 11/27/2023 :  Patient reports complete resolution of malodor starting today.  Patient has been compliant with Flonase 2 sprays once daily and saline rinse at least once daily.     Patient also reports a history of itchy ears and earwax buildup.  Patient wonders whether he has a cerumen impaction today.     PLAN:  Resolution of symptoms for only 1 day  If symptoms returned despite Flonase and saline rinse above, we will have patient undergo CT sinus  Discussed continuing Flonase and saline until symptoms have been resolved for at least 1-2 weeks    INITIAL office visit 11/6/2023 :  Patient reports developing an abnormal smell in his nose daily about 5 months ago.  Patient is unable to describe the smell other than it smells like a medication.  Patient denies a malodor, facial pressure, nasal obstruction, or seasonal allergies.  Patient reports since then he has undergone hip surgery in been diagnosed with sleep apnea.  This smell seems to be most apparent at home but can also be present outside of the home.  Family members at home do not smell it.       Current sinonasal medications include flonase x2 days.    He does not recall previously having allergy testing.  He denies a history of asthma.  He denies a history of reflux symptoms.    He relates a diagnosis of obstructive sleep apnea with regular CPAP use.   He does not recall a prior history of nasal trauma.  He has not had sinonasal surgery.    He has not had a tonsillectomy.  He  "is not a tobacco smoker.      1. Parosmia  2. Hypertrophy of inferior nasal turbinate              - Unclear etiology of smell at this time              - malodor would be associated with ARFS vs bacteral sinusits but no clear signs on endoscopy              - will trial nasal saline rinse daily and flonase 2 SEN daily               - if no improvement will have patient undergo CT sinus    The patient's medications, allergies, past medical, surgical, social and family histories were reviewed and updated as appropriate.    A detailed review of systems was obtained with pertinent positives as per the above HPI, and otherwise negative.        Objective:     BP (!) 159/77 (BP Location: Right arm, Patient Position: Sitting, BP Method: Large (Automatic))   Pulse 69   Wt 110.3 kg (243 lb 2.7 oz)   BMI 34.89 kg/m²      Physical Exam  Vitals reviewed.   Constitutional:       Appearance: Normal appearance.   HENT:      Head: Normocephalic and atraumatic.      Right Ear: External ear normal.      Left Ear: External ear normal.      Nose: Septal deviation (right) present.      Right Turbinates: Enlarged.      Left Turbinates: Enlarged.   Eyes:      Conjunctiva/sclera: Conjunctivae normal.   Pulmonary:      Effort: Pulmonary effort is normal.   Neurological:      Mental Status: He is alert.          Procedure    None    Data Reviewed    WBC (K/uL)   Date Value   06/29/2023 7.56     Eosinophil % (%)   Date Value   06/29/2023 1.7     Eos # (K/uL)   Date Value   06/29/2023 0.1     Platelets (K/uL)   Date Value   06/29/2023 142 (L)     Glucose (mg/dL)   Date Value   06/29/2023 113 (H)     No results found for: "IGE"    Assessment:     1. Vasomotor rhinitis    2. Phantosmia    3. Hypertrophy of inferior nasal turbinate         Plan:     Trial atrovent for suspected gustatory rhinitis    Phantosmia likely related to olfactory nerve changes vs central causes, but will have patient undergo CT sinus to r/o masses, etc.     He will " follow up as needed  I had a discussion with the patient regarding his condition and the further workup and management options.    All questions were answered, and the patient is in agreement with the above.     Disclaimer:  This note may have been prepared utilizing voice recognition software which may result in occasional typographical errors in the text such as sound alike words.   If further clarification is needed, please contact the ENT department of [facility].

## 2024-09-10 ENCOUNTER — OFFICE VISIT (OUTPATIENT)
Dept: OTOLARYNGOLOGY | Facility: CLINIC | Age: 79
End: 2024-09-10
Payer: MEDICARE

## 2024-09-10 VITALS
WEIGHT: 243.19 LBS | SYSTOLIC BLOOD PRESSURE: 159 MMHG | HEART RATE: 69 BPM | DIASTOLIC BLOOD PRESSURE: 77 MMHG | BODY MASS INDEX: 34.89 KG/M2

## 2024-09-10 DIAGNOSIS — J34.3 HYPERTROPHY OF INFERIOR NASAL TURBINATE: ICD-10-CM

## 2024-09-10 DIAGNOSIS — R44.2 PHANTOSMIA: ICD-10-CM

## 2024-09-10 DIAGNOSIS — J30.0 VASOMOTOR RHINITIS: Primary | ICD-10-CM

## 2024-09-10 PROCEDURE — 99213 OFFICE O/P EST LOW 20 MIN: CPT | Mod: S$PBB,,, | Performed by: PHYSICIAN ASSISTANT

## 2024-09-10 PROCEDURE — 99213 OFFICE O/P EST LOW 20 MIN: CPT | Mod: PBBFAC | Performed by: PHYSICIAN ASSISTANT

## 2024-09-10 PROCEDURE — 99999 PR PBB SHADOW E&M-EST. PATIENT-LVL III: CPT | Mod: PBBFAC,,, | Performed by: PHYSICIAN ASSISTANT

## 2024-09-12 ENCOUNTER — TELEPHONE (OUTPATIENT)
Dept: OTOLARYNGOLOGY | Facility: CLINIC | Age: 79
End: 2024-09-12
Payer: MEDICARE

## 2024-09-12 DIAGNOSIS — J30.0 VASOMOTOR RHINITIS: Primary | ICD-10-CM

## 2024-09-12 RX ORDER — IPRATROPIUM BROMIDE 21 UG/1
2 SPRAY, METERED NASAL 3 TIMES DAILY PRN
Qty: 30 ML | Refills: 2 | Status: SHIPPED | OUTPATIENT
Start: 2024-09-12

## 2024-09-12 NOTE — TELEPHONE ENCOUNTER
----- Message from Yeison Sunshine PA-C sent at 9/12/2024 12:34 PM CDT -----  Regarding: RE: Pt Advice  Contact: [phone]  Send my apologies.  I sent the medication to his pharmacy on [address]  ----- Message -----  From: Alva Henderson MA  Sent: 9/12/2024  11:02 AM CDT  To: Yeison Sunshine PA-C  Subject: FW: Pt Advice                                      ----- Message -----  From: Uziel Thompson  Sent: 9/12/2024  10:59 AM CDT  To: Bita Latham Staff  Subject: Pt Advice                                        Pt calling regarding medication that was to be prescribed Ipratropium bromide. Please call [phone]

## 2024-09-13 ENCOUNTER — PATIENT MESSAGE (OUTPATIENT)
Dept: OPTOMETRY | Facility: CLINIC | Age: 79
End: 2024-09-13
Payer: MEDICARE

## 2024-09-23 ENCOUNTER — LAB VISIT (OUTPATIENT)
Dept: LAB | Facility: OTHER | Age: 79
End: 2024-09-23
Attending: RADIOLOGY
Payer: MEDICARE

## 2024-09-23 DIAGNOSIS — C61 PROSTATE CANCER: ICD-10-CM

## 2024-09-23 LAB — COMPLEXED PSA SERPL-MCNC: <0.01 NG/ML (ref 0–4)

## 2024-09-23 PROCEDURE — 36415 COLL VENOUS BLD VENIPUNCTURE: CPT | Performed by: RADIOLOGY

## 2024-09-23 PROCEDURE — 84153 ASSAY OF PSA TOTAL: CPT | Performed by: RADIOLOGY

## 2024-09-26 ENCOUNTER — HOSPITAL ENCOUNTER (OUTPATIENT)
Dept: RADIOLOGY | Facility: HOSPITAL | Age: 79
Discharge: HOME | End: 2024-09-26
Attending: PHYSICIAN ASSISTANT
Payer: MEDICARE

## 2024-09-26 DIAGNOSIS — R44.2 PHANTOSMIA: ICD-10-CM

## 2024-09-26 PROCEDURE — 70486 CT MAXILLOFACIAL W/O DYE: CPT | Mod: 26,,, | Performed by: RADIOLOGY

## 2024-09-26 PROCEDURE — 70486 CT MAXILLOFACIAL W/O DYE: CPT | Mod: TC

## 2024-10-01 ENCOUNTER — OFFICE VISIT (OUTPATIENT)
Dept: OPTOMETRY | Facility: CLINIC | Age: 79
End: 2024-10-01
Payer: MEDICARE

## 2024-10-01 DIAGNOSIS — H52.4 HYPEROPIA WITH PRESBYOPIA OF BOTH EYES: Primary | ICD-10-CM

## 2024-10-01 DIAGNOSIS — H52.03 HYPEROPIA WITH PRESBYOPIA OF BOTH EYES: Primary | ICD-10-CM

## 2024-10-01 PROCEDURE — 99999 PR PBB SHADOW E&M-EST. PATIENT-LVL II: CPT | Mod: PBBFAC,,, | Performed by: OPTOMETRIST

## 2024-10-01 PROCEDURE — 99212 OFFICE O/P EST SF 10 MIN: CPT | Mod: PBBFAC,PO | Performed by: OPTOMETRIST

## 2024-10-01 NOTE — PROGRESS NOTES
"HPI    EMORY: 5/30/2024  Chief complaint (CC): patient here for Rx update, glasses recheck  Glasses? + has new pair and an old pair   Contacts? -  H/o eye surgery, injections or laser: -  H/o eye injury: -  Known eye conditions? -  Family h/o eye conditions? -  Eye gtts? -    Diabetic? -  A1c? Hemoglobin A1C       Date                     Value               Ref Range             Status                01/22/2020               5.8 (H)             4.0 - 5.6 %           Final                      Last edited by Lola Rogers on 10/1/2024 10:55 AM.            Assessment /Plan     For exam results, see Encounter Report.    Hyperopia with presbyopia of both eyes      See prev notes:  Cat OS>OD--wrote optional new Rx  (has separate PAls and computer only spex)  Few Vitreous floaters/rare PVD photopsia--discussed Signs/Symptoms of Retinal Detachment.     Pt presents TODAY because got new spex at [facility] (after breaking his last pair, which he had no problems with) and "can't see" thru them.  Demonstrated to pt he sees 20/20 both far away and up close w new spex.  ALSO reassured pt his prescription did NOT change from last year!  Base curves +5.00 in both pairs.  Discussed w pt his problems are either due to OC misalignment, or different brand PAL    PLAN:    Sent pt to optical to recheck OCs.  If they are OK he may just want to get his money back and try new spex elsewhere.  NOLOS EPRx today                   "

## 2024-10-16 ENCOUNTER — PATIENT MESSAGE (OUTPATIENT)
Dept: OTOLARYNGOLOGY | Facility: CLINIC | Age: 79
End: 2024-10-16
Payer: MEDICARE

## 2024-10-29 DIAGNOSIS — Z00.00 ENCOUNTER FOR MEDICARE ANNUAL WELLNESS EXAM: ICD-10-CM

## 2024-11-19 NOTE — PROGRESS NOTES
Subjective:      Zheng Talley Jr. is a 79 y.o. male who returns today regarding his       No new complaints.     No leakage        Some erections  Not firm enough   Used cialis.    The following portions of the patient's history were reviewed and updated as appropriate: allergies, current medications, past family history, past medical history, past social history, past surgical history and problem list.    Review of Systems  Pertinent items are noted in HPI.  A comprehensive multipoint review of systems was negative except as otherwise stated in the HPI.    Past Medical History:   Diagnosis Date    Arthritis     BPH (benign prostatic hyperplasia)     Cancer     prostate    Cataract     Groin pain     History of gastroesophageal reflux (GERD)     Hyperlipidemia     Hypertension     Nuclear sclerosis - Both Eyes 02/18/2014    Obesity     Prostate cancer     Renal insufficiency 06/09/2023    Sleep apnea     Trouble in sleeping      Past Surgical History:   Procedure Laterality Date    ARTHROPLASTY OF HIP BY ANTERIOR APPROACH Right 06/26/2023    Procedure: ARTHROPLASTY, HIP, TOTAL, ANTERIOR APPROACH: RIGHT: HORACE AVENIR & G7: SAME DAY;  Surgeon: Teofilo Rodrigues MD;  Location: [facility] OR;  Service: Orthopedics;  Laterality: Right;    BUNIONECTOMY      bilateral     CARPAL TUNNEL RELEASE Left 07/25/2022    Procedure: RELEASE, CARPAL TUNNEL,LEFT;  Surgeon: Elyssa Bradford MD;  Location: [facility] OR;  Service: Orthopedics;  Laterality: Left;    COLONOSCOPY  2011    Dr. Velez    COLONOSCOPY N/A 02/25/2019    Procedure: COLONOSCOPY;  Surgeon: JACQUELINE Lozano MD;  Location: [facility] ENDO ([facility] FLR);  Service: Endoscopy;  Laterality: N/A;    dental implant      HAND SURGERY      INJECTION Right 03/03/2023    Procedure: INJECTION, RIGHT HIP-INTRA-ARTICULAR CONTRAST DIRECT REF;  Surgeon: Frankie Carrillo MD;  Location: [facility] PAIN MGT;  Service: Pain Management;  Laterality: Right;    JOINT REPLACEMENT  06/26/2023    hip Anterior  aprpoach    lip surgery      PROSTATE SURGERY      REPAIR OF NAIL BED Right 11/02/2018    Procedure: REPAIR, NAIL BED right thumb with I&D;  Surgeon: Elyssa Bradford MD;  Location: [facility];  Service: Orthopedics;  Laterality: Right;  stretcher, supine, hand pan 1 and pan 2       Review of patient's allergies indicates:   Allergen Reactions    Iodine and iodide containing products Other (See Comments)     Blood in urine in the 1970s          Objective:   Vitals: There were no vitals taken for this visit.    Physical Exam   General: alert and oriented, no acute distress  Respiratory: Symmetric expansion, non-labored breathing  Cardiovascular: no peripheral edema  Abdomen: non distended  Skin: normal coloration and turgor, no rashes, no suspicious skin lesions noted  Neuro: no gross deficits  Psych: normal judgment and insight, normal mood/affect, and non-anxious    Physical Exam    Lab Review   Urinalysis demonstrates no specimen    Lab Results   Component Value Date    WBC 7.56 06/29/2023    HGB 10.6 (L) 06/29/2023    HCT 32.4 (L) 06/29/2023    HCT 40 06/26/2023    MCV 88 06/29/2023     (L) 06/29/2023     Lab Results   Component Value Date    CREATININE 1.0 06/29/2023    BUN 16 06/29/2023     Lab Results   Component Value Date    PSA 3.4 02/24/2014    PSA 2.80 02/04/2013    PSA 3.13 07/11/2012    PSADIAG <0.01 09/23/2024    PSADIAG 0.08 05/09/2024    PSADIAG 0.05 11/14/2023         Imaging  -    Assessment and Plan:   Prostate cancer      didier 9 4+5 [id number] psa detectable but very low and stable; 9 years post op     Saw Dr Kearns 2/24 with repeat PSA      RTC me 6 months with psa and T        Erectile dysfunction, unspecified erectile dysfunction type  Cialis prn

## 2024-11-22 ENCOUNTER — TELEPHONE (OUTPATIENT)
Dept: UROLOGY | Facility: CLINIC | Age: 79
End: 2024-11-22

## 2024-11-22 ENCOUNTER — OFFICE VISIT (OUTPATIENT)
Dept: UROLOGY | Facility: CLINIC | Age: 79
End: 2024-11-22
Payer: MEDICARE

## 2024-11-22 VITALS
DIASTOLIC BLOOD PRESSURE: 74 MMHG | WEIGHT: 242.5 LBS | HEART RATE: 72 BPM | OXYGEN SATURATION: 100 % | SYSTOLIC BLOOD PRESSURE: 144 MMHG | RESPIRATION RATE: 12 BRPM | BODY MASS INDEX: 34.72 KG/M2 | HEIGHT: 70 IN

## 2024-11-22 DIAGNOSIS — C61 PROSTATE CANCER: Primary | ICD-10-CM

## 2024-11-22 NOTE — TELEPHONE ENCOUNTER
----- Message from Reid Benson sent at 11/22/2024  9:21 AM CST -----    ----- Message -----  From: Derrick Wu MD  Sent: 11/22/2024   9:19 AM CST  To: Greg Kearns Jr., MD; [facility] Staff    See DR Wu 6 months with psa and T

## 2024-12-07 DIAGNOSIS — J30.0 VASOMOTOR RHINITIS: ICD-10-CM

## 2024-12-09 RX ORDER — IPRATROPIUM BROMIDE 21 UG/1
2 SPRAY, METERED NASAL 3 TIMES DAILY PRN
Qty: 30 ML | Refills: 11 | Status: SHIPPED | OUTPATIENT
Start: 2024-12-09

## 2024-12-27 ENCOUNTER — PATIENT MESSAGE (OUTPATIENT)
Dept: SLEEP MEDICINE | Facility: CLINIC | Age: 79
End: 2024-12-27
Payer: MEDICARE

## 2025-01-30 ENCOUNTER — OFFICE VISIT (OUTPATIENT)
Dept: SLEEP MEDICINE | Facility: CLINIC | Age: 80
End: 2025-01-30
Payer: MEDICARE

## 2025-01-30 VITALS
WEIGHT: 244.38 LBS | DIASTOLIC BLOOD PRESSURE: 78 MMHG | HEIGHT: 70 IN | HEART RATE: 66 BPM | SYSTOLIC BLOOD PRESSURE: 147 MMHG | BODY MASS INDEX: 34.99 KG/M2

## 2025-01-30 DIAGNOSIS — G47.33 OBSTRUCTIVE SLEEP APNEA: Primary | ICD-10-CM

## 2025-01-30 PROCEDURE — 99213 OFFICE O/P EST LOW 20 MIN: CPT | Mod: PBBFAC | Performed by: NURSE PRACTITIONER

## 2025-01-30 PROCEDURE — 99214 OFFICE O/P EST MOD 30 MIN: CPT | Mod: S$PBB,,, | Performed by: NURSE PRACTITIONER

## 2025-01-30 PROCEDURE — 99999 PR PBB SHADOW E&M-EST. PATIENT-LVL III: CPT | Mod: PBBFAC,,, | Performed by: NURSE PRACTITIONER

## 2025-01-30 NOTE — PROGRESS NOTES
ESTABLISHED PATIENT VISIT    Zheng Talley Jr.  is a pleasant 79 y.o. male established with the [facility]    Here today for:  follow-up     Since last visit:   See assessment below      Past Medical History:   Diagnosis Date    Arthritis     BPH (benign prostatic hyperplasia)     Cancer     prostate    Cataract     Groin pain     History of gastroesophageal reflux (GERD)     Hyperlipidemia     Hypertension     Nuclear sclerosis - Both Eyes 02/18/2014    Obesity     Prostate cancer     Renal insufficiency 06/09/2023    Sleep apnea     Trouble in sleeping      Patient Active Problem List   Diagnosis    Primary hypertension    Colon polyps    External hemorrhoids    Trigger finger    HSV infection    Knee pain    Erectile dysfunction    Hyperlipidemia LDL goal <130    Nuclear sclerosis - Both Eyes    Nocturia    Obesity, Class II, BMI 35-39.9    Prostate cancer    Open displaced fracture of distal phalanx of right thumb    Laceration of finger nail bed, initial encounter    Adrenal nodule    Screening for colon cancer    Right knee DJD    Spondylosis of lumbar spine    Chronic pain disorder    Primary osteoarthritis of right knee    Carpal tunnel syndrome of left wrist    Wrist stiffness, left    Primary osteoarthritis of both hips    Primary osteoarthritis of right hip s/p right VITO on 6/26/2023    Gastroesophageal reflux disease without esophagitis    Snoring    Renal insufficiency    Thrombocytopenia, unspecified    Ventricular tachycardia, unspecified       Current Outpatient Medications:     amLODIPine (NORVASC) 10 MG tablet, Take 1 tablet (10 mg total) by mouth once daily., Disp: 90 tablet, Rfl: 3    aspirin (ECOTRIN) 81 MG EC tablet, Take 1 tablet (81 mg total) by mouth 2 (two) times a day., Disp: 60 tablet, Rfl: 0    fluticasone propionate (FLONASE) 50 mcg/actuation nasal spray, 1 spray (50 mcg total) by Each Nostril route once daily., Disp: 16 g, Rfl: 5    ipratropium (ATROVENT) 21 mcg (0.03 %)  "nasal spray, 2 SPRAYS BY EACH NOSTRIL ROUTE 3 (THREE) TIMES DAILY AS NEEDED (RUNNY NOSE)., Disp: 30 mL, Rfl: 11    losartan-hydrochlorothiazide 100-25 mg (HYZAAR) 100-25 mg per tablet, Take 1 tablet by mouth once daily., Disp: 90 tablet, Rfl: 3    metoprolol succinate (TOPROL-XL) 50 MG 24 hr tablet, Take 1 tablet (50 mg total) by mouth once daily., Disp: 90 tablet, Rfl: 3    pravastatin (PRAVACHOL) 40 MG tablet, Take 1 tablet (40 mg total) by mouth every evening., Disp: 90 tablet, Rfl: 3    tadalafiL (CIALIS) 20 MG Tab, Take 1 tablet (20 mg total) by mouth daily as needed. (Patient not taking: Reported on 10/25/2023), Disp: 12 tablet, Rfl: 11    valACYclovir (VALTREX) 1000 MG tablet, Take 1 tablet (1,000 mg total) by mouth once daily., Disp: 90 tablet, Rfl: 3       Vitals:    01/30/25 1044   BP: (!) 147/78   BP Location: Left arm   Patient Position: Sitting   Pulse: 66   Weight: 110.9 kg (244 lb 6.1 oz)   Height: 5' 10" (1.778 m)     Physical Exam:    GEN:   Well-appearing  Psych:  Appropriate affect, demonstrates insight  SKIN:  No rash on the face or bridge of the nose      LABS:   No results found for: "HGB", "CO2"      RECORDS REVIEWED:        ASSESSMENT    ASSESSMENT    Sig PMH:  HTN, HLD, acute on chronic respiratory failure with hypercapnia, chronic pain disorder, ED, prostate Ca, HSV, GERD, BMI 35+, SACHA   PROBLEM DESCRIPTION/ Sx on Presentation Interval Hx STATUS PLAN     SACHA   Presentation:   Previously seen by MANDO Escalante  LOV: noted to have elevated residual AHI 9.5 on machine download with pressure settings at 18/9.  Increased 18/12 and to RTC in 3 months      PAP history   Dx Study    Machine age  AV, setup 6/29/23, AirCurve 10 VAuto    Mask Hybrid   DME HME   My Air    PAP altn    Benefits    PROBS           Since last visit:     Needs supplies.    Feels like he is getting good rest, gets about 6-8 hours of sleep.    Does not have to get up as often since his prostate surgery    01/30/2025: 30/30 x " 6h59m: 18/12, PS 4, leak 2.8/16.2/24.2, AHI 25.4, C5.1, O19.1.    Reports that he saw his residual events were elevated via [employer] caesar, but he did not know he was supposed to return to clinic 3 months after his last pressure change. He assumed we were monitoring his therapy data and would notify him on when to return.         uncontrolled     PAP PLAN   E min 18 cwp (cont)   I max 14 cwp (from 12)   PS/epr    RAMP    Other    Altn.      Strongly recommended doing a titration study.  Pt will consider; his wife just had back surgery and he doesn't want to be away from her while she is recovering.      Increased I max from 12 to 14 due to high residual AHI, with centrals and obstructive events. Pt will msg via portal in 2 weeks.    The patient is using and benefitting from PAP therapy when they have proper supplies         Daytime Sx     ESS 3/24 on 7/4/2023       ESS 3/24 01/30/2025     controlled   -continue treatment of SACHA as above     Nocturia     x 2-3 per sleep period    stable      Other issues:     RTC:  will arrange RTC depending on results of sleep testing and prn

## 2025-02-18 ENCOUNTER — LAB VISIT (OUTPATIENT)
Dept: LAB | Facility: OTHER | Age: 80
End: 2025-02-18
Attending: RADIOLOGY
Payer: MEDICARE

## 2025-02-18 DIAGNOSIS — C61 PROSTATE CANCER: ICD-10-CM

## 2025-02-18 LAB — COMPLEXED PSA SERPL-MCNC: 0.09 NG/ML (ref 0–4)

## 2025-02-18 PROCEDURE — 84153 ASSAY OF PSA TOTAL: CPT | Performed by: RADIOLOGY

## 2025-02-18 PROCEDURE — 36415 COLL VENOUS BLD VENIPUNCTURE: CPT | Performed by: RADIOLOGY

## 2025-02-22 DIAGNOSIS — Z00.00 ENCOUNTER FOR MEDICARE ANNUAL WELLNESS EXAM: ICD-10-CM

## 2025-02-24 ENCOUNTER — OFFICE VISIT (OUTPATIENT)
Dept: RADIATION ONCOLOGY | Facility: CLINIC | Age: 80
End: 2025-02-24
Attending: RADIOLOGY
Payer: MEDICARE

## 2025-02-24 VITALS
HEART RATE: 64 BPM | OXYGEN SATURATION: 96 % | WEIGHT: 246.88 LBS | BODY MASS INDEX: 35.34 KG/M2 | HEIGHT: 70 IN | SYSTOLIC BLOOD PRESSURE: 151 MMHG | DIASTOLIC BLOOD PRESSURE: 74 MMHG

## 2025-02-24 DIAGNOSIS — C61 PROSTATE CANCER: Primary | ICD-10-CM

## 2025-02-24 PROCEDURE — 99999 PR PBB SHADOW E&M-EST. PATIENT-LVL III: CPT | Mod: PBBFAC,,, | Performed by: RADIOLOGY

## 2025-02-24 PROCEDURE — 99213 OFFICE O/P EST LOW 20 MIN: CPT | Mod: PBBFAC | Performed by: RADIOLOGY

## 2025-02-24 PROCEDURE — 99212 OFFICE O/P EST SF 10 MIN: CPT | Mod: S$PBB,,, | Performed by: RADIOLOGY

## 2025-02-24 NOTE — PROGRESS NOTES
[facility] - Radiation Oncology     Patient ID: Zheng Talley Jr. is a 80 y.o. male.    Chief Complaint: Prostate Cancer (9 mo f/u w/psa)      Mr. Talley has a history of pathologic stage II (T2, N0, M0, R0, PSA < 10, GG5) prostate cancer.  He presented in 2015 with an elevated PSA of 4.2 ng/ml.  Biopsies from the Rt. apex, Lt. base, Lt. mid gland and Lt. apex returned positive for adenocarcinoma.  The Vermillion score was 8 (3 +5) in biopsies from the Rt. apex, 7 (3+4) at the Lt. apex and Lt. mid gland and 6 (3+3) at the Lt. base.  He underwent prostatectomy in July of 2015.  Pathology revealed Vermillion 9 (4+5) adenocarcinoma involving 10% of both lobes of the prostate.  There was no extraprostatic extension or seminal vesicle invasion. The margins of resection and 8 (4 Rt, 4 Lt.) pelvic nodes were negative for tumor involvement.  Postoperatively the patient recovered well  His PSA remained < 0.01 ng/ml until February of 2020.  PSA has increased slowly since that time.  Today the patient states he feels well.  No complaints.       Review of Systems   Constitutional:  Negative for activity change, appetite change, chills and fatigue.   Gastrointestinal:  Negative for abdominal pain, diarrhea and fecal incontinence.   Genitourinary:  Positive for erectile dysfunction (partial erections). Negative for bladder incontinence, difficulty urinating, dysuria, frequency and hematuria.       Physical Exam  Constitutional:       General: He is not in acute distress.     Appearance: Normal appearance.   Neurological:      Mental Status: He is alert and oriented to person, place, and time.   Psychiatric:         Mood and Affect: Mood normal.         Judgment: Judgment normal.        Latest Reference Range & Units 06/08/23 09:39 09/18/23 10:03 11/14/23 11:10 05/09/24 10:38 02/18/25 09:33   PSA Diagnostic 0.00 - 4.00 ng/mL 0.06 0.06 0.05 0.08 0.09          Assessment and Plan      Recurrent prostate cancer -  discussed the significance of his increasing PSA.  Explained the differences in local vs distant recurrent disease.  Currently his PSA doubling time is 18 - 24 months.  Explained this would be more consistent with local / regional recurrence.  Discussed the role of salvage irradiation and hormonal therapy in this setting.  Recommend he consider salvage therapy if his PSA continues to increase.  His planned for follow up with Dr. Wu in May.  Will check PSA, if it is increased he is agreeable to start savage therapy.

## 2025-02-28 ENCOUNTER — TELEPHONE (OUTPATIENT)
Dept: SLEEP MEDICINE | Facility: OTHER | Age: 80
End: 2025-02-28
Payer: MEDICARE

## 2025-03-11 ENCOUNTER — TELEPHONE (OUTPATIENT)
Dept: SLEEP MEDICINE | Facility: OTHER | Age: 80
End: 2025-03-11
Payer: MEDICARE

## 2025-03-11 ENCOUNTER — HOSPITAL ENCOUNTER (OUTPATIENT)
Dept: SLEEP MEDICINE | Facility: OTHER | Age: 80
Discharge: HOME | End: 2025-03-11
Payer: MEDICARE

## 2025-03-11 DIAGNOSIS — G47.33 OBSTRUCTIVE SLEEP APNEA: ICD-10-CM

## 2025-03-11 PROCEDURE — 95811 POLYSOM 6/>YRS CPAP 4/> PARM: CPT

## 2025-03-12 PROBLEM — G47.33 OBSTRUCTIVE SLEEP APNEA: Status: ACTIVE | Noted: 2025-03-12

## 2025-03-12 NOTE — PROCEDURES
"[facility]    Titration Interpretation Report    Patient Name:  RAYSHAWN ROBBINS  MRN#:  [id number]  :  1945  Study Date:  3/11/2025  Referring Provider:  BLAKE MILLAN NP    The patient is a 80 year old Male who is 5' 10" and weighs 244.0 lbs.  His BMI equals 35.3.  A full night PAP titration was performed.    Polysomnogram Data  A full night polysomnogram recorded the standard physiologic parameters including EEG, EOG, EMG, EKG, nasal and oral airflow.  Respiratory parameters of chest and abdominal movements were recorded with (RIP) Respiratory Inductance Plethsmography.  Oxygen saturation was recorded by pulse oximetry.    Titration Summary  The patient was titrated at pressures ranging from 12/8/0* cm/H20 with supplemental oxygen at - up to 24/20/0* cm/H20 with supplemental oxygen at -.  The last pressure used in the study was 24/20/0* cm/H20 with supplemental oxygen at -.    Sleep Architecture  The total recording time of the polysomnogram was 448.1 minutes.  The total sleep time was 237.5 minutes.  The patient spent 46.1% of total sleep time in Stage N1, 36.8% in Stage N2, 0.0% in Stages N3, and 17.1% in REM.  Sleep latency was 10.3 minutes.  REM latency was 57.0 minutes.  Sleep Efficiency was 53.0%.  Total wake time was 210.5 minutes for a total wake percentage of 45.2%.  Wake after Sleep Onset was 200.0 minutes.    Respiratory Summary  The polysomnogram revealed a presence of 118 obstructive, 6 central, and 11 mixed apneas resulting in Total Apnea index of 34.1 events per hour.  There were 25 hypopneas resulting in Total Hypopnea index of 6.3 events per hour.  The combined Apnea/Hypopnea index was 40.4 events per hour.  There were a total of 2 RERA events resulting in a Respiratory Disturbance Index (RDI) of 40.9 events per hour.     Mean oxygen saturation was 93.9%.  The lowest oxygen saturation during sleep was 80.0%.  Time spent <=88% oxygen saturation was 25.6 minutes (5.8%).    End " Tidal CO2 during sleep ranged from - to - mmHg. End Tidal CO2 was greater than 50 mmHg for - minutes and greater than 55 mmHg for - minutes.  Transcutaneous CO2 during sleep ranged from - to - mmHg. Transcutaneous CO2 was greater than 50 mmHg for - minutes and greater than 55 mmHg for - minutes.    Limb Movement Activity  There were - limb movements recorded.  Of this total, - were classified as PLMs.  Of the PLMs, - were associated with arousals.  The Limb Movement index was - per hour while the PLM index was - per hour and PLM with arousals index was - per hour.    Cardiac: single lead EKG revealed normal sinus rhythm with occasional PVCs and PACs      Mask: Sidney FFM   BiPAP = 12/8 cwp was partially effective in supine position in stage N2 sleep  BiPAP = 18/14 cwp was partially effective in supine position in stage REM sleep  BiPAP = 20/16 cwp was partially effective in supine position in stage N2 sleep  BiPAP = 24/20 cwp was ineffective in supine position in stage REM sleep later in the study    Sleep onset centrals were noted, but these appeared to convert to obstructive hypopneas at higher pressures   The mask used in this study worked well    Oxygenation:  Hypoxemia was only observed in relation to obstructive events.      Impression:  -obstructive sleep apnea    Recommendations:  -initial BiPAP auto settings EPAP min = 16 cwp, PS = 4 cwp, and IPAP max = 20 cwp.   -EPAP max should be increased to 20 cwp or higher depending on patient tolerance.   -the patient has follow up with Sleep Medicine        Teofilo Conte MD    (This Sleep Study was interpreted by a Board Certified Sleep Specialist who conducted an epoch-by-epoch review of the entire raw data recording.)  (The indication for this sleep study was reviewed and deemed appropriate by AASM Practice Parameters or other reasons by a Board Certified Sleep Specialist.)      [facility]     Titration Report     Patient Name: RAYSHAWN ROBBINS  "Study Date: 3/11/2025   YOB: 1945 MRN #: [id number]   Age: 80 year SANJUANA #: [id number]   Sex: Male Referring Provider: BLAKE MILLAN NP   Height: 5' 10" Recording Tech: Adebayolisa Beau RPSGT   Weight: 244.0 lbs Scoring Tech: Michael Snyder JAUN RPSGT   BMI: 35.3 Interpreting Physician: -   ESS: - Neck Circumference: -      Study Overview     Lights Off: 09:39:42 PM   Count Index   Lights On: 05:07:48 AM Awakenings: 165 41.7   Time in Bed: 448.1 min. Arousals: 84 21.2   Total Sleep Time: 237.5 min. Apneas & Hypopneas: 160 40.4    Sleep Efficiency: 53.0% Limb Movements: - -   Sleep Latency: 10.3 min. Snores: - -   Wake After Sleep Onset: 200.0 min. Desaturations: 137 34.6    REM Latency from Sleep Onset: 57.0 min. Minimum SpO2 TST: 80.0%       Sleep Architecture                                                                                                                           % of Time in Bed     Stages Time (mins) % Sleep Time   Wake 210.5     Stage N1 109.5 46.1%   Stage N2 87.5 36.8%   Stage N3 0.0 0.0%   REM 40.5 17.1%         Arousal Summary       NREM REM Sleep Index   Respiratory Arousals 30 12 42 10.6   PLM Arousals - - - -   Isolated Limb Movement Arousals - - - -   Spontaneous Arousals 41 1 42 10.6   Total 71 13 84 21.2         Limb Movement Summary       Count Index   Isolated Limb Movements - -   Periodic Limb Movements (PLMs) - -   Total Limb Movements - -       Respiratory Summary       By Sleep Stage By Body Position Total    NREM REM Supine Non-Supine    Time (min) 197.0 40.5 237.5 - 237.5                 Obstructive Apnea 93 25 118 - 118   Mixed Apnea 8 3 11 - 11   Central Apnea 5 1 6 - 6   Central Apnea Index 1.5 1.5 1.5 - 1.5   Total Apneas 106 29 135 - 135   Total Apnea Index 32.3 43.0 34.1 - 34.1                 Total Hypopnea 24 1 25 - 25   Total Hypopnea Index 7.3 1.5 6.3 - 6.3                 Apnea & Hypopnea 130 30 160 - 160   Apnea & Hypopnea Index 39.6 44.4 40.4 - 40.4               "   RERAs 2 - 2 - 2   RERA Index 0.6 - 0.5 - 0.5                 RDI 40.2 44.4 40.9 - 40.9      Scoring Criteria: Hypopneas scored at 4% desaturation criteria.     Respiratory Event Durations       Apnea Hypopnea    NREM REM NREM REM   Average (seconds) 32.8 47.7 24.4 61.7   Maximum (seconds) 76.4 74.9 45.2 61.7         Oxygen Saturation Summary       Wake NREM REM TST Total   Average SpO2 93.3% 94.6% 93.9% 94.5% 93.9%   Minimum SpO2 78.0% 81.0% 80.0% 80.0% 78.0%   Maximum SpO2 99.0% 98.0% 98.0% 98.0% 99.0%      Oxygen Saturation Distribution     Range (%) Time in range (min) Time in range (%)    90.0 - 100.0 395.8 89.4%   80.0 - 90.0 45.5 10.3%   70.0 - 80.0 0.5 0.1%   60.0 - 70.0 - -   50.0 - 60.0 - -   0.0 - 50.0 - -   Time Spent <=88% SpO2     Range (%) Time in range (min) Time in range (%)   0.0 - 88.0 25.6 5.8%              Count Index   Desaturations 137 34.6           Cardiac Summary       Wake NREM REM Sleep Total   Average Pulse Rate (BPM) 55.1 53.7 52.7 53.5 54.3   Minimum Pulse Rate (BPM) 44.0 43.0 44.0 43.0 43.0   Maximum Pulse Rate (BPM) 100.0 76.0 79.0 79.0 100.0      Pulse Rate Distribution     Range (bpm) Time in range (min) Time in range (%)   0.0 - 40.0 - -   40.0 - 60.0 413.3 93.2%   60.0 - 80.0 30.0 6.8%   80.0 - 100.0 0.2 0.0%   100.0 - 120.0 - -   120.0 - 140.0 - -   140.0 - 200.0 - -      EtCO2 Summary     Stage Min (mmHg) Average (mmHg) Max (mmHg)   Wake - - -   NREM(1+2+3) - - -   REM - - -      Range (mmHg) Time in range (min) Time in range (%)   20.0 - 40.0 - -   40.0 - 50.0 - -   50.0 - 55.0 - -   55.0 - 100.0 - -      TcCO2 Summary     Stage Min (mmHg) Average (mmHg) Max (mmHg)   Wake - - -   NREM(1+2+3) - - -   REM - - -      Range (mmHg) Time in range (min) Time in range (%)   20.0 - 40.0 - -   40.0 - 50.0 - -   50.0 - 55.0 - -   55.0 - 100.0 - -   Excluded data <20.0 & >65.0 448.5 100.0%      Comments     -         Titration Summary     PAP Device PAP Level O2 Level Time (min) TST  (min) NREM (min) REM (min) Wake (min) Sleep Eff% OA# CA# MA# Hyp# AHI RERA RDI Min SpO2 SpO2 <=88% (min) Ar. Index   - Off - 0.5 0.0 0.0 0.0 0.0 0.0%             BiLevel 12/8/0 - 35.5 21.0 21.0 0.0 14.5 59.2% 1 - - 2 8.6 1 11.4 90.0  0.0 5.7   BiLevel 14/10/0 - 11.5 10.0 10.0 0.0 1.5 87.0% 1 - - 2 18.0 - 18.0 86.0  0.1 -   BiLevel 16/12/0 - 8.0 3.5 3.5 0.0 4.5 43.8% 2 - - 4 102.9 - 102.9 88.0  0.1 17.1   BiLevel 18/14/0 - 36.0 25.0 18.0 7.0 11.0 69.4% 9 1 - 5 36.0 1 38.4 86.0  0.5 14.4   BiLevel 20/16/0 - 15.5 13.5 13.5 0.0 2.0 87.1% 3 - - 1 17.8 - 17.8 88.0  0.0 8.9   BiLevel 22/18/0 - 16.5 10.5 10.5 0.0 6.0 63.6% 9 - - 1 57.1 - 57.1 86.0  0.5 22.9   BiLevel 24/20/0 - 325.0 154.0 120.5 33.5 171.0 47.4% 93 5 11 10 46.4 - 46.4 80.0  6.0 26.9

## 2025-03-12 NOTE — PROGRESS NOTES
Patient set up, procedures explained prior to testing. Disposable leads/sensors used where applicable. BIPAP titration performed using his Sidney FFM. Post study f/u instructions given, questions answered.       104.3

## 2025-03-24 ENCOUNTER — RESULTS FOLLOW-UP (OUTPATIENT)
Dept: SLEEP MEDICINE | Facility: CLINIC | Age: 80
End: 2025-03-24

## 2025-03-24 ENCOUNTER — TELEPHONE (OUTPATIENT)
Dept: SLEEP MEDICINE | Facility: CLINIC | Age: 80
End: 2025-03-24
Payer: MEDICARE

## 2025-03-24 PROCEDURE — 95811 POLYSOM 6/>YRS CPAP 4/> PARM: CPT | Mod: 26,,, | Performed by: INTERNAL MEDICINE

## 2025-03-31 RX ORDER — FLUTICASONE PROPIONATE 50 MCG
1 SPRAY, SUSPENSION (ML) NASAL DAILY
Qty: 16 G | Refills: 5 | OUTPATIENT
Start: 2025-03-31

## 2025-03-31 RX ORDER — FLUTICASONE PROPIONATE 50 MCG
SPRAY, SUSPENSION (ML) NASAL
Qty: 48 G | Refills: 1 | Status: SHIPPED | OUTPATIENT
Start: 2025-03-31

## 2025-03-31 NOTE — TELEPHONE ENCOUNTER
Care Due:                  Date            Visit Type   Department     Provider  --------------------------------------------------------------------------------                                EP -                              PRIMARY      [facility] INTERNAL  Gio Calzada  Last Visit: 08-      CARE ([facility])   [facility]  Next Visit: None Scheduled  None         None Found                                                            Last  Test          Frequency    Reason                     Performed    Due Date  --------------------------------------------------------------------------------    CBC.........  12 months..  valACYclovir.............  06- 06-    CMP.........  12 months..  losartan-hydrochlorothiaz  06- 06-                             meghann, pravastatin,                             valACYclovir.............    Lipid Panel.  12 months..  pravastatin..............  06- 06-    [facility] Embedded Care Due Messages. Reference number: [id number].   3/31/2025 11:08:14 AM CDT

## 2025-03-31 NOTE — TELEPHONE ENCOUNTER
Provider Staff:  Action required for this patient    Requires labs      Please see care gap opportunities below in Care Due Message.    Thanks!  [facility]     Appointments      Date Provider   Last Visit   8/12/2024 Gio Rizo MD   Next Visit   3/31/2025 Gio Rizo MD     Refill Decision Note   Zheng Talley  is requesting a refill authorization.  Brief Assessment and Rationale for Refill:  Approve     Medication Therapy Plan:        Comments:     Note composed:6:10 PM 03/31/2025

## 2025-04-29 NOTE — PROGRESS NOTES
"CHIEF COMPLAINT:  Annual and hypertension follow-up    HISTORY OF PRESENT ILLNESS: The patient is a 78 year-old male.  No problems recently    The patient has been treated with robotic prostatectomy.  He is closely following with urologic oncology.  All is going well there.      The patient has a history of stable hypertension on current medications.  Patient denies chest pain or shortness of breath today.    The patient has a history of stable hyperlipidemia on current medications.  The patient denies chest pain or shortness of breath today.  The patient denies muscle aches or myalgias suggestive of myositis.    REVIEW OF SYSTEMS:  GENERAL: No fever, chills, fatigability or weight loss.  SKIN: No rashes, itching or changes in color or texture of skin.  HEAD: No headaches or recent head trauma.  EYES: Visual acuity fine. No photophobia, ocular pain or diplopia.  EARS: Denies ear pain, discharge or vertigo.  NOSE: No loss of smell, no epistaxis or postnasal drip.  MOUTH & THROAT: No hoarseness or change in voice. No excessive gum bleeding.  NODES: Denies swollen glands.  CHEST: Denies CLIFFORD, cyanosis, wheezing, cough and sputum production.  CARDIOVASCULAR: Denies chest pain, PND, orthopnea or reduced exercise tolerance.  ABDOMEN: Appetite fine. No weight loss. Denies diarrhea, abdominal pain, hematemesis or blood in stool.  URINARY: No flank pain, dysuria or hematuria.  PERIPHERAL VASCULAR: No claudication or cyanosis.  MUSCULOSKELETAL: No joint stiffness or swelling. Denies back pain.  NEUROLOGIC: No history of seizures, paralysis, alteration of gait or coordination.    SOCIAL HISTORY: The patient does not smoke.  The patient consumes alcohol socially.  The patient is happily .    PHYSICAL EXAMINATION:   Blood pressure 122/64, pulse 67, height 5' 10" (1.778 m), weight 111.1 kg (244 lb 14.9 oz), SpO2 95 %.    APPEARANCE: Well nourished, well developed, in no acute distress.    HEAD: Normocephalic, " atraumatic.  EYES: PERRL. EOMI.  Conjunctivae without injection and  anicteric  NOSE: Mucosa pink. Airway clear.  MOUTH & THROAT: No tonsillar enlargement. No pharyngeal erythema or exudate. No stridor.  NECK: Supple.   NODES: No cervical, axillary or inguinal lymph node enlargement.  CHEST: Lungs clear to auscultation.  No retractions are noted.  No rales or rhonchi are present.  CARDIOVASCULAR: Normal S1, S2. No rubs, murmurs or gallops.  ABDOMEN: Bowel sounds normal. Not distended. Soft. No tenderness or masses.  No ascites is noted.  MUSCULOSKELETAL:  There is no clubbing, cyanosis, or edema of the extremities x4.  There is full range of motion of the lumbar spine.  There is full range of motion of the extremities x4.  There is no deformity noted.    NEUROLOGIC:       Normal speech development.      Hearing normal.      Normal gait.      Motor and sensory exams grossly normal.  PSYCHIATRIC: Patient is alert and oriented x3.  Thought processes are all normal.  There is no homicidality.  There is no suicidality.  There is no evidence of psychosis.    LABORATORY/RADIOLOGY:   Chart reviewed.  We reviewed blood work today.    ASSESSMENT:   Annual  Prostate cancer, believed to be cured  Hypertension  Rt THR 6/23    PLAN:  Recent BW good  Flonase  Blood pressure medications refilled   I discussed his case with his urologist     Return to clinic in one year.         poor balance

## 2025-05-13 ENCOUNTER — LAB VISIT (OUTPATIENT)
Dept: LAB | Facility: OTHER | Age: 80
End: 2025-05-13
Attending: UROLOGY
Payer: MEDICARE

## 2025-05-13 DIAGNOSIS — C61 PROSTATE CANCER: ICD-10-CM

## 2025-05-13 LAB
PSA SERPL-MCNC: 0.13 NG/ML
TESTOST SERPL-MCNC: 372 NG/DL (ref 304–1227)

## 2025-05-13 PROCEDURE — 84403 ASSAY OF TOTAL TESTOSTERONE: CPT

## 2025-05-13 PROCEDURE — 84153 ASSAY OF PSA TOTAL: CPT

## 2025-05-13 PROCEDURE — 36415 COLL VENOUS BLD VENIPUNCTURE: CPT

## 2025-05-14 ENCOUNTER — RESULTS FOLLOW-UP (OUTPATIENT)
Dept: UROLOGY | Facility: CLINIC | Age: 80
End: 2025-05-14

## 2025-05-19 ENCOUNTER — PATIENT MESSAGE (OUTPATIENT)
Dept: SLEEP MEDICINE | Facility: CLINIC | Age: 80
End: 2025-05-19
Payer: MEDICARE

## 2025-05-19 ENCOUNTER — TELEPHONE (OUTPATIENT)
Dept: SLEEP MEDICINE | Facility: CLINIC | Age: 80
End: 2025-05-19
Payer: MEDICARE

## 2025-05-19 NOTE — TELEPHONE ENCOUNTER
Lvm to reschedule missed appt called three times       ----- Message from Danna sent at 5/19/2025  1:03 PM CDT -----  :  Appointment RequestName of Caller:Zheng AVILA When is the first available appointment?No accessSymptoms: f/u on cpap usage with new pressure settingsWould the patient rather a call back or a response via [facility]? Call back or my [facility] portal  Best Call Back Number: [phone] Information: Pt was scheduled for today but he missed his appointment and would like to get his appointment rescheduled for the next soonest available , or if he can see someone he can see him as soon as possible. Pt also states when the settings was changed last time in the machine he hasn't seen any improvement in his activity on the machine and states the numbers or still high. Pt states to please call back with further assistance in scheduling.  ----- Message -----  From: Danna Salas  Sent: 5/19/2025   1:07 PM CDT  To:     :  Appointment RequestName of Caller:Zheng AVILA When is the first available appointment?No accessSymptoms: f/u on cpap usage with new pressure settingsWould the patient rather a call back or a response via [facility] [employer]? Call back Best Call Back Number: [phone] Information: Pt was scheduled for today but he missed his appointment and would like to get his appointment rescheduled for the next soonest available , or if he can see someone he can see him as soon as possible. Pt states to please call back with further assistance in scheduling.

## 2025-05-21 NOTE — PROGRESS NOTES
Subjective:      Zheng Talley Jr. is a 80 y.o. male who returns today regarding his       IPSS  Incomplete Emptying: (Patient-Rptd) (P) Not at all  Frequency: (Patient-Rptd) (P) Almost always  Intermittency: (Patient-Rptd) (P) Not at all  Urgency: (Patient-Rptd) (P) Less than 1 time in 5  Weak Stream: (Patient-Rptd) (P) Not at all  Straining: (Patient-Rptd) (P) Not at all  Nocturia: (Patient-Rptd) (P) Less than 1 time in 5  Total Score: (Patient-Rptd) (P) 7  Feel About Condition: (Patient-Rptd) (P) Mostly Satisfed  .    The following portions of the patient's history were reviewed and updated as appropriate: allergies, current medications, past family history, past medical history, past social history, past surgical history and problem list.    Review of Systems  Pertinent items are noted in HPI.  A comprehensive multipoint review of systems was negative except as otherwise stated in the HPI.    Past Medical History:   Diagnosis Date    Arthritis     BPH (benign prostatic hyperplasia)     Cancer     prostate    Cataract     Groin pain     History of gastroesophageal reflux (GERD)     Hyperlipidemia     Hypertension     Nuclear sclerosis - Both Eyes 02/18/2014    Obesity     Prostate cancer     Renal insufficiency 06/09/2023    Sleep apnea     Trouble in sleeping      Past Surgical History:   Procedure Laterality Date    ARTHROPLASTY OF HIP BY ANTERIOR APPROACH Right 06/26/2023    Procedure: ARTHROPLASTY, HIP, TOTAL, ANTERIOR APPROACH: RIGHT: HORACE AVENIR & G7: SAME DAY;  Surgeon: Teofilo Rodrigues MD;  Location: [facility] OR;  Service: Orthopedics;  Laterality: Right;    BUNIONECTOMY      bilateral     CARPAL TUNNEL RELEASE Left 07/25/2022    Procedure: RELEASE, CARPAL TUNNEL,LEFT;  Surgeon: Elyssa Bradford MD;  Location: [facility] OR;  Service: Orthopedics;  Laterality: Left;    COLONOSCOPY  2011    Dr. Velez    COLONOSCOPY N/A 02/25/2019    Procedure: COLONOSCOPY;  Surgeon: JACQUELINE Lozano MD;  Location: [facility]  ENDO (4TH FLR);  Service: Endoscopy;  Laterality: N/A;    dental implant      HAND SURGERY      INJECTION Right 03/03/2023    Procedure: INJECTION, RIGHT HIP-INTRA-ARTICULAR CONTRAST DIRECT REF;  Surgeon: Frankie Carrillo MD;  Location: [facility] PAIN MGT;  Service: Pain Management;  Laterality: Right;    JOINT REPLACEMENT  06/26/2023    hip Anterior aprpoach    lip surgery      PROSTATE SURGERY      REPAIR OF NAIL BED Right 11/02/2018    Procedure: REPAIR, NAIL BED right thumb with I&D;  Surgeon: Elyssa Bradford MD;  Location: [facility] OR;  Service: Orthopedics;  Laterality: Right;  stretcher, supine, hand pan 1 and pan 2       Review of patient's allergies indicates:   Allergen Reactions    Iodine and iodide containing products Other (See Comments)     Blood in urine in the 1970s          Objective:   Vitals: There were no vitals taken for this visit.    Physical Exam   General: alert and oriented, no acute distress  Respiratory: Symmetric expansion, non-labored breathing  Cardiovascular: no peripheral edema  Abdomen: non distended  Skin: normal coloration and turgor, no rashes, no suspicious skin lesions noted  Neuro: no gross deficits  Psych: normal judgment and insight, normal mood/affect, and non-anxious    Physical Exam    Lab Review   Urinalysis demonstrates no specimen    Lab Results   Component Value Date    WBC 7.56 06/29/2023    HGB 10.6 (L) 06/29/2023    HCT 32.4 (L) 06/29/2023    HCT 40 06/26/2023    MCV 88 06/29/2023     (L) 06/29/2023     Lab Results   Component Value Date    CREATININE 1.0 06/29/2023    BUN 16 06/29/2023     Lab Results   Component Value Date    PSA 0.13 05/13/2025    PSA 3.4 02/24/2014    PSA 2.80 02/04/2013    PSADIAG 0.09 02/18/2025    PSADIAG <0.01 09/23/2024    PSADIAG 0.08 05/09/2024         Imaging  -    Assessment and Plan:   Prostate cancer  didier 9 4+5 [id number] psa detectable; 10 years post op     PSMA PET  See Dr Kearns for salvage XRT  ADT per advanced  prostate cancer clinic        Erectile dysfunction, unspecified erectile dysfunction type  Cialis prn      Visit today included increased complexity associated with the care of the episodic problem prostate cancer addressed and managing the longitudinal care of the patient due to the serious and/or complex managed problem(s) prostate cancer.

## 2025-05-23 ENCOUNTER — OFFICE VISIT (OUTPATIENT)
Dept: UROLOGY | Facility: CLINIC | Age: 80
End: 2025-05-23
Payer: MEDICARE

## 2025-05-23 VITALS
HEART RATE: 61 BPM | HEIGHT: 70 IN | OXYGEN SATURATION: 91 % | SYSTOLIC BLOOD PRESSURE: 142 MMHG | WEIGHT: 243.94 LBS | DIASTOLIC BLOOD PRESSURE: 74 MMHG | BODY MASS INDEX: 34.92 KG/M2

## 2025-05-23 DIAGNOSIS — C61 PROSTATE CANCER: Primary | ICD-10-CM

## 2025-05-23 NOTE — Clinical Note
PSMA PET then see Dr Kearns for early salvage XRT See Advanced prostate cancer clinic for ADT thanks

## 2025-05-26 ENCOUNTER — TELEPHONE (OUTPATIENT)
Dept: HEMATOLOGY/ONCOLOGY | Facility: CLINIC | Age: 80
End: 2025-05-26
Payer: MEDICARE

## 2025-05-26 NOTE — TELEPHONE ENCOUNTER
----- Message from MORGAN So sent at 5/26/2025  9:43 AM CDT -----  Yes please!  ----- Message -----  From: Jignesh Boland RN  Sent: 5/26/2025   9:41 AM CDT  To: MORGAN Banks, Just to be sure.  He wants pt to see agata and med onc?  ----- Message -----  From: Saray Lei RN  Sent: 5/26/2025   7:35 AM CDT  To: MORGAN Leblanc Dr. would like him to see the advanced prostate cancer clinic.  ----- Message -----  From: Jignesh Boland RN  Sent: 5/23/2025   2:15 PM CDT  To: Saray Lei RN    Who does this pt need to see?  ----- Message -----  From: Saray Lei RN  Sent: 5/23/2025  10:28 AM CDT  To: Vianca Mcdaniel RN    Hey! Would you happen to know if there is a referral for APC, or is it just the regular [facility] referral? Thanks!  ----- Message -----  From: Derrick Wu MD  Sent: 5/23/2025   9:54 AM CDT  To: Greg Kearns Jr., MD; Gio Jaimes#    PSMA PET then see Dr Kearns for early salvage XRT  See Advanced prostate cancer clinic for ADT  thanks

## 2025-05-27 ENCOUNTER — TELEPHONE (OUTPATIENT)
Dept: HEMATOLOGY/ONCOLOGY | Facility: CLINIC | Age: 80
End: 2025-05-27
Payer: MEDICARE

## 2025-05-27 NOTE — NURSING
Oncology Navigation   Intake  Cancer Type:   Type of Referral: Internal  Date of Referral: 05/26/25  Initial Nurse Navigator Contact: 05/26/25  Referral to Initial Contact Timeline (days): 0  First Appointment Available: 06/09/25  Appointment Date: 06/09/25  First Available Date vs. Scheduled Date (days): 0     Treatment                              Acuity      Follow Up  No follow-ups on file.

## 2025-05-27 NOTE — TELEPHONE ENCOUNTER
Called and spoke to Patient .  Appointment scheduled on 6/9 with Dr Jara.  All questions and concerns addressed.   Provided my name and direct number and instructed Patient  to call with any questions and/or concerns.       ROXANN LeblancN, RN-BC  BMT Nurse Navigator  [facility] [address], [place], [place] [address]  _________________________  o [phone]

## 2025-05-27 NOTE — TELEPHONE ENCOUNTER
----- Message from MORGAN So sent at 5/26/2025  9:43 AM CDT -----  Yes please!  ----- Message -----  From: Jignesh Boland RN  Sent: 5/26/2025   9:41 AM CDT  To: MORGAN Banks, Just to be sure.  He wants pt to see agata and med onc?  ----- Message -----  From: Saray Lei RN  Sent: 5/26/2025   7:35 AM CDT  To: MORGAN Leblanc Dr. would like him to see the advanced prostate cancer clinic.  ----- Message -----  From: Jignesh Boland RN  Sent: 5/23/2025   2:15 PM CDT  To: Saray Lei RN    Who does this pt need to see?  ----- Message -----  From: Saray Lei RN  Sent: 5/23/2025  10:28 AM CDT  To: Vianca Mcdaniel RN    Hey! Would you happen to know if there is a referral for APC, or is it just the regular [facility] referral? Thanks!  ----- Message -----  From: Derrick Wu MD  Sent: 5/23/2025   9:54 AM CDT  To: Greg Kearns Jr., MD; Gio Jaimes#    PSMA PET then see Dr Kearns for early salvage XRT  See Advanced prostate cancer clinic for ADT  thanks

## 2025-05-27 NOTE — TELEPHONE ENCOUNTER
----- Message from MORGAN So sent at 5/26/2025  9:43 AM CDT -----  Yes please!  ----- Message -----  From: Jignesh Boland RN  Sent: 5/26/2025   9:41 AM CDT  To: MORGAN Banks, Just to be sure.  He wants pt to see agata and med onc?  ----- Message -----  From: Saray Lei RN  Sent: 5/26/2025   7:35 AM CDT  To: MORGAN Leblanc Dr. would like him to see the advanced prostate cancer clinic.  ----- Message -----  From: Jignesh Boland RN  Sent: 5/23/2025   2:15 PM CDT  To: Saray Lei RN    Who does this pt need to see?  ----- Message -----  From: Saray Lei RN  Sent: 5/23/2025  10:28 AM CDT  To: Vianca Mcdaniel RN    Hey! Would you happen to know if there is a referral for APC, or is it just the regular [facility] referral? Thanks!  ----- Message -----  From: Derrick Wu MD  Sent: 5/23/2025   9:54 AM CDT  To: Greg Kearns Jr., MD; Gio Jaimes#    PSMA PET then see Dr Kearns for early salvage XRT  See Advanced prostate cancer clinic for ADT  thanks

## 2025-05-30 ENCOUNTER — PATIENT MESSAGE (OUTPATIENT)
Dept: SLEEP MEDICINE | Facility: CLINIC | Age: 80
End: 2025-05-30
Payer: MEDICARE

## 2025-06-04 ENCOUNTER — HOSPITAL ENCOUNTER (OUTPATIENT)
Dept: RADIOLOGY | Facility: HOSPITAL | Age: 80
Discharge: HOME | End: 2025-06-04
Attending: UROLOGY
Payer: MEDICARE

## 2025-06-04 DIAGNOSIS — C61 PROSTATE CANCER: ICD-10-CM

## 2025-06-04 PROCEDURE — A9596 HC GALLIUM GA-68 GOZETOTIDE, DX (ILLUCCIX), PER 1 MCI: HCPCS | Mod: TB | Performed by: UROLOGY

## 2025-06-04 PROCEDURE — 78815 PET IMAGE W/CT SKULL-THIGH: CPT | Mod: TC

## 2025-06-04 PROCEDURE — 78815 PET IMAGE W/CT SKULL-THIGH: CPT | Mod: 26,PI,, | Performed by: STUDENT IN AN ORGANIZED HEALTH CARE EDUCATION/TRAINING PROGRAM

## 2025-06-04 RX ADMIN — KIT FOR THE PREPARATION OF GALLIUM GA 68 GOZETOTIDE INJECTION 3.74 MILLICURIE: KIT INTRAVENOUS at 01:06

## 2025-06-05 ENCOUNTER — RESULTS FOLLOW-UP (OUTPATIENT)
Dept: UROLOGY | Facility: CLINIC | Age: 80
End: 2025-06-05

## 2025-06-09 ENCOUNTER — OFFICE VISIT (OUTPATIENT)
Dept: HEMATOLOGY/ONCOLOGY | Facility: CLINIC | Age: 80
End: 2025-06-09
Payer: MEDICARE

## 2025-06-09 ENCOUNTER — LAB VISIT (OUTPATIENT)
Dept: LAB | Facility: HOSPITAL | Age: 80
End: 2025-06-09
Attending: HOSPITALIST
Payer: MEDICARE

## 2025-06-09 VITALS
OXYGEN SATURATION: 95 % | BODY MASS INDEX: 35.23 KG/M2 | SYSTOLIC BLOOD PRESSURE: 134 MMHG | WEIGHT: 246.06 LBS | HEART RATE: 61 BPM | HEIGHT: 70 IN | DIASTOLIC BLOOD PRESSURE: 65 MMHG

## 2025-06-09 DIAGNOSIS — D63.0 ANEMIA IN NEOPLASTIC DISEASE: ICD-10-CM

## 2025-06-09 DIAGNOSIS — Z79.899 LONG TERM CURRENT USE OF THERAPEUTIC DRUG: ICD-10-CM

## 2025-06-09 DIAGNOSIS — D53.9 NUTRITIONAL ANEMIA: ICD-10-CM

## 2025-06-09 DIAGNOSIS — C61 PROSTATE CANCER: ICD-10-CM

## 2025-06-09 DIAGNOSIS — C61 PROSTATE CANCER: Primary | ICD-10-CM

## 2025-06-09 LAB
25(OH)D3+25(OH)D2 SERPL-MCNC: 30 NG/ML (ref 30–96)
ABSOLUTE NEUTROPHIL COUNT (OHS): 4.2 K/UL (ref 1.8–7.7)
ALBUMIN SERPL BCP-MCNC: 4.1 G/DL (ref 3.5–5.2)
ALP SERPL-CCNC: 59 UNIT/L (ref 40–150)
ALT SERPL W/O P-5'-P-CCNC: 24 UNIT/L (ref 10–44)
ANION GAP (OHS): 8 MMOL/L (ref 8–16)
AST SERPL-CCNC: 24 UNIT/L (ref 11–45)
BILIRUB SERPL-MCNC: 0.7 MG/DL (ref 0.1–1)
BUN SERPL-MCNC: 15 MG/DL (ref 8–23)
CALCIUM SERPL-MCNC: 9.7 MG/DL (ref 8.7–10.5)
CHLORIDE SERPL-SCNC: 107 MMOL/L (ref 95–110)
CO2 SERPL-SCNC: 28 MMOL/L (ref 23–29)
CREAT SERPL-MCNC: 1.4 MG/DL (ref 0.5–1.4)
ERYTHROCYTE [DISTWIDTH] IN BLOOD BY AUTOMATED COUNT: 15.7 % (ref 11.5–14.5)
FERRITIN SERPL-MCNC: 220 NG/ML (ref 20–300)
GFR SERPLBLD CREATININE-BSD FMLA CKD-EPI: 51 ML/MIN/1.73/M2
GLUCOSE SERPL-MCNC: 95 MG/DL (ref 70–110)
HAPTOGLOB SERPL-MCNC: 123 MG/DL (ref 30–250)
HCT VFR BLD AUTO: 50.1 % (ref 40–54)
HGB BLD-MCNC: 16.3 GM/DL (ref 14–18)
IMM GRANULOCYTES # BLD AUTO: 0.02 K/UL (ref 0–0.04)
INR PPP: 1.2 (ref 0.8–1.2)
IRON SATN MFR SERPL: 25 % (ref 20–50)
IRON SERPL-MCNC: 99 UG/DL (ref 45–160)
LDH SERPL-CCNC: 267 U/L (ref 110–260)
MCH RBC QN AUTO: 28.2 PG (ref 27–31)
MCHC RBC AUTO-ENTMCNC: 32.5 G/DL (ref 32–36)
MCV RBC AUTO: 87 FL (ref 82–98)
PLATELET # BLD AUTO: 176 K/UL (ref 150–450)
PMV BLD AUTO: 11 FL (ref 9.2–12.9)
POTASSIUM SERPL-SCNC: 4 MMOL/L (ref 3.5–5.1)
PROT SERPL-MCNC: 7 GM/DL (ref 6–8.4)
PROTHROMBIN TIME: 12.8 SECONDS (ref 9–12.5)
PSA SERPL-MCNC: 0.15 NG/ML
RBC # BLD AUTO: 5.77 M/UL (ref 4.6–6.2)
RETICS/RBC NFR AUTO: 1.5 % (ref 0.4–2)
SODIUM SERPL-SCNC: 143 MMOL/L (ref 136–145)
TESTOST SERPL-MCNC: 294 NG/DL (ref 304–1227)
TIBC SERPL-MCNC: 394 UG/DL (ref 250–450)
TRANSFERRIN SERPL-MCNC: 266 MG/DL (ref 200–375)
VIT B12 SERPL-MCNC: 302 PG/ML (ref 210–950)
WBC # BLD AUTO: 6.38 K/UL (ref 3.9–12.7)

## 2025-06-09 PROCEDURE — G2211 COMPLEX E/M VISIT ADD ON: HCPCS | Mod: ,,, | Performed by: HOSPITALIST

## 2025-06-09 PROCEDURE — 36415 COLL VENOUS BLD VENIPUNCTURE: CPT

## 2025-06-09 PROCEDURE — 85027 COMPLETE CBC AUTOMATED: CPT

## 2025-06-09 PROCEDURE — 83540 ASSAY OF IRON: CPT

## 2025-06-09 PROCEDURE — 83615 LACTATE (LD) (LDH) ENZYME: CPT

## 2025-06-09 PROCEDURE — 85045 AUTOMATED RETICULOCYTE COUNT: CPT

## 2025-06-09 PROCEDURE — 82607 VITAMIN B-12: CPT

## 2025-06-09 PROCEDURE — 82306 VITAMIN D 25 HYDROXY: CPT

## 2025-06-09 PROCEDURE — 84153 ASSAY OF PSA TOTAL: CPT

## 2025-06-09 PROCEDURE — 99999 PR PBB SHADOW E&M-EST. PATIENT-LVL III: CPT | Mod: PBBFAC,,, | Performed by: HOSPITALIST

## 2025-06-09 PROCEDURE — 83010 ASSAY OF HAPTOGLOBIN QUANT: CPT

## 2025-06-09 PROCEDURE — 82728 ASSAY OF FERRITIN: CPT

## 2025-06-09 PROCEDURE — 82247 BILIRUBIN TOTAL: CPT

## 2025-06-09 PROCEDURE — 99213 OFFICE O/P EST LOW 20 MIN: CPT | Mod: PBBFAC | Performed by: HOSPITALIST

## 2025-06-09 PROCEDURE — 85610 PROTHROMBIN TIME: CPT

## 2025-06-09 PROCEDURE — 84403 ASSAY OF TOTAL TESTOSTERONE: CPT

## 2025-06-09 PROCEDURE — 99205 OFFICE O/P NEW HI 60 MIN: CPT | Mod: S$PBB,,, | Performed by: HOSPITALIST

## 2025-06-09 NOTE — ASSESSMENT & PLAN NOTE
PSA remains generally low but rising; now at 0.13. Recommend continued observation to better define PSA doubling time and velocity. Would want to start salvage RT prior to PSA 0.5. Likely concurrent 6 months ADT with salvage therapy.  - FU 3 months repeat PSA level

## 2025-06-09 NOTE — PROGRESS NOTES
Advanced Prostate Cancer Clinic: New patient visit  Best Contact Phone Number(s): There are no phone numbers on file.      Cancer/Stage/TNM:    Cancer Staging   No matching staging information was found for the patient.        Reason for visit:  Biochemical recurrence    Molecular:  Germline Testing: N/A    Treatment History:   2015     Prostatectomy     HPI:   Zheng Talley Jr. is a 80 y.o. male with biochemical recurrence following prostatectomy. Initially diagnosied with high risk prostate cacner (pT2, [place] 4+5, PSA 4.2) sp prostatectomy 2015. He has had detectable PSA since 2020 with very slow rise. PSMA PET CT 06/04/25 with no radiologic disease. He presents to medical oncology clinic for initial evaluation.    Overall patient feels well; denies particular symptoms. He has ongoing frequent nocturia - awakens every 4 hours to urinate. More frequent if he isn't wearing his CPAP.  Has moderate urgency during the day; denies signficant urge or stress incontinence. Energy is good; remains quite active - retired, but does a fair amount of yardwork and maintenance about the house. Appetite is good - no N/V/D. No particular chronic pain.     Otherwise has been generally healthy. Has SACHA on CPAP - currently dealing with mask leak and awaiting new device. Has some chronic neuropathy in his hands - atributes to carpal tunnel. Takes amlodipine and and losartan-HCTZ for blood pressure. No MI or stroke. No DM2. No COPD or asthma. Prior right hip replacement 06/2023.      Lab Results   Component Value Date    PSADIAG 0.09 02/18/2025    PSADIAG <0.01 09/23/2024    PSADIAG 0.08 05/09/2024    PSADIAG 0.05 11/14/2023    PSADIAG 0.06 09/18/2023    PSADIAG 0.06 06/08/2023    PSADIAG 0.03 12/19/2022    PSADIAG 0.04 08/30/2022    PSADIAG 0.04 04/19/2022    PSADIAG <0.01 12/13/2021    PSA 0.15 06/09/2025    PSA 0.13 05/13/2025    PSA 3.4 02/24/2014    PSA 2.80 02/04/2013    PSA 3.13 07/11/2012    PSA 2.1 07/21/2011    PSA 1.8  02/19/2009    PSA 1.8 01/09/2009    PSA 1.0 05/17/2005         Anemia: 2023  Overdue Lipids and A1c    History has been obtained by chart review and discussion with the patient.     Oncology History Overview Note   2015: PSA 4.2    04/16/15: Prostate biopsy  Prostate adenocarcinoma, up to [place] 3+5    04/29/15: Tc99m Bone Scan - Negative    07/17/15: Prostatectomy: Prostate adenocarcinoma. [place] 4+5 involving both lobes. No SVI. Margins negative. No LN involved. pT2cN0    02/2020: PSA detecable. Very slow rise.    06/04/25: PSMA PET CT  - No radiotracer avid lesions of LAD     Prostate cancer   7/17/2015 Initial Diagnosis    Prostate cancer           Past Medical History:   Diagnosis Date    Arthritis     BPH (benign prostatic hyperplasia)     Cancer     prostate    Cataract     Groin pain     History of gastroesophageal reflux (GERD)     Hyperlipidemia     Hypertension     Nuclear sclerosis - Both Eyes 02/18/2014    Obesity     Prostate cancer     Renal insufficiency 06/09/2023    Sleep apnea     Trouble in sleeping          Past Surgical History:   Procedure Laterality Date    ARTHROPLASTY OF HIP BY ANTERIOR APPROACH Right 06/26/2023    Procedure: ARTHROPLASTY, HIP, TOTAL, ANTERIOR APPROACH: RIGHT: HORACE AVENIR & G7: SAME DAY;  Surgeon: Teofilo Rodrigues MD;  Location: [facility] OR;  Service: Orthopedics;  Laterality: Right;    BUNIONECTOMY      bilateral     CARPAL TUNNEL RELEASE Left 07/25/2022    Procedure: RELEASE, CARPAL TUNNEL,LEFT;  Surgeon: Elyssa Bradford MD;  Location: [facility] OR;  Service: Orthopedics;  Laterality: Left;    COLONOSCOPY  2011    Dr. Velez    COLONOSCOPY N/A 02/25/2019    Procedure: COLONOSCOPY;  Surgeon: JACQUELINE Lozano MD;  [facility]  Service: Endoscopy;  Laterality: N/A;    dental implant      HAND SURGERY      INJECTION Right 03/03/2023    Procedure: INJECTION, RIGHT HIP-INTRA-ARTICULAR CONTRAST DIRECT REF;  Surgeon: Frankie Carrillo MD;  Location: [facility] PAIN  "MGT;  Service: Pain Management;  Laterality: Right;    JOINT REPLACEMENT  06/26/2023    hip Anterior aprpoach    lip surgery      PROSTATE SURGERY      REPAIR OF NAIL BED Right 11/02/2018    Procedure: REPAIR, NAIL BED right thumb with I&D;  Surgeon: Elyssa Bradford MD;  Location: [facility];  Service: Orthopedics;  Laterality: Right;  stretcher, supine, hand pan 1 and pan 2         Review of patient's allergies indicates:   Allergen Reactions    Iodine and iodide containing products Other (See Comments)     Blood in urine in the 1970s         Current Medications[1]     Objective:      Physical Exam:   /65 (Patient Position: Sitting)   Pulse 61   Ht 5' 10" (1.778 m)   Wt 111.6 kg (246 lb 0.5 oz)   SpO2 95%   BMI 35.30 kg/m²       ECOG Performance status: (0) Fully active, able to carry on all predisease performance without restriction     Physical Exam  Constitutional:       General: He is not in acute distress.     Appearance: Normal appearance.   HENT:      Head: Normocephalic.   Eyes:      General: No scleral icterus.     Extraocular Movements: Extraocular movements intact.      Conjunctiva/sclera: Conjunctivae normal.   Cardiovascular:      Rate and Rhythm: Normal rate.   Pulmonary:      Effort: Pulmonary effort is normal. No respiratory distress.   Abdominal:      General: There is no distension.      Palpations: Abdomen is soft.   Skin:     General: Skin is warm and dry.   Neurological:      Mental Status: He is alert and oriented to person, place, and time.      Motor: No weakness.   Psychiatric:         Mood and Affect: Mood normal.         Behavior: Behavior normal.         Thought Content: Thought content normal.          Recent Labs:   Lab Results   Component Value Date    WBC 6.38 06/09/2025    RBC 5.77 06/09/2025    HGB 16.3 06/09/2025    HCT 50.1 06/09/2025     06/09/2025     06/09/2025    K 4.0 06/09/2025     06/09/2025    CO2 28 06/09/2025    GLU 95 06/09/2025    BUN " 15 06/09/2025    CREATININE 1.4 06/09/2025    CALCIUM 9.7 06/09/2025    PHOS 6.5 (H) 06/26/2023    BILITOT 0.7 06/09/2025    AST 24 06/09/2025    ALT 24 06/09/2025          Lab Results   Component Value Date    PSADIAG 0.09 02/18/2025    PSADIAG <0.01 09/23/2024    PSADIAG 0.08 05/09/2024    PSADIAG 0.05 11/14/2023    PSADIAG 0.06 09/18/2023    PSADIAG 0.06 06/08/2023    PSADIAG 0.03 12/19/2022    PSADIAG 0.04 08/30/2022    PSADIAG 0.04 04/19/2022    PSADIAG <0.01 12/13/2021    PSA 0.15 06/09/2025    PSA 0.13 05/13/2025    PSA 3.4 02/24/2014    PSA 2.80 02/04/2013    PSA 3.13 07/11/2012    PSA 2.1 07/21/2011    PSA 1.8 02/19/2009    PSA 1.8 01/09/2009    PSA 1.0 05/17/2005        Cardiovascular Screening:  Primary care physician: Gio Rizo MD      The ASCVD Risk score (Chang DK, et al., 2019) failed to calculate for the following reasons:    The valid total cholesterol range is 130 to 320 mg/dL    EKG:   Results for orders placed or performed during the hospital encounter of 06/26/23   EKG 12-lead    Collection Time: 06/29/23  7:30 AM    Narrative    Test Reason : I47.20,    Vent. Rate : 076 BPM     Atrial Rate : 076 BPM     P-R Int : 156 ms          QRS Dur : 068 ms      QT Int : 394 ms       P-R-T Axes : 054 -07 196 degrees     QTc Int : 443 ms    Sinus rhythm with Premature atrial complexes  T wave abnormality, consider lateral ischemia  Abnormal ECG  When compared with ECG of 26-JUN-2023 15:33,  Premature atrial complexes are now Present  t wave inversions now present in the inferolateral leads  Confirmed by Ronda Cortez MD ([id number]) on 6/29/2023 1:20:22 PM    Referred By: ETELVINA KANG           Confirmed By:Ronda Cortez MD       Body mass index is 35.3 kg/m².    Lab Results   Component Value Date    CHOL 96 (L) 06/28/2023    LDLCALC 42.4 (L) 06/28/2023    HDL 31 (L) 06/28/2023    TRIG 113 06/28/2023    HGBA1C 5.8 (H) 01/22/2020          Bone Health    Lab Results   Component Value Date     MPKRYDMT44RM 30 06/09/2025        No results found for this or any previous visit.         PSMA PET IMAGING+     Results for orders placed during the hospital encounter of 06/04/25    NM PET CT F 18 PYL PSMA, Midthigh to Vertex    Impression  History of prostate malignancy status post prostatectomy in 2015.  No radiotracer avid lesions or lymphadenopathy.    Electronically signed by resident: Baron Ferguson  Date:    06/04/2025  Time:    15:38    Electronically signed by: Shekhar Corbin  Date:    06/05/2025  Time:    07:41       I have personally reviewed the above imaging.     Path:   Reviewed pathology as documented above.      Diagnoses:     1. Prostate cancer    2. Anemia in neoplastic disease    3. Nutritional anemia    4. Long term current use of therapeutic drug          Assessment and Plan:     1. Prostate cancer  Overview:  Biochemical recurrence following prostatectomy. Initially diagnosied with high risk prostate cacner (pT2, [place] 4+5, PSA 4.2) sp prostatectomy 2015. He has had detectable PSA since 2020 with very slow rise. PSMA PET CT 06/04/25 with no radiologic disease.     Assessment & Plan:  PSA remains generally low but rising; now at 0.13. Recommend continued observation to better define PSA doubling time and velocity. Would want to start salvage RT prior to PSA 0.5. Likely concurrent 6 months ADT with salvage therapy.  - FU 3 months repeat PSA level    Orders:  -     CBC Oncology; Standing  -     Comprehensive Metabolic Panel; Standing  -     Prostate Specific Antigen, Diagnostic; Standing  -     Testosterone,Total; Standing  -     Vitamin D; Future; Expected date: 06/09/2025    2. Anemia in neoplastic disease  -     Ferritin; Future; Expected date: 06/09/2025  -     B-12; Future; Expected date: 06/09/2025  -     Iron and TIBC; Future; Expected date: 06/09/2025  -     Reticulocytes; Future; Expected date: 06/09/2025  -     Lactate Dehydrogenase; Future; Expected date: 06/09/2025  -     HAPTOGLOBIN;  Future; Expected date: 06/09/2025  -     PT/INR; Future; Expected date: 06/09/2025  -     CBC Oncology; Standing    3. Nutritional anemia  -     Ferritin; Future; Expected date: 06/09/2025  -     B-12; Future; Expected date: 06/09/2025  -     Iron and TIBC; Future; Expected date: 06/09/2025  -     Reticulocytes; Future; Expected date: 06/09/2025  -     Lactate Dehydrogenase; Future; Expected date: 06/09/2025  -     HAPTOGLOBIN; Future; Expected date: 06/09/2025  -     PT/INR; Future; Expected date: 06/09/2025  -     CBC Oncology; Standing    4. Long term current use of therapeutic drug  -     Vitamin D; Future; Expected date: 06/09/2025          Follow up:   Route Chart for Scheduling    Med Onc Chart Routing      Follow up with physician . 8/25/25   Follow up with JEREMIAH    Infusion scheduling note    Injection scheduling note    Labs CBC, CMP and PSA   Scheduling:  Preferred lab:  Lab interval:     Imaging    Pharmacy appointment    Other referrals                         The above information has been reviewed with the patient and all questions have been answered to their apparent satisfaction.  They understand that they can call the clinic with any questions.    Nixon Jara MD MPH  Staff Physician     [facility] [place] [address]  Email: [email or web address]  Phone: o) [phone] (c) [phone]                  [1]   Current Outpatient Medications   Medication Sig Dispense Refill    amLODIPine (NORVASC) 10 MG tablet Take 1 tablet (10 mg total) by mouth once daily. 90 tablet 3    fluticasone propionate (FLONASE) 50 mcg/actuation nasal spray USE 1 SPRAY (50 MCG TOTAL) IN EACH NOSTRIL ONCE DAILY 48 g 1    ipratropium (ATROVENT) 21 mcg (0.03 %) nasal spray 2 SPRAYS BY EACH NOSTRIL ROUTE 3 (THREE) TIMES DAILY AS NEEDED (RUNNY NOSE). 30 mL 11    losartan-hydrochlorothiazide 100-25 mg (HYZAAR) 100-25 mg per tablet Take 1 tablet by mouth once daily. 90 tablet 3     metoprolol succinate (TOPROL-XL) 50 MG 24 hr tablet Take 1 tablet (50 mg total) by mouth once daily. 90 tablet 3    pravastatin (PRAVACHOL) 40 MG tablet Take 1 tablet (40 mg total) by mouth every evening. 90 tablet 3    valACYclovir (VALTREX) 1000 MG tablet Take 1 tablet (1,000 mg total) by mouth once daily. 90 tablet 3    tadalafiL (CIALIS) 20 MG Tab Take 1 tablet (20 mg total) by mouth daily as needed. (Patient not taking: Reported on 10/25/2023) 12 tablet 11     No current facility-administered medications for this visit.

## 2025-06-16 ENCOUNTER — OFFICE VISIT (OUTPATIENT)
Dept: SLEEP MEDICINE | Facility: CLINIC | Age: 80
End: 2025-06-16
Payer: MEDICARE

## 2025-06-16 VITALS
HEIGHT: 70 IN | WEIGHT: 249.13 LBS | SYSTOLIC BLOOD PRESSURE: 133 MMHG | HEART RATE: 86 BPM | DIASTOLIC BLOOD PRESSURE: 76 MMHG | BODY MASS INDEX: 35.66 KG/M2

## 2025-06-16 DIAGNOSIS — G47.33 SEVERE OBSTRUCTIVE SLEEP APNEA: Primary | ICD-10-CM

## 2025-06-16 PROCEDURE — 99213 OFFICE O/P EST LOW 20 MIN: CPT | Mod: PBBFAC | Performed by: NURSE PRACTITIONER

## 2025-06-16 PROCEDURE — 99214 OFFICE O/P EST MOD 30 MIN: CPT | Mod: S$PBB,,, | Performed by: NURSE PRACTITIONER

## 2025-06-16 PROCEDURE — 99999 PR PBB SHADOW E&M-EST. PATIENT-LVL III: CPT | Mod: PBBFAC,,, | Performed by: NURSE PRACTITIONER

## 2025-06-16 NOTE — PROGRESS NOTES
ESTABLISHED PATIENT VISIT    Zheng Talley Jr.  is a pleasant 80 y.o. male established with the [facility].    Here today for:  follow-up on SACHA    Since last visit:   See assessment below      Past Medical History:   Diagnosis Date    Arthritis     BPH (benign prostatic hyperplasia)     Cancer     prostate    Cataract     Groin pain     History of gastroesophageal reflux (GERD)     Hyperlipidemia     Hypertension     Nuclear sclerosis - Both Eyes 02/18/2014    Obesity     Prostate cancer     Renal insufficiency 06/09/2023    Sleep apnea     Trouble in sleeping      Patient Active Problem List   Diagnosis    Primary hypertension    Colon polyps    External hemorrhoids    Trigger finger    HSV infection    Knee pain    Erectile dysfunction    Hyperlipidemia LDL goal <130    Nuclear sclerosis - Both Eyes    Nocturia    Obesity, Class II, BMI 35-39.9    Prostate cancer    Open displaced fracture of distal phalanx of right thumb    Laceration of finger nail bed, initial encounter    Adrenal nodule    Screening for colon cancer    Right knee DJD    Spondylosis of lumbar spine    Chronic pain disorder    Primary osteoarthritis of right knee    Carpal tunnel syndrome of left wrist    Wrist stiffness, left    Primary osteoarthritis of both hips    Primary osteoarthritis of right hip s/p right VITO on 6/26/2023    Gastroesophageal reflux disease without esophagitis    Snoring    Renal insufficiency    Thrombocytopenia, unspecified    Ventricular tachycardia, unspecified    Obstructive sleep apnea       Current Outpatient Medications:     amLODIPine (NORVASC) 10 MG tablet, Take 1 tablet (10 mg total) by mouth once daily., Disp: 90 tablet, Rfl: 3    fluticasone propionate (FLONASE) 50 mcg/actuation nasal spray, USE 1 SPRAY (50 MCG TOTAL) IN EACH NOSTRIL ONCE DAILY, Disp: 48 g, Rfl: 1    ipratropium (ATROVENT) 21 mcg (0.03 %) nasal spray, 2 SPRAYS BY EACH NOSTRIL ROUTE 3 (THREE) TIMES DAILY AS NEEDED (RUNNY NOSE).,  Disp: 30 mL, Rfl: 11    losartan-hydrochlorothiazide 100-25 mg (HYZAAR) 100-25 mg per tablet, Take 1 tablet by mouth once daily., Disp: 90 tablet, Rfl: 3    metoprolol succinate (TOPROL-XL) 50 MG 24 hr tablet, Take 1 tablet (50 mg total) by mouth once daily., Disp: 90 tablet, Rfl: 3    pravastatin (PRAVACHOL) 40 MG tablet, Take 1 tablet (40 mg total) by mouth every evening., Disp: 90 tablet, Rfl: 3    tadalafiL (CIALIS) 20 MG Tab, Take 1 tablet (20 mg total) by mouth daily as needed. (Patient not taking: Reported on 10/25/2023), Disp: 12 tablet, Rfl: 11    valACYclovir (VALTREX) 1000 MG tablet, Take 1 tablet (1,000 mg total) by mouth once daily., Disp: 90 tablet, Rfl: 3       There were no vitals filed for this visit.    Physical Exam:    GEN:   Well-appearing  Psych:  Appropriate affect, demonstrates insight  SKIN:  No rash on the face or bridge of the nose      LABS:   Lab Results   Component Value Date    HGB 16.3 06/09/2025    CO2 28 06/09/2025         RECORDS REVIEWED:  PSG/PAP 7.31.23 - AHI 76.2, behzad 68%, rec epap min 6, ipap max 18, ps 4, titrate to 15 or higher    PAP 3.11.25:  Rec: min 16(push to 20+)  max 20  Min Ramp: quick.  Sidney FFM (worked well). No effective pressure in supine position up to 24/20. Possible centrals but appeared more obstructive    Visit 9.26.23 with MANDO Escalante - noted to have elevated residual AHI 9.5 on machine download with pressure settings at 18/9.  Increased 18/12 and to RTC in 3 months    ASSESSMENT    Sig PMH:  HTN, HLD, acute on chronic respiratory failure with hypercapnia, chronic pain disorder, ED, prostate Ca, HSV, GERD, BMI 35+, SACHA   PROBLEM DESCRIPTION/ Sx on Presentation Interval Hx STATUS PLAN     Severe SACHA   Diagnosed in 2023.    LOV 1.30.25 (Hu) - SACHA on cpap follow up. On 18/12 with residual AHI of 25.4 (C5/O19).  Increased settings to 18/14 and recommended titration study.        PAP history   Dx Study    Machine age  AV, setup 6/29/23, AirCurve 10 VAuto     Mask Previously Hybrid, now with FFM   DME HME   My Air    PAP altn    Benefits    PROBS         Since last visit:   Had titration study in light of elevated residual AHI on last download/visit.    PAP 3.11.25:  Rec: min 16(push to 20+)  max 20  Min Ramp: quick.      After study, pressures were adjusted to 20/16.    >>>05/19/2025. Missed appointment. Download today with high residual. 30/30 x 6h41m: 20/16, leak 2/22.6/37.2, AHI 35 (central 4.9/obstructive 27)  Called to inform him of increase to 24/20, sleep only on side as much as possible. Recheck in 2-3 months    Reports today that he has switched to FFM (from hybrid), which provides a better seal - but the current pressures are still too high.  He cannot tolerate the higher pressures and the leaks.    06/12/2025: 21/30 x 5h52m: 24/20, PS 4, leak 9.1/55.3/77.5, AHI 26.6  (c3.7/o16.4)     partially controlled     PAP PLAN   E min 16 cwp (from 20)   I max 20 cwp (from 24)   PS/epr    RAMP    Other    Altn.          Will decrease the pressure settings back to 20/16. In conjunction with new mask, residual AHI should improve.    Will recheck patient in 1 month via patient portal.         Daytime Sx     ESS 3/24 on 7/4/2023       ESS 3/24 06/16/2025     controlled   -continue treatment of SACHA as above     Nocturia     x 2-3 per sleep period    stable      Other issues:     RTC:    in 1 month

## 2025-06-18 ENCOUNTER — PATIENT MESSAGE (OUTPATIENT)
Dept: SLEEP MEDICINE | Facility: CLINIC | Age: 80
End: 2025-06-18
Payer: MEDICARE

## 2025-06-20 ENCOUNTER — PATIENT MESSAGE (OUTPATIENT)
Dept: HEMATOLOGY/ONCOLOGY | Facility: CLINIC | Age: 80
End: 2025-06-20
Payer: MEDICARE

## 2025-07-30 ENCOUNTER — PATIENT MESSAGE (OUTPATIENT)
Dept: SLEEP MEDICINE | Facility: CLINIC | Age: 80
End: 2025-07-30
Payer: MEDICARE

## 2025-08-06 ENCOUNTER — OFFICE VISIT (OUTPATIENT)
Dept: SLEEP MEDICINE | Facility: CLINIC | Age: 80
End: 2025-08-06
Payer: MEDICARE

## 2025-08-06 DIAGNOSIS — G47.33 SEVERE OBSTRUCTIVE SLEEP APNEA: Primary | ICD-10-CM

## 2025-08-06 NOTE — PROGRESS NOTES
The patient location is: [place]  The chief complaint leading to consultation is: trouble with sleep    Visit type: audiovisual    30 minutes of total time spent on the encounter, which includes face to face time and non-face to face time preparing to see the patient (eg, review of tests), Obtaining and/or reviewing separately obtained history, Documenting clinical information in the electronic or other health record, Independently interpreting results (not separately reported) and communicating results to the patient/family/caregiver, or Care coordination (not separately reported).     Each patient to whom he or she provides medical services by telemedicine is:  (1) informed of the relationship between the physician and patient and the respective role of any other health care provider with respect to management of the patient; and (2) notified that he or she may decline to receive medical services by telemedicine and may withdraw from such care at any time.    ESTABLISHED PATIENT VISIT    Zheng Talley Jr.  is a pleasant 80 y.o. male established with the [facility].    Here today for:  follow-up on SACHA    Since last visit:   See assessment below      Past Medical History:   Diagnosis Date    Arthritis     BPH (benign prostatic hyperplasia)     Cancer     prostate    Cataract     Groin pain     History of gastroesophageal reflux (GERD)     Hyperlipidemia     Hypertension     Nuclear sclerosis - Both Eyes 02/18/2014    Obesity     Prostate cancer     Renal insufficiency 06/09/2023    Sleep apnea     Trouble in sleeping      Patient Active Problem List   Diagnosis    Primary hypertension    Colon polyps    External hemorrhoids    Trigger finger    HSV infection    Knee pain    Erectile dysfunction    Hyperlipidemia LDL goal <130    Nuclear sclerosis - Both Eyes    Nocturia    Obesity, Class II, BMI 35-39.9    Prostate cancer    Open displaced fracture of distal phalanx of right thumb    Laceration of  finger nail bed, initial encounter    Adrenal nodule    Screening for colon cancer    Right knee DJD    Spondylosis of lumbar spine    Chronic pain disorder    Primary osteoarthritis of right knee    Carpal tunnel syndrome of left wrist    Wrist stiffness, left    Primary osteoarthritis of both hips    Primary osteoarthritis of right hip s/p right VITO on 6/26/2023    Gastroesophageal reflux disease without esophagitis    Snoring    Renal insufficiency    Thrombocytopenia, unspecified    Ventricular tachycardia, unspecified    Obstructive sleep apnea       Current Outpatient Medications:     amLODIPine (NORVASC) 10 MG tablet, Take 1 tablet (10 mg total) by mouth once daily., Disp: 90 tablet, Rfl: 3    fluticasone propionate (FLONASE) 50 mcg/actuation nasal spray, USE 1 SPRAY (50 MCG TOTAL) IN EACH NOSTRIL ONCE DAILY, Disp: 48 g, Rfl: 1    ipratropium (ATROVENT) 21 mcg (0.03 %) nasal spray, 2 SPRAYS BY EACH NOSTRIL ROUTE 3 (THREE) TIMES DAILY AS NEEDED (RUNNY NOSE)., Disp: 30 mL, Rfl: 11    losartan-hydrochlorothiazide 100-25 mg (HYZAAR) 100-25 mg per tablet, Take 1 tablet by mouth once daily., Disp: 90 tablet, Rfl: 3    metoprolol succinate (TOPROL-XL) 50 MG 24 hr tablet, Take 1 tablet (50 mg total) by mouth once daily., Disp: 90 tablet, Rfl: 3    pravastatin (PRAVACHOL) 40 MG tablet, Take 1 tablet (40 mg total) by mouth every evening., Disp: 90 tablet, Rfl: 3    tadalafiL (CIALIS) 20 MG Tab, Take 1 tablet (20 mg total) by mouth daily as needed. (Patient not taking: Reported on 10/25/2023), Disp: 12 tablet, Rfl: 11    valACYclovir (VALTREX) 1000 MG tablet, Take 1 tablet (1,000 mg total) by mouth once daily., Disp: 90 tablet, Rfl: 3       There were no vitals filed for this visit.    Physical Exam:    GEN:   Well-appearing  Psych:  Appropriate affect, demonstrates insight  SKIN:  No rash on the face or bridge of the nose      LABS:   Lab Results   Component Value Date    HGB 16.3 06/09/2025    CO2 28 06/09/2025          RECORDS REVIEWED:  PSG/PAP 7.31.23 - AHI 76.2, behzad 68%, rec epap min 6, ipap max 18, ps 4, titrate to 15 or higher    PAP 3.11.25:  Rec: min 16(push to 20+)  max 20  Min Ramp: quick.  Sidney FFM (worked well). No effective pressure in supine position up to 24/20. Possible centrals but appeared more obstructive    Visit 9.26.23 with PA Boris - noted to have elevated residual AHI 9.5 on machine download with pressure settings at 18/9.  Increased 18/12 and to RTC in 3 months    ASSESSMENT    Sig PMH:  HTN, HLD, acute on chronic respiratory failure with hypercapnia, chronic pain disorder, ED, prostate Ca, HSV, GERD, BMI 35+, SACHA   PROBLEM DESCRIPTION/ Sx on Presentation Interval Hx STATUS PLAN     Severe SACHA   Diagnosed in 2023.    LOV 1.30.25 (Hu) - SACHA on cpap follow up. On 18/12 with residual AHI of 25.4 (C5/O19).  Increased settings to 18/14 and recommended titration study.    PAP titration on 3.11.25 recommended min 16, max 20. No effective pressure in supine position.    Recommendation from study input into machine.  Repeat interrogation on 5.19.25 shows residual AHI of 35:   30/30 x 6h41m: 20/16, leak 2/22.6/37.2, AHI 35 (central 4.9/obstructive 27)  Called to inform him of increase to 24/20, sleep only on side as much as possible.     Interval download on 6.12.25 with machine set for 24/20 shows high mask leak and residual 26.6. Pt reports the pressures were too high and causing leaks with his mask.  Decreased settings back down to 20/16 cwp.      PAP history   Dx Study PSG/PAP 7.31.23 - AHI 76.2, behzad 68%, rec epap min 6, ipap max 18, ps 4, titrate to 15 or higher   Machine age  AV, setup 6/29/23, AirCurve 10 VAuto    Mask Previously Hybrid, now with FFM   DME HME   My Air    PAP altn    Benefits    PROBS         Since last visit:     Using Sidney FFM and sleeps well with it. No significant leaks.    Is still having elevated residuals nightly at current settings.    Tried to sleep on his right side,  but it breaks the seal on his mask. He lies his on back and tries to turn his head to the left. When he lies on his left side, he gets tangled in the hose.      Discussed cpap pillows for side sleepers.    Download 08/06/2025: 30/30 x 7h9m: 20/16, leak 0.9/28.6/43.6, PS 4, AHI 38.4       partially controlled     PAP PLAN   E min 18 cwp (from 16)   I max 22 cwp (from 20)   PS/epr    RAMP    Other    Altn.      Residuals continue to be elevated. Needs higher pressures. Will increase pressures to see what he can tolerate. Will increase the pressure settings to 22/18.    He will continue to try to sleep on his side.    Discussed potentially adding on a OA or Inspire to help control the SACHA better.    Will recheck patient in 1-2 weeks via patient portal.         Daytime Sx     ESS 3/24 on 7/4/2023       ESS 3/24 08/06/2025     controlled   -continue treatment of SACHA as above     Nocturia     x 2-3 per sleep period    stable      Other issues:     RTC:    in 1-2 weeks

## 2025-08-12 ENCOUNTER — OFFICE VISIT (OUTPATIENT)
Dept: OPTOMETRY | Facility: CLINIC | Age: 80
End: 2025-08-12
Payer: MEDICARE

## 2025-08-12 DIAGNOSIS — Z13.5 SCREENING FOR GLAUCOMA: ICD-10-CM

## 2025-08-12 DIAGNOSIS — H52.03 HYPEROPIA WITH PRESBYOPIA OF BOTH EYES: ICD-10-CM

## 2025-08-12 DIAGNOSIS — H25.13 NUCLEAR SCLEROSIS, BILATERAL: Primary | ICD-10-CM

## 2025-08-12 DIAGNOSIS — H52.4 HYPEROPIA WITH PRESBYOPIA OF BOTH EYES: ICD-10-CM

## 2025-08-12 PROCEDURE — 99213 OFFICE O/P EST LOW 20 MIN: CPT | Mod: PBBFAC,PO | Performed by: OPTOMETRIST

## 2025-08-12 PROCEDURE — 99999 PR PBB SHADOW E&M-EST. PATIENT-LVL III: CPT | Mod: PBBFAC,,, | Performed by: OPTOMETRIST

## 2025-08-18 ENCOUNTER — LAB VISIT (OUTPATIENT)
Dept: LAB | Facility: OTHER | Age: 80
End: 2025-08-18
Attending: RADIOLOGY
Payer: MEDICARE

## 2025-08-18 DIAGNOSIS — C61 PROSTATE CANCER: ICD-10-CM

## 2025-08-18 LAB — PSA SERPL-MCNC: 0.13 NG/ML

## 2025-08-18 PROCEDURE — 84153 ASSAY OF PSA TOTAL: CPT

## 2025-08-18 PROCEDURE — 36415 COLL VENOUS BLD VENIPUNCTURE: CPT

## 2025-08-25 ENCOUNTER — OFFICE VISIT (OUTPATIENT)
Dept: RADIATION ONCOLOGY | Facility: CLINIC | Age: 80
End: 2025-08-25
Attending: RADIOLOGY
Payer: MEDICARE

## 2025-08-25 ENCOUNTER — OFFICE VISIT (OUTPATIENT)
Dept: UROLOGY | Facility: CLINIC | Age: 80
End: 2025-08-25
Payer: MEDICARE

## 2025-08-25 ENCOUNTER — TELEPHONE (OUTPATIENT)
Dept: UROLOGY | Facility: CLINIC | Age: 80
End: 2025-08-25

## 2025-08-25 VITALS
HEART RATE: 69 BPM | WEIGHT: 252.13 LBS | DIASTOLIC BLOOD PRESSURE: 86 MMHG | HEIGHT: 70 IN | SYSTOLIC BLOOD PRESSURE: 160 MMHG | BODY MASS INDEX: 36.09 KG/M2 | OXYGEN SATURATION: 96 %

## 2025-08-25 VITALS
DIASTOLIC BLOOD PRESSURE: 72 MMHG | BODY MASS INDEX: 36.03 KG/M2 | HEIGHT: 70 IN | WEIGHT: 251.69 LBS | OXYGEN SATURATION: 96 % | SYSTOLIC BLOOD PRESSURE: 134 MMHG | HEART RATE: 52 BPM

## 2025-08-25 DIAGNOSIS — C61 PROSTATE CANCER: Primary | ICD-10-CM

## 2025-08-25 PROCEDURE — 99999 PR PBB SHADOW E&M-EST. PATIENT-LVL III: CPT | Mod: PBBFAC,,, | Performed by: RADIOLOGY

## 2025-08-25 PROCEDURE — 99214 OFFICE O/P EST MOD 30 MIN: CPT | Mod: S$GLB,,, | Performed by: UROLOGY

## 2025-08-25 PROCEDURE — G2211 COMPLEX E/M VISIT ADD ON: HCPCS | Mod: S$GLB,,, | Performed by: UROLOGY

## 2025-08-25 PROCEDURE — 99212 OFFICE O/P EST SF 10 MIN: CPT | Mod: S$PBB,,, | Performed by: RADIOLOGY

## 2025-08-25 PROCEDURE — 99213 OFFICE O/P EST LOW 20 MIN: CPT | Mod: PBBFAC | Performed by: RADIOLOGY

## 2025-08-27 ENCOUNTER — TELEPHONE (OUTPATIENT)
Dept: HEMATOLOGY/ONCOLOGY | Facility: CLINIC | Age: 80
End: 2025-08-27
Payer: MEDICARE

## (undated) DEVICE — SYS CLSR DERMABOND PRINEO 22CM

## (undated) DEVICE — GLOVE BIOGEL SKINSENSE PI 7.0

## (undated) DEVICE — SOL SALINE IRRIGATION 250ML

## (undated) DEVICE — TUBE SUCTION KAMVAC MINI 20/BX

## (undated) DEVICE — UNDERGLOVES BIOGEL PI SZ 7 LF

## (undated) DEVICE — GLOVE BIOGEL PI MICRO INDIC 7

## (undated) DEVICE — ALCOHOL 70% ISOP RUBBING 4OZ

## (undated) DEVICE — GLOVE BIOGEL PI MICRO SZ 7

## (undated) DEVICE — TOURNIQUET SB QC DP 18X4IN

## (undated) DEVICE — NDL 22GA X1 1/2 REG BEVEL

## (undated) DEVICE — BANDAGE CONFORM 3IN STRL

## (undated) DEVICE — FORCEP STRAIGHT DISP

## (undated) DEVICE — CHLORAPREP 10.5 ML APPLICATOR

## (undated) DEVICE — DRAPE C-ARM ELAS CLIP 42X120IN

## (undated) DEVICE — GOWN SMARTGOWN 3XL XLONG

## (undated) DEVICE — COVER MAYO STND XL 30X57IN

## (undated) DEVICE — GOWN ECLIPSE POLY REINF 3XL

## (undated) DEVICE — DRAPE STERI-DRAPE 1000 17X11IN

## (undated) DEVICE — COVER CAMERA OPERATING ROOM

## (undated) DEVICE — SUT 4.0 ETHILON

## (undated) DEVICE — DRESSING N ADH OIL EMUL 3X3

## (undated) DEVICE — Device

## (undated) DEVICE — SOL PVP-I SCRUB 7.5% 4OZ

## (undated) DEVICE — SUT 4/0 18IN ETHILON BL P3

## (undated) DEVICE — BRUSH SCRUB HIBICLENS 4%

## (undated) DEVICE — GLOVE BIOGEL SKINSENSE PI 8.0

## (undated) DEVICE — UNDERGLOVES BIOGEL PI SIZE 8

## (undated) DEVICE — NDL 18GA X1 1/2 REG BEVEL

## (undated) DEVICE — BANDAGE ESMARK ELASTIC ST 4X9

## (undated) DEVICE — BLADE SURG STAINLESS STEEL #15

## (undated) DEVICE — TOURNIQUET SB QC DP 24X4IN

## (undated) DEVICE — SUT 2/0 36IN COATED VICRYL

## (undated) DEVICE — KIT EVACUATOR 3-SPRING 1/8 DRN

## (undated) DEVICE — SUT MONOCRYL 3-0 PS-2 UND

## (undated) DEVICE — PAD UNDERPAD 30X30

## (undated) DEVICE — DRAPE THREE-QTR REINF 53X77IN

## (undated) DEVICE — NDL SAFETY 22G X 1.5 ECLIPSE

## (undated) DEVICE — SEE MEDLINE ITEM 146270

## (undated) DEVICE — HOOD T7 W/ PEEL AWAY LENS

## (undated) DEVICE — SUT MONOCRYL 4-0 PS-2

## (undated) DEVICE — ELECTRODE REM PLYHSV RETURN 9

## (undated) DEVICE — GAUZE SPONGE 4X4 12PLY

## (undated) DEVICE — SYR B-D DISP CONTROL 10CC100/C

## (undated) DEVICE — KIT PT CARE HANA PROFX SSXT

## (undated) DEVICE — SYR 10CC LUER LOCK

## (undated) DEVICE — DRESSING TRANS 4X4 TEGADERM

## (undated) DEVICE — DRAPE SURG W/TWL 17 5/8X23

## (undated) DEVICE — SEE MEDLINE ITEM 157110

## (undated) DEVICE — BNDG COFLEX FOAM LF2 ST 4X5YD

## (undated) DEVICE — CORD BIPOLAR 12 FT STERILE

## (undated) DEVICE — BANDAGE KERLIX P/P 2.25IN STER

## (undated) DEVICE — TOWEL OR XRAY WHITE 17X26IN

## (undated) DEVICE — ELECTRODE BLADE TEFLON 6

## (undated) DEVICE — SUT ETHIBOND XTRA 1 OS-6

## (undated) DEVICE — TAPE SURG DURAPORE 2 SGL USE

## (undated) DEVICE — BANDAGE ELASTIC 2X5 VELCRO ST

## (undated) DEVICE — SEE MEDLINE ITEM 157131

## (undated) DEVICE — BANDAGE MATRIX HK LOOP 2IN 5YD

## (undated) DEVICE — BLADE SAGITTAL 18 X 1.27 X 90M

## (undated) DEVICE — DRESSING AQUACEL AG 3.5X10IN

## (undated) DEVICE — SEALER BIPOLAR TISSUE 6.0

## (undated) DEVICE — PACK UPPER EXTREMITY BAPTIST

## (undated) DEVICE — DRAPE IOBAN 2 STERI

## (undated) DEVICE — SEE MEDLINE ITEM 146268

## (undated) DEVICE — MARKER SKIN RULER STERILE

## (undated) DEVICE — PULSAVAC ZIMMER

## (undated) DEVICE — KIT TOTAL HIP HPOFH OMC

## (undated) DEVICE — SUT 1 36IN COATED VICRYL UN

## (undated) DEVICE — TOURNIQUET SB QC SP 18X4IN

## (undated) DEVICE — DRESSING LEUKOPLAST FLEX 1X3IN

## (undated) DEVICE — PACK DRAPE UNIVERSAL CONVERTOR

## (undated) DEVICE — SOL BETADINE 5%

## (undated) DEVICE — PAD CAST SPECIALIST STRL 4